# Patient Record
Sex: FEMALE | Race: BLACK OR AFRICAN AMERICAN | NOT HISPANIC OR LATINO | Employment: FULL TIME | URBAN - METROPOLITAN AREA
[De-identification: names, ages, dates, MRNs, and addresses within clinical notes are randomized per-mention and may not be internally consistent; named-entity substitution may affect disease eponyms.]

---

## 2017-03-20 DIAGNOSIS — E66.9 NON MORBID OBESITY, UNSPECIFIED OBESITY TYPE: ICD-10-CM

## 2017-03-20 NOTE — TELEPHONE ENCOUNTER
[facility] phone call message- patient requesting a refill:    Full Medication Name:naltrexone-bupropion (CONTRAVE) 8-90 MG extended release tablet    Dose:     Pharmacy confirmed as     [place], [place] - [facility] [address] [address]  Phone: [phone] Fax: [phone]  : Yes    Additional Comments: patient is requesting refill  Of above medication     OK to leave a message on voice mail? Yes    Primary language: English      needed? No    Call taken on March 20, 2017 at 3:58 PM by Maria Dolores Solomon

## 2017-03-24 ENCOUNTER — OFFICE VISIT (OUTPATIENT)
Dept: FAMILY MEDICINE | Facility: CLINIC | Age: 57
End: 2017-03-24

## 2017-03-24 VITALS
OXYGEN SATURATION: 99 % | BODY MASS INDEX: 33.79 KG/M2 | HEIGHT: 70 IN | SYSTOLIC BLOOD PRESSURE: 131 MMHG | WEIGHT: 236 LBS | HEART RATE: 65 BPM | DIASTOLIC BLOOD PRESSURE: 89 MMHG

## 2017-03-24 DIAGNOSIS — M25.511 BILATERAL SHOULDER PAIN, UNSPECIFIED CHRONICITY: ICD-10-CM

## 2017-03-24 DIAGNOSIS — M25.512 BILATERAL SHOULDER PAIN, UNSPECIFIED CHRONICITY: ICD-10-CM

## 2017-03-24 DIAGNOSIS — R10.31 RLQ ABDOMINAL PAIN: Primary | ICD-10-CM

## 2017-03-24 DIAGNOSIS — F32.A DEPRESSION, UNSPECIFIED DEPRESSION TYPE: ICD-10-CM

## 2017-03-24 RX ORDER — BUSPIRONE HYDROCHLORIDE 7.5 MG/1
7.5 TABLET ORAL 2 TIMES DAILY
Qty: 60 TABLET | Refills: 1 | Status: SHIPPED | OUTPATIENT
Start: 2017-03-24 | End: 2017-05-02

## 2017-03-24 ASSESSMENT — ENCOUNTER SYMPTOMS
NERVOUS/ANXIOUS: 1
MYALGIAS: 1
CHILLS: 0
DYSPHORIC MOOD: 1
SHORTNESS OF BREATH: 0
ABDOMINAL PAIN: 0
COUGH: 0
NECK PAIN: 1
FEVER: 0

## 2017-03-24 NOTE — PATIENT INSTRUCTIONS
Here is the plan from today's visit    1. RLQ abdominal pain  They should call you to set up an appointment  - GASTROENTEROLOGY ADULT REF PROCEDURE ONLY    2. Depression, unspecified depression type  Please follow up with me in 1 month   - busPIRone (BUSPAR) 7.5 MG tablet; Take 1 tablet (7.5 mg) by mouth 2 times daily  Dispense: 60 tablet; Refill: 1    3. Bilateral shoulder pain, unspecified chronicity  Continue taking aleve for pain.  Physical therapy should call you for an appointment  - PHYSICAL THERAPY REFERRAL    Please call or return to clinic if your symptoms don't go away.    Follow up plan  Please make a clinic appointment for follow up with me (BENJIE BONNER) in 1  month for follow up.    Thank you for coming to [facility] today.  Lab Testing:  **If you had lab testing today and your results are reassuring or normal they will be mailed to you or sent through [employer] within 7 days.   **If the lab tests need quick action we will call you with the results.  The phone number we will call with results is # [phone] (home) [phone] (work). If this is not the best number please call our clinic and change the number.  Medication Refills:  If you need any refills please call your pharmacy and they will contact us.   If you need to  your refill at a new pharmacy, please contact the new pharmacy directly. The new pharmacy will help you get your medications transferred faster.   Scheduling:  If you have any concerns about today's visit or wish to schedule another appointment please call our office during normal business hours [phone] (8-5:00 M-F)  If a referral was made to a [facility] and you don't get a call from central scheduling please call [phone].  If a Mammogram was ordered for you at [facility] call [phone] to schedule or change your appointment.  If you had an XRay/CT/Ultrasound/MRI ordered the number is [phone] to schedule or  change your radiology appointment.   Medical Concerns:  If you have urgent medical concerns please call [phone] at any time of the day.    Colonoscopy referral    Order sent to Endoscopy scheduling pool.           ThanksAgusto

## 2017-03-24 NOTE — PROGRESS NOTES
HPI:       Chiquita Butler is a 57 year old who presents for the following  Patient presents with:  Physical      Joint Pain- Right and Left Shoulder pain and weakness     Onset: 3-4 months    Injury  no    Description:   Location(s): Posterior shoulders bilaterally, across trapezius.  Occasional neck pain  Character: Feels tight.  No focal weakness or pain or loss in function or mobility.    Intensity: moderate    Progression of Symptoms: same    Accompanying Signs & Symptoms:  Other symptoms: radiation of pain to arms   History:   Previous similar pain: no      Worsened by    Overuse?: Yes Details: worse with raising arms, lifting child  Morning Stiffness?:no    Alleviating factors:  Improved by: NSAID - Aleve       Therapies Tried and outcome: Aleve, Details: uses it every day, helps.  Patient has also had massages, did not help.          Mood Symptoms     Concerns: History of depression, currently working with a grief counselor, which is bringing up more depression    How long have you been feeling this way? 6 months    Description:   Depressed Mood?: YES   Anxious Mood?:  YES     Accompanying Signs & Symptoms:  Still participating in activities that you used to enjoy?: Yes  Fatigue: No   Irritability:  YES   Difficulty concentrating:  YES   Changes in appetite: No   Problems with sleep:  YES uses ambien every other night, helps  ++++++++++++++++++++++++++++++++      Substance Use: No        Alcohol Use:occasional                Other drug use:  Never Used    Social Support           Who do you live with? Partner, 4 kids, partner's brother and his partner          Who do you turn to for support? Partner         Resources         What makes you feel better?: spending time with family         What do you do to manage stress? exercise             History          Has there been a time in your life when you needed much          less sleep than usual? No   Heart racing/beating fast : No   Thoughts of hurting  "yourself or others: No   Previous treatment Antidepressants - Currently being treated with Celexa (maximum dose)                                 Therapy: Yes: Currently going to a grief counselor    Today's PHQ-9 Score:   3    KEN Score:  6           Patient also requests a referral for a colonoscopy.  She was seen in ED 11/2016 for right lower quadrant abdominal pain.  An abdominal CT was performed which showed thickening and edema of transverse colon.  GI was consulted and patient was advised to have an \"urgent\" colonoscopy.  Patient also notes a family history of colon cancer.  She states abdominal pain has improved but she is still concerned and would like to have a colonoscopy.      Problem, Medication and Allergy Lists were reviewed and are current.  Patient is an established patient of this clinic.         Review of Systems:   Review of Systems   Constitutional: Negative for chills and fever.   HENT: Negative for congestion.    Respiratory: Negative for cough and shortness of breath.    Cardiovascular: Negative for chest pain.   Gastrointestinal: Negative for abdominal pain.   Musculoskeletal: Positive for myalgias and neck pain.   Psychiatric/Behavioral: Positive for dysphoric mood (mild depression). The patient is nervous/anxious (mild anxiety).              Physical Exam:   Patient Vitals for the past 24 hrs:   BP Pulse SpO2 Height Weight   03/24/17 1115 131/89 - - - -   03/24/17 1113 (!) 151/94 65 99 % 5' 10\" (177.8 cm) 236 lb (107 kg)     Body mass index is 33.86 kg/(m^2).  Vital signs normal except elevated blood pressure     Physical Exam   Constitutional: She is oriented to person, place, and time. She appears well-developed and well-nourished.   HENT:   Head: Normocephalic.   Neck: Normal range of motion. Neck supple.   Cardiovascular: Normal rate.    Pulmonary/Chest: Effort normal.   Musculoskeletal: Normal range of motion.   Mild forward posture.  Mild tenderness with palpation of lower " cervical/upper thoracic spine.  Trapezius muscles tight/firm to palpation bilaterally.  Patient has no limits in ROM of arms or shoulders.  MS 5/5 shoulders and upper extremities.  Negative Simental, Neers, and empty can tests bilaterally.   Neurological: She is alert and oriented to person, place, and time.   Skin: No rash noted. No erythema. No pallor.   Psychiatric: She has a normal mood and affect. Her behavior is normal. Judgment and thought content normal.   Vitals reviewed.      Results:     No labs ordered today  Assessment and Plan     Chiquita was seen today for physical.  Chiquita was planning on receiving a CPE, but due to shoulder pain, need for referral and discussion about depression, we decided to postpone CPE and address current issues.      First, patient's abdominal pain has actually improved, but this is the reason she was advised to have the colonoscopy.  Colonoscopy referral placed in Epic.      Second, we discussed patient's depression.  She is currently seeing a grief counselor and issues from the past are coming up and patient does not feel that her Celexa (currently on maximum dose) is helping as much as she would like it to.  She is open to augmentation with Buspar, so we will try this for a month and patient will return for follow up to discuss it's effect.    Third, patient has bilateral upper back/shoulder pain.  There was no inciting event and the pain is localized to the trapezius area bilaterally, so I do not think this is an actual shoulder injury such as a rotator cuff tear or impingement syndrome.  Her exam was normal (no focal weakness or pain noted).   She has a toddler and it is possible that her toddler is getting heavier, which is straining her muscles.  Patient would most likely benefit from PT and exercises to help strengthen her upper back.  PT referral given to patient.  Ok to continue Aleve, but do not recommend it's use for long periods of time (risk of kidney  injury).    Diagnoses and all orders for this visit:    RLQ abdominal pain  -     GASTROENTEROLOGY ADULT REF PROCEDURE ONLY    Depression, unspecified depression type  -     busPIRone (BUSPAR) 7.5 MG tablet; Take 1 tablet (7.5 mg) by mouth 2 times daily    Bilateral shoulder pain, unspecified chronicity  -     PHYSICAL THERAPY REFERRAL      There are no discontinued medications.  Options for treatment and follow-up care were reviewed with the patient. Chiuqita Butler  engaged in the decision making process and verbalized understanding of the options discussed and agreed with the final plan.    Kasey Daniel M.D.  PGY-2, Family Medicine

## 2017-03-24 NOTE — MR AVS SNAPSHOT
After Visit Summary   3/24/2017    Chiquita Butler    MRN: [id number]           Patient Information     Date Of Birth          1960        Visit Information        Provider Department      3/24/2017 11:00 AM Kasey Daniel MD [facility]        Today's Diagnoses     RLQ abdominal pain    -  1    Depression, unspecified depression type        Bilateral shoulder pain, unspecified chronicity          Care Instructions    Here is the plan from today's visit    1. RLQ abdominal pain  They should call you to set up an appointment  - GASTROENTEROLOGY ADULT REF PROCEDURE ONLY    2. Depression, unspecified depression type  Please follow up with me in 1 month   - busPIRone (BUSPAR) 7.5 MG tablet; Take 1 tablet (7.5 mg) by mouth 2 times daily  Dispense: 60 tablet; Refill: 1    3. Bilateral shoulder pain, unspecified chronicity  Continue taking aleve for pain.  Physical therapy should call you for an appointment  - PHYSICAL THERAPY REFERRAL    Please call or return to clinic if your symptoms don't go away.    Follow up plan  Please make a clinic appointment for follow up with me (KASEY DANIEL) in 1  month for follow up.    Thank you for coming to [facility] today.  Lab Testing:  **If you had lab testing today and your results are reassuring or normal they will be mailed to you or sent through [employer] within 7 days.   **If the lab tests need quick action we will call you with the results.  The phone number we will call with results is # [phone] (home) [phone] (work). If this is not the best number please call our clinic and change the number.  Medication Refills:  If you need any refills please call your pharmacy and they will contact us.   If you need to  your refill at a new pharmacy, please contact the new pharmacy directly. The new pharmacy will help you get your medications transferred faster.   Scheduling:  If you have any concerns about today's visit  or wish to schedule another appointment please call our office during normal business hours [phone] (8-5:00 M-F)  If a referral was made to a [facility] and you don't get a call from central scheduling please call [phone].  If a Mammogram was ordered for you at [facility] call [phone] to schedule or change your appointment.  If you had an XRay/CT/Ultrasound/MRI ordered the number is [phone] to schedule or change your radiology appointment.   Medical Concerns:  If you have urgent medical concerns please call [phone] at any time of the day.          Follow-ups after your visit        Additional Services     GASTROENTEROLOGY ADULT REF PROCEDURE ONLY       Last Lab Result: Creatinine (mg/dL)       Date                     Value                 11/13/2016               0.75             ----------  Body mass index is 33.86 kg/(m^2).     Needed:  No  Language:  English    Patient will be contacted to schedule procedure.     Please be aware that coverage of these services is subject to the terms and limitations of your health insurance plan.  Call member services at your health plan with any benefit or coverage questions.  Any procedures must be performed at a [place] facility OR coordinated by your clinic's referral office.    Please bring the following with you to your appointment:    (1) Any X-Rays, CTs or MRIs which have been performed.  Contact the facility where they were done to arrange for  prior to your scheduled appointment.    (2) List of current medications   (3) This referral request   (4) Any documents/labs given to you for this referral            PHYSICAL THERAPY REFERRAL       *This therapy referral will be filtered to a centralized scheduling office at [facility] and the patient will receive a call to schedule an appointment at a [place] location most convenient for them. *     [facility]  provides Physical Therapy evaluation and treatment and many specialty services across the [facility].  If requesting a specialty program, please choose from the list below.    If you have not heard from the scheduling office within 2 business days, please call [phone] for all locations, with the exception of [place], please call [phone].  Treatment: Evaluation & Treatment  Special Instructions/Modalities: Bilateral shoulder pain and mild neck pain  Special Programs: None    Please be aware that coverage of these services is subject to the terms and limitations of your health insurance plan.  Call member services at your health plan with any benefit or coverage questions.      **Note to Provider:  If you are referring outside of [place] for the therapy appointment, please list the name of the location in the  special instructions  above, print the referral and give to the patient to schedule the appointment.                  Who to contact     Please call your clinic at [phone] to:    Ask questions about your health    Make or cancel appointments    Discuss your medicines    Learn about your test results    Speak to your doctor   If you have compliments or concerns about an experience at your clinic, or if you wish to file a complaint, please contact [facility] Patient Relations at [phone] or email us at [email or web address]         Additional Information About Your Visit        [employer] Information     [employer] gives you secure access to your electronic health record. If you see a primary care provider, you can also send messages to your care team and make appointments. If you have questions, please call your primary care clinic.  If you do not have a primary care provider, please call [phone] and they will assist you.      [employer] is an electronic gateway that provides easy, online access to your medical records. With [employer], you can request a clinic  "appointment, read your test results, renew a prescription or communicate with your care team.     To access your existing account, please contact your [facility] or call [phone] for assistance.        Care EveryWhere ID     This is your Care EveryWhere ID. This could be used by other organizations to access your [facility] medical records  [id number]        Your Vitals Were     Pulse Height Pulse Oximetry Breastfeeding? BMI (Body Mass Index)       65 5' 10\" (177.8 cm) 99% No 33.86 kg/m2        Blood Pressure from Last 3 Encounters:   03/24/17 131/89   11/13/16 133/82   06/29/16 121/82    Weight from Last 3 Encounters:   03/24/17 236 lb (107 kg)   06/29/16 244 lb (110.7 kg)   05/27/16 238 lb 12.8 oz (108.3 kg)              We Performed the Following     GASTROENTEROLOGY ADULT REF PROCEDURE ONLY     PHYSICAL THERAPY REFERRAL          Today's Medication Changes          These changes are accurate as of: 3/24/17 11:55 AM.  If you have any questions, ask your nurse or doctor.               Start taking these medicines.        Dose/Directions    busPIRone 7.5 MG tablet   Commonly known as:  BUSPAR   Used for:  Depression, unspecified depression type   Started by:  Kasey Daniel MD        Dose:  7.5 mg   Take 1 tablet (7.5 mg) by mouth 2 times daily   Quantity:  60 tablet   Refills:  1            Where to get your medicines      These medications were sent to [address] [address]     Phone:  [phone]     busPIRone 7.5 MG tablet                Primary Care Provider Office Phone # Fax #    Amarilis Agosto [phone]       [facility] [address] [facility] [address]        Thank you!     Thank you for choosing [facility]  for your care. Our goal is always to provide you with excellent care. Hearing back from our patients is one way we can " continue to improve our services. Please take a few minutes to complete the written survey that you may receive in the mail after your visit with us. Thank you!             Your Updated Medication List - Protect others around you: Learn how to safely use, store and throw away your medicines at www.disposemymeds.org.          This list is accurate as of: 3/24/17 11:55 AM.  Always use your most recent med list.                   Brand Name Dispense Instructions for use    amLODIPine 10 MG tablet    NORVASC    90 tablet    Take 1 tablet (10 mg) by mouth daily       artificial tears Oint ophthalmic ointment     5 g    Apply a thin film to both eyes nightly for dryness       ASPIRIN PO          busPIRone 7.5 MG tablet    BUSPAR    60 tablet    Take 1 tablet (7.5 mg) by mouth 2 times daily       citalopram 40 MG tablet    celeXA    90 tablet    Take 1 tablet (40 mg) by mouth daily       losartan 100 MG tablet    COZAAR    90 tablet    Take 1 tablet (100 mg) by mouth daily       metoprolol 100 MG 24 hr tablet    TOPROL XL    90 tablet    Take 1 tablet (100 mg) by mouth daily       Multi-vitamin Tabs tablet      Take 1 tablet by mouth daily       naltrexone 50 MG tablet    DEPADE;REVIA    30 tablet    Take 1/2 tablet.  Time it one to two hours prior to worst cravings.  Then increase to one full tablet as instructed.       naltrexone-bupropion 8-90 MG per 12 hr tablet    CONTRAVE    120 tablet    Take 2 tablets by mouth 2 times daily Patient has titrated up to final dose 2tabs 2x daily.       vitamin D 2000 UNITS tablet     100 tablet    Take 2,000 Units by mouth daily       zolpidem 5 MG tablet    AMBIEN    90 tablet    Take 1 tablet (5 mg) by mouth nightly as needed

## 2017-03-24 NOTE — PROGRESS NOTES
Preceptor Attestation:   Patient seen and discussed with the resident. Assessment and plan reviewed with resident and agreed upon.   Supervising Physician:  Nick Aburto MD  [facility]

## 2017-04-05 ASSESSMENT — PATIENT HEALTH QUESTIONNAIRE - PHQ9: 5. POOR APPETITE OR OVEREATING: SEVERAL DAYS

## 2017-04-05 ASSESSMENT — ANXIETY QUESTIONNAIRES
3. WORRYING TOO MUCH ABOUT DIFFERENT THINGS: NOT AT ALL
GAD7 TOTAL SCORE: 3
2. NOT BEING ABLE TO STOP OR CONTROL WORRYING: NOT AT ALL
1. FEELING NERVOUS, ANXIOUS, OR ON EDGE: SEVERAL DAYS
5. BEING SO RESTLESS THAT IT IS HARD TO SIT STILL: NOT AT ALL
IF YOU CHECKED OFF ANY PROBLEMS ON THIS QUESTIONNAIRE, HOW DIFFICULT HAVE THESE PROBLEMS MADE IT FOR YOU TO DO YOUR WORK, TAKE CARE OF THINGS AT HOME, OR GET ALONG WITH OTHER PEOPLE: NOT DIFFICULT AT ALL
7. FEELING AFRAID AS IF SOMETHING AWFUL MIGHT HAPPEN: NOT AT ALL
6. BECOMING EASILY ANNOYED OR IRRITABLE: SEVERAL DAYS

## 2017-04-06 ASSESSMENT — ANXIETY QUESTIONNAIRES: GAD7 TOTAL SCORE: 3

## 2017-04-06 ASSESSMENT — PATIENT HEALTH QUESTIONNAIRE - PHQ9: SUM OF ALL RESPONSES TO PHQ QUESTIONS 1-9: 3

## 2017-04-17 ENCOUNTER — TELEPHONE (OUTPATIENT)
Dept: GASTROENTEROLOGY | Facility: CLINIC | Age: 57
End: 2017-04-17

## 2017-04-17 DIAGNOSIS — Z12.11 ENCOUNTER FOR SCREENING COLONOSCOPY: Primary | ICD-10-CM

## 2017-04-17 NOTE — TELEPHONE ENCOUNTER
Patient scheduled for Colonoscopy    Indication for procedure. RLQ abdominal pain    Referring Provider. Kasey Daniel MD    ? Not Needed    Arrival time verified? Yes, 0840    Facility location verified? Yes, [address]    Instructions given regarding prep and procedure    Prep Type Golytely    Are you taking any anticoagulants or blood thinners? Aspirin    Instructions given? Stopped    Electronic implanted devices? No    Pre procedure teaching completed? Yes    Transportation from procedure? Significant Other, Significant Other will stay with patient after procedure    H&P / Pre op physical completed? N/A

## 2017-04-20 ENCOUNTER — HOSPITAL ENCOUNTER (OUTPATIENT)
Facility: CLINIC | Age: 57
Discharge: HOME | End: 2017-04-20
Attending: INTERNAL MEDICINE | Admitting: INTERNAL MEDICINE
Payer: COMMERCIAL

## 2017-04-20 VITALS
DIASTOLIC BLOOD PRESSURE: 85 MMHG | OXYGEN SATURATION: 96 % | SYSTOLIC BLOOD PRESSURE: 145 MMHG | RESPIRATION RATE: 12 BRPM

## 2017-04-20 DIAGNOSIS — E66.9 NON MORBID OBESITY, UNSPECIFIED OBESITY TYPE: ICD-10-CM

## 2017-04-20 DIAGNOSIS — Z98.84 H/O GASTRIC BYPASS: Primary | ICD-10-CM

## 2017-04-20 LAB — COLONOSCOPY: NORMAL

## 2017-04-20 PROCEDURE — 25000125 ZZHC RX 250: Performed by: INTERNAL MEDICINE

## 2017-04-20 PROCEDURE — 25000128 H RX IP 250 OP 636: Performed by: INTERNAL MEDICINE

## 2017-04-20 PROCEDURE — G0104 CA SCREEN;FLEXI SIGMOIDSCOPE: HCPCS | Performed by: INTERNAL MEDICINE

## 2017-04-20 PROCEDURE — 40000104 ZZH STATISTIC MODERATE SEDATION < 10 MIN: Performed by: INTERNAL MEDICINE

## 2017-04-20 PROCEDURE — 45378 DIAGNOSTIC COLONOSCOPY: CPT | Performed by: INTERNAL MEDICINE

## 2017-04-20 RX ORDER — LIDOCAINE 40 MG/G
CREAM TOPICAL
Status: DISCONTINUED | OUTPATIENT
Start: 2017-04-20 | End: 2017-04-26 | Stop reason: HOSPADM

## 2017-04-20 RX ORDER — ONDANSETRON 2 MG/ML
4 INJECTION INTRAMUSCULAR; INTRAVENOUS
Status: DISCONTINUED | OUTPATIENT
Start: 2017-04-20 | End: 2017-04-26 | Stop reason: HOSPADM

## 2017-04-20 RX ORDER — FENTANYL CITRATE 50 UG/ML
INJECTION, SOLUTION INTRAMUSCULAR; INTRAVENOUS PRN
Status: DISCONTINUED | OUTPATIENT
Start: 2017-04-20 | End: 2017-04-26 | Stop reason: HOSPADM

## 2017-04-20 NOTE — IP AVS SNAPSHOT
MRN:[id number]                      After Visit Summary   4/20/2017    Chiquita Butler    MRN: [id number]           Thank you!     Thank you for choosing [facility] for your care. Our goal is always to provide you with excellent care. Hearing back from our patients is one way we can continue to improve our services. Please take a few minutes to complete the written survey that you may receive in the mail after you visit with us. Thank you!        Patient Information     Date Of Birth          1960        About your hospital stay     You were admitted on:  April 20, 2017 You last received care in the:  [facility], Endoscopy    You were discharged on:  April 20, 2017       Who to Call     For medical emergencies, please call 911.  For non-urgent questions about your medical care, please call your primary care provider or clinic, [phone]  For questions related to your surgery, please call your surgery clinic        Attending Provider     Provider Specialty    August Perez MD Gastroenterology       Primary Care Provider Office Phone # Fax #    Amarilis Agosto [phone]       [facility] [address] [address]        Your next 10 appointments already scheduled     May 02, 2017  8:00 AM CDT   Return Visit with Kasey Daniel MD   [facility]    [address],  [address] [address] [phone]              Further instructions from your care team       Discharge Instructions after Colonoscopy  or Sigmoidoscopy    Today you had a Colonoscopy    Activity and Diet  You were given medicine for pain. You may be dizzy or sleepy.  For 24 hours:    Do not drive or use heavy equipment.    Do not make important decisions.    Do not drink any alcohol.  You may return to your normal diet and medicines.    Discomfort    Air was placed in your colon during the exam in order to see it.  Walking helps to pass the air.  .    Follow-up    We were unable to complete your Colonoscopy today due to poor bowel prep.  You will need to reschedule your Colonoscopy and complete a 2-day prep for the next procedure.        We have sent a message to the [facility] Endoscopy Unit scheduling group regarding the above.  You should expect a call from them in the next couple of days.     When to call:    Call right away if you have:    Unusual pain in belly or chest pain not relieved with passing air.    More than 1 to 2 Tablespoons of bleeding from your rectum.    Fever above 100.6  F (37.5  C).    If you have severe pain, bleeding, or shortness of breath, go to an emergency room.    If you have questions, call:  Monday to Friday, 7 a.m. to 4:30 p.m.  Endoscopy: [phone] (We may have to call you back)    After hours  Hospital: [phone] (Ask for the GI fellow on call)    Pending Results     No orders found from 4/18/2017 to 4/21/2017.            Admission Information     Date & Time Provider Department Dept. Phone    4/20/2017 August Perez MD [facility], [place], Endoscopy [phone]      Your Vitals Were     Blood Pressure Respirations Pulse Oximetry             153/99 43 98%         MyChart Information     [employer] gives you secure access to your electronic health record. If you see a primary care provider, you can also send messages to your care team and make appointments. If you have questions, please call your primary care clinic.  If you do not have a primary care provider, please call [phone] and they will assist you.        Care EveryWhere ID     This is your Care EveryWhere ID. This could be used by other organizations to access your [place] medical records  [id number]           Review of your medicines      UNREVIEWED medicines. Ask your doctor about these medicines        Dose / Directions    amLODIPine 10 MG tablet   Commonly known as:  NORVASC   Used for:  Benign essential  hypertension        Dose:  10 mg   Take 1 tablet (10 mg) by mouth daily   Quantity:  90 tablet   Refills:  3       artificial tears Oint ophthalmic ointment   Used for:  Dry eyes, bilateral        Apply a thin film to both eyes nightly for dryness   Quantity:  5 g   Refills:  3       ASPIRIN PO   Used for:  Myopia of both eyes        Dose:  81 mg   Take 81 mg by mouth   Refills:  0       busPIRone 7.5 MG tablet   Commonly known as:  BUSPAR   Used for:  Depression, unspecified depression type        Dose:  7.5 mg   Take 1 tablet (7.5 mg) by mouth 2 times daily   Quantity:  60 tablet   Refills:  1       citalopram 40 MG tablet   Commonly known as:  celeXA   Used for:  Recurrent major depressive disorder, in partial remission (H)        Dose:  40 mg   Take 1 tablet (40 mg) by mouth daily   Quantity:  90 tablet   Refills:  3       losartan 100 MG tablet   Commonly known as:  COZAAR   Used for:  Benign essential hypertension        Dose:  100 mg   Take 1 tablet (100 mg) by mouth daily   Quantity:  90 tablet   Refills:  3       metoprolol 100 MG 24 hr tablet   Commonly known as:  TOPROL XL   Used for:  Benign essential hypertension        Dose:  100 mg   Take 1 tablet (100 mg) by mouth daily   Quantity:  90 tablet   Refills:  3       Multi-vitamin Tabs tablet   Used for:  Myopia of both eyes        Dose:  1 tablet   Take 1 tablet by mouth daily   Refills:  0       naltrexone 50 MG tablet   Commonly known as:  DEPADE;REVIA   Used for:  Morbid obesity (H)        Take 1/2 tablet.  Time it one to two hours prior to worst cravings.  Then increase to one full tablet as instructed.   Quantity:  30 tablet   Refills:  6       naltrexone-bupropion 8-90 MG per 12 hr tablet   Commonly known as:  CONTRAVE   Used for:  Non morbid obesity, unspecified obesity type        Dose:  2 tablet   Take 2 tablets by mouth 2 times daily Patient has titrated up to final dose 2tabs 2x daily.   Quantity:  120 tablet   Refills:  6       vitamin D 2000  UNITS tablet   Used for:  Health care maintenance        Dose:  2000 Units   Take 2,000 Units by mouth daily   Quantity:  100 tablet   Refills:  3       zolpidem 5 MG tablet   Commonly known as:  AMBIEN   Used for:  Persistent disorder of initiating or maintaining sleep        Dose:  5 mg   Take 1 tablet (5 mg) by mouth nightly as needed   Quantity:  90 tablet   Refills:  1                Protect others around you: Learn how to safely use, store and throw away your medicines at www.disposemymeds.org.             Medication List: This is a list of all your medications and when to take them. Check marks below indicate your daily home schedule. Keep this list as a reference.      Medications           Morning Afternoon Evening Bedtime As Needed    amLODIPine 10 MG tablet   Commonly known as:  NORVASC   Take 1 tablet (10 mg) by mouth daily                                artificial tears Oint ophthalmic ointment   Apply a thin film to both eyes nightly for dryness                                ASPIRIN PO   Take 81 mg by mouth                                busPIRone 7.5 MG tablet   Commonly known as:  BUSPAR   Take 1 tablet (7.5 mg) by mouth 2 times daily                                citalopram 40 MG tablet   Commonly known as:  celeXA   Take 1 tablet (40 mg) by mouth daily                                losartan 100 MG tablet   Commonly known as:  COZAAR   Take 1 tablet (100 mg) by mouth daily                                metoprolol 100 MG 24 hr tablet   Commonly known as:  TOPROL XL   Take 1 tablet (100 mg) by mouth daily                                Multi-vitamin Tabs tablet   Take 1 tablet by mouth daily                                naltrexone 50 MG tablet   Commonly known as:  DEPADE;REVIA   Take 1/2 tablet.  Time it one to two hours prior to worst cravings.  Then increase to one full tablet as instructed.                                naltrexone-bupropion 8-90 MG per 12 hr tablet   Commonly known as:   CONTRAVE   Take 2 tablets by mouth 2 times daily Patient has titrated up to final dose 2tabs 2x daily.                                vitamin D 2000 UNITS tablet   Take 2,000 Units by mouth daily                                zolpidem 5 MG tablet   Commonly known as:  AMBIEN   Take 1 tablet (5 mg) by mouth nightly as needed

## 2017-04-20 NOTE — OR NURSING
Pt tolerated colonoscopy well on 2L NC. Weaned to rm air after. Stool present. Procedure aborted. MD informed pt to need more prep.

## 2017-04-20 NOTE — DISCHARGE INSTRUCTIONS
Discharge Instructions after Colonoscopy  or Sigmoidoscopy    Today you had a Colonoscopy    Activity and Diet  You were given medicine for pain. You may be dizzy or sleepy.  For 24 hours:    Do not drive or use heavy equipment.    Do not make important decisions.    Do not drink any alcohol.  You may return to your normal diet and medicines.    Discomfort    Air was placed in your colon during the exam in order to see it. Walking helps to pass the air.  .    Follow-up    We were unable to complete your Colonoscopy today due to poor bowel prep.  You will need to reschedule your Colonoscopy and complete a 2-day prep for the next procedure.        We have sent a message to the [facility] Endoscopy Unit scheduling group regarding the above.  You should expect a call from them in the next couple of days.     When to call:    Call right away if you have:    Unusual pain in belly or chest pain not relieved with passing air.    More than 1 to 2 Tablespoons of bleeding from your rectum.    Fever above 100.6  F (37.5  C).    If you have severe pain, bleeding, or shortness of breath, go to an emergency room.    If you have questions, call:  Monday to Friday, 7 a.m. to 4:30 p.m.  Endoscopy: [phone] (We may have to call you back)    After hours  Hospital: [phone] (Ask for the GI fellow on call)

## 2017-04-20 NOTE — IP AVS SNAPSHOT
[facility], [place], Endoscopy    [address] [address]    Phone:  [phone]                                       After Visit Summary   4/20/2017    Chiquita Butler    MRN: [id number]           After Visit Summary Signature Page     I have received my discharge instructions, and my questions have been answered. I have discussed any challenges I see with this plan with the nurse or doctor.    ..........................................................................................................................................  Patient/Patient Representative Signature      ..........................................................................................................................................  Patient Representative Print Name and Relationship to Patient    ..................................................               ................................................  Date                                            Time    ..........................................................................................................................................  Reviewed by Signature/Title    ...................................................              ..............................................  Date                                                            Time

## 2017-04-25 ENCOUNTER — TELEPHONE (OUTPATIENT)
Dept: GASTROENTEROLOGY | Facility: CLINIC | Age: 57
End: 2017-04-25

## 2017-04-25 NOTE — TELEPHONE ENCOUNTER
Message left for patient to call back to schedule follow up colonoscopy with 2 day prep.     Salena Dhaliwal RN

## 2017-05-02 ENCOUNTER — OFFICE VISIT (OUTPATIENT)
Dept: FAMILY MEDICINE | Facility: CLINIC | Age: 57
End: 2017-05-02

## 2017-05-02 VITALS
RESPIRATION RATE: 20 BRPM | WEIGHT: 236.6 LBS | BODY MASS INDEX: 33.95 KG/M2 | TEMPERATURE: 97.5 F | SYSTOLIC BLOOD PRESSURE: 136 MMHG | HEART RATE: 67 BPM | OXYGEN SATURATION: 97 % | DIASTOLIC BLOOD PRESSURE: 84 MMHG

## 2017-05-02 DIAGNOSIS — Z01.419 ENCOUNTER FOR CERVICAL PAP SMEAR WITH PELVIC EXAM: Primary | ICD-10-CM

## 2017-05-02 DIAGNOSIS — Z11.59 NEED FOR HEPATITIS C SCREENING TEST: ICD-10-CM

## 2017-05-02 DIAGNOSIS — F32.A DEPRESSION, UNSPECIFIED DEPRESSION TYPE: ICD-10-CM

## 2017-05-02 LAB — HCV AB SERPL QL IA: NORMAL

## 2017-05-02 RX ORDER — BUSPIRONE HYDROCHLORIDE 7.5 MG/1
7.5 TABLET ORAL 2 TIMES DAILY
Qty: 60 TABLET | Refills: 1 | Status: SHIPPED | OUTPATIENT
Start: 2017-05-02 | End: 2017-08-03

## 2017-05-02 ASSESSMENT — ENCOUNTER SYMPTOMS
FEVER: 0
PALPITATIONS: 0
FATIGUE: 0
COUGH: 0
SHORTNESS OF BREATH: 0

## 2017-05-02 NOTE — PATIENT INSTRUCTIONS
Here is the plan from today's visit    1. Encounter for cervical Pap smear with pelvic exam  - Pap imaged thin layer screen with HPV - recommended age 30 - 65 years (select HPV order below)  - HPV High Risk Types DNA Cervical  -The results will take at least one week.     2. Need for hepatitis C screening test  - Hepatitis C antibody    3. Depression, unspecified depression type  - busPIRone (BUSPAR) 7.5 MG tablet; Take 1 tablet (7.5 mg) by mouth 2 times daily  Dispense: 60 tablet; Refill: 1      Please call or return to clinic if your symptoms don't go away.    Follow up plan  Follow up as needed    Thank you for coming to [facility] today.  Lab Testing:  **If you had lab testing today and your results are reassuring or normal they will be mailed to you or sent through [employer] within 7 days.   **If the lab tests need quick action we will call you with the results.  The phone number we will call with results is # [phone] (home) [phone] (work). If this is not the best number please call our clinic and change the number.  Medication Refills:  If you need any refills please call your pharmacy and they will contact us.   If you need to  your refill at a new pharmacy, please contact the new pharmacy directly. The new pharmacy will help you get your medications transferred faster.   Scheduling:  If you have any concerns about today's visit or wish to schedule another appointment please call our office during normal business hours [phone] (8-5:00 M-F)  If a referral was made to a [facility] and you don't get a call from central scheduling please call [phone].  If a Mammogram was ordered for you at [facility] call [phone] to schedule or change your appointment.  If you had an XRay/CT/Ultrasound/MRI ordered the number is [phone] to schedule or change your radiology appointment.   Medical Concerns:  If you have urgent medical concerns please call  [phone] at any time of the day.  If you have a medical emergency please call 911.

## 2017-05-02 NOTE — PROGRESS NOTES
HPI:       Chiquita Butler is a 57 year old who presents for the following  Patient presents with:  Repeat Pap Smear: annual pap      Pap smear: Patient is here to get pap smear done. Report she moved from [place] three years ago and last pap smear was five years ago. Denies any abnormality with pap smear.  She has been menopause for years       Problem, Medication and Allergy Lists were reviewed and are current.  Patient is an established patient of this clinic.         Review of Systems:   Review of Systems   Constitutional: Negative for fatigue and fever.   Respiratory: Negative for cough and shortness of breath.    Cardiovascular: Negative for chest pain, palpitations and leg swelling.             Physical Exam:   Patient Vitals for the past 24 hrs:   BP Temp Temp src Pulse Resp SpO2 Weight   05/02/17 0826 136/84 97.5  F (36.4  C) Oral 67 20 97 % 236 lb 9.6 oz (107.3 kg)     Body mass index is 33.95 kg/(m^2).  Vitals were reviewed and were normal     Physical Exam   Constitutional: She is oriented to person, place, and time. She appears well-developed and well-nourished.   HENT:   Head: Normocephalic and atraumatic.   Cardiovascular: Normal rate, regular rhythm and normal heart sounds.    No murmur heard.  Pulmonary/Chest: Effort normal and breath sounds normal. No respiratory distress. She has no wheezes. She has no rales.   Genitourinary: No tenderness in the vagina. No signs of injury around the vagina. No vaginal discharge found.   Neurological: She is alert and oriented to person, place, and time.         Results:      Results from the last 24 hoursNo results found for this or any previous visit (from the past 24 hour(s)).  Assessment and Plan     Chiquita was seen today for repeat pap smear.    Diagnoses and all orders for this visit:    Encounter for cervical Pap smear with pelvic exam  -     Pap imaged thin layer screen with HPV - recommended age 30 - 65 years (select HPV order below)  -     HPV High Risk  Types DNA Cervical    Need for hepatitis C screening test  -     Hepatitis C antibody    Depression, unspecified depression type  -     busPIRone (BUSPAR) 7.5 MG tablet; Take 1 tablet (7.5 mg) by mouth 2 times daily      Medications Discontinued During This Encounter   Medication Reason     busPIRone (BUSPAR) 7.5 MG tablet Reorder     Options for treatment and follow-up care were reviewed with the patient. Chiquita Butler  engaged in the decision making process and verbalized understanding of the options discussed and agreed with the final plan.    Symone Wright MD  PGY 3 [facility]  [phone](pg)

## 2017-05-02 NOTE — PROGRESS NOTES
Preceptor Attestation:   Patient seen and discussed with the resident.   Assessment and plan reviewed with resident and agreed upon.   Supervising Physician:  Uvaldo Bear MD  [facility]

## 2017-05-02 NOTE — MR AVS SNAPSHOT
After Visit Summary   5/2/2017    Chiquita Butler    MRN: [id number]           Patient Information     Date Of Birth          1960        Visit Information        Provider Department      5/2/2017 8:20 AM Symone Wright MD [facility]        Today's Diagnoses     Encounter for cervical Pap smear with pelvic exam    -  1    Need for hepatitis C screening test        Depression, unspecified depression type          Care Instructions    Here is the plan from today's visit    1. Encounter for cervical Pap smear with pelvic exam  - Pap imaged thin layer screen with HPV - recommended age 30 - 65 years (select HPV order below)  - HPV High Risk Types DNA Cervical  -The results will take at least one week.     2. Need for hepatitis C screening test  - Hepatitis C antibody    3. Depression, unspecified depression type  - busPIRone (BUSPAR) 7.5 MG tablet; Take 1 tablet (7.5 mg) by mouth 2 times daily  Dispense: 60 tablet; Refill: 1      Please call or return to clinic if your symptoms don't go away.    Follow up plan  Follow up as needed    Thank you for coming to [facility] today.  Lab Testing:  **If you had lab testing today and your results are reassuring or normal they will be mailed to you or sent through [employer]bi within 7 days.   **If the lab tests need quick action we will call you with the results.  The phone number we will call with results is # [phone] (home) [phone] (work). If this is not the best number please call our clinic and change the number.  Medication Refills:  If you need any refills please call your pharmacy and they will contact us.   If you need to  your refill at a new pharmacy, please contact the new pharmacy directly. The new pharmacy will help you get your medications transferred faster.   Scheduling:  If you have any concerns about today's visit or wish to schedule another appointment please call our office during normal business hours  [phone] (8-5:00 M-F)  If a referral was made to a [facility] and you don't get a call from central scheduling please call [phone].  If a Mammogram was ordered for you at [facility] call [phone] to schedule or change your appointment.  If you had an XRay/CT/Ultrasound/MRI ordered the number is [phone] to schedule or change your radiology appointment.   Medical Concerns:  If you have urgent medical concerns please call [phone] at any time of the day.  If you have a medical emergency please call [phone].        Follow-ups after your visit        Your next 10 appointments already scheduled     May 15, 2017   Procedure with Gold Tamayo MD   [facility], [place], Endoscopy ([facility], [facility])    [address] [address] [phone]           The [facility] is located on the corner of [address] and [address] on the [address]. It is easily accessible from virtually any point in the [place] [place] area, via [employer] and [employer].              Who to contact     Please call your clinic at [phone] to:    Ask questions about your health    Make or cancel appointments    Discuss your medicines    Learn about your test results    Speak to your doctor   If you have compliments or concerns about an experience at your clinic, or if you wish to file a complaint, please contact [facility] Patient Relations at [phone] or email us at [email or web address]         Additional Information About Your Visit        [employer] Information     [employer] gives you secure access to your electronic health record. If you see a primary care provider, you can also send messages to your care team and make appointments. If you have questions, please call your primary care clinic.  If you do not have a primary care provider, please call [phone] and they will assist  you.      [employer] is an electronic gateway that provides easy, online access to your medical records. With [employer], you can request a clinic appointment, read your test results, renew a prescription or communicate with your care team.     To access your existing account, please contact your [facility] or call [phone] for assistance.        Care EveryWhere ID     This is your Care EveryWhere ID. This could be used by other organizations to access your [place] medical records  [id number]        Your Vitals Were     Pulse Temperature Respirations Pulse Oximetry BMI (Body Mass Index)       67 97.5  F (36.4  C) (Oral) 20 97% 33.95 kg/m2        Blood Pressure from Last 3 Encounters:   05/02/17 136/84   04/20/17 145/85   03/24/17 131/89    Weight from Last 3 Encounters:   05/02/17 236 lb 9.6 oz (107.3 kg)   03/24/17 236 lb (107 kg)   06/29/16 244 lb (110.7 kg)              We Performed the Following     Hepatitis C antibody     HPV High Risk Types DNA Cervical     Pap imaged thin layer screen with HPV - recommended age 30 - 65 years (select HPV order below)          Where to get your medicines      These medications were sent to [address] [address]     Phone:  [phone]     busPIRone 7.5 MG tablet          Primary Care Provider Office Phone # Fax #    Amarilis Agosto [phone]       [facility] [address] [facility] [address]        Thank you!     Thank you for choosing [facility]  for your care. Our goal is always to provide you with excellent care. Hearing back from our patients is one way we can continue to improve our services. Please take a few minutes to complete the written survey that you may receive in the mail after your visit with us. Thank you!             Your Updated Medication List - Protect others around you: Learn how to safely  use, store and throw away your medicines at www.disposemymeds.org.          This list is accurate as of: 5/2/17  9:10 AM.  Always use your most recent med list.                   Brand Name Dispense Instructions for use    amLODIPine 10 MG tablet    NORVASC    90 tablet    Take 1 tablet (10 mg) by mouth daily       artificial tears Oint ophthalmic ointment     5 g    Apply a thin film to both eyes nightly for dryness       ASPIRIN PO      Take 81 mg by mouth       busPIRone 7.5 MG tablet    BUSPAR    60 tablet    Take 1 tablet (7.5 mg) by mouth 2 times daily       citalopram 40 MG tablet    celeXA    90 tablet    Take 1 tablet (40 mg) by mouth daily       losartan 100 MG tablet    COZAAR    90 tablet    Take 1 tablet (100 mg) by mouth daily       metoprolol 100 MG 24 hr tablet    TOPROL XL    90 tablet    Take 1 tablet (100 mg) by mouth daily       Multi-vitamin Tabs tablet      Take 1 tablet by mouth daily       naltrexone 50 MG tablet    DEPADE;REVIA    30 tablet    Take 1/2 tablet.  Time it one to two hours prior to worst cravings.  Then increase to one full tablet as instructed.       naltrexone-bupropion 8-90 MG per 12 hr tablet    CONTRAVE    120 tablet    Take 2 tablets by mouth 2 times daily Patient has titrated up to final dose 2tabs 2x daily.       vitamin D 2000 UNITS tablet     100 tablet    Take 2,000 Units by mouth daily       zolpidem 5 MG tablet    AMBIEN    90 tablet    Take 1 tablet (5 mg) by mouth nightly as needed

## 2017-05-04 LAB
COPATH REPORT: NORMAL
PAP: NORMAL

## 2017-05-05 LAB
FINAL DIAGNOSIS: NORMAL
HPV HR 12 DNA CVX QL NAA+PROBE: NEGATIVE
HPV16 DNA SPEC QL NAA+PROBE: NEGATIVE
HPV18 DNA SPEC QL NAA+PROBE: NEGATIVE
SPECIMEN DESCRIPTION: NORMAL

## 2017-05-08 ENCOUNTER — TELEPHONE (OUTPATIENT)
Dept: GASTROENTEROLOGY | Facility: CLINIC | Age: 57
End: 2017-05-08

## 2017-05-08 DIAGNOSIS — Z12.11 ENCOUNTER FOR SCREENING COLONOSCOPY: Primary | ICD-10-CM

## 2017-05-08 RX ORDER — BISACODYL 5 MG/1
6 TABLET, DELAYED RELEASE ORAL ONCE
Qty: 6 TABLET | Refills: 0 | Status: SHIPPED | OUTPATIENT
Start: 2017-05-08 | End: 2017-05-08

## 2017-05-08 NOTE — TELEPHONE ENCOUNTER
Patient scheduled for Colonoscopy    Indication for procedure. RLQ abdominal pain    Referring Provider. Kasey Daniel MD    ? Not Needed    Arrival time verified? Yes, 1400    Facility location verified? Yes, [address]    Instructions given regarding prep and procedure    Prep Type 2 day Golytely prep (Past poor prep)    Are you taking any anticoagulants or blood thinners? Aspirin    Instructions given? Stopped    Electronic implanted devices? No    Pre procedure teaching completed? Yes    Transportation from procedure? Significant Other, Significant Other will stay with patient after procedure    H&P / Pre op physical completed? N/A

## 2017-05-09 DIAGNOSIS — G47.00 PERSISTENT DISORDER OF INITIATING OR MAINTAINING SLEEP: ICD-10-CM

## 2017-05-09 NOTE — TELEPHONE ENCOUNTER
Date of last visit at clinic: 5/2/17    Please complete refill and CLOSE ENCOUNTER.  Closing the encounter signifies the refill is complete.

## 2017-05-10 RX ORDER — ZOLPIDEM TARTRATE 5 MG/1
5 TABLET ORAL
Qty: 30 TABLET | Refills: 1 | Status: SHIPPED | OUTPATIENT
Start: 2017-05-10 | End: 2017-12-15

## 2017-05-10 NOTE — TELEPHONE ENCOUNTER
Please process per controlled substance protocol. I have reviewed pt's chart and this refill is appropriate. Please provide rx to preceptor to sign, then provide to patient's pharmacy and notify patient.   Amarilis Agosto MD

## 2017-05-15 ENCOUNTER — HOSPITAL ENCOUNTER (OUTPATIENT)
Facility: CLINIC | Age: 57
Discharge: HOME | End: 2017-05-15
Attending: INTERNAL MEDICINE | Admitting: INTERNAL MEDICINE
Payer: COMMERCIAL

## 2017-05-15 VITALS
RESPIRATION RATE: 20 BRPM | OXYGEN SATURATION: 98 % | DIASTOLIC BLOOD PRESSURE: 84 MMHG | WEIGHT: 225 LBS | SYSTOLIC BLOOD PRESSURE: 140 MMHG | HEIGHT: 70 IN | BODY MASS INDEX: 32.21 KG/M2

## 2017-05-15 LAB — COLONOSCOPY: NORMAL

## 2017-05-15 PROCEDURE — 45378 DIAGNOSTIC COLONOSCOPY: CPT | Performed by: INTERNAL MEDICINE

## 2017-05-15 PROCEDURE — 99153 MOD SED SAME PHYS/QHP EA: CPT | Performed by: INTERNAL MEDICINE

## 2017-05-15 PROCEDURE — 25000128 H RX IP 250 OP 636: Performed by: INTERNAL MEDICINE

## 2017-05-15 PROCEDURE — G0500 MOD SEDAT ENDO SERVICE >5YRS: HCPCS | Performed by: INTERNAL MEDICINE

## 2017-05-15 PROCEDURE — 25000132 ZZH RX MED GY IP 250 OP 250 PS 637: Performed by: INTERNAL MEDICINE

## 2017-05-15 PROCEDURE — 25000125 ZZHC RX 250: Performed by: INTERNAL MEDICINE

## 2017-05-15 RX ORDER — SIMETHICONE
LIQUID (ML) MISCELLANEOUS PRN
Status: DISCONTINUED | OUTPATIENT
Start: 2017-05-15 | End: 2017-05-15 | Stop reason: HOSPADM

## 2017-05-15 RX ORDER — FENTANYL CITRATE 50 UG/ML
INJECTION, SOLUTION INTRAMUSCULAR; INTRAVENOUS PRN
Status: DISCONTINUED | OUTPATIENT
Start: 2017-05-15 | End: 2017-05-15 | Stop reason: HOSPADM

## 2017-05-15 NOTE — IP AVS SNAPSHOT
[facility], [place], Endoscopy    [address] [address]    Phone:  [phone]                                       After Visit Summary   5/15/2017    Chiquita Butler    MRN: [id number]           After Visit Summary Signature Page     I have received my discharge instructions, and my questions have been answered. I have discussed any challenges I see with this plan with the nurse or doctor.    ..........................................................................................................................................  Patient/Patient Representative Signature      ..........................................................................................................................................  Patient Representative Print Name and Relationship to Patient    ..................................................               ................................................  Date                                            Time    ..........................................................................................................................................  Reviewed by Signature/Title    ...................................................              ..............................................  Date                                                            Time

## 2017-05-15 NOTE — IP AVS SNAPSHOT
MRN:[id number]                      After Visit Summary   5/15/2017    Chiquita Butler    MRN: [id number]           Thank you!     Thank you for choosing [facility] for your care. Our goal is always to provide you with excellent care. Hearing back from our patients is one way we can continue to improve our services. Please take a few minutes to complete the written survey that you may receive in the mail after you visit with us. Thank you!        Patient Information     Date Of Birth          1960        About your hospital stay     You were admitted on:  May 15, 2017 You last received care in the:  [facility], Endoscopy    You were discharged on:  May 15, 2017       Who to Call     For medical emergencies, please call 911.  For non-urgent questions about your medical care, please call your primary care provider or clinic, [phone]  For questions related to your surgery, please call your surgery clinic        Attending Provider     Provider Specialty    Gold Tamayo MD Gastroenterology       Primary Care Provider Office Phone # Fax #    Amarilis MD Surendra [phone]       [place] [id number]        Further instructions from your care team       Discharge Instructions after Colonoscopy  or Sigmoidoscopy    Today you had a ____ Colonoscopy ____ Sigmoidoscopy    Activity and Diet  You were given medicine for pain. You may be dizzy or sleepy.  For 24 hours:    Do not drive or use heavy equipment.    Do not make important decisions.    Do not drink any alcohol.  You may return to your normal diet and medicines.    Discomfort    Air was placed in your colon during the exam in order to see it. Walking helps to pass the air.    You may take Tylenol (acetaminophen) for pain unless your doctor has told you not to.  Do not take aspirin or ibuprofen (Advil, Motrin, or other anti-inflammatory  drugs) for _____ days.    Follow-up  ____ We took small  "tissue samples or polyps to study. Your doctor will call you with the results  within two weeks.    When to call:    Call right away if you have:    Unusual pain in belly or chest pain not relieved with passing air.    More than 1 to 2 Tablespoons of bleeding from your rectum.    Fever above 100.6  F (37.5  C).    If you have severe pain, bleeding, or shortness of breath, go to an emergency room.    If you have questions, call:  Monday to Friday, 7 a.m. to 4:30 p.m.  Endoscopy: [phone] (We may have to call you back)    After hours  Hospital: [phone] (Ask for the GI fellow on call)    Pending Results     No orders found from 5/13/2017 to 5/16/2017.            Admission Information     Date & Time Provider Department Dept. Phone    5/15/2017 Gold Tamayo MD [facility], [place], Endoscopy [phone]      Your Vitals Were     Blood Pressure Respirations Height Weight Pulse Oximetry BMI (Body Mass Index)    129/86 17 1.778 m (5' 10\") 102.1 kg (225 lb) 97% 32.28 kg/m2      [email or web address] Information     [employer] gives you secure access to your electronic health record. If you see a primary care provider, you can also send messages to your care team and make appointments. If you have questions, please call your primary care clinic.  If you do not have a primary care provider, please call [phone] and they will assist you.        Care EveryWhere ID     This is your Care EveryWhere ID. This could be used by other organizations to access your [place] medical records  [id number]           Review of your medicines      UNREVIEWED medicines. Ask your doctor about these medicines        Dose / Directions    amLODIPine 10 MG tablet   Commonly known as:  NORVASC   Used for:  Benign essential hypertension        Dose:  10 mg   Take 1 tablet (10 mg) by mouth daily   Quantity:  90 tablet   Refills:  3       artificial tears Oint ophthalmic ointment   Used for:  Dry eyes, bilateral        Apply a thin film to both eyes " nightly for dryness   Quantity:  5 g   Refills:  3       ASPIRIN PO   Used for:  Myopia of both eyes        Dose:  81 mg   Take 81 mg by mouth   Refills:  0       busPIRone 7.5 MG tablet   Commonly known as:  BUSPAR   Used for:  Depression, unspecified depression type        Dose:  7.5 mg   Take 1 tablet (7.5 mg) by mouth 2 times daily   Quantity:  60 tablet   Refills:  1       citalopram 40 MG tablet   Commonly known as:  celeXA   Used for:  Recurrent major depressive disorder, in partial remission (H)        Dose:  40 mg   Take 1 tablet (40 mg) by mouth daily   Quantity:  90 tablet   Refills:  3       losartan 100 MG tablet   Commonly known as:  COZAAR   Used for:  Benign essential hypertension        Dose:  100 mg   Take 1 tablet (100 mg) by mouth daily   Quantity:  90 tablet   Refills:  3       metoprolol 100 MG 24 hr tablet   Commonly known as:  TOPROL XL   Used for:  Benign essential hypertension        Dose:  100 mg   Take 1 tablet (100 mg) by mouth daily   Quantity:  90 tablet   Refills:  3       Multi-vitamin Tabs tablet   Used for:  Myopia of both eyes        Dose:  1 tablet   Take 1 tablet by mouth daily   Refills:  0       naltrexone 50 MG tablet   Commonly known as:  DEPADE;REVIA   Used for:  Morbid obesity (H)        Take 1/2 tablet.  Time it one to two hours prior to worst cravings.  Then increase to one full tablet as instructed.   Quantity:  30 tablet   Refills:  6       naltrexone-bupropion 8-90 MG per 12 hr tablet   Commonly known as:  CONTRAVE   Used for:  Non morbid obesity, unspecified obesity type        Dose:  2 tablet   Take 2 tablets by mouth 2 times daily Patient has titrated up to final dose 2tabs 2x daily.   Quantity:  120 tablet   Refills:  6       vitamin D 2000 UNITS tablet   Used for:  Health care maintenance        Dose:  2000 Units   Take 2,000 Units by mouth daily   Quantity:  100 tablet   Refills:  3       zolpidem 5 MG tablet   Commonly known as:  AMBIEN   Used for:  Persistent  disorder of initiating or maintaining sleep        Dose:  5 mg   Take 1 tablet (5 mg) by mouth nightly as needed   Quantity:  30 tablet   Refills:  1                Protect others around you: Learn how to safely use, store and throw away your medicines at www.disposemymeds.org.             Medication List: This is a list of all your medications and when to take them. Check marks below indicate your daily home schedule. Keep this list as a reference.      Medications           Morning Afternoon Evening Bedtime As Needed    amLODIPine 10 MG tablet   Commonly known as:  NORVASC   Take 1 tablet (10 mg) by mouth daily                                artificial tears Oint ophthalmic ointment   Apply a thin film to both eyes nightly for dryness                                ASPIRIN PO   Take 81 mg by mouth                                busPIRone 7.5 MG tablet   Commonly known as:  BUSPAR   Take 1 tablet (7.5 mg) by mouth 2 times daily                                citalopram 40 MG tablet   Commonly known as:  celeXA   Take 1 tablet (40 mg) by mouth daily                                losartan 100 MG tablet   Commonly known as:  COZAAR   Take 1 tablet (100 mg) by mouth daily                                metoprolol 100 MG 24 hr tablet   Commonly known as:  TOPROL XL   Take 1 tablet (100 mg) by mouth daily                                Multi-vitamin Tabs tablet   Take 1 tablet by mouth daily                                naltrexone 50 MG tablet   Commonly known as:  DEPADE;REVIA   Take 1/2 tablet.  Time it one to two hours prior to worst cravings.  Then increase to one full tablet as instructed.                                naltrexone-bupropion 8-90 MG per 12 hr tablet   Commonly known as:  CONTRAVE   Take 2 tablets by mouth 2 times daily Patient has titrated up to final dose 2tabs 2x daily.                                vitamin D 2000 UNITS tablet   Take 2,000 Units by mouth daily                                 zolpidem 5 MG tablet   Commonly known as:  AMBIEN   Take 1 tablet (5 mg) by mouth nightly as needed

## 2017-05-15 NOTE — DISCHARGE INSTRUCTIONS
Discharge Instructions after Colonoscopy  or Sigmoidoscopy    Today you had a ____ Colonoscopy ____ Sigmoidoscopy    Activity and Diet  You were given medicine for pain. You may be dizzy or sleepy.  For 24 hours:    Do not drive or use heavy equipment.    Do not make important decisions.    Do not drink any alcohol.  You may return to your normal diet and medicines.    Discomfort    Air was placed in your colon during the exam in order to see it. Walking helps to pass the air.    You may take Tylenol (acetaminophen) for pain unless your doctor has told you not to.  Do not take aspirin or ibuprofen (Advil, Motrin, or other anti-inflammatory  drugs) for _____ days.    Follow-up  ____ We took small tissue samples or polyps to study. Your doctor will call you with the results  within two weeks.    When to call:    Call right away if you have:    Unusual pain in belly or chest pain not relieved with passing air.    More than 1 to 2 Tablespoons of bleeding from your rectum.    Fever above 100.6  F (37.5  C).    If you have severe pain, bleeding, or shortness of breath, go to an emergency room.    If you have questions, call:  Monday to Friday, 7 a.m. to 4:30 p.m.  Endoscopy: [phone] (We may have to call you back)    After hours  Hospital: [phone] (Ask for the GI fellow on call)

## 2017-05-15 NOTE — OR NURSING
Colonoscopy started but aborted d/t poor prep.  Pt tolerated procedure well.  VSS on 2L NC.  Sedation was given per MD instruction.  Pt instructed to reschedule procedure with additional prep.

## 2017-07-06 DIAGNOSIS — I10 BENIGN ESSENTIAL HYPERTENSION: ICD-10-CM

## 2017-07-06 NOTE — TELEPHONE ENCOUNTER
Request for medication refill:    Date of last visit at clinic: 5-2-17    Please complete refill if appropriate and CLOSE ENCOUNTER.    Closing the encounter signifies the refill is complete.    If refill has been denied, please complete the smart phrase .smirefuse and route it to the [facility] RN TRIAGE pool to inform the patient and the pharmacy.    Aissatou Archibald

## 2017-07-07 DIAGNOSIS — I10 BENIGN ESSENTIAL HYPERTENSION: ICD-10-CM

## 2017-07-07 NOTE — TELEPHONE ENCOUNTER
Request for medication refill:    Date of last visit at clinic: 5/2/17    Please complete refill if appropriate and CLOSE ENCOUNTER.    Closing the encounter signifies the refill is complete.    If refill has been denied, please complete the smart phrase .smirefuse and route it to the [facility] RN TRIAGE pool to inform the patient and the pharmacy.    KARSON Bautista

## 2017-07-10 RX ORDER — LOSARTAN POTASSIUM 100 MG/1
100 TABLET ORAL DAILY
Qty: 90 TABLET | Refills: 3 | Status: SHIPPED | OUTPATIENT
Start: 2017-07-10 | End: 2018-07-27

## 2017-07-10 RX ORDER — AMLODIPINE BESYLATE 10 MG/1
10 TABLET ORAL DAILY
Qty: 90 TABLET | Refills: 3 | Status: SHIPPED | OUTPATIENT
Start: 2017-07-10 | End: 2017-12-22

## 2017-07-11 DIAGNOSIS — I10 BENIGN ESSENTIAL HYPERTENSION: ICD-10-CM

## 2017-07-11 DIAGNOSIS — F33.41 RECURRENT MAJOR DEPRESSIVE DISORDER, IN PARTIAL REMISSION (H): ICD-10-CM

## 2017-07-11 NOTE — TELEPHONE ENCOUNTER
Request for medication refill:    Date of last visit at clinic: 5-2-17    Please complete refill if appropriate and CLOSE ENCOUNTER.    Closing the encounter signifies the refill is complete.    If refill has been denied, please complete the smart phrase .smirefuse and route it to the [facility] RN TRIAGE pool to inform the patient and the pharmacy.    Vannesa Gonzalez MA

## 2017-07-12 RX ORDER — CITALOPRAM HYDROBROMIDE 40 MG/1
40 TABLET ORAL DAILY
Qty: 90 TABLET | Refills: 3 | Status: SHIPPED | OUTPATIENT
Start: 2017-07-12 | End: 2018-07-30

## 2017-07-12 RX ORDER — METOPROLOL SUCCINATE 100 MG/1
100 TABLET, EXTENDED RELEASE ORAL DAILY
Qty: 90 TABLET | Refills: 3 | Status: SHIPPED | OUTPATIENT
Start: 2017-07-12 | End: 2018-07-30

## 2017-08-03 DIAGNOSIS — F32.A DEPRESSION, UNSPECIFIED DEPRESSION TYPE: ICD-10-CM

## 2017-08-04 RX ORDER — BUSPIRONE HYDROCHLORIDE 7.5 MG/1
7.5 TABLET ORAL 2 TIMES DAILY
Qty: 180 TABLET | Refills: 1 | Status: SHIPPED | OUTPATIENT
Start: 2017-08-04 | End: 2017-12-22

## 2017-10-10 ENCOUNTER — ALLIED HEALTH/NURSE VISIT (OUTPATIENT)
Dept: NURSING | Facility: CLINIC | Age: 57
End: 2017-10-10
Payer: COMMERCIAL

## 2017-10-10 DIAGNOSIS — Z23 NEED FOR PROPHYLACTIC VACCINATION AND INOCULATION AGAINST INFLUENZA: Primary | ICD-10-CM

## 2017-10-10 PROCEDURE — 90686 IIV4 VACC NO PRSV 0.5 ML IM: CPT

## 2017-10-10 PROCEDURE — 99207 ZZC NO CHARGE NURSE ONLY: CPT

## 2017-10-10 PROCEDURE — 90471 IMMUNIZATION ADMIN: CPT

## 2017-10-10 NOTE — PROGRESS NOTES
Injectable Influenza Immunization Documentation    1.  Is the person to be vaccinated sick today?   No    2. Does the person to be vaccinated have an allergy to a component   of the vaccine?   No    3. Has the person to be vaccinated ever had a serious reaction   to influenza vaccine in the past?   No    4. Has the person to be vaccinated ever had Guillain-Barré syndrome?   No    Form completed by Sugey Nielsen

## 2017-10-10 NOTE — MR AVS SNAPSHOT
After Visit Summary   10/10/2017    Chiquita Butler    MRN: [id number]           Patient Information     Date Of Birth          1960        Visit Information        Provider Department      10/10/2017 11:50 AM CARE COORDINATOR [facility]        Today's Diagnoses     Need for prophylactic vaccination and inoculation against influenza    -  1       Follow-ups after your visit        Who to contact     If you have questions or need follow up information about today's clinic visit or your schedule please contact [facility] directly at [phone].  Normal or non-critical lab and imaging results will be communicated to you by [employer], letter or phone within 4 business days after the clinic has received the results. If you do not hear from us within 7 days, please contact the clinic through [employer] or phone. If you have a critical or abnormal lab result, we will notify you by phone as soon as possible.  Submit refill requests through [employer]uli or call your pharmacy and they will forward the refill request to us. Please allow 3 business days for your refill to be completed.          Additional Information About Your Visit        MyChart Information     [employer] gives you secure access to your electronic health record. If you see a primary care provider, you can also send messages to your care team and make appointments. If you have questions, please call your primary care clinic.  If you do not have a primary care provider, please call [phone] and they will assist you.        Care EveryWhere ID     This is your Care EveryWhere ID. This could be used by other organizations to access your [place] medical records  [id number]         Blood Pressure from Last 3 Encounters:   05/15/17 140/84   05/02/17 136/84   04/20/17 145/85    Weight from Last 3 Encounters:   05/15/17 225 lb (102.1 kg)   05/02/17 236 lb 9.6 oz (107.3 kg)   03/24/17 236 lb (107 kg)               We Performed the Following     FLU VAC, SPLIT VIRUS IM > 3 YO (QUADRIVALENT) [09615]     Vaccine Administration, Initial [38570]        Primary Care Provider Office Phone # Fax #    Amarilis Agosto [phone]       [address] [address]        Equal Access to Services     RONASIRENA ELLI : Hadii aad ku hadasho Soomaali, waaxda luqadaha, qaybta kaalmada adeegyada, waxay emiliain hayshantelln sukhwinder jimsanna herrera. So [facility] [phone].    ATENCIÓN: Si habla español, tiene a peng disposición servicios gratuitos de asistencia lingüística. Lawsonamarjit al [phone].    We comply with applicable federal civil rights laws and [place] laws. We do not discriminate on the basis of race, color, national origin, age, disability, sex, sexual orientation, or gender identity.            Thank you!     Thank you for choosing [facility]  for your care. Our goal is always to provide you with excellent care. Hearing back from our patients is one way we can continue to improve our services. Please take a few minutes to complete the written survey that you may receive in the mail after your visit with us. Thank you!             Your Updated Medication List - Protect others around you: Learn how to safely use, store and throw away your medicines at www.disposemymeds.org.          This list is accurate as of: 10/10/17 12:19 PM.  Always use your most recent med list.                   Brand Name Dispense Instructions for use Diagnosis    amLODIPine 10 MG tablet    NORVASC    90 tablet    Take 1 tablet (10 mg) by mouth daily    Benign essential hypertension       artificial tears Oint ophthalmic ointment     5 g    Apply a thin film to both eyes nightly for dryness    Dry eyes, bilateral       ASPIRIN PO      Take 81 mg by mouth    Myopia of both eyes       busPIRone 7.5 MG tablet    BUSPAR    180 tablet    Take 1 tablet (7.5 mg) by mouth 2 times daily    Depression, unspecified  depression type       citalopram 40 MG tablet    celeXA    90 tablet    Take 1 tablet (40 mg) by mouth daily    Recurrent major depressive disorder, in partial remission (H)       losartan 100 MG tablet    COZAAR    90 tablet    Take 1 tablet (100 mg) by mouth daily    Benign essential hypertension       metoprolol 100 MG 24 hr tablet    TOPROL XL    90 tablet    Take 1 tablet (100 mg) by mouth daily    Benign essential hypertension       Multi-vitamin Tabs tablet      Take 1 tablet by mouth daily    Myopia of both eyes       naltrexone 50 MG tablet    DEPADE;REVIA    30 tablet    Take 1/2 tablet.  Time it one to two hours prior to worst cravings.  Then increase to one full tablet as instructed.    Morbid obesity (H)       naltrexone-bupropion 8-90 MG per 12 hr tablet    CONTRAVE    120 tablet    Take 2 tablets by mouth 2 times daily Patient has titrated up to final dose 2tabs 2x daily.    Non morbid obesity, unspecified obesity type       vitamin D 2000 UNITS tablet     100 tablet    Take 2,000 Units by mouth daily    Health care maintenance       zolpidem 5 MG tablet    AMBIEN    30 tablet    Take 1 tablet (5 mg) by mouth nightly as needed    Persistent disorder of initiating or maintaining sleep

## 2017-12-15 ENCOUNTER — DOCUMENTATION ONLY (OUTPATIENT)
Dept: FAMILY MEDICINE | Facility: CLINIC | Age: 57
End: 2017-12-15

## 2017-12-15 DIAGNOSIS — G47.00 PERSISTENT DISORDER OF INITIATING OR MAINTAINING SLEEP: ICD-10-CM

## 2017-12-15 RX ORDER — ZOLPIDEM TARTRATE 5 MG/1
5 TABLET ORAL
Qty: 30 TABLET | Refills: 1 | Status: SHIPPED | OUTPATIENT
Start: 2017-12-15 | End: 2018-04-02

## 2017-12-22 ENCOUNTER — OFFICE VISIT (OUTPATIENT)
Dept: FAMILY MEDICINE | Facility: CLINIC | Age: 57
End: 2017-12-22
Payer: COMMERCIAL

## 2017-12-22 VITALS
SYSTOLIC BLOOD PRESSURE: 135 MMHG | WEIGHT: 225.2 LBS | DIASTOLIC BLOOD PRESSURE: 78 MMHG | HEIGHT: 68 IN | HEART RATE: 79 BPM | OXYGEN SATURATION: 95 % | RESPIRATION RATE: 18 BRPM | TEMPERATURE: 97.4 F | BODY MASS INDEX: 34.13 KG/M2

## 2017-12-22 DIAGNOSIS — Z13.220 LIPID SCREENING: ICD-10-CM

## 2017-12-22 DIAGNOSIS — I10 BENIGN ESSENTIAL HYPERTENSION: ICD-10-CM

## 2017-12-22 DIAGNOSIS — F32.A DEPRESSION, UNSPECIFIED DEPRESSION TYPE: ICD-10-CM

## 2017-12-22 DIAGNOSIS — Z12.11 SPECIAL SCREENING FOR MALIGNANT NEOPLASMS, COLON: Primary | ICD-10-CM

## 2017-12-22 RX ORDER — AMLODIPINE BESYLATE 10 MG/1
10 TABLET ORAL DAILY
Qty: 90 TABLET | Refills: 3 | Status: SHIPPED | OUTPATIENT
Start: 2017-12-22 | End: 2019-01-14

## 2017-12-22 RX ORDER — BUSPIRONE HYDROCHLORIDE 7.5 MG/1
7.5 TABLET ORAL 2 TIMES DAILY
Qty: 180 TABLET | Refills: 1 | Status: SHIPPED | OUTPATIENT
Start: 2017-12-22 | End: 2018-07-27

## 2017-12-22 ASSESSMENT — ENCOUNTER SYMPTOMS
FEVER: 0
PALPITATIONS: 0
SHORTNESS OF BREATH: 0
ABDOMINAL PAIN: 0
COUGH: 1
FATIGUE: 0
HEADACHES: 0
NERVOUS/ANXIOUS: 0
CHILLS: 0

## 2017-12-22 NOTE — MR AVS SNAPSHOT
After Visit Summary   12/22/2017    Chiquita Butler    MRN: [id number]           Patient Information     Date Of Birth          1960        Visit Information        Provider Department      12/22/2017 2:00 PM Kasey Daniel MD [facility]        Today's Diagnoses     Special screening for malignant neoplasms, colon    -  1    Depression, unspecified depression type        Benign essential hypertension          Care Instructions    Here is the plan from today's visit    1. Depression, unspecified depression type  - busPIRone (BUSPAR) 7.5 MG tablet; Take 1 tablet (7.5 mg) by mouth 2 times daily  Dispense: 180 tablet; Refill: 1    2. Benign essential hypertension  - amLODIPine (NORVASC) 10 MG tablet; Take 1 tablet (10 mg) by mouth daily  Dispense: 90 tablet; Refill: 3    3. Special screening for malignant neoplasms, colon  - Fecal colorectal cancer screen FIT; Future    Please call or return to clinic if your symptoms don't go away.    Follow up plan  Please make a clinic appointment for follow up with me (KASEY DANIEL) or your primary physician Link, Amarilis in 6 months or as needed.    Thank you for coming to [facility] today.  Lab Testing:  **If you had lab testing today and your results are reassuring or normal they will be mailed to you or sent through 115 network disks within 7 days.   **If the lab tests need quick action we will call you with the results.  The phone number we will call with results is # [phone] (home) [phone] (work). If this is not the best number please call our clinic and change the number.  Medication Refills:  If you need any refills please call your pharmacy and they will contact us.   If you need to  your refill at a new pharmacy, please contact the new pharmacy directly. The new pharmacy will help you get your medications transferred faster.   Scheduling:  If you have any concerns about today's visit or wish to schedule another  appointment please call our office during normal business hours [phone] (8-5:00 M-F)  If a referral was made to a [facility] and you don't get a call from central scheduling please call [phone].  If a Mammogram was ordered for you at [facility] call [phone] to schedule or change your appointment.  If you had an XRay/CT/Ultrasound/MRI ordered the number is [phone] to schedule or change your radiology appointment.   Medical Concerns:  If you have urgent medical concerns please call [phone] at any time of the day.            Follow-ups after your visit        Future tests that were ordered for you today     Open Future Orders        Priority Expected Expires Ordered    Fecal colorectal cancer screen FIT Routine 1/12/2018 3/16/2018 12/22/2017            Who to contact     Please call your clinic at [phone] to:    Ask questions about your health    Make or cancel appointments    Discuss your medicines    Learn about your test results    Speak to your doctor   If you have compliments or concerns about an experience at your clinic, or if you wish to file a complaint, please contact [facility] Patient Relations at [phone] or email us at [email or web address]         Additional Information About Your Visit        [facility] Information     [employer] gives you secure access to your electronic health record. If you see a primary care provider, you can also send messages to your care team and make appointments. If you have questions, please call your primary care clinic.  If you do not have a primary care provider, please call [phone] and they will assist you.      [employer] is an electronic gateway that provides easy, online access to your medical records. With [employer], you can request a clinic appointment, read your test results, renew a prescription or communicate with your care team.     To access your existing account,  "please contact your [facility] or call [phone] for assistance.        Care EveryWhere ID     This is your Care EveryWhere ID. This could be used by other organizations to access your [facility] medical records  [id number]        Your Vitals Were     Pulse Temperature Respirations Height Pulse Oximetry BMI (Body Mass Index)    79 97.4  F (36.3  C) (Oral) 18 5' 7.72\" (172 cm) 95% 34.53 kg/m2       Blood Pressure from Last 3 Encounters:   12/22/17 135/78   05/15/17 140/84   05/02/17 136/84    Weight from Last 3 Encounters:   12/22/17 225 lb 3.2 oz (102.2 kg)   05/15/17 225 lb (102.1 kg)   05/02/17 236 lb 9.6 oz (107.3 kg)                 Where to get your medicines      These medications were sent to [address] [address]     Phone:  [phone]     amLODIPine 10 MG tablet    busPIRone 7.5 MG tablet          Primary Care Provider Office Phone # Fax #    Amarilis Agosto [phone]       [address] [address]        Equal Access to Services     LUIGI CALLOWAY AH: Hadii darrel ku hadasho Soomaali, waaxda luqadaha, qaybta kaalmada adeegyada, waxay emiliain hayshantelln sukhwinder herrera. So [facility] [phone].    ATENCIÓN: Si habla español, tiene a peng disposición servicios gratuitos de asistencia lingüística. amarjit al [phone].    We comply with applicable federal civil rights laws and [place] laws. We do not discriminate on the basis of race, color, national origin, age, disability, sex, sexual orientation, or gender identity.            Thank you!     Thank you for choosing [facility]  for your care. Our goal is always to provide you with excellent care. Hearing back from our patients is one way we can continue to improve our services. Please take a few minutes to complete the written survey that you may receive in the mail after your visit with us. " Thank you!             Your Updated Medication List - Protect others around you: Learn how to safely use, store and throw away your medicines at www.disposemymeds.org.          This list is accurate as of: 12/22/17  2:46 PM.  Always use your most recent med list.                   Brand Name Dispense Instructions for use Diagnosis    amLODIPine 10 MG tablet    NORVASC    90 tablet    Take 1 tablet (10 mg) by mouth daily    Benign essential hypertension       artificial tears Oint ophthalmic ointment     5 g    Apply a thin film to both eyes nightly for dryness    Dry eyes, bilateral       ASPIRIN PO      Take 81 mg by mouth    Myopia of both eyes       busPIRone 7.5 MG tablet    BUSPAR    180 tablet    Take 1 tablet (7.5 mg) by mouth 2 times daily    Depression, unspecified depression type       citalopram 40 MG tablet    celeXA    90 tablet    Take 1 tablet (40 mg) by mouth daily    Recurrent major depressive disorder, in partial remission (H)       losartan 100 MG tablet    COZAAR    90 tablet    Take 1 tablet (100 mg) by mouth daily    Benign essential hypertension       metoprolol 100 MG 24 hr tablet    TOPROL XL    90 tablet    Take 1 tablet (100 mg) by mouth daily    Benign essential hypertension       Multi-vitamin Tabs tablet      Take 1 tablet by mouth daily    Myopia of both eyes       naltrexone 50 MG tablet    DEPADE;REVIA    30 tablet    Take 1/2 tablet.  Time it one to two hours prior to worst cravings.  Then increase to one full tablet as instructed.    Morbid obesity (H)       naltrexone-bupropion 8-90 MG per 12 hr tablet    CONTRAVE    120 tablet    Take 2 tablets by mouth 2 times daily Patient has titrated up to final dose 2tabs 2x daily.    Non morbid obesity, unspecified obesity type       vitamin D 2000 UNITS tablet     100 tablet    Take 2,000 Units by mouth daily    Health care maintenance       zolpidem 5 MG tablet    AMBIEN    30 tablet    Take 1 tablet (5 mg) by mouth nightly as needed     Persistent disorder of initiating or maintaining sleep

## 2017-12-22 NOTE — PATIENT INSTRUCTIONS
Here is the plan from today's visit    1. Depression, unspecified depression type  - busPIRone (BUSPAR) 7.5 MG tablet; Take 1 tablet (7.5 mg) by mouth 2 times daily  Dispense: 180 tablet; Refill: 1    2. Benign essential hypertension  - amLODIPine (NORVASC) 10 MG tablet; Take 1 tablet (10 mg) by mouth daily  Dispense: 90 tablet; Refill: 3    3. Special screening for malignant neoplasms, colon  - Fecal colorectal cancer screen FIT; Future    Please call or return to clinic if your symptoms don't go away.    Follow up plan  Please make a clinic appointment for follow up with me (BENJIE BONNER) or your primary physician Link, Amarilis in 6 months or as needed.    Thank you for coming to [facility] today.  Lab Testing:  **If you had lab testing today and your results are reassuring or normal they will be mailed to you or sent through [employer] within 7 days.   **If the lab tests need quick action we will call you with the results.  The phone number we will call with results is # [phone] (home) [phone] (work). If this is not the best number please call our clinic and change the number.  Medication Refills:  If you need any refills please call your pharmacy and they will contact us.   If you need to  your refill at a new pharmacy, please contact the new pharmacy directly. The new pharmacy will help you get your medications transferred faster.   Scheduling:  If you have any concerns about today's visit or wish to schedule another appointment please call our office during normal business hours [phone] (8-5:00 M-F)  If a referral was made to a [facility] and you don't get a call from central scheduling please call [phone].  If a Mammogram was ordered for you at [facility] call [phone] to schedule or change your appointment.  If you had an XRay/CT/Ultrasound/MRI ordered the number is [phone] to schedule or change your radiology appointment.   Medical  Concerns:  If you have urgent medical concerns please call [phone] at any time of the day.

## 2017-12-22 NOTE — PROGRESS NOTES
Preceptor Attestation:   Patient seen and discussed with the resident. Assessment and plan reviewed with resident and agreed upon.   Supervising Physician:  Markel Valencia MD  [facility]

## 2018-03-29 DIAGNOSIS — G47.00 PERSISTENT DISORDER OF INITIATING OR MAINTAINING SLEEP: ICD-10-CM

## 2018-03-29 NOTE — TELEPHONE ENCOUNTER
[facility] phone call message- patient requesting a refill:    Full Medication Name: zolpidem (AMBIEN) 5 MG tablet    Dose:     Pharmacy confirmed as   [id number] - [place], [place] - [address] [place] [address] AT CORNER OF [address] [place] [id number]  Phone: [phone] Fax: [phone]    [place], [place] - [facility] [address] [address]  Phone: [phone] Fax: [phone]  : Yes    Additional Comments: pt requesting rx refill    OK to leave a message on voice mail? Yes    Primary language: English      needed? No    Call taken on March 29, 2018 at 2:43 PM by Lam Koch

## 2018-04-02 RX ORDER — ZOLPIDEM TARTRATE 5 MG/1
5 TABLET ORAL
Qty: 30 TABLET | Refills: 1 | Status: SHIPPED | OUTPATIENT
Start: 2018-04-02 | End: 2018-06-28

## 2018-06-28 DIAGNOSIS — G47.00 PERSISTENT DISORDER OF INITIATING OR MAINTAINING SLEEP: ICD-10-CM

## 2018-06-28 RX ORDER — ZOLPIDEM TARTRATE 5 MG/1
5 TABLET ORAL
Qty: 30 TABLET | Refills: 1 | Status: SHIPPED | OUTPATIENT
Start: 2018-06-28 | End: 2018-09-13

## 2018-06-28 NOTE — TELEPHONE ENCOUNTER

## 2018-07-27 DIAGNOSIS — F32.A DEPRESSION, UNSPECIFIED DEPRESSION TYPE: ICD-10-CM

## 2018-07-27 DIAGNOSIS — I10 BENIGN ESSENTIAL HYPERTENSION: ICD-10-CM

## 2018-07-27 RX ORDER — BUSPIRONE HYDROCHLORIDE 7.5 MG/1
TABLET ORAL
Qty: 180 TABLET | Refills: 1 | Status: CANCELLED | OUTPATIENT
Start: 2018-07-27

## 2018-07-27 RX ORDER — BUSPIRONE HYDROCHLORIDE 7.5 MG/1
7.5 TABLET ORAL 2 TIMES DAILY
Qty: 180 TABLET | Refills: 1 | Status: SHIPPED | OUTPATIENT
Start: 2018-07-27 | End: 2019-03-22

## 2018-07-27 RX ORDER — LOSARTAN POTASSIUM 100 MG/1
100 TABLET ORAL DAILY
Qty: 90 TABLET | Refills: 3 | Status: SHIPPED | OUTPATIENT
Start: 2018-07-27 | End: 2019-02-28

## 2018-07-27 NOTE — TELEPHONE ENCOUNTER

## 2018-07-27 NOTE — TELEPHONE ENCOUNTER

## 2018-07-30 DIAGNOSIS — F33.41 RECURRENT MAJOR DEPRESSIVE DISORDER, IN PARTIAL REMISSION (H): ICD-10-CM

## 2018-07-30 DIAGNOSIS — I10 BENIGN ESSENTIAL HYPERTENSION: ICD-10-CM

## 2018-07-30 RX ORDER — CITALOPRAM HYDROBROMIDE 40 MG/1
40 TABLET ORAL DAILY
Qty: 90 TABLET | Refills: 3 | Status: SHIPPED | OUTPATIENT
Start: 2018-07-30 | End: 2019-08-21

## 2018-07-30 RX ORDER — METOPROLOL SUCCINATE 100 MG/1
100 TABLET, EXTENDED RELEASE ORAL DAILY
Qty: 90 TABLET | Refills: 3 | Status: SHIPPED | OUTPATIENT
Start: 2018-07-30 | End: 2019-02-28

## 2018-07-30 NOTE — TELEPHONE ENCOUNTER

## 2018-09-13 DIAGNOSIS — G47.00 PERSISTENT DISORDER OF INITIATING OR MAINTAINING SLEEP: ICD-10-CM

## 2018-09-13 NOTE — TELEPHONE ENCOUNTER

## 2018-09-17 RX ORDER — ZOLPIDEM TARTRATE 5 MG/1
5 TABLET ORAL
Qty: 30 TABLET | Refills: 1 | Status: SHIPPED | OUTPATIENT
Start: 2018-09-17 | End: 2018-09-19

## 2018-09-19 DIAGNOSIS — G47.00 PERSISTENT DISORDER OF INITIATING OR MAINTAINING SLEEP: ICD-10-CM

## 2018-09-19 NOTE — TELEPHONE ENCOUNTER

## 2018-09-21 RX ORDER — ZOLPIDEM TARTRATE 5 MG/1
5 TABLET ORAL
Qty: 30 TABLET | Refills: 1 | Status: SHIPPED | OUTPATIENT
Start: 2018-09-21 | End: 2018-12-17

## 2018-12-13 DIAGNOSIS — G47.00 PERSISTENT DISORDER OF INITIATING OR MAINTAINING SLEEP: ICD-10-CM

## 2018-12-13 NOTE — TELEPHONE ENCOUNTER

## 2018-12-17 RX ORDER — ZOLPIDEM TARTRATE 5 MG/1
5 TABLET ORAL
Qty: 30 TABLET | Refills: 0 | Status: SHIPPED | OUTPATIENT
Start: 2018-12-17 | End: 2019-01-10

## 2018-12-17 NOTE — TELEPHONE ENCOUNTER
Successfully faxed prescription to [facility] pharmacy.  Nory Valle RN

## 2019-01-01 ENCOUNTER — DOCUMENTATION ONLY (OUTPATIENT)
Dept: FAMILY MEDICINE | Facility: CLINIC | Age: 59
End: 2019-01-01

## 2019-01-01 NOTE — PROGRESS NOTES
[employer] message sent for limited refill. Due for recheck and one month supply sent. [employer] message has not been opened. Please call.

## 2019-01-10 DIAGNOSIS — G47.00 PERSISTENT DISORDER OF INITIATING OR MAINTAINING SLEEP: ICD-10-CM

## 2019-01-10 RX ORDER — ZOLPIDEM TARTRATE 5 MG/1
5 TABLET ORAL
Qty: 30 TABLET | Refills: 0 | Status: SHIPPED | OUTPATIENT
Start: 2019-01-10 | End: 2019-02-28

## 2019-01-10 NOTE — TELEPHONE ENCOUNTER

## 2019-01-14 DIAGNOSIS — I10 BENIGN ESSENTIAL HYPERTENSION: ICD-10-CM

## 2019-01-15 RX ORDER — AMLODIPINE BESYLATE 10 MG/1
TABLET ORAL
Qty: 90 TABLET | Refills: 3 | Status: SHIPPED | OUTPATIENT
Start: 2019-01-15 | End: 2019-11-08

## 2019-02-06 DIAGNOSIS — G47.00 PERSISTENT DISORDER OF INITIATING OR MAINTAINING SLEEP: ICD-10-CM

## 2019-02-06 RX ORDER — ZOLPIDEM TARTRATE 5 MG/1
5 TABLET ORAL
Qty: 30 TABLET | Refills: 0 | OUTPATIENT
Start: 2019-02-06

## 2019-02-06 NOTE — TELEPHONE ENCOUNTER

## 2019-02-06 NOTE — TELEPHONE ENCOUNTER
Medication Refill Denied  Reason: Patient needs: provider visit  Provider: I have not called the patient about the Rx denial, please call. I have messaged patient previously and done a one month supply previously.   PCS: Please notify the pharmacy, Please contact the patient to explain reasoning provided above and to schedule the patient for a provider visit with me .      Amarilis Agosto MD

## 2019-02-25 ENCOUNTER — TELEPHONE (OUTPATIENT)
Dept: FAMILY MEDICINE | Facility: CLINIC | Age: 59
End: 2019-02-25

## 2019-02-28 ENCOUNTER — OFFICE VISIT (OUTPATIENT)
Dept: FAMILY MEDICINE | Facility: CLINIC | Age: 59
End: 2019-02-28
Payer: COMMERCIAL

## 2019-02-28 VITALS
OXYGEN SATURATION: 100 % | DIASTOLIC BLOOD PRESSURE: 85 MMHG | HEART RATE: 69 BPM | WEIGHT: 250 LBS | BODY MASS INDEX: 38.33 KG/M2 | TEMPERATURE: 97.3 F | SYSTOLIC BLOOD PRESSURE: 133 MMHG

## 2019-02-28 DIAGNOSIS — Z98.84 H/O GASTRIC BYPASS: ICD-10-CM

## 2019-02-28 DIAGNOSIS — I10 BENIGN ESSENTIAL HYPERTENSION: ICD-10-CM

## 2019-02-28 DIAGNOSIS — Z13.820 SCREENING FOR OSTEOPOROSIS: Primary | ICD-10-CM

## 2019-02-28 DIAGNOSIS — F64.0 GENDER DYSPHORIA IN ADULT: ICD-10-CM

## 2019-02-28 DIAGNOSIS — Z00.00 HEALTHCARE MAINTENANCE: ICD-10-CM

## 2019-02-28 DIAGNOSIS — G47.00 PERSISTENT DISORDER OF INITIATING OR MAINTAINING SLEEP: ICD-10-CM

## 2019-02-28 RX ORDER — METOPROLOL SUCCINATE 100 MG/1
100 TABLET, EXTENDED RELEASE ORAL DAILY
Qty: 90 TABLET | Refills: 3 | Status: SHIPPED | OUTPATIENT
Start: 2019-02-28 | End: 2019-11-08

## 2019-02-28 RX ORDER — LOSARTAN POTASSIUM 100 MG/1
100 TABLET ORAL DAILY
Qty: 90 TABLET | Refills: 3 | Status: SHIPPED | OUTPATIENT
Start: 2019-02-28 | End: 2019-11-08

## 2019-02-28 RX ORDER — ZOLPIDEM TARTRATE 5 MG/1
5 TABLET ORAL
Qty: 30 TABLET | Refills: 5 | Status: SHIPPED | OUTPATIENT
Start: 2019-02-28 | End: 2019-03-06

## 2019-02-28 ASSESSMENT — ANXIETY QUESTIONNAIRES
1. FEELING NERVOUS, ANXIOUS, OR ON EDGE: NOT AT ALL
5. BEING SO RESTLESS THAT IT IS HARD TO SIT STILL: NOT AT ALL
3. WORRYING TOO MUCH ABOUT DIFFERENT THINGS: NOT AT ALL
2. NOT BEING ABLE TO STOP OR CONTROL WORRYING: NOT AT ALL
IF YOU CHECKED OFF ANY PROBLEMS ON THIS QUESTIONNAIRE, HOW DIFFICULT HAVE THESE PROBLEMS MADE IT FOR YOU TO DO YOUR WORK, TAKE CARE OF THINGS AT HOME, OR GET ALONG WITH OTHER PEOPLE: NOT DIFFICULT AT ALL
7. FEELING AFRAID AS IF SOMETHING AWFUL MIGHT HAPPEN: NOT AT ALL
6. BECOMING EASILY ANNOYED OR IRRITABLE: NOT AT ALL
GAD7 TOTAL SCORE: 0

## 2019-02-28 ASSESSMENT — PATIENT HEALTH QUESTIONNAIRE - PHQ9
5. POOR APPETITE OR OVEREATING: NOT AT ALL
SUM OF ALL RESPONSES TO PHQ QUESTIONS 1-9: 1

## 2019-02-28 NOTE — PROGRESS NOTES
"       HPI   Chiquita Butler is a 59 year old  who presents for   Chief Complaint   Patient presents with     Recheck Medication     ambien     Preventative  Is scheduled for a mammogram. Has gained 8-9 lbs since winter. Would like to get flu shot today. Has questions about shingles vaccine. Has questions about Alzheimer's testing, read there were proteins that can be tested, and wonders if it makes sense to do testing.     Sleep  Has been taking 5mg Ambien every other night to help with sleep, with improvement. Ambien helps with initial insomnia and staying asleep, was unable to fall asleep and stay asleep before Ambien. Has not experienced any side effects on Ambien. Drug use in the 70's, still smokes some marijuana. Hopes to eventually stop Ambien when she gets the time to exercise at night because it helps with sleep.     Mood  Mood is good, describes her mood as \"mellow jazz.\" Mood has been stable.     Gender Dysphoria  Still has similar feelings around their gender, but has anxiety about going through it- particularly about prioritizing her health over her families, financially speaking. Hates breasts, bothered her whole life, wants gone. FEels affirming to dress and act masculine. Would feel better if insurance covered top surgery. Is unhappy with body, would like to have the body they've always wanted. Has talked to a couple of friends about top surgery and her partner. Started thinking about top surgery after Aissatou's brother had top surgery. Partner is supportive.     Social Hx   at [employer] [place]. 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 Partnered. Daughter, Dorina. Exercises by walking at work, gets in 15,000 steps daily. Marijuana use. No alcohol use.    FHx  - Early onset Alzheimer's (Mother, late 60's).    +++++++    Problem, Medication and Allergy Lists were reviewed and updated if needed.    Patient is an established patient of this clinic.         Review of " Systems:   Review of Systems     Positive for: stable mood, weight gain (8-9 lbs), and improved sleep.    Negative for: cognitive changes.    See HPI for additional sx.    This document serves as a record of the services and decisions personally performed and made by Amarilis Agosto MD. It was created on his/her behalf by Kavitha oKch, a trained medical scribe. The creation of this document is based the provider's statements to the medical scribe.  Scribe Kavitha Koch 11:28 AM, February 28, 2019       Physical Exam:     Vitals:    02/28/19 1122   BP: 133/85   Pulse: 69   Temp: 97.3  F (36.3  C)   TempSrc: Oral   SpO2: 100%   Weight: 113.4 kg (250 lb)     Body mass index is 38.33 kg/m .  Vitals were reviewed and were normal     Physical Exam    BMI= Body mass index is 38.33 kg/m .   GENERAL: healthy, alert and no distress  RESP: lungs clear to auscultation - no rales, no rhonchi, no wheezes  CV: regular rates and rhythm, normal S1 S2, no S3 or S4 and no murmur, no click or rub -  MS: extremities- no gross deformities noted, no edema  PSYCH: Alert and oriented times 3; speech- coherent , normal rate and volume; able to articulate logical thoughts, able to abstract reason, affect- generally calm, mildly anxious when discussing gender affirmation procedures.    Results:   No testing ordered today    Assessment and Plan   Chiquita was seen today for recheck medication.    Diagnoses and all orders for this visit:    Screening for osteoporosis  H/O gastric bypass  -     DX Hip/Pelvis/Spine; Future    Healthcare maintenance  -     ADMIN VACCINE, INITIAL  - Reports she's had labs at another clinic and will send results via [employer]. Reportedly had lipid, sugar, and CMP. Will send records  - Provided education on Dexa Scans, shingles vaccine, and on potential side effects of Ambien.  - Educated on top surgery.  - Would like me to see her 17 y/o daughter.     Benign essential hypertension  -     losartan (COZAAR) 100 MG tablet;  Take 1 tablet (100 mg) by mouth daily  -     metoprolol succinate ER (TOPROL XL) 100 MG 24 hr tablet; Take 1 tablet (100 mg) by mouth daily  Cont current therapy (also on amlodipine) all effective.   Await records to see if due for BMP, annual    Persistent disorder of initiating or maintaining sleep  -     zolpidem (AMBIEN) 5 MG tablet; Take 1 tablet (5 mg) by mouth nightly as needed for sleep  Discussed de prescribing eventually - she wants to be able to exercise at night, curently work and parenting duties coincide, then she sleeps better. Not drinking, no drug use since the 70's and is using appropriately w/o side effects.   Ok to cont for now  Follow up 6 months     Other orders  -     FLU VAC PRESRV FREE QUAD SPLIT VIR IM, 0.5 mL dosage    Gender dysphoria in adult   Pt meets DSM 5 criteria persistently identifying as another gender and desiring to be rid of sec sex characteristics and causes dysfunction in life   Goal to be able to wear a suit.   Discussed this dx in chart and implications   Refer to Dr. Rapp or Margaret for top surgery evaluation     Medications Discontinued During This Encounter   Medication Reason     naltrexone (DEPADE;REVIA) 50 MG tablet      naltrexone-bupropion (CONTRAVE) 8-90 MG per 12 hr tablet      artificial tears OINT ophthalmic ointment      losartan (COZAAR) 100 MG tablet Reorder     metoprolol succinate (TOPROL XL) 100 MG 24 hr tablet Reorder     zolpidem (AMBIEN) 5 MG tablet Reorder     Patient Instructions   Preventative  1. Flu shot today at [facility]. Results via [employer].  2. I recommend getting your shingles vaccine at the [facility]. The second shot in the series will be 2-4 months from the initial shot.  3. Mammogram as scheduled.  4. Referral for top surgery.    Brain Health  1. Engaging in exercise and cognitive exercises are good preventative measures for Alzheimer's, so continue doing these things.    Options for treatment and follow-up care were reviewed with  the patient. Chiquita Butler  engaged in the decision making process and verbalized understanding of the options discussed and agreed with the final plan.    The information in this document, created by the medical scribe for me, accurately reflects the services I personally performed and the decisions made by me. I have reviewed and approved this document for accuracy prior to leaving the patient care area.    Amarilis Agosto MD  11:28 AM, 02/28/19  I spent 45 min face to face with the patient and >50% was spent counselling the patient about the above medical conditions, educating patient on their medical conditions, behavioral interventions and supports.

## 2019-03-01 ENCOUNTER — TELEPHONE (OUTPATIENT)
Dept: FAMILY MEDICINE | Facility: CLINIC | Age: 59
End: 2019-03-01

## 2019-03-01 PROBLEM — F64.0 GENDER DYSPHORIA IN ADULT: Status: ACTIVE | Noted: 2019-03-01

## 2019-03-01 ASSESSMENT — ANXIETY QUESTIONNAIRES: GAD7 TOTAL SCORE: 0

## 2019-03-01 NOTE — TELEPHONE ENCOUNTER
Internal referral placed by MIGEL ZARATE for Gender dysphoria in adult his patient will be new to the Plastic Surgery clinic. No answer or voice mail.

## 2019-03-05 DIAGNOSIS — G47.00 PERSISTENT DISORDER OF INITIATING OR MAINTAINING SLEEP: ICD-10-CM

## 2019-03-05 NOTE — TELEPHONE ENCOUNTER
I have reviewed pt's chart and this refill is appropriate.   Please provide printed rx to preceptor to sign, then provide to patient's pharmacy   and notify patient.     Amarilis Agosto MD

## 2019-03-05 NOTE — TELEPHONE ENCOUNTER

## 2019-03-06 RX ORDER — ZOLPIDEM TARTRATE 5 MG/1
5 TABLET ORAL
Qty: 30 TABLET | Refills: 5 | Status: SHIPPED | OUTPATIENT
Start: 2019-03-06 | End: 2019-09-19

## 2019-03-06 NOTE — TELEPHONE ENCOUNTER
RN printed rx and had preceptor Dr tsai sign. Faxed to pharmacy. Fax successful    Iva Fermin RN

## 2019-03-08 ENCOUNTER — PATIENT OUTREACH (OUTPATIENT)
Dept: PLASTIC SURGERY | Facility: CLINIC | Age: 59
End: 2019-03-08

## 2019-03-22 DIAGNOSIS — F32.A DEPRESSION, UNSPECIFIED DEPRESSION TYPE: ICD-10-CM

## 2019-03-22 RX ORDER — BUSPIRONE HYDROCHLORIDE 7.5 MG/1
7.5 TABLET ORAL 2 TIMES DAILY
Qty: 180 TABLET | Refills: 1 | Status: SHIPPED | OUTPATIENT
Start: 2019-03-22 | End: 2019-11-08

## 2019-03-28 ENCOUNTER — ANCILLARY PROCEDURE (OUTPATIENT)
Dept: MAMMOGRAPHY | Facility: CLINIC | Age: 59
End: 2019-03-28
Attending: FAMILY MEDICINE

## 2019-03-28 PROCEDURE — 77067 SCR MAMMO BI INCL CAD: CPT | Mod: TC

## 2019-05-03 PROBLEM — Z00.00 ENCOUNTER FOR PREVENTIVE CARE: Status: ACTIVE | Noted: 2019-05-03

## 2019-05-15 ENCOUNTER — PATIENT OUTREACH (OUTPATIENT)
Dept: PLASTIC SURGERY | Facility: CLINIC | Age: 59
End: 2019-05-15

## 2019-05-15 NOTE — PROGRESS NOTES
[facility]:  Care Coordination Note     SITUATION   Patient (Chiquita, They/Them) is a 59 year old who is receiving support for:  Clinic Care Coordination - Initial (Scheduling top consultation)  .    BACKGROUND     New patient seeking top surgery, referred by Dr. Amarilis Agsoto at [facility].     Pt is older and exploring gender as genderqueer person. Interested in connecting to other older folks in similar situation, requested resources.     ASSESSMENT     Surgery              CGC Assessment  Comprehensive Gender Care (CGC) Enrollment: Enrolled(Not on hormones, not planning on starting)  Patient has a therapist: [place] in [place]  Letter of support #1: Requested  Surgery being considered: Yes  Mastectomy: Yes          PLAN          Nursing Interventions:   CGC program and services discussed with patient. CGC referral placed. CGC assessment completed. Process for accessing surgery discussed including: WPATH standards of care, Letters of support, [place] insurance process, surgery scheduling, and approximate timeline. Pt questions answered. Registration completed & reviewed; scheduling process discussed & completed, as necessary. Referrals provided: Mental health referrals for letters of support. Pts former therapist is in [place], pt has spiritual support, friends and family.     More than 50% of the time was used to educate patient on medical & surgical process.     Follow-up plan:  Pt to attend consultation with DR. Rapp on 11/29/19. Pt to reach out to referrals for letter of support, sent via [employer].        Giacomo Troy

## 2019-08-21 DIAGNOSIS — F33.41 RECURRENT MAJOR DEPRESSIVE DISORDER, IN PARTIAL REMISSION (H): ICD-10-CM

## 2019-08-21 RX ORDER — CITALOPRAM HYDROBROMIDE 40 MG/1
40 TABLET ORAL DAILY
Qty: 30 TABLET | Refills: 0 | Status: SHIPPED | OUTPATIENT
Start: 2019-08-21 | End: 2019-08-21

## 2019-08-21 RX ORDER — CITALOPRAM HYDROBROMIDE 40 MG/1
40 TABLET ORAL DAILY
Qty: 90 TABLET | Refills: 0 | Status: SHIPPED | OUTPATIENT
Start: 2019-08-21 | End: 2019-11-08

## 2019-08-21 NOTE — TELEPHONE ENCOUNTER
Verify that the refill encounter hasn't been started Yes    [facility] phone call message- patient requesting a refill: Jenn calling in to say, they have sent a couple of faxes to refill the below medication however it has not been worked on. Conveyed the message that the front fax has not been working over for the past 10 days and can refax it again, now that its working at the same time, I will relay the message of refilling it.     Full Medication Name: citalopram (CELEXA) 40 MG tablet    Dose: - Route: Take 1 tablet (40 mg) by mouth daily - Oral     Pharmacy confirmed as     [facility] [address] [address]  Phone: [phone] Fax: [phone]      : Yes    Medication tab checked to see if medication has been sent  Yes    Additional Comments: Patient is completely out of medication is requesting a refill as early as possible.     OK to leave a message on voice mail? Yes    Advised patient refill may take up to 2 business days? Yes    Primary language: English      needed? No    Call taken on August 21, 2019 at 11:14 AM by Ashleigh Sterling    Route to P [facility] MED REFILL

## 2019-08-21 NOTE — TELEPHONE ENCOUNTER
RN sent 30 days worth of medication per protocol. Routing to PCP to add additional refills if appropriate, patient usually gets 90 day supply.  Nory Valle RN

## 2019-09-16 DIAGNOSIS — G47.00 PERSISTENT DISORDER OF INITIATING OR MAINTAINING SLEEP: ICD-10-CM

## 2019-09-19 RX ORDER — ZOLPIDEM TARTRATE 5 MG/1
5 TABLET ORAL
Qty: 30 TABLET | Refills: 5 | Status: SHIPPED | OUTPATIENT
Start: 2019-09-19 | End: 2019-11-08

## 2019-11-06 ENCOUNTER — PRE VISIT (OUTPATIENT)
Dept: SURGERY | Facility: CLINIC | Age: 59
End: 2019-11-06

## 2019-11-06 NOTE — TELEPHONE ENCOUNTER
FUTURE VISIT INFORMATION      FUTURE VISIT INFORMATION:    Date: 11/26/19    Time: 9:00am    Location: [facility]  REFERRAL INFORMATION:    Referring provider:  Amarilis sadler    Referring providers clinic:  [facility]    Reason for visit/diagnosis  Top consult    RECORDS REQUESTED FROM:       No recs to collect

## 2019-11-08 ENCOUNTER — OFFICE VISIT (OUTPATIENT)
Dept: FAMILY MEDICINE | Facility: CLINIC | Age: 59
End: 2019-11-08
Payer: COMMERCIAL

## 2019-11-08 VITALS
HEIGHT: 70 IN | DIASTOLIC BLOOD PRESSURE: 101 MMHG | SYSTOLIC BLOOD PRESSURE: 148 MMHG | WEIGHT: 243 LBS | OXYGEN SATURATION: 98 % | BODY MASS INDEX: 34.79 KG/M2 | TEMPERATURE: 97.4 F | RESPIRATION RATE: 18 BRPM | HEART RATE: 54 BPM

## 2019-11-08 DIAGNOSIS — B07.0 VERRUCA PLANTARIS: Primary | ICD-10-CM

## 2019-11-08 DIAGNOSIS — F32.A DEPRESSION, UNSPECIFIED DEPRESSION TYPE: ICD-10-CM

## 2019-11-08 DIAGNOSIS — Z23 NEED FOR PROPHYLACTIC VACCINATION AND INOCULATION AGAINST INFLUENZA: ICD-10-CM

## 2019-11-08 DIAGNOSIS — I10 BENIGN ESSENTIAL HYPERTENSION: ICD-10-CM

## 2019-11-08 DIAGNOSIS — F33.41 RECURRENT MAJOR DEPRESSIVE DISORDER, IN PARTIAL REMISSION (H): ICD-10-CM

## 2019-11-08 DIAGNOSIS — G47.00 PERSISTENT DISORDER OF INITIATING OR MAINTAINING SLEEP: ICD-10-CM

## 2019-11-08 LAB
BUN SERPL-MCNC: 18.7 MG/DL (ref 7–19)
CALCIUM SERPL-MCNC: 8.8 MG/DL (ref 8.5–10.1)
CHLORIDE SERPLBLD-SCNC: 100.4 MMOL/L (ref 98–110)
CHOLEST SERPL-MCNC: 157.4 MG/DL (ref 0–200)
CHOLEST/HDLC SERPL: 2.8 {RATIO} (ref 0–5)
CO2 SERPL-SCNC: 29.2 MMOL/L (ref 20–32)
CREAT SERPL-MCNC: 0.6 MG/DL (ref 0.5–1)
GFR SERPL CREATININE-BSD FRML MDRD: >90 ML/MIN/1.7 M2
GLUCOSE SERPL-MCNC: 99.6 MG'DL (ref 70–99)
HDLC SERPL-MCNC: 57 MG/DL
LDLC SERPL CALC-MCNC: 76 MG/DL (ref 0–129)
POTASSIUM SERPL-SCNC: 3.6 MMOL/DL (ref 3.3–4.5)
SODIUM SERPL-SCNC: 137.8 MMOL/L (ref 132.6–141.4)
TRIGL SERPL-MCNC: 123 MG/DL (ref 0–150)
VLDL CHOLESTEROL: 24.6 MG/DL (ref 7–32)

## 2019-11-08 RX ORDER — ZOLPIDEM TARTRATE 5 MG/1
5 TABLET ORAL
Qty: 30 TABLET | Refills: 2 | Status: SHIPPED | OUTPATIENT
Start: 2019-11-08 | End: 2020-03-15

## 2019-11-08 RX ORDER — METOPROLOL SUCCINATE 100 MG/1
100 TABLET, EXTENDED RELEASE ORAL DAILY
Qty: 90 TABLET | Refills: 0 | Status: SHIPPED | OUTPATIENT
Start: 2019-11-08 | End: 2020-03-02

## 2019-11-08 RX ORDER — LOSARTAN POTASSIUM 100 MG/1
100 TABLET ORAL DAILY
Qty: 90 TABLET | Refills: 0 | Status: SHIPPED | OUTPATIENT
Start: 2019-11-08 | End: 2020-03-12

## 2019-11-08 RX ORDER — AMLODIPINE BESYLATE 10 MG/1
10 TABLET ORAL DAILY
Qty: 90 TABLET | Refills: 0 | Status: SHIPPED | OUTPATIENT
Start: 2019-11-08 | End: 2020-03-02

## 2019-11-08 RX ORDER — BUSPIRONE HYDROCHLORIDE 7.5 MG/1
7.5 TABLET ORAL 2 TIMES DAILY
Qty: 180 TABLET | Refills: 0 | Status: SHIPPED | OUTPATIENT
Start: 2019-11-08 | End: 2020-03-12

## 2019-11-08 RX ORDER — CITALOPRAM HYDROBROMIDE 40 MG/1
40 TABLET ORAL DAILY
Qty: 90 TABLET | Refills: 0 | Status: SHIPPED | OUTPATIENT
Start: 2019-11-08 | End: 2020-02-13

## 2019-11-08 ASSESSMENT — ANXIETY QUESTIONNAIRES
GAD7 TOTAL SCORE: 0
7. FEELING AFRAID AS IF SOMETHING AWFUL MIGHT HAPPEN: NOT AT ALL
2. NOT BEING ABLE TO STOP OR CONTROL WORRYING: NOT AT ALL
6. BECOMING EASILY ANNOYED OR IRRITABLE: NOT AT ALL
7. FEELING AFRAID AS IF SOMETHING AWFUL MIGHT HAPPEN: NOT AT ALL
6. BECOMING EASILY ANNOYED OR IRRITABLE: NOT AT ALL
1. FEELING NERVOUS, ANXIOUS, OR ON EDGE: NOT AT ALL
1. FEELING NERVOUS, ANXIOUS, OR ON EDGE: NOT AT ALL
5. BEING SO RESTLESS THAT IT IS HARD TO SIT STILL: NOT AT ALL
3. WORRYING TOO MUCH ABOUT DIFFERENT THINGS: NOT AT ALL
IF YOU CHECKED OFF ANY PROBLEMS ON THIS QUESTIONNAIRE, HOW DIFFICULT HAVE THESE PROBLEMS MADE IT FOR YOU TO DO YOUR WORK, TAKE CARE OF THINGS AT HOME, OR GET ALONG WITH OTHER PEOPLE: NOT DIFFICULT AT ALL
5. BEING SO RESTLESS THAT IT IS HARD TO SIT STILL: NOT AT ALL
2. NOT BEING ABLE TO STOP OR CONTROL WORRYING: NOT AT ALL
3. WORRYING TOO MUCH ABOUT DIFFERENT THINGS: NOT AT ALL
IF YOU CHECKED OFF ANY PROBLEMS ON THIS QUESTIONNAIRE, HOW DIFFICULT HAVE THESE PROBLEMS MADE IT FOR YOU TO DO YOUR WORK, TAKE CARE OF THINGS AT HOME, OR GET ALONG WITH OTHER PEOPLE: NOT DIFFICULT AT ALL
GAD7 TOTAL SCORE: 0

## 2019-11-08 ASSESSMENT — MIFFLIN-ST. JEOR: SCORE: 1742.49

## 2019-11-08 ASSESSMENT — PATIENT HEALTH QUESTIONNAIRE - PHQ9
5. POOR APPETITE OR OVEREATING: NOT AT ALL
5. POOR APPETITE OR OVEREATING: NOT AT ALL

## 2019-11-08 ASSESSMENT — ENCOUNTER SYMPTOMS
NERVOUS/ANXIOUS: 0
ARTHRALGIAS: 0
DYSPHORIC MOOD: 0

## 2019-11-08 ASSESSMENT — PAIN SCALES - GENERAL: PAINLEVEL: MODERATE PAIN (5)

## 2019-11-08 NOTE — PROGRESS NOTES
Preceptor Attestation:   Patient seen, evaluated and discussed with the resident. I was present for and supervised the entire procedure. I have verified the content of the note, which accurately reflects my assessment of the patient and the plan of care.   Supervising Physician:  Uvaldo Bear MD.

## 2019-11-08 NOTE — PATIENT INSTRUCTIONS
Here is the plan from today's visit      Wart removal:  First round of freezing done! You may need a second round, let Dr. Agosto know if your wort is still giving you trouble.     1. Benign essential hypertension  - amLODIPine (NORVASC) 10 MG tablet; Take 1 tablet (10 mg) by mouth daily  Dispense: 90 tablet; Refill: 0  - metoprolol succinate ER (TOPROL XL) 100 MG 24 hr tablet; Take 1 tablet (100 mg) by mouth daily  Dispense: 90 tablet; Refill: 0  - losartan (COZAAR) 100 MG tablet; Take 1 tablet (100 mg) by mouth daily  Dispense: 90 tablet; Refill: 0  - Basic Metabolic Panel ([facility])  - Lipid Cascade ([facility])    2. Persistent disorder of initiating or maintaining sleep  - zolpidem (AMBIEN) 5 MG tablet; Take 1 tablet (5 mg) by mouth nightly as needed for sleep  Dispense: 30 tablet; Refill: 2    3. Recurrent major depressive disorder, in partial remission (H)  - citalopram (CELEXA) 40 MG tablet; Take 1 tablet (40 mg) by mouth daily  Dispense: 90 tablet; Refill: 0    4. Depression, unspecified depression type  - busPIRone (BUSPAR) 7.5 MG tablet; Take 1 tablet (7.5 mg) by mouth 2 times daily  Dispense: 180 tablet; Refill: 0    5. Need for prophylactic vaccination and inoculation against influenza  - Fluzone quad, preserve-free/prefilled  0.5ml, 6+ months [33920]      Please call or return to clinic if your symptoms don't go away.    Follow up plan  Keep your appointment with Dr. Agosto coming up in a few weeks!    Thank you for coming to [facility] today.  Lab Testing:  **If you had lab testing today and your results are reassuring or normal they will be mailed to you or sent through [employer] within 7 days.   **If the lab tests need quick action we will call you with the results.  The phone number we will call with results is # [phone] (home) [phone] (work). If this is not the best number please call our clinic and change the number.  Medication Refills:  If you need any refills please call your pharmacy  and they will contact us.   If you need to  your refill at a new pharmacy, please contact the new pharmacy directly. The new pharmacy will help you get your medications transferred faster.   Scheduling:  If you have any concerns about today's visit or wish to schedule another appointment please call our office during normal business hours [phone] (8-5:00 M-F)  If a referral was made to a [facility] and you don't get a call from central scheduling please call [phone].  If a Mammogram was ordered for you at [facility] call [phone] to schedule or change your appointment.  If you had an XRay/CT/Ultrasound/MRI ordered the number is [phone] to schedule or change your radiology appointment.   Medical Concerns:  If you have urgent medical concerns please call [phone] at any time of the day.    Nicole Church MD

## 2019-11-08 NOTE — PROGRESS NOTES
HPI       Chiquita Butler is a 59 year old  who presents for   Chief Complaint   Patient presents with     Derm Problem     Patient is complaining warts on the left foot and it's painful when she walk      Imm/Inj     Flu Shot     Pronouns: she/her/hers for now, but is exploring and may choose to use they/them/theirs in the future.       L foot Wart: Patient has had a wart on the sole of the L foot near the heel for at least one month. It's become painful to walk on. She has attempted OTC freezing treatments which have not been helpful.    Refills:  Amlodipine  buspar  Citalopram  Losartan  Metoprolol  zolpidem          Problem, Medication and Allergy Lists were      Patient Active Problem List    Diagnosis Date Noted     Encounter for preventive care 05/03/2019     Priority: Medium     Had [facility] Screening tests on Dec 27, 2018 at [facility]. Results:  Carotid blood flow measurements: normal   EKG: HR 60 NSR  AAA screen normal   [place]: normal (<1.3)  BMI 36  CRP 1.77  Glu 104  Lipids: HDL 70, , LDL 98,        Gender dysphoria in adult 03/01/2019     Priority: Medium     Non morbid obesity, unspecified obesity type 06/29/2016     Priority: Medium     MDD (major depressive disorder) 09/07/2014     Priority: Medium     Vitamin D deficiency 09/07/2014     Priority: Medium     Problem list name updated by automated process. Provider to review       H/O gastric bypass 09/07/2014     Priority: Medium     RNY 2004       Congenital heart valve 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     Priority: Medium     Hypertension      Priority: Medium     Keratoconus of both eyes 01/02/2012     Priority: Medium     Followed by ophtho q 6mos. Dx in 2002. Wears hyprid soft/hard lenses; anticipates cornea transplants in future.      .    Patient is an established patient of this clinic..         Review of Systems:   Review of Systems   Musculoskeletal: Negative for arthralgias and gait problem.   Skin:        Wart,  "L foot     Psychiatric/Behavioral: Negative for dysphoric mood. The patient is not nervous/anxious.             Physical Exam:     Vitals:    11/08/19 1112 11/08/19 1116   BP: (!) 146/88 (!) 148/101   Pulse: 54 54   Resp: 18    Temp: 97.4  F (36.3  C)    TempSrc: Oral    SpO2: 98%    Weight: 110.2 kg (243 lb)    Height: 1.77 m (5' 9.69\")      Body mass index is 35.18 kg/m .  Vital signs normal except elevated BP     Physical Exam  Constitutional:       General: Chiquita Butler is not in acute distress.     Appearance: Normal appearance.   HENT:      Head: Normocephalic and atraumatic.      Nose: Nose normal.      Mouth/Throat:      Mouth: Mucous membranes are moist.   Eyes:      General: No scleral icterus.        Right eye: No discharge.         Left eye: No discharge.      Extraocular Movements: Extraocular movements intact.      Conjunctiva/sclera: Conjunctivae normal.   Neck:      Musculoskeletal: Normal range of motion.   Cardiovascular:      Rate and Rhythm: Normal rate.   Pulmonary:      Effort: Pulmonary effort is normal. No respiratory distress.   Abdominal:      General: Abdomen is flat. There is no distension.   Musculoskeletal: Normal range of motion.   Skin:     Comments: L foot plantar surface: 1cm wart with surrounding pallor c/w prior freezing.   Neurological:      Mental Status: Chiquita Butler is alert and oriented to person, place, and time.   Psychiatric:         Mood and Affect: Mood normal.         Behavior: Behavior normal.           Results:      Results from this visit  Results for orders placed or performed in visit on 11/08/19   Basic Metabolic Panel ([facility])     Status: Abnormal   Result Value Ref Range    Calcium 8.8 8.5 - 10.1 mg/dL    Chloride 100.4 98.0 - 110.0 mmol/L    Carbon Dioxide 29.2 20.0 - 32.0 mmol/L    Creatinine 0.6 0.5 - 1.0 mg/dL    Glucose 99.6 (H) 70.0 - 99.0 mg'dL    Potassium 3.6 3.3 - 4.5 mmol/dL    Sodium 137.8 132.6 - 141.4 mmol/L    GFR Estimate >90 >60.0 mL/min/1.7 m2    " GFR Estimate If Black >90 >60.0 mL/min/1.7 m2    Urea Nitrogen 18.7 7.0 - 19.0 mg/dL   Lipid Cascade ([facility])     Status: None   Result Value Ref Range    Cholesterol 157.4 0.0 - 200.0 mg/dL    Cholesterol/HDL Ratio 2.8 0.0 - 5.0    HDL Cholesterol 57.0 >40.0 mg/dL    Triglycerides 123.0 0.0 - 150.0 mg/dL    VLDL Cholesterol 24.6 7.0 - 32.0 mg/dL    LDL Cholesterol Calculated 76 0 - 129 mg/dL       Assessment and Plan        Chiquita was seen today for derm problem and imm/inj.    Diagnoses and all orders for this visit:      Verruca plantaris  Wart removal: 1 wart on the heel of the plantar surface of the L foot was cleaned with antiseptic wipe, pared down with a sterile blade and frozen with 3 pulsed bursts of liquid nitrogen. Patient tolerated the procedure well with no complications. RTC in 2-3 for re-freeze if not resolved.    Benign essential hypertension  Blood pressures elevated at todays visit, have been WNL in past visits. Pt asymptomatic. Checked BMP and Lipids, both WNL. Refilling BP medications and recommend keeping visit with Dr. Agosto on 12/2/19 for follow-up.  -     amLODIPine (NORVASC) 10 MG tablet; Take 1 tablet (10 mg) by mouth daily  -     metoprolol succinate ER (TOPROL XL) 100 MG 24 hr tablet; Take 1 tablet (100 mg) by mouth daily  -     losartan (COZAAR) 100 MG tablet; Take 1 tablet (100 mg) by mouth daily  -     Basic Metabolic Panel ([facility])  -     Lipid Cascade ([facility])    Recurrent major depressive disorder, in partial remission (H)  -     citalopram (CELEXA) 40 MG tablet; Take 1 tablet (40 mg) by mouth daily    Persistent disorder of initiating or maintaining sleep  -     zolpidem (AMBIEN) 5 MG tablet; Take 1 tablet (5 mg) by mouth nightly as needed for sleep    Depression, unspecified depression type  -     busPIRone (BUSPAR) 7.5 MG tablet; Take 1 tablet (7.5 mg) by mouth 2 times daily    Need for prophylactic vaccination and inoculation against influenza  -     Fluzone quad,  preserve-free/prefilled  0.5ml, 6+ months [66934]           Medications Discontinued During This Encounter   Medication Reason     amLODIPine (NORVASC) 10 MG tablet Reorder     metoprolol succinate ER (TOPROL XL) 100 MG 24 hr tablet Reorder     zolpidem (AMBIEN) 5 MG tablet Reorder     losartan (COZAAR) 100 MG tablet Reorder     citalopram (CELEXA) 40 MG tablet Reorder     busPIRone (BUSPAR) 7.5 MG tablet Reorder       Options for treatment and follow-up care were reviewed with the patient. Chiquita Butler  engaged in the decision making process and verbalized understanding of the options discussed and agreed with the final plan.    Nicole Church MD

## 2019-11-09 ASSESSMENT — ANXIETY QUESTIONNAIRES: GAD7 TOTAL SCORE: 0

## 2019-11-14 ASSESSMENT — PATIENT HEALTH QUESTIONNAIRE - PHQ9: SUM OF ALL RESPONSES TO PHQ QUESTIONS 1-9: 0

## 2019-11-26 ENCOUNTER — PATIENT OUTREACH (OUTPATIENT)
Dept: PLASTIC SURGERY | Facility: CLINIC | Age: 59
End: 2019-11-26

## 2019-11-26 ENCOUNTER — OFFICE VISIT (OUTPATIENT)
Dept: PLASTIC SURGERY | Facility: CLINIC | Age: 59
End: 2019-11-26
Payer: COMMERCIAL

## 2019-11-26 VITALS — WEIGHT: 245.4 LBS | HEIGHT: 70 IN | BODY MASS INDEX: 35.13 KG/M2

## 2019-11-26 DIAGNOSIS — F64.0 GENDER DYSPHORIA IN ADOLESCENT AND ADULT: Primary | ICD-10-CM

## 2019-11-26 ASSESSMENT — MIFFLIN-ST. JEOR: SCORE: 1758.38

## 2019-11-26 ASSESSMENT — PAIN SCALES - GENERAL: PAINLEVEL: NO PAIN (0)

## 2019-11-26 NOTE — LETTER
"2019       RE: Chiquita Butler  [address]  [facility] [address]     Dear Colleague,    Thank you for referring your patient, Chiquita Butler, to the [facility] PLASTIC AND RECONSTRUCTIVE SURGERY at [facility]. Please see a copy of my visit note below.    PLASTICS NEW    This is a 59 year old biological female here today with their wife to discuss possible top surgery. Chiquita is comfortable with any pronouns and identifies as non-binary or gender queer. They originally \"came out\" in 1972, and historically were referred to as marian or \"stud\".  They are not on hormonal therapy and they do not bind.   They were referred by Amarilis Agosto, and are well-connected with the LGBTQ community over the years.   They deny having any overt breast problems. They have had yearly mammograms per recommended screening, and their last was 2019 without signs of abnormality.     PMH: gender dysphoria. Obesity.  HTN controlled on meds. Anxiety and depression on meds. Keratoconus - uses contacts. Denies DM, asthma, GERD. Denies bleeding/clotting, healing or scarring problems.     PSH: Amrand-en-Y 15 years ago (lost 85 lbs) in [place]. Partial hysterectomy (cervix remains) for fibroids. Open heart surgery at age 1 years for valve ablation.     FHX: non known breast or ovarian cancers. Paternal side - CAD. MGM  from stomach CA. Mom  with Alzheimers. Dad  of MI at age 47. Sister has DM.     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 Wife is pregnant and due mid February. They also have  4 yo and 19 year old daughters.  Non-smoker, non-drinker. Diet - omnivore, caffeine 2-3 cups/day. No pop. Exercise - walks, strengthening at gym. Goes to [facility].  Sleep - uses Ambien PRN, but usually gets 7-8 hours per night.     PE: 5'10\", 235 lbs.   Good contour upper chest. Grade 3 ptosis of breasts with striae. Moderate lateral thoracic rolls. " Right breast 200-300 gms larger. Right breast 1-2 cms lower than left. Both IMF's at least 3-4 cms too low related to pec origin. Descended but fairly dense breast tissue on palpation. No abnormal masses or nodes. Well - healed midline abdominal incision. Well-healed left posterolateral thoracic incision.  Photos taken with written consent.     A/P: 59 year old non-binary individual  - good candidate for gender affirming top surgery. Given breast volume and ptosis, will require double incision approach to bilateral simple mastectomy. Patient interested in nipple graft reconstruction.     Will need letter of support from a therapist, most likely from [facility]. Does not need to repeat mammogram. Hoping for surgery in January so that they can be recovered before the birth of their baby in mid-February.     Met with our Transgender Care Coordinator to discuss preop requirements and general timeline of process.     Total time= 30 minutes, greater than half spent on discussing various surgical options.    Again, thank you for allowing me to participate in the care of your patient.      Sincerely,    Gracie Rpap MD

## 2019-11-26 NOTE — PROGRESS NOTES
[facility]:  Care Coordination Note     SITUATION   Patient (Chiquita, They/Them) is a 59 year old who is receiving support for:  Clinic Care Coordination - Face To Face and Consult For ([facility] [place] [facility] - [facility])  .    BACKGROUND     Pt attended [facility] consult with Dr. Rapp; pt was accompanied by spouse. Pts Spouse is pregnant and due February 2020. Pt will need to schedule around this date.     Pt does not have therapist, requested a person of color, unfortunately  referral list does not include an adult POC therapist.  Pt was willing to see Sonia Pritchett at [facility].    ASSESSMENT     Surgery              [facility] [place] [place] Assessment  [facility] Care ([facility] [place] [place]) Enrollment: Enrolled  Patient has a therapist: No  Letter of support #1: Requested  Surgery being considered: Yes  Mastectomy: Yes          PLAN          Nursing Interventions:   [facility] [place] [place] program and services discussed with patient. Educational surgical packet provided and reviewed with patient. Process for accessing surgery discussed, including: WPATH standards of care, letters of support, treatment plan action steps, PA insurance process, surgery scheduling, and approximate timeline.  Pt questions answered within scope of practice.     Follow-up plan:  Pt needs to obtain one letter of support. See Dr. Rapp's note for additional information.     Writer sending message to Sonia Pritchett message to see if pt can get in for a letter of support.        Giacomo Troy

## 2019-11-26 NOTE — NURSING NOTE
"Chief Complaint   Patient presents with     Consult     Pt here for top surgery consult       Vitals:    11/26/19 0854   Weight: 111.3 kg (245 lb 6.4 oz)   Height: 1.778 m (5' 10\")       Body mass index is 35.21 kg/m .      STANLEY Mcdonough NREMT                    No vitals taken per provider  "

## 2019-12-02 ENCOUNTER — OFFICE VISIT (OUTPATIENT)
Dept: FAMILY MEDICINE | Facility: CLINIC | Age: 59
End: 2019-12-02
Payer: COMMERCIAL

## 2019-12-02 VITALS
HEART RATE: 57 BPM | HEIGHT: 70 IN | TEMPERATURE: 97.7 F | BODY MASS INDEX: 34.36 KG/M2 | WEIGHT: 240 LBS | DIASTOLIC BLOOD PRESSURE: 85 MMHG | SYSTOLIC BLOOD PRESSURE: 138 MMHG

## 2019-12-02 DIAGNOSIS — G44.209 TENSION HEADACHE: ICD-10-CM

## 2019-12-02 DIAGNOSIS — H61.21 IMPACTED CERUMEN OF RIGHT EAR: ICD-10-CM

## 2019-12-02 DIAGNOSIS — Z12.11 SPECIAL SCREENING FOR MALIGNANT NEOPLASMS, COLON: ICD-10-CM

## 2019-12-02 DIAGNOSIS — M25.562 ACUTE PAIN OF LEFT KNEE: ICD-10-CM

## 2019-12-02 DIAGNOSIS — Z00.00 ROUTINE GENERAL MEDICAL EXAMINATION AT A HEALTH CARE FACILITY: Primary | ICD-10-CM

## 2019-12-02 DIAGNOSIS — E66.811 CLASS 1 OBESITY DUE TO EXCESS CALORIES WITH SERIOUS COMORBIDITY AND BODY MASS INDEX (BMI) OF 34.0 TO 34.9 IN ADULT: ICD-10-CM

## 2019-12-02 DIAGNOSIS — E66.09 CLASS 1 OBESITY DUE TO EXCESS CALORIES WITH SERIOUS COMORBIDITY AND BODY MASS INDEX (BMI) OF 34.0 TO 34.9 IN ADULT: ICD-10-CM

## 2019-12-02 SDOH — HEALTH STABILITY: PHYSICAL HEALTH: ON AVERAGE, HOW MANY DAYS PER WEEK DO YOU ENGAGE IN MODERATE TO STRENUOUS EXERCISE (LIKE A BRISK WALK)?: 4 DAYS

## 2019-12-02 SDOH — HEALTH STABILITY: PHYSICAL HEALTH: ON AVERAGE, HOW MANY MINUTES DO YOU ENGAGE IN EXERCISE AT THIS LEVEL?: 30 MIN

## 2019-12-02 ASSESSMENT — MIFFLIN-ST. JEOR: SCORE: 1729.91

## 2019-12-02 NOTE — PROGRESS NOTES
"PLASTICS NEW    This is a 59 year old biological female here today with their wife to discuss possible top surgery. Chiquita is comfortable with any pronouns and identifies as non-binary or gender queer. They originally \"came out\" in , and historically were referred to as marian or \"stud\".  They are not on hormonal therapy and they do not bind.   They were referred by Amarilis Aogsto, and are well-connected with the LGBTQ community over the years.   They deny having any overt breast problems. They have had yearly mammograms per recommended screening, and their last was 2019 without signs of abnormality.     PMH: gender dysphoria. Obesity.  HTN controlled on meds. Anxiety and depression on meds. Keratoconus - uses contacts. Denies DM, asthma, GERD. Denies bleeding/clotting, healing or scarring problems.     PSH: Armand-en-Y 15 years ago (lost 85 lbs) in [place]. Partial hysterectomy (cervix remains) for fibroids. Open heart surgery at age 1 years for valve ablation.     FHX: non known breast or ovarian cancers. Paternal side - CAD. MGM  from stomach CA. Mom  with Alzheimers. Dad  of MI at age 47. Sister has DM.     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 Wife is pregnant and due mid February. They also have  6 yo and 19 year old daughters.  Non-smoker, non-drinker. Diet - omnivore, caffeine 2-3 cups/day. No pop. Exercise - walks, strengthening at gym. Goes to [facility].  Sleep - uses Ambien PRN, but usually gets 7-8 hours per night.     PE: 5'10\", 235 lbs.   Good contour upper chest. Grade 3 ptosis of breasts with striae. Moderate lateral thoracic rolls. Right breast 200-300 gms larger. Right breast 1-2 cms lower than left. Both IMF's at least 3-4 cms too low related to pec origin. Descended but fairly dense breast tissue on palpation. No abnormal masses or nodes. Well - healed midline abdominal incision. Well-healed left posterolateral " thoracic incision.  Photos taken with written consent.     A/P: 59 year old non-binary individual  - good candidate for gender affirming top surgery. Given breast volume and ptosis, will require double incision approach to bilateral simple mastectomy. Patient interested in nipple graft reconstruction.     Will need letter of support from a therapist, [facility] likely from [facility]. Does not need to repeat mammogram. Hoping for surgery in January so that they can be recovered before the birth of their baby in mid-February.     Met with our Transgender Care Coordinator to discuss preop requirements and general timeline of process.     Total time= 30 minutes, greater than half spent on discussing various surgical options.

## 2019-12-02 NOTE — PATIENT INSTRUCTIONS
Preventative  1. I recommend getting a shingles vaccination at the [facility].  - The pharmacy can give you an estimated out of pocket cost after insurance.  - This is a two series shot, the second series will be 4 months from the initial injection.  2. Due for pap in 2022.  3. Ordered FIT test today.  4. Continue using Selsun Blue for dandruff.   5. Continue walking at the [facility].  6. Follow up with me in one year for routine wellness visit, or otherwise as needed.  - Follow up with me for pre-op.    Left Knee  1. Continue going to [facility].  2. Provided Sports Medicine referral.    Headaches  1. Do occipital exercises as demonstrated during visit.  - Start by doing exercises ten times a day and work up to 30 a day.      * You can e-mail Makr Ribeiro at: [email or web address].    Preventive Health Recommendations  Female Ages 50 - 64    Yearly exam: See your health care provider every year in order to  o Review health changes.   o Discuss preventive care.    o Review your medicines if your doctor has prescribed any.      Get a Pap test every three years (unless you have an abnormal result and your provider advises testing more often).    If you get Pap tests with HPV test, you only need to test every 5 years, unless you have an abnormal result.     You do not need a Pap test if your uterus was removed (hysterectomy) and you have not had cancer.    You should be tested each year for STDs (sexually transmitted diseases) if you're at risk.     Have a mammogram every 1 to 2 years.    Have a colonoscopy at age 50, or have a yearly FIT test (stool test). These exams screen for colon cancer.      Have a cholesterol test every 5 years, or more often if advised.    Have a diabetes test (fasting glucose) every three years. If you are at risk for diabetes, you should have this test more often.     If you are at risk for osteoporosis (brittle bone disease), think about having a bone density scan (DEXA).    Shots: Get a flu  shot each year. Get a tetanus shot every 10 years.    Nutrition:     Eat at least 5 servings of fruits and vegetables each day.    Eat whole-grain bread, whole-wheat pasta and brown rice instead of white grains and rice.    Get adequate Calcium and Vitamin D.     Lifestyle    Exercise at least 150 minutes a week (30 minutes a day, 5 days a week). This will help you control your weight and prevent disease.    Limit alcohol to one drink per day.    No smoking.     Wear sunscreen to prevent skin cancer.     See your dentist every six months for an exam and cleaning.    See your eye doctor every 1 to 2 years.

## 2019-12-02 NOTE — PROGRESS NOTES
"Physical Note          HPI   60 y/o individual presents today for a wellness visit.    Concerns today: heart health, knee swelling, headache, having a baby in feb (partner delivering/carrying)    Preventative  Does not have periods because they had a total hysterectomy, but still has a cervix.last screening 2017 NIL and neg HPV. Is drinking more water, aims to drink 100 oz a day. Is receiving routine dental care. Has been using Ambien three night a week max during a harder week. Is taking Buspar scheduled, with improvement. Received flu shot this year. Would like to create a bill for chaplains in the [place], explains that [place] has a administer of loneliness.   Had flu shot    Heart Health  Wonders about heart health since she had a heart valve at age 1y/o ( I suspect PDA ligation) and ablation at age 33 y/o. Denies CP, chest pressure, chest tightness, racing heart.     HAs  Reports she's been having HA's, which is new. HAs are around the occipital region, also has some tenderness in the area. Has some dandruff but not more than usual. Uses Head and Shoulders shampoo or Selsun Blue, and coconut oil for dryness. Denies flaking of the skin or bumps.     MSK Issue  Reports falling a few months ago in [place] and she twisted the right leg, and recently fell again a few weeks ago and now has some swelling in the left knee, has been icing the area. Notices knees creak more with sit to stand. Knee pain is worse with stairs. [facility] once a week, with improvement in left knee pain. Wonders about knee injections. Had a foot wart that was completely removed with cryotherapy.    Mood  [facility], needs a letter of support from Sonia Pritchett. States gender journey has been \"invigorating\" and \"spritual.\" Building a community at [facility], which provides support.Making the mental transition from a lifetime as a marian lesbian to  More nonbinary identity. Same pronouns " for now, considering use of he in future. Very excited about upcoming surgery.     Social Hx  Is having a baby in 2020. Walks 10,000 steps 4 days a week at the [facility].  Eating healthy, fresh.     FHx  - Heart attack (Father  age 46 y/o)  - Colorectal CA (Materal Grandmother)  - Cellulitis (Brother)    Patient Active Problem List   Diagnosis     Keratoconus of both eyes     Hypertension     MDD (major depressive disorder)     Vitamin D deficiency     H/O gastric bypass     Congenital heart valve abnormality     Non morbid obesity, unspecified obesity type     Gender dysphoria in adult     Encounter for preventive care       Past Medical History:   Diagnosis Date     Congenital heart valve abnormality 2014     Diverticulosis      Hypertension      Keratoconus of both eyes 2012     Panic attack 11    ER Visit       Previous Medical Care   [facility]     Family History   Problem Relation Age of Onset     Alzheimer Disease Mother 60         78     Myocardial Infarction Father 49             C.A.D. Father      Glaucoma Maternal Grandmother      Colorectal Cancer Maternal Grandmother         95, pancreatic     Diabetes Sister      Eye Disorder Sister      Depression Other         multiple siblings     Cerebrovascular Disease Paternal Grandmother         80's     Breast Cancer No family hx of             Review of Systems:     Review of Systems:  CONSTITUTIONAL: NEGATIVE for fever, chills, change in weight  INTEGUMENTARY/SKIN: NEGATIVE for worrisome rashes, moles or lesions  EYES: NEGATIVE for vision changes or irritation  ENT/MOUTH: NEGATIVE for ear, mouth and throat problems  RESP: NEGATIVE for significant cough or SOB  BREAST: NEGATIVE for masses, tenderness or discharge  CV: NEGATIVE for chest pain, palpitations or peripheral edema  GI: NEGATIVE for nausea, abdominal pain, heartburn, or change in bowel habits  : NEGATIVE for frequency, dysuria, or hematuria  MUSCULOSKELETAL: NEGATIVE for  significant arthralgias or myalgia  NEURO: NEGATIVE for weakness, dizziness or paresthesias  ENDOCRINE: NEGATIVE for temperature intolerance, skin/hair changes  HEME/ALLERGY: NEGATIVE for bleeding problems  PSYCHIATRIC: NEGATIVE for changes in mood or affect  Sleep:   Do you snore most or the night (as reported by a family member)? No  Do you feel sleepy or extremely tired during most of the day? No    See HPI for additional sxs.    This document serves as a record of the services and decisions personally performed and made by Amarilis Agosto MD. It was created on his/her behalf by Kavitha Koch, a trained medical scribe. The creation of this document is based the provider's statements to the medical scribe.  Scribdesi Koch 10:25 AM, December 2, 2019       Social History     Social History     Socioeconomic History     Marital status:      Spouse name: Aissatou     Number of children: Not on file     Years of education: Not on file     Highest education level: Not on file   Occupational History     Not on file   Social Needs     Financial resource strain: Not on file     Food insecurity:     Worry: Not on file     Inability: Not on file     Transportation needs:     Medical: Not on file     Non-medical: Not on file   Tobacco Use     Smoking status: Never Smoker     Smokeless tobacco: Never Used   Substance and Sexual Activity     Alcohol use: Not Currently     Alcohol/week: 2.5 standard drinks     Types: 3 Standard drinks or equivalent per week     Comment: 1-2 drinks/week     Drug use: No     Sexual activity: Yes     Partners: Female     Birth control/protection: None   Lifestyle     Physical activity:     Days per week: 4 days     Minutes per session: 30 min     Stress: Not on file   Relationships     Social connections:     Talks on phone: Not on file     Gets together: Not on file     Attends Mosque service: Not on file     Active member of club or organization: Not on file     Attends meetings of  "clubs or organizations: Not on file     Relationship status: Not on file     Intimate partner violence:     Fear of current or ex partner: Not on file     Emotionally abused: Not on file     Physically abused: Not on file     Forced sexual activity: Not on file   Other Topics Concern      Service Not Asked     Blood Transfusions Not Asked     Caffeine Concern Not Asked     Occupational Exposure Not Asked     Hobby Hazards Not Asked     Sleep Concern Yes     Comment: 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 Not Asked   Social History Narrative    Lives in [facility] with partner Aissatou, adopted daughter Marely. [place] born . Baby due 2020.      at [employer] [place]. Plays trumpet.     Gets exercise! 10,000 steps..       Marital Status:Partnered  Who lives in your household? Partner, daughter    Has anyone hurt you physically, for example by pushing, hitting, slapping or kicking you or forcing you to have sex? Denies  Do you feel threatened or controlled by a partner, ex-partner or anyone in your life? Denies    Sexual Health     Sexual concerns: No   STI History: Neg  Pregnancy History:   LMP No LMP recorded (lmp unknown). Patient is postmenopausal.   Last Pap Smear Date:   Lab Results   Component Value Date    PAP NIL 2017     Abnormal Pap History: None    Recommended Screening     Pap/HPV cotest every 5 years for women 30-65  Pap discussion today, pt is due in .  Colon CA Screening (>50-75 ):  Recommended and patient accepted testing, will do FIT test.         Physical Exam:   Vitals: Blood Pressure 138/85   Pulse 57   Temperature 97.7  F (36.5  C) (Oral)   Height 1.772 m (5' 9.75\")   Weight 108.9 " kg (240 lb)   Last Menstrual Period  (LMP Unknown)   Peak Flow 98 L/min   Body Mass Index 34.68 kg/m    BMI= Body mass index is 34.68 kg/m .   Vitals were reviewed and were normal.     Physical Exam:  GENERAL: healthy, alert and no distress  EYES: Eyes grossly normal to inspection, extraocular movements - intact, and PERRL  HENT: Head: Symmetric, no lymphadenopathy, no skin changes or swelling, there is diffuse scaling of the scalp over the left parietal and occipital regions without redness.   ear canals- normal, cerumen in the right canal, curetted successfully by me to remove the occlusion ; TMs- normal; Nose- normal; Mouth- no ulcers, no lesions, Mallampati III.  NECK: no tenderness, no adenopathy, no asymmetry, no masses, no stiffness; thyroid- normal to palpation  RESP: lungs clear to auscultation - no rales, no rhonchi, no wheezes  CV: regular rates and rhythm, normal S1 S2, no S3 or S4 and no murmur, no click or rub -  ABDOMEN: soft, no tenderness, no  hepatosplenomegaly, no masses, normal bowel sounds  MS: extremities-  Dermatofibroma on the right leg.     No effusion on the right knee. No pedal edema bilaterally.  Left Knee: The left knee is swollen compared to the right.  Tender medial joint, small effusion, small bruise inferior to the knee cap. Flexion is near full, slightly limited by pants. Extension lacks approximately 5 degrees. Soft tissue swelling on the lateral aspect above the knee. Negative [facility]. Negative [facility].   SKIN: no suspicious lesions, no rashes   NEURO: strength and tone- normal, sensory exam- grossly normal, mentation- intact, speech- normal, reflexes- symmetric  BACK: no CVA tenderness, no paralumbar tenderness  PSYCH: Alert and oriented times 3; speech- coherent , normal rate and volume; able to articulate logical thoughts, able to abstract reason, affect- bright.  LYMPHATICS: ant. cervical- normal, post. cervical- normal,occipital normal supraclavicular- normal,  Assessment  and Plan   Chiquita was seen today for physical and referral.    Diagnoses and all orders for this visit:    Routine general medical examination at a health care facility  - Immunizations were reviewed, recommended shingles vaccination. Discussed risks and benefits.    Special screening for malignant neoplasms, colon  -     Fecal colorectal cancer screen FIT; Future    Tension headache  1. Do occipital exercises as demonstrated during visit.  2. Upper back strengthening - Start by doing exercises ten times a day and work up to 30 a day to offload neck area    Acute pain of left knee  -     [facility] INTERNAL REFERRAL    Obesity stage 1 Body mass index is 34.68 kg/m . s/p gastric bypass surgery and 80# wt loss.   Maintain exercise regimen!    Patient Instructions   Preventative  1. I recommend getting a shingles vaccination at the [facility] pharmacy.  - The pharmacy can give you an estimated out of pocket cost after insurance.  - This is a two series shot, the second series will be 4 months from the initial injection.  2. Due for pap in 2022.  3. Ordered FIT test today.  4. Continue using Selsun Blue for dandruff.   5. Continue walking at the [facility].  6. Follow up with me in one year for routine wellness visit, or otherwise as needed.  - Follow up with me for pre-op.    Left Knee  1. Continue going [facility].  2. Provided Sports Medicine referral.    Headaches  1. Do occipital exercises as demonstrated during visit.  - Start by doing exercises ten times a day and work up to 30 a day.      Options for treatment and follow-up care were reviewed with the patient . Chiquita Butler and/or guardian engaged in the decision making process and verbalized understanding of the options discussed and agreed with the final plan.    The information in this document, created by the medical scribe for me, accurately reflects the services I personally performed and the decisions made by me. I have reviewed and approved  this document for accuracy prior to leaving the patient care area.    Amarilis Agosto MD  10:25 AM, 12/02/19  End Time: 11:07 AM

## 2019-12-13 ENCOUNTER — OFFICE VISIT (OUTPATIENT)
Dept: OTHER | Facility: OUTPATIENT CENTER | Age: 59
End: 2019-12-13
Payer: COMMERCIAL

## 2019-12-13 DIAGNOSIS — F64.0 GENDER DYSPHORIA IN ADULT: Primary | ICD-10-CM

## 2019-12-13 NOTE — LETTER
2019      Re:  Chiquita Butler   :  1960  Referral letter for gender affirmation surgery (top surgery)    To whom it may concern:    I am writing this letter in support of Chiquita s intention to pursue gender affirmation surgery (top surgery). I am employed at the [employer] ([employer]) and work in the Transgender Health Program. Further, I am a gender specialist and licensed psychologist with a Ph.D. in Counseling Psychology.     Chiquita identifies as genderqueer and transmasculine and reported being comfortable with any pronouns. For the purposes of this letter, I will be using he/him/his pronouns to which Chiquita agreed and reported made him feel affirmed at that the time of discussion. His appointment for an individual session occurred on 2019. Chiquita met with me to complete an initial diagnostic evaluation and to gather information to assist with writing this letter of support. During this session, Chiquita was engaged and attentive, asked appropriate questions, was open to feedback, and was an active participant in discussion.    Chiquita appears to meet diagnostic criteria for Gender Dysphoria. He reported a history of gender and anatomic dysphoria related to their birth-assigned sex dating back to adolescence. Chiquita reported that he initially  came out  in , when he was 12 years old, and has historically been referred to as a stud or marian. He described that coming to a better understanding of his gender (separate from his sexual orientation) on the masculine spectrum and as nonbinary has been a  spiritual  and  invigorating  experience. He noted that he completed a partial hysterectomy for fibroids in the past, but reported that he had some relief in terms of gender dysphoria following this procedure. Chiquita described a strong desire for gender affirmation surgery (in the form of a top surgery) to decrease his gender and anatomical dysphoria. Chiquita reported that he had a top  surgery consultation on November 25, 2019 with Dr. Rapp at [facility]. Gender affirmation surgeries are needed as effective and medically necessary procedures to treat gender dysphoria.    Chiquita appears to meet the World Professional Association for Transgender Health (WPATH) Standards of Care for gender affirmation surgery. Surgery is considered a medically necessary treatment by the WPATH Standards of Care for transgender persons. According to the WPATH Standards of Care, an important guideline states that an individual should reasonably address medical and mental health concerns prior to undergoing surgery. Chiquita reported a history of experiencing anxiety and depressive symptoms. Chiquita shared that he takes psychotropic medications as prescribed that he finds helpful. He also reported having assistance from a support network and that he engages in adaptive coping skills to manage symptoms. He appears to be psychologically well adjusted, and there appears to be no concerns that would present need for caution in moving forward with gender affirming top surgery at this time.     Chiquita appears to have adequate expectations about surgery.  Chiquita also reported having support to assist with post-operation needs. Given the information in this letter, I support his decision to pursue medically necessary surgery at this time. This support is based on the psychological indication for surgery and did not include a physical examination to assess medical contraindications for the surgical procedure.     This letter of support is valid for one year from the date of approval. If you have any questions, or are in need of additional information, please do not hesitate to contact me at [phone].     Sincerely,      IVELISSE Carson, PhD LP    [facility], [facility]

## 2019-12-13 NOTE — PROGRESS NOTES
[facility] -  Case Progress Note    Date of Service: 12/13/19   Client Name: Chiquita Butler (any pronouns)  YOB: 1960  MRN: [id number]  Treating Provider: IVELISSE Carson, PhD,   Type of Session: Individual  Present in Session: Client Only  Number of Minutes:  57     Current Symptoms/Status:  Gender and anatomical dysphoria that has been presenting for more than six months. Seeking a letter of support for gender affirming chest surgery. Reported being in communication with Giacomo Troy (care coordinator) regarding this procedure. Reported completing top surgery consultation with Dr. Rapp on 11/23/2019.       Progress Toward Treatment Goals:   Client is making progress by taking steps towards gender affirming chest surgery, including getting letter for support.      Intervention: Modality and Description:  Utilized interpersonal, solution-focused, and supportive psychotherapy approaches to process about gender affirmation surgery plans. Discussed client's history of gender and anatomical dysphoria. Discussed [facility] Standards of Care criteria for surgery, and it appears that client meets criteria to move forward with surgery at this time. Discussed client's understanding of the surgery process, including risks, benefits, and alternatives to surgery. Discussed client's aftercare plans/support. Discussed plan to complete and submit letter of support, which will be electronically stored in client's file. Client thanked provider for having this session and expressed appreciation for the letter of support. Toward session end, client stated that he is not currently interested in therapy but may want to start individual therapy after completing chest surgery. Discussed procedures for schedule appts and noted that client is welcome to schedule with this provider in the future if desired.     Of note: client reported no preference in terms of pronouns. Initially discussed using gender neutral pronouns  in documentation. When discussing transmasculine as a word that resonates with client, client stated that he would like to be referenced with more masculine coded language/terminology. Agreed to complete documentations using he/him/his pronouns at this point.      Response to Intervention:  Client reported validation and support.     Assignment:  None     Interactive Complexity:  None     Diagnosis:  Encounter Diagnosis   Name Primary?     Gender dysphoria in adult Yes       Plan / Need for Future Services:  Client is not seeking therapy services at this time. He stated he may be interested in services after completing chest surgery. Discussed that he is welcome to return for therapy in the future if he would like.

## 2019-12-17 ENCOUNTER — ANCILLARY PROCEDURE (OUTPATIENT)
Dept: GENERAL RADIOLOGY | Facility: CLINIC | Age: 59
End: 2019-12-17
Attending: FAMILY MEDICINE
Payer: COMMERCIAL

## 2019-12-17 ENCOUNTER — OFFICE VISIT (OUTPATIENT)
Dept: FAMILY MEDICINE | Facility: CLINIC | Age: 59
End: 2019-12-17
Payer: COMMERCIAL

## 2019-12-17 VITALS
RESPIRATION RATE: 16 BRPM | DIASTOLIC BLOOD PRESSURE: 86 MMHG | HEART RATE: 60 BPM | HEIGHT: 70 IN | TEMPERATURE: 97.7 F | OXYGEN SATURATION: 98 % | WEIGHT: 245 LBS | SYSTOLIC BLOOD PRESSURE: 146 MMHG | BODY MASS INDEX: 35.07 KG/M2

## 2019-12-17 DIAGNOSIS — M17.10 PATELLOFEMORAL ARTHRITIS: ICD-10-CM

## 2019-12-17 DIAGNOSIS — M25.562 ACUTE PAIN OF LEFT KNEE: Primary | ICD-10-CM

## 2019-12-17 DIAGNOSIS — M25.562 ACUTE PAIN OF LEFT KNEE: ICD-10-CM

## 2019-12-17 ASSESSMENT — MIFFLIN-ST. JEOR: SCORE: 1756.56

## 2019-12-17 NOTE — PROGRESS NOTES
SUBJECTIVE: Chiquita Butler is a 59 year old adult who presents with left knee pain and swelling (worse for the last 2 weeks)  3 months ago was out dancing fell on both knees. Since then she has had pain in the left knee. 2 weeks ago has had increasing pain and swelling. Painful when going from sitting to standing. Has been taking advil to help with the pain. Expecting a baby in the family in February, and would like to be in the best shape. Points to supero-lateral pain of left knee. Carrying up stairs is bad. Once she is level, does not have significant pain     PAST MEDICAL, SOCIAL, SURGICAL AND FAMILY HISTORY: Chiquita Butler  has a past medical history of Congenital heart valve abnormality (9/7/2014), Diverticulosis, Hypertension, Keratoconus of both eyes (01-), and Panic attack (12/17/13).  Chiquita Butler  has a past surgical history that includes Hysterectomy (1993); Bypass gastric romaine-en-y, liver biopsy, combined (2004); Repair valve pulmonary (1960); catheter, ablation (1962); Colonoscopy (N/A, 4/20/2017); and Colonoscopy (N/A, 5/15/2017).  Chiquita Butler's family history includes Alzheimer Disease (age of onset: 60) in Chiquita Butler's mother; C.A.D. in Chiquita Butler's father; Cerebrovascular Disease in Chiquita Butler's paternal grandmother; Colorectal Cancer in Chiquita Butler's maternal grandmother; Depression in an other family member; Diabetes in Chiquita Butler's sister; Eye Disorder in Chiquita Butler's sister; Glaucoma in Chiquita Butler's maternal grandmother; Myocardial Infarction (age of onset: 49) in Chiquita Butler's father.  Chiquita Butler reports that Chiquita Butler has never smoked. Chiquita Butler has never used smokeless tobacco. Chiquita Butler reports previous alcohol use of about 2.5 standard drinks of alcohol per week. Chiquita Butler reports that Chiquita Butler does not use drugs.      ALLERGIES: Chiquita Butler has No Known Allergies.    CURRENT MEDICATIONS: Chiquita Butler has a current medication list which includes the following prescription(s): amlodipine,  "buspirone, vitamin d3, citalopram, losartan, metoprolol succinate er, multivitamin w/minerals, and zolpidem.     REVIEW OF SYSTEMS: 10 point review of systems is negative except as noted above.    EXAM:  BP (!) 146/86   Pulse 60   Temp 97.7  F (36.5  C) (Oral)   Resp 16   Ht 1.778 m (5' 10\")   Wt 111.1 kg (245 lb)   LMP  (LMP Unknown)   SpO2 98%   BMI 35.15 kg/m    CONSTITUTIONIAL: healthy, alert and no distress  SKIN: no suspicious lesions or rashes  GAIT: normal  Stance: normal  NEUROLOGIC: Normal muscle tone and strength, reflexes normal, sensation grossly normal.  PSYCHIATRIC: affect normal/bright and mentation appears normal.    MUSCULOSKELETAL: left knee pain  Inspection: swelling of lateral knee  Tender: Pain lateral joint line, little tender of medial joint line, pain with lateral patellar grind  Non-tender: patella tendon  Active Range of Motion: all normal  Strength: hip flexion, knee extension, knee flexion normal  Special tests: positive lateral stress    Also examined: Normal right knee exam     ASSESSMENT/PLAN:  Chiquita was seen today for knee pain.    Diagnoses and all orders for this visit:    Acute pain of left knee  Patellofemoral arthritis  Patient's xray consistent with patellofemoral arthritis, consistent also with exam. We discussed treatment options - emphasis on PT and continuing PT. We discussed injections and knee replacements as treatment for knee compartment arthritis - but patient's compartments look normal, and only patella involved. Patient to go to PT weekly, and follow-up in 8 weeks. Ice packs as needed. If swelling at the end of the day - working with ROM. May continue with stretching exercises. Continue with ibuprofen, consider topical preparations for pain relief.   -     [facility]; Future  -     XR Knee Left 3 Views; Future    X-RAY INTERPRETATION:   X-Ray of the Left Knee: 3-view, Christian, lateral, sunrise   ordered and interpreted in " the office today was positive for patellofemoral arthritis, preserved medial and lateral compartments without significant signs of arthritis.    Shireen Wheeler DO  PGY3 Family Medicine Resident  Pager: [phone]     Patient seen, evaluated and discussed with the resident. I have verified the content of the note, which accurately reflects my assessment of the patient and the plan of care.   Supervising Physician:  Kasey Doss MD

## 2019-12-17 NOTE — PATIENT INSTRUCTIONS
Here is the plan from today's visit    1. Acute pain of left knee  - XR Knee Left 3 Views; Future    2. Patellofemoral arthritis  - [facility], PT, HAND AND CHIROPRACTIC REFERRAL - [facility]; Future    Please call or return to clinic if your symptoms don't go away.    Follow up plan  Please make a clinic appointment for follow up with me (CAYLA, KASEY GAVIRIA) in 8  weeks for recheck.    Thank you for coming to [facility] today.  Lab Testing:  **If you had lab testing today and your results are reassuring or normal they will be mailed to you or sent through [employer] within 7 days.   **If the lab tests need quick action we will call you with the results.  The phone number we will call with results is # [phone] (home) [phone] (work). If this is not the best number please call our clinic and change the number.  Medication Refills:  If you need any refills please call your pharmacy and they will contact us.   If you need to  your refill at a new pharmacy, please contact the new pharmacy directly. The new pharmacy will help you get your medications transferred faster.   Scheduling:  If you have any concerns about today's visit or wish to schedule another appointment please call our office during normal business hours [phone] (8-5:00 M-F)  If a referral was made to a [facility] and you don't get a call from central scheduling please call [phone].  If a Mammogram was ordered for you at [facility] call [phone] to schedule or change your appointment.  If you had an XRay/CT/Ultrasound/MRI ordered the number is [phone] to schedule or change your radiology appointment.   Medical Concerns:  If you have urgent medical concerns please call [phone] at any time of the day.    Kasey Doss MD

## 2020-01-06 ENCOUNTER — TELEPHONE (OUTPATIENT)
Dept: FAMILY MEDICINE | Facility: CLINIC | Age: 60
End: 2020-01-06

## 2020-01-06 NOTE — TELEPHONE ENCOUNTER
[facility] phone call message-patient reporting a symptom:     Symptom: Sweaty, extreme tired, nose running, Temperature of 101     When did symptoms begin? Few days back. Temperature zonia to 102 this morning.    Characteristics: (location on body, intensity, what makes it better or worse, associated symptoms):  Headache, vomiting,     Additional Details: Tried making an appointment but no appointments for today.      Same Day Visit Offered: No open spot    Additional comments: Stephanie is diagnosed with influenza A+ so is assuming whether they got it from her. Spoke with RN.    OK to leave message on voice mail? Yes    Advised patient that RN would call back within 3 hours, unless emergent   Primary language: English      needed? No    Call taken on January 6, 2020 at 8:18 AM by Ashleigh Sterling

## 2020-01-06 NOTE — TELEPHONE ENCOUNTER
RN attempted reaching her and could not reach, left name and callback number. When she calls back, please transfer to RN.  Nory Valle RN

## 2020-01-07 NOTE — TELEPHONE ENCOUNTER
RN spoke with Aissatou yesterday 1/6/2020 (mistakenly did not document) and she started getting sick Sunday and has gotten worse, has been exposed to influenza A. She is not vomiting, this was an error with the message that was taken. She has a cough, body aches and fever. Due to the timing for receiving Tamiflu and the lack of appointments at clinic, RN recommended urgent care. Aissatou verbalized understanding.  Nory Valle RN

## 2020-01-09 ENCOUNTER — OFFICE VISIT (OUTPATIENT)
Dept: FAMILY MEDICINE | Facility: CLINIC | Age: 60
End: 2020-01-09
Payer: COMMERCIAL

## 2020-01-09 ENCOUNTER — ANCILLARY PROCEDURE (OUTPATIENT)
Dept: GENERAL RADIOLOGY | Facility: CLINIC | Age: 60
End: 2020-01-09
Attending: FAMILY MEDICINE
Payer: COMMERCIAL

## 2020-01-09 VITALS
RESPIRATION RATE: 18 BRPM | HEART RATE: 74 BPM | DIASTOLIC BLOOD PRESSURE: 87 MMHG | SYSTOLIC BLOOD PRESSURE: 128 MMHG | TEMPERATURE: 98.1 F | OXYGEN SATURATION: 94 %

## 2020-01-09 DIAGNOSIS — R06.89 DECREASED BREATH SOUNDS: Primary | ICD-10-CM

## 2020-01-09 DIAGNOSIS — J15.7 PNEUMONIA OF BOTH LOWER LOBES DUE TO MYCOPLASMA PNEUMONIAE: ICD-10-CM

## 2020-01-09 DIAGNOSIS — R06.89 DECREASED BREATH SOUNDS: ICD-10-CM

## 2020-01-09 RX ORDER — AZITHROMYCIN 250 MG/1
TABLET, FILM COATED ORAL
Qty: 6 TABLET | Refills: 0 | Status: SHIPPED | OUTPATIENT
Start: 2020-01-09 | End: 2020-12-10

## 2020-01-09 RX ORDER — INHALER, ASSIST DEVICES
SPACER (EA) MISCELLANEOUS
Qty: 1 EACH | Refills: 0 | Status: SHIPPED | OUTPATIENT
Start: 2020-01-09 | End: 2020-12-10

## 2020-01-09 ASSESSMENT — PAIN SCALES - GENERAL: PAINLEVEL: MODERATE PAIN (4)

## 2020-01-09 NOTE — PATIENT INSTRUCTIONS
Chest Pain  1. Chest x-ray today at [facility]. Results discussed in clinic.  2. Continue tamiflu as prescribed.  3. If you experience increased difficulty breathing, or increased coughing follow up with me or the ED.  4. I recommend using your inhaler while standing so it gets to the bases of your lungs.  - Continue waiting a minute between puffs.  - Ordered spacer today for inhaler.  5. I recommend waiting on the shingles vaccine until you're feeling better.

## 2020-01-09 NOTE — PROGRESS NOTES
HPI   Chiquita Butler is a 59 year old  who presents for   Chief Complaint   Patient presents with     Chest Pain     Patient is here for  chest x-ray      Chest Pain  Was dx with influenza. Everyone in the house has it including pregnant wife and 4 y/o daughter. Sxs worsened three days ago so they went to urgent care for chest pressure. Had a fever of 102.6 at urgent care. Oxygen saturation of 93%, was prescribed an inhaler and pt has been using inhaler regularly with minimal improvement. Presents today with daughter for WCC and asks me to listen to her lungs. Based on this exam, asked her to be registered as a patient so we can get vitals.     Has 2-3 days left remaining of tamiflu, dose was for 5 days. Last dose of Tylenol/ibuprofen was yesterday. Last albuterol was this morning before visit, and is underwhelmed with the results. Uses inhaler while sitting and does not have a spacer. Has had similar sxs in the past and was put on O2 for three months. Wonders if she should wait on her shingles shot today until she's feeling better because her friend told her it wiped them out.    +++++++    Problem, Medication and Allergy Lists were reviewed and updated if needed.    Patient is an established patient of this clinic.         Review of Systems:   Review of Systems    Positive for: chest tightness, productive cough, poor appetite, dizziness,     Negative for: vomiting, poor sleep, racing heart, edema in the bilateral lower extremities.    See HPI for additional sxs.    This document serves as a record of the services and decisions personally performed and made by Amarilis Agosto MD. It was created on his/her behalf by Kavitha Koch, a trained medical scribe. The creation of this document is based the provider's statements to the medical scribe.  Anali Koch 8:42 AM, January 9, 2020       Physical Exam:     Vitals:    01/09/20 0933   BP: 128/87   Pulse: 74   Resp: 18   Temp: 98.1  F (36.7  C)   TempSrc: Oral    SpO2: 94%     There is no height or weight on file to calculate BMI.  Vitals were reviewed and were normal     Physical Exam    BMI= There is no height or weight on file to calculate BMI.   GENERAL: healthy, alert and no distress  EYES: Eyes grossly normal to inspection, extraocular movements - intact,  HENT: ear canals- normal; TMs- normal; Nose- normal; Mouth- White plaque, moist mucus membranes.  RESP: Diminished sounds in the left base and taking a deep breath incites a paroxysm of cough.  CV: regular rates and rhythm, normal S1 S2, no S3 or S4 and no murmur, no click or rub -  PSYCH: Alert and oriented times 3; speech- coherent , normal rate and volume; able to articulate logical thoughts, able to abstract reason, affect- normal  LYMPHATICS: ant. cervical- normal, post. cervical- normal, axillary- normal, supraclavicular- normal    Results:    Results from this visitNo results found for any visits on 01/09/20.     01/09/20 Chestxr reviewed by me shows: streaky linear opacities in the right lower and left lower lobes, minimal. Does have retro cardiac clearing on the lateral.     Assessment and Plan   Chiquita was seen today for chest pain.    Diagnoses and all orders for this visit:    Decreased breath sounds  -     XR CHEST 2 VW; Future    Pneumonia of both lower lobes due to Mycoplasma pneumoniae  -     azithromycin (ZITHROMAX) 250 MG tablet; Two tablets first day, then one tablet daily for four days.  -     spacer (OPTICHAMBER TRACY) holding chamber; Any brand spacer for use with inhaler      There are no discontinued medications.    Patient Instructions   Chest Pain  1. Chest x-ray today at [facility]. Results discussed in clinic.  2. Continue tamiflu as prescribed.  3. If you experience increased difficulty breathing, or increased coughing follow up with me or the ED.  4. I recommend using your inhaler while standing so it gets to the bases of your lungs.  - Continue waiting a minute between puffs.  -  Ordered spacer today for inhaler.  5. I recommend waiting on the shingles vaccine until you're feeling better.    Options for treatment and follow-up care were reviewed with the patient. Chiquita Butler  engaged in the decision making process and verbalized understanding of the options discussed and agreed with the final plan.    The information in this document, created by the medical scribe for me, accurately reflects the services I personally performed and the decisions made by me. I have reviewed and approved this document for accuracy prior to leaving the patient care area.    Amarilis Agosto MD

## 2020-02-13 DIAGNOSIS — F33.41 RECURRENT MAJOR DEPRESSIVE DISORDER, IN PARTIAL REMISSION (H): ICD-10-CM

## 2020-02-13 RX ORDER — CITALOPRAM HYDROBROMIDE 40 MG/1
40 TABLET ORAL DAILY
Qty: 30 TABLET | Refills: 0 | Status: SHIPPED | OUTPATIENT
Start: 2020-02-13 | End: 2020-02-13

## 2020-02-13 NOTE — TELEPHONE ENCOUNTER
Verify that the refill encounter hasn't been started Yes    [facility] phone call message- patient requesting a refill:    Full Medication Name: citalopram (CELEXA) 40 MG tablet    Dose: Take 1 tablet (40 mg) by mouth daily - Oral     Pharmacy confirmed as   [place], [place] - [address]  [address] [address]  Phone: [phone] Fax: [phone]  : Yes    Medication tab checked to see if medication has been sent  Yes    Additional Comments: Patient is out of med.     OK to leave a message on voice mail? Yes    Advised patient refill may take up to 2 business days? Yes    Primary language: English      needed? No    Call taken on February 13, 2020 at 3:24 PM by Cecille Rust to [facility] MED REFILL

## 2020-02-13 NOTE — TELEPHONE ENCOUNTER
RN sent 30 days per protocol. Routing to PCP to add additional refills if appropriate, or advise if a visit is needed. If visit needed, please route to  to contact patient to schedule.  Nory Valle RN

## 2020-02-14 RX ORDER — CITALOPRAM HYDROBROMIDE 40 MG/1
40 TABLET ORAL DAILY
Qty: 90 TABLET | Refills: 0 | Status: SHIPPED | OUTPATIENT
Start: 2020-02-14 | End: 2020-03-15

## 2020-02-18 DIAGNOSIS — Z12.11 SPECIAL SCREENING FOR MALIGNANT NEOPLASMS, COLON: ICD-10-CM

## 2020-02-18 PROCEDURE — 82274 ASSAY TEST FOR BLOOD FECAL: CPT | Performed by: FAMILY MEDICINE

## 2020-02-20 ENCOUNTER — PATIENT OUTREACH (OUTPATIENT)
Dept: PLASTIC SURGERY | Facility: CLINIC | Age: 60
End: 2020-02-20

## 2020-02-20 NOTE — PROGRESS NOTES
[facility]:  Care Coordination Note     SITUATION   Patient (Chiquita, They/Them)  is a 60 year old who is receiving support for:  Clinic Care Coordination - Follow-up (Letter of support obtained)  .    BACKGROUND     Ready to PA.     Pt had top consult on 11/26/2019 with Dr. Rapp. Pt called to let us know they received their letter of support from Randall Chapman.       ASSESSMENT     Surgery              CGC Assessment  [facility] (CGC) Enrollment: Enrolled  Patient has a therapist: Yes  Name of therapist: Randall Chapman @ [facility]  Letter of support #1: Received  Letter #1 Date: 12/19/19  Surgery being considered: Yes  Mastectomy: Yes  Mammogram completed: Yes(03/29/19 - negative located in media tab)          PLAN          Nursing Interventions:   Reviewed letter of support for WPATH standards of care which is adequate.     Follow-up plan:  Ready to PA.        Giacomo Troy

## 2020-02-21 LAB — HEMOCCULT STL QL IA: NEGATIVE

## 2020-03-02 DIAGNOSIS — I10 BENIGN ESSENTIAL HYPERTENSION: ICD-10-CM

## 2020-03-02 RX ORDER — METOPROLOL SUCCINATE 100 MG/1
100 TABLET, EXTENDED RELEASE ORAL DAILY
Qty: 90 TABLET | Refills: 0 | Status: SHIPPED | OUTPATIENT
Start: 2020-03-02 | End: 2020-03-15

## 2020-03-02 RX ORDER — AMLODIPINE BESYLATE 10 MG/1
10 TABLET ORAL DAILY
Qty: 90 TABLET | Refills: 3 | Status: SHIPPED | OUTPATIENT
Start: 2020-03-02 | End: 2020-03-15

## 2020-03-02 NOTE — TELEPHONE ENCOUNTER
"Request for medication refill: amLODIPine (NORVASC) 10 MG tablet AND busPIRone (BUSPAR) 7.5 MG tablet AND losartan (COZAAR) 100 MG tablet    Providers if patient needs an appointment and you are willing to give a one month supply please refill for one month and  send a letter/MyChart using \".SMILLIMITEDREFILL\" .smillimited and route chart to \"P [facility] \" (Giving one month refill in non controlled medications is strongly recommended before denial)    If refill has been denied, meaning absolutely no refills without visit, please complete the smart phrase \".smirxrefuse\" and route it to the \"P SMI MED REFILLS\"  pool to inform the patient and the pharmacy.    Christine Conrad CMA        "

## 2020-03-02 NOTE — TELEPHONE ENCOUNTER
"Request for medication refill: metoprolol succinate ER (TOPROL XL) 100 MG 24 hr tablet    Providers if patient needs an appointment and you are willing to give a one month supply please refill for one month and  send a letter/MyChart using \".SMILLIMITEDREFILL\" .smillimited and route chart to \"P [facility] \" (Giving one month refill in non controlled medications is strongly recommended before denial)    If refill has been denied, meaning absolutely no refills without visit, please complete the smart phrase \".smirxrefuse\" and route it to the \"P SMI MED REFILLS\"  pool to inform the patient and the pharmacy.    Barbara Membreno, CMA        "

## 2020-03-03 ENCOUNTER — TELEPHONE (OUTPATIENT)
Dept: SURGERY | Facility: CLINIC | Age: 60
End: 2020-03-03

## 2020-03-03 DIAGNOSIS — F64.0 GENDER DYSPHORIA IN ADOLESCENT AND ADULT: Primary | ICD-10-CM

## 2020-03-03 RX ORDER — CEFAZOLIN SODIUM 2 G/50ML
2 SOLUTION INTRAVENOUS
Status: CANCELLED | OUTPATIENT
Start: 2020-03-03

## 2020-03-03 RX ORDER — CEFAZOLIN SODIUM 1 G/50ML
1 INJECTION, SOLUTION INTRAVENOUS SEE ADMIN INSTRUCTIONS
Status: CANCELLED | OUTPATIENT
Start: 2020-03-03

## 2020-03-06 ENCOUNTER — TELEPHONE (OUTPATIENT)
Dept: PLASTIC SURGERY | Facility: CLINIC | Age: 60
End: 2020-03-06

## 2020-03-06 ENCOUNTER — TELEPHONE (OUTPATIENT)
Dept: SURGERY | Facility: CLINIC | Age: 60
End: 2020-03-06

## 2020-03-06 ENCOUNTER — HOSPITAL ENCOUNTER (OUTPATIENT)
Facility: CLINIC | Age: 60
End: 2020-03-06
Attending: SURGERY | Admitting: SURGERY
Payer: COMMERCIAL

## 2020-03-06 DIAGNOSIS — F64.0 GENDER DYSPHORIA IN ADOLESCENT AND ADULT: ICD-10-CM

## 2020-03-06 NOTE — TELEPHONE ENCOUNTER
Patient returned call and would like surgery the beginning of May. This writer called the patient and left a message offering 05/06/2020. Patient was asked to call [phone].

## 2020-03-06 NOTE — TELEPHONE ENCOUNTER
This writer called the patient to schedule procedure with Dr. Deyanira Rapp, there was no answer. Left message with this writer's direct line [phone] and let the patient know she would need an H&P within 30 days of her surgery date, a  to take her home, and someone to stay with her for 24 hours after her surgery.

## 2020-03-12 ENCOUNTER — TELEPHONE (OUTPATIENT)
Dept: FAMILY MEDICINE | Facility: CLINIC | Age: 60
End: 2020-03-12

## 2020-03-12 DIAGNOSIS — F32.A DEPRESSION, UNSPECIFIED DEPRESSION TYPE: ICD-10-CM

## 2020-03-12 DIAGNOSIS — I10 BENIGN ESSENTIAL HYPERTENSION: ICD-10-CM

## 2020-03-12 RX ORDER — LOSARTAN POTASSIUM 100 MG/1
100 TABLET ORAL DAILY
Qty: 90 TABLET | Refills: 0 | Status: SHIPPED | OUTPATIENT
Start: 2020-03-12 | End: 2020-03-15

## 2020-03-12 RX ORDER — BUSPIRONE HYDROCHLORIDE 7.5 MG/1
7.5 TABLET ORAL 2 TIMES DAILY
Qty: 180 TABLET | Refills: 0 | Status: SHIPPED | OUTPATIENT
Start: 2020-03-12 | End: 2020-06-16

## 2020-03-15 ENCOUNTER — MYC REFILL (OUTPATIENT)
Dept: FAMILY MEDICINE | Facility: CLINIC | Age: 60
End: 2020-03-15

## 2020-03-15 DIAGNOSIS — I10 BENIGN ESSENTIAL HYPERTENSION: ICD-10-CM

## 2020-03-15 DIAGNOSIS — F33.41 RECURRENT MAJOR DEPRESSIVE DISORDER, IN PARTIAL REMISSION (H): ICD-10-CM

## 2020-03-15 DIAGNOSIS — G47.00 PERSISTENT DISORDER OF INITIATING OR MAINTAINING SLEEP: ICD-10-CM

## 2020-03-16 RX ORDER — LOSARTAN POTASSIUM 100 MG/1
100 TABLET ORAL DAILY
Qty: 90 TABLET | Refills: 0 | Status: SHIPPED | OUTPATIENT
Start: 2020-03-16 | End: 2020-06-15

## 2020-03-16 RX ORDER — ZOLPIDEM TARTRATE 5 MG/1
5 TABLET ORAL
Qty: 30 TABLET | Refills: 2 | Status: SHIPPED | OUTPATIENT
Start: 2020-03-16 | End: 2020-07-09

## 2020-03-16 RX ORDER — CITALOPRAM HYDROBROMIDE 40 MG/1
40 TABLET ORAL DAILY
Qty: 90 TABLET | Refills: 0 | Status: SHIPPED | OUTPATIENT
Start: 2020-03-16 | End: 2020-06-15

## 2020-03-16 RX ORDER — METOPROLOL SUCCINATE 100 MG/1
100 TABLET, EXTENDED RELEASE ORAL DAILY
Qty: 90 TABLET | Refills: 0 | Status: SHIPPED | OUTPATIENT
Start: 2020-03-16 | End: 2020-10-12

## 2020-03-16 RX ORDER — AMLODIPINE BESYLATE 10 MG/1
10 TABLET ORAL DAILY
Qty: 90 TABLET | Refills: 3 | Status: SHIPPED | OUTPATIENT
Start: 2020-03-16 | End: 2020-10-12

## 2020-04-10 ENCOUNTER — TELEPHONE (OUTPATIENT)
Dept: FAMILY MEDICINE | Facility: CLINIC | Age: 60
End: 2020-04-10

## 2020-04-10 DIAGNOSIS — I10 BENIGN ESSENTIAL HYPERTENSION: ICD-10-CM

## 2020-04-10 NOTE — TELEPHONE ENCOUNTER
Verify that the refill encounter hasn't been started Yes    [facility] phone call message- patient requesting a refill:    Full Medication Name: amLODIPine (NORVASC) 10 MG tablet     Dose: Take 1 tablet (10 mg) by mouth daily - Oral     Pharmacy confirmed as   [id number] - [place], [place] - [address] & [address] [address]  Phone: [phone] Fax: [phone]  : Yes    Medication tab checked to see if medication has been sent  Yes    Additional Comments: Pt is out of medication, requesting 90day supply. Pt requested a request via [employer] on 3/16/20 but still has not received medication      OK to leave a message on voice mail? Yes    Advised patient refill may take up to 2 business days? No: Need ASAP     Primary language: English      needed? No    Call taken on April 10, 2020 at 9:33 AM by Paty Valverde    Route to P [facility] MED REFILL

## 2020-04-10 NOTE — TELEPHONE ENCOUNTER
RN contacted the pharmacy because we sent 90 days with refills on 3/16/20. They state that she picked it up on 3/20/20 for 90 days. RN contacted patient to relay and she states that she doesn't have anything. RN instructed her to contact pharmacy and see if they can do an override to fill early due to loss of medication.  Nory Valle RN

## 2020-04-13 ENCOUNTER — TELEPHONE (OUTPATIENT)
Dept: SURGERY | Facility: CLINIC | Age: 60
End: 2020-04-13

## 2020-04-13 NOTE — TELEPHONE ENCOUNTER
LVM that we are cancelling surgery with Dr. Rapp on 05/06 due to the COVID-19 concerns. We are also cancelling any post-op appointment scheduled.     I stated that we will call when we are able to reschedule as we are not able to do so at this time.    Noted that I am not a nurse and cannot answer any medical questions regarding this and to contact the RN with medical questions.    Provided my direct number.

## 2020-05-18 ENCOUNTER — TELEPHONE (OUTPATIENT)
Dept: SURGERY | Facility: CLINIC | Age: 60
End: 2020-05-18

## 2020-05-18 DIAGNOSIS — Z11.59 ENCOUNTER FOR SCREENING FOR OTHER VIRAL DISEASES: Primary | ICD-10-CM

## 2020-05-18 NOTE — TELEPHONE ENCOUNTER
Surgery is scheduled with Dr. Rapp on 7/22 at [place].  Scheduled per MD.    H&P: to be completed by PCP.  POST-OP: 7/28    The RN completed the education regarding the surgery.     The surgery packet was provided that contains surgical instructions and a map.     They are aware that they will receive a call  ~2 days prior to the scheduled procedure and will be given an exact arrival/start time.    I contacted the patient and left a message to confirm the scheduled dates.

## 2020-05-25 ENCOUNTER — TELEPHONE (OUTPATIENT)
Dept: FAMILY MEDICINE | Facility: CLINIC | Age: 60
End: 2020-05-25

## 2020-05-25 NOTE — TELEPHONE ENCOUNTER
Message Return    5/25/2020  12:25 PM    Message returned by Parish Roblero DO    Patient: Chiquita Butler   Phone number-  [phone] (home) [phone] (work)      Return their call  Phone conversation with: Patient    Situation: Chiquita Butler  Is a 60 year old  adult who is calling because of  medical advice.  Background :   [facility] [place]  -removed 2 deer ticks off side of abdomen & her neck  -now having neck pain; no rash noted   -denies fevers, SOB, CP, otherwise asymptomatic    Assessment: Rule out lyme disease    Recommendation/Plan  Advised caller to go to urgent care for rule out lyme disease & to assess neck pain      Parish Roblero DO   PGY-1  [facility]  Pager [phone]

## 2020-06-15 ENCOUNTER — TELEPHONE (OUTPATIENT)
Dept: FAMILY MEDICINE | Facility: CLINIC | Age: 60
End: 2020-06-15

## 2020-06-15 DIAGNOSIS — F33.41 RECURRENT MAJOR DEPRESSIVE DISORDER, IN PARTIAL REMISSION (H): ICD-10-CM

## 2020-06-15 DIAGNOSIS — F32.A DEPRESSION, UNSPECIFIED DEPRESSION TYPE: ICD-10-CM

## 2020-06-15 DIAGNOSIS — I10 BENIGN ESSENTIAL HYPERTENSION: ICD-10-CM

## 2020-06-15 RX ORDER — CITALOPRAM HYDROBROMIDE 40 MG/1
40 TABLET ORAL DAILY
Qty: 90 TABLET | Refills: 0 | Status: SHIPPED | OUTPATIENT
Start: 2020-06-15 | End: 2020-08-24

## 2020-06-15 RX ORDER — CITALOPRAM HYDROBROMIDE 40 MG/1
40 TABLET ORAL DAILY
Qty: 90 TABLET | Refills: 0 | Status: CANCELLED | OUTPATIENT
Start: 2020-06-15

## 2020-06-15 RX ORDER — LOSARTAN POTASSIUM 100 MG/1
100 TABLET ORAL DAILY
Qty: 90 TABLET | Refills: 0 | Status: SHIPPED | OUTPATIENT
Start: 2020-06-15 | End: 2020-10-12

## 2020-06-15 NOTE — TELEPHONE ENCOUNTER
Verify that the refill encounter hasn't been started Yes    [facility] phone call message- patient requesting a refill:    Full Medication Name: citalopram (CELEXA) 40 MG tablet   losartan (COZAAR) 100 MG tablet     Dose: See chart      Pharmacy confirmed as   [id number] - [place], [place] - [address] [address]  Phone: [phone] Fax: [phone]  : Yes    Medication tab checked to see if medication has been sent  Yes    Additional Comments: Patient is out of medication.     OK to leave a message on voice mail? Yes    Advised patient refill may take up to 2 business days? Yes    Primary language: English      needed? No    Call taken on Melina 15, 2020 at 3:26 PM by Riana Self    Route to [facility] MED REFILL

## 2020-06-15 NOTE — TELEPHONE ENCOUNTER
"Request for medication refill: Buspirone, Citalopram      Providers if patient needs an appointment and you are willing to give a one month supply please refill for one month and  send a letter/MyChart using \".SMILLIMITEDREFILL\" .smillimited and route chart to \"P [facility] [place] [facility] \" (Giving one month refill in non controlled medications is strongly recommended before denial)    If refill has been denied, meaning absolutely no refills without visit, please complete the smart phrase \".smirxrefuse\" and route it to the \"P SMI MED REFILLS\"  pool to inform the patient and the pharmacy.    Maria Guadalupe Klein, [facility]        "

## 2020-06-16 RX ORDER — BUSPIRONE HYDROCHLORIDE 7.5 MG/1
7.5 TABLET ORAL 2 TIMES DAILY
Qty: 180 TABLET | Refills: 0 | Status: SHIPPED | OUTPATIENT
Start: 2020-06-16 | End: 2020-10-12

## 2020-06-17 ENCOUNTER — MYC REFILL (OUTPATIENT)
Dept: FAMILY MEDICINE | Facility: CLINIC | Age: 60
End: 2020-06-17

## 2020-06-17 DIAGNOSIS — F32.A DEPRESSION, UNSPECIFIED DEPRESSION TYPE: ICD-10-CM

## 2020-06-17 RX ORDER — BUSPIRONE HYDROCHLORIDE 7.5 MG/1
7.5 TABLET ORAL 2 TIMES DAILY
Qty: 180 TABLET | Refills: 0 | Status: CANCELLED | OUTPATIENT
Start: 2020-06-17

## 2020-06-22 ENCOUNTER — ANCILLARY PROCEDURE (OUTPATIENT)
Dept: MAMMOGRAPHY | Facility: CLINIC | Age: 60
End: 2020-06-22
Attending: FAMILY MEDICINE
Payer: COMMERCIAL

## 2020-06-22 DIAGNOSIS — Z12.31 VISIT FOR SCREENING MAMMOGRAM: ICD-10-CM

## 2020-06-23 NOTE — RESULT ENCOUNTER NOTE
Normal mammogram. If you have any further questions feel free to contact me via [employer] message.     Sincerely,   Chiquita Green MD (for Dr. Agosto)

## 2020-07-09 DIAGNOSIS — G47.00 PERSISTENT DISORDER OF INITIATING OR MAINTAINING SLEEP: ICD-10-CM

## 2020-07-09 RX ORDER — ZOLPIDEM TARTRATE 5 MG/1
5 TABLET ORAL
Qty: 30 TABLET | Refills: 1 | Status: SHIPPED | OUTPATIENT
Start: 2020-07-09 | End: 2020-10-12

## 2020-07-09 NOTE — TELEPHONE ENCOUNTER

## 2020-08-18 DIAGNOSIS — F33.41 RECURRENT MAJOR DEPRESSIVE DISORDER, IN PARTIAL REMISSION (H): ICD-10-CM

## 2020-08-18 NOTE — TELEPHONE ENCOUNTER
Verify that the refill encounter hasn't been started Yes    [facility] phone call message- patient requesting a refill:    Full Medication Name: citalopram (CELEXA) 40 MG tablet    Dose:     Pharmacy confirmed as   [id number] - [place], [place] - [address] AT [address]NER OF [address] [place] [address]  [address] [address]  Phone: [phone] Fax: [phone]    [place], [place] - [address] [place] [address]  Phone: [phone] Fax: [phone]    [id number] - [place] - [address] AT [address] [facility] [address]  [address] [place] [address]  Phone: [phone] Fax: [phone]  : No:     Medication tab checked to see if medication has been sent  No:    Additional Comments: pt request rx refill please send [address]     OK to leave a message on voice mail? Yes    Advised patient refill may take up to 2 business days? Yes    Primary language: English      needed? No    Call taken on August 18, 2020 at 4:14 PM by Lam Rust to [facility] MED REFILL

## 2020-08-18 NOTE — TELEPHONE ENCOUNTER

## 2020-08-24 RX ORDER — CITALOPRAM HYDROBROMIDE 40 MG/1
40 TABLET ORAL DAILY
Qty: 90 TABLET | Refills: 0 | Status: SHIPPED | OUTPATIENT
Start: 2020-08-24 | End: 2020-10-12

## 2020-10-12 ENCOUNTER — MYC REFILL (OUTPATIENT)
Dept: FAMILY MEDICINE | Facility: CLINIC | Age: 60
End: 2020-10-12

## 2020-10-12 DIAGNOSIS — G47.00 PERSISTENT DISORDER OF INITIATING OR MAINTAINING SLEEP: ICD-10-CM

## 2020-10-12 DIAGNOSIS — I10 BENIGN ESSENTIAL HYPERTENSION: ICD-10-CM

## 2020-10-12 DIAGNOSIS — F33.41 RECURRENT MAJOR DEPRESSIVE DISORDER, IN PARTIAL REMISSION (H): ICD-10-CM

## 2020-10-12 DIAGNOSIS — F32.A DEPRESSION, UNSPECIFIED DEPRESSION TYPE: ICD-10-CM

## 2020-10-13 RX ORDER — CEFAZOLIN SODIUM 2 G/50ML
2 SOLUTION INTRAVENOUS
Status: CANCELLED | OUTPATIENT
Start: 2020-10-13

## 2020-10-13 RX ORDER — ZOLPIDEM TARTRATE 5 MG/1
5 TABLET ORAL
Qty: 30 TABLET | Refills: 1 | Status: SHIPPED | OUTPATIENT
Start: 2020-10-13 | End: 2020-12-10

## 2020-10-13 RX ORDER — METOPROLOL SUCCINATE 100 MG/1
100 TABLET, EXTENDED RELEASE ORAL DAILY
Qty: 90 TABLET | Refills: 0 | Status: SHIPPED | OUTPATIENT
Start: 2020-10-13 | End: 2020-12-10

## 2020-10-13 RX ORDER — LOSARTAN POTASSIUM 100 MG/1
100 TABLET ORAL DAILY
Qty: 90 TABLET | Refills: 0 | Status: SHIPPED | OUTPATIENT
Start: 2020-10-13 | End: 2020-12-10

## 2020-10-13 RX ORDER — CITALOPRAM HYDROBROMIDE 40 MG/1
40 TABLET ORAL DAILY
Qty: 90 TABLET | Refills: 0 | Status: SHIPPED | OUTPATIENT
Start: 2020-10-13 | End: 2021-02-02

## 2020-10-13 RX ORDER — CEFAZOLIN SODIUM 1 G/50ML
1 INJECTION, SOLUTION INTRAVENOUS SEE ADMIN INSTRUCTIONS
Status: CANCELLED | OUTPATIENT
Start: 2020-10-13

## 2020-10-13 RX ORDER — BUSPIRONE HYDROCHLORIDE 7.5 MG/1
7.5 TABLET ORAL 2 TIMES DAILY
Qty: 180 TABLET | Refills: 0 | Status: SHIPPED | OUTPATIENT
Start: 2020-10-13 | End: 2020-12-10

## 2020-10-13 RX ORDER — AMLODIPINE BESYLATE 10 MG/1
10 TABLET ORAL DAILY
Qty: 90 TABLET | Refills: 3 | Status: SHIPPED | OUTPATIENT
Start: 2020-10-13 | End: 2020-12-10

## 2020-10-22 DIAGNOSIS — Z11.59 ENCOUNTER FOR SCREENING FOR OTHER VIRAL DISEASES: Primary | ICD-10-CM

## 2020-12-08 ENCOUNTER — VIRTUAL VISIT (OUTPATIENT)
Dept: PLASTIC SURGERY | Facility: CLINIC | Age: 60
End: 2020-12-08
Payer: COMMERCIAL

## 2020-12-08 VITALS — HEIGHT: 70 IN | WEIGHT: 245 LBS | BODY MASS INDEX: 35.07 KG/M2

## 2020-12-08 DIAGNOSIS — F64.0 GENDER DYSPHORIA IN ADOLESCENT AND ADULT: Primary | ICD-10-CM

## 2020-12-08 DIAGNOSIS — E66.01 MORBID OBESITY (H): ICD-10-CM

## 2020-12-08 PROCEDURE — 99213 OFFICE O/P EST LOW 20 MIN: CPT | Mod: 95 | Performed by: SURGERY

## 2020-12-08 ASSESSMENT — PAIN SCALES - GENERAL: PAINLEVEL: NO PAIN (0)

## 2020-12-08 ASSESSMENT — MIFFLIN-ST. JEOR: SCORE: 1751.56

## 2020-12-08 NOTE — PROGRESS NOTES
"Chiquita is being evaluated via a billable video visit.      The patient has been notified of following:     \"This video visit will be conducted via a call between you and your physician/provider. We have found that certain health care needs can be provided without the need for an in-person physical exam.  This service lets us provide the care you need with a video conversation.  If a prescription is necessary we can send it directly to your pharmacy.  If lab work is needed we can place an order for that and you can then stop by our lab to have the test done at a later time.    If during the course of the call the physician/provider feels a video visit is not appropriate, you will not be charged for this service.\"     Patient has given verbal consent for Video visit? Yes    Start time: 1225    Stop time: 1246    PLASTICS PRE-OP  This is a 60 year old year old biological female who identifies as non-binary who presents for their pre-op visit prior to bilateral simple mastectomy without nipple graft reconstruction scheduled for 12/14. They are here today for a virtual visit by themselves. The patient has had a negative mammogram (6/22/20), and their letter of support from Lucy Carson has been received. History and physical are scheduled for Thursday.     Has 9 month old baby. I advised them not to  the child for at least 3 weeks. Baby can be placed in their arms.     My LPN, Gillian, discussed periop instructions with the patient including: not eating anything 8 hours prior to surgery, drinking clear liquids up to 2 hours before surgery except for morning medications with a sip of water, the preop shower with surgical soap which was given, and wearing a button- or zip-up shirt on the day of surgery. She also instructed the patient to avoid NSAIDs x 1 week both before AND after surgery, but they may take Tylenol post-op for pain as needed. She also gave the patient a folder with information on leander-op topics, " including where the surgery will be.     I discussed the following with the patient; preop, intraop and postop phases of care on the day of surgery, the placement of a bladder catheter during surgery that will likely be removed in recovery, postop cares and limitations with relation to home and work settings, 5-lb weight restriction for the first 3 weeks postop, and how long to maintain limited activities. We also discussed Zofran, oxycodone, Z-michael, and antipruritics which will be prescribed. We discussed that preventing constipation will be their responsibility, and we discussed methods such as aloe, prune juice or Miralax.     In addition, we went over the possible risks and complications involved with this elective procedure. These include but are not limited to: infection, bleeding, hematoma/seroma formation, and poor healing (including dehiscence, nipple graft loss, or hypertrophic scarring). We also discussed the possibility of altered chest sensation (either hypo or hypersensitive), residual deformities and asymmetries, possible further surgical revisions, and possible injury to surrounding neurovascular and musculoskeletal structures, including intra-axillary or intra-thoracic. We lastly discussed anesthetic risks including DVT/PE or cardiopulmonary events.      All questions were answered. They will bring any other questions that arise to the day of surgery.    Total time = 20 minutes, all spent educating about upcoming procedure.    This note was prepared on behalf of Gracie Rapp MD by Magda Lilly, a trained medical scribe, based on my observations and the provider's statements to me.

## 2020-12-08 NOTE — NURSING NOTE
"Chief Complaint   Patient presents with     Pre-Op Exam     Virtual pre op       Vitals:    12/08/20 1149   Weight: 111.1 kg (245 lb)   Height: 1.778 m (5' 10\")       Body mass index is 35.15 kg/m .      STANLEY McdonoughT                      "

## 2020-12-08 NOTE — LETTER
"12/8/2020       RE: Chiquita Butler  [address]  [facility] [address]     Dear Colleague,    Thank you for referring your patient, Chiquita Butler, to the [facility] [place] at [facility]. Please see a copy of my visit note below.    Chiquita is being evaluated via a billable video visit.      The patient has been notified of following:     \"This video visit will be conducted via a call between you and your physician/provider. We have found that certain health care needs can be provided without the need for an in-person physical exam.  This service lets us provide the care you need with a video conversation.  If a prescription is necessary we can send it directly to your pharmacy.  If lab work is needed we can place an order for that and you can then stop by our lab to have the test done at a later time.    If during the course of the call the physician/provider feels a video visit is not appropriate, you will not be charged for this service.\"     Patient has given verbal consent for Video visit? Yes    Start time: 1225    Stop time: 1246    PLASTICS PRE-OP  This is a 60 year old year old biological female who identifies as non-binary who presents for their pre-op visit prior to bilateral simple mastectomy without nipple graft reconstruction scheduled for 12/14. They are here today for a virtual visit by themselves. The patient has had a negative mammogram (6/22/20), and their letter of support from [facility] has been received. History and physical are scheduled for Thursday.     Has 9 month old baby. I advised them not to  the child for at least 3 weeks. Baby can be placed in their arms.     My LPN, Gillian, discussed periop instructions with the patient including: not eating anything 8 hours prior to surgery, drinking clear liquids up to 2 hours before surgery except for morning medications with a sip of water, the " preop shower with surgical soap which was given, and wearing a button- or zip-up shirt on the day of surgery. She also instructed the patient to avoid NSAIDs x 1 week both before AND after surgery, but they may take Tylenol post-op for pain as needed. She also gave the patient a folder with information on leander-op topics, including where the surgery will be.     I discussed the following with the patient; preop, intraop and postop phases of care on the day of surgery, the placement of a bladder catheter during surgery that will likely be removed in recovery, postop cares and limitations with relation to home and work settings, 5-lb weight restriction for the first 3 weeks postop, and how long to maintain limited activities. We also discussed Zofran, oxycodone, Z-michael, and antipruritics which will be prescribed. We discussed that preventing constipation will be their responsibility, and we discussed methods such as aloe, prune juice or Miralax.     In addition, we went over the possible risks and complications involved with this elective procedure. These include but are not limited to: infection, bleeding, hematoma/seroma formation, and poor healing (including dehiscence, nipple graft loss, or hypertrophic scarring). We also discussed the possibility of altered chest sensation (either hypo or hypersensitive), residual deformities and asymmetries, possible further surgical revisions, and possible injury to surrounding neurovascular and musculoskeletal structures, including intra-axillary or intra-thoracic. We lastly discussed anesthetic risks including DVT/PE or cardiopulmonary events.      All questions were answered. They will bring any other questions that arise to the day of surgery.    Total time = 20 minutes, all spent educating about upcoming procedure.    This note was prepared on behalf of Gracie Rapp MD by Magda Lilly, a trained medical scribe, based on my observations and the provider's statements  to me.     Again, thank you for allowing me to participate in the care of your patient.      Sincerely,    Gracie Rapp MD

## 2020-12-08 NOTE — PATIENT INSTRUCTIONS
Bilateral Mastectomy   Pre and Post op Surgery Instructions    You are scheduled for Bilateral Mastectomy with nipple grafts on 12/14/2020 at 9:00 AM.    You will need a Covid test 3-4 days prior to your surgery. Please make sure you get this done or your surgery will get cancel.     You will need to arrive at 7:00 [place] [id number]. You can park in the Green Lot and check in on 3rd floor. (See attached map).     - Please make sure you have someone to drive you home after your surgery and you will need someone to stay with you at home 24 hours after your surgery.    The surgery will be about 3-4 hours long. You will be in recovery afterwards for about 1-2 hours.     Before Surgery:  - 8 hours prior to surgery stop eating solid food. You can continue to drink clear liquids (water, apple juice, Gatorade) up to 2 hours before surgery. If you have any medications that need to be taken in this timeframe, you can take them with a small sip of water    - No Ibuprofen, Advil, Aleve, Naproxen, Motrin, Aspirin for 7 days prior to surgery. If you are having pain in the week before surgery Tylenol is okay to take.    - Wear or bring a button up or zip up shirt with you to the hospital.    - Wash with surgical soap:      Showering with an antiseptic soap prior to an invasive procedure will decrease the  bacteria that is normally found on the skin.     Using 1/2 of the bottle for each application.  Be sure to use the whole bottle before coming to surgery.    The evening before surgery, shower or bathe as you normally would, using your preferred soap.  Give special attention to areas where the incisions will be made. Rinse thoroughly.  You may wash your hair with your regular shampoo.     Next wash your entire body, from the chin down, with the special antiseptic soap (full strength) using a freshly laundered clean washcloth, again giving special  attention to areas where incisions will be made.    Rinse thoroughly and dry off using a freshly laundered clean towel.     Wear clean clothes/pajamas and sleep on clean sheets    Repeat steps 2 and 3 in the morning before coming to surgery (using the rest of the bottle of soap).     **If you have a reaction to the surgical soap, let the nurse know.     Events of day:     -Check in on the 3rd floor of the hospital for your surgery.    Pre-op     - After you check in, you will meet with a pre-op nurse. They will go over your medications, review your H&P (pre-op physical), have you change into a paper gown, and your chest will be wiped again with soap.    - They will place compression boots on both legs to help decrease risk of blood clots.     - You may be asked to complete a pregnancy test. If you have had no exposure to sperm, you can decline.    - You will meet with the anesthesiologists and nurse anesthetist who will be keeping you asleep during surgery, they will be placing an IV, and will be monitoring you throughout the surgery.    - You will meet with the RN as you are being moved into operating room, they will be helping to position you and keep you comfortable during the surgery.     - Dr. Rapp will meet you in the pre-op area to discuss any questions about surgery, make markings on your chest for planning, and to sign your consent.     Intra-op (OR)    - A catheter will be placed in your bladder after you are sleeping and is typically removed before you wake up. The longest this would typically be in is in the post-op recovery room if needed.     - There will be straps and pillows to help position you and keep you in place during the surgery.    - The tissue that is removed will be sent to pathology to be analyzed and then discarded.     - MORGAN drains will be placed (one in each armpit) and a pain catheter will be placed in the center of your chest.     - Closure is done with dissolvable sutures under the  "skin and is then sealed with glue, and tape.    Post-op/Recovery    - You can usually go home the same day so long as you are eating, drinking, voiding, and pain is well controlled.  You will be sent home with all needed supplies.     - Post-Op medications you will be given in addition to the anesthesia include: Zofran (nausea), Oxycodone (pain), Z-michael (infection), and antipruritic (itching).     - You will leave the hospitals with an ace bandage wrapped around your chest for compression. You may keep the ace bandage on until you come back for your one week post op visit or you may adjust the wrap as needed if it is either too tight or too loose.     Post-Op Cares:     - No lifting over 5 lbs for 3 weeks after surgery    - No stretching arms out or above the head (\"modified T-batsheva\")    - Walk around frequently to help prevent blood clots    - Do deep breathing exercises to prevent pneumonia    - No sleeping on stomach x 3 weeks after surgery, if it is difficult not to roll over onto your stomach you can sleep in a recliner or use additional pillows    - Do not use any ice packs or heat packs    - Preventing constipation will be your responsibility. You can use whatever method works best for you such as; aloe, prune juice, Sennokot or Miralax.    - No showering in the first week after your surgery.     What to expect at your 1st post op visit:    When you come in for your 1st post op visit, we will assess the output of your drains and remove the pain catheter (On-Q) that was placed on your chest.     -Please remember to bring the documentations of your output with you to your appointment so we can review how much your drains have been putting out since your surgery.     -We will remove the bolsters that was placed on your nipples and teach you how to perform nipple graft dressings.     -You will be given supplies to take home and will need to continue wearing the ace wrap compression for another week after the drains " are removed.     Nipple Care:   Change nipple dressings daily.  Take dressings off prior to showering and reapply after showering.  No direct shower spray on nipple grafts for 4 weeks.     Cut vaseline gauze to nipple size and place over nipple.  Place folded white gauze over vaseline gauze and cover with plastic dressing.  Do dressing changes for 1 more week.  If you continue to have drainage, continue the dressings until drainage stops.       Drain Care:  You will be discharged with Bronson-Valencia (MORGAN) drainage tubes.  These tubes drain fluid from your incision, helping to prevent swelling and reducing the risk for infection.  The tube is held in place by a few stitches. Pin your MORGAN drain to your clothing by using a safety pin through the plastic loop on the top of the bulb. If the drain is not attached to your clothing, it may pull out from under your skin. Drains are uncomfortable!    Emptying the drain bulb: The measuring cup provided by the hospital or a measuring cup that is used only for the MORGAN drain.  1. Wash your hands with soap and water.  2. Hold the bulb securely.  3. Remove the drainage plug from the emptying port.  4. Carefully turn the bulb upside down over the measuring cup, and gently squeeze all of the drainage into the measuring cup.  5. Squeeze the middle of the bulb firmly so that the bulb is as flat as possible.                                                                                                                                                    6. While still squeezing the bulb, replace the drainage plug. This step is important to keep the drain suction  7. Measure how much fluid you removed from the bulb.  8. Write down the amount and color of the fluid you removed from the bulb. If  you have more than one drain, keep a separate record for each one.  9. Empty the fluid into the toilet and flush.  10. Rinse the measuring cup, and wash your hands with soap and water.  11. You should  empty the drain at least two times each day, in the morning and at bedtime.  12. Drainage tubes are removed when the output is less than 20ml in a 24 hour period for 2 consecutive days.  13. Output will be somewhere between 30 to 50 mLs per day, but don't be alarmed if it is more or less. Output may not be equal between sides. Amounts will probably decrease over time.  14. The color coming from the drains may vary from deep red, light pink, or clear yellow.    Stripping the tube:  To prevent clots from blocking the drain, you will need to  strip  it. Stripping means that you use your fingers to squeeze along the length of the drain to help maintain the flow of drainage.    Wash your hands.    Using one hand, firmly hold the tubing near the insertion site (as close to your skin as the ace wrap and gauze dressing will allow). This will prevent the drain from being pulled out while you are stripping it and from pulling on skin and sutures.    [place] upper/top fingers and milk with the bottom or lower fingers.    Using your index finger and thumb of the other hand, squeeze the tubing below the  first hand. You should squeeze it firmly enough so the tubing becomes flat.     Maintain pressure on the tube, as you are squeezing, slide your index finger and thumb down the tube about 4-6 inches toward the bulb. (use alcohol wipes or lotion to help).    Then, release the hand closest to where the tube is attached to the body (making sure to keep pressure with the other hand), and move upper/anchor hand down. Squeeze, Repeat in segments.    Do not release the pressure you are creating in the tubing until you reach the bulb.  The whole tube will be flat.     If at any time it feels like the tube is pulling away from the skin or starts to hurt STOP! Then start over again more gently.     Strip the drain each time you empty it.

## 2020-12-10 ENCOUNTER — OFFICE VISIT (OUTPATIENT)
Dept: FAMILY MEDICINE | Facility: CLINIC | Age: 60
End: 2020-12-10
Payer: COMMERCIAL

## 2020-12-10 VITALS
RESPIRATION RATE: 16 BRPM | DIASTOLIC BLOOD PRESSURE: 81 MMHG | WEIGHT: 261.4 LBS | BODY MASS INDEX: 37.51 KG/M2 | OXYGEN SATURATION: 98 % | SYSTOLIC BLOOD PRESSURE: 129 MMHG | HEART RATE: 65 BPM | TEMPERATURE: 97.8 F

## 2020-12-10 DIAGNOSIS — Z01.818 PREOP GENERAL PHYSICAL EXAM: Primary | ICD-10-CM

## 2020-12-10 DIAGNOSIS — F33.41 RECURRENT MAJOR DEPRESSIVE DISORDER, IN PARTIAL REMISSION (H): ICD-10-CM

## 2020-12-10 DIAGNOSIS — G47.00 PERSISTENT DISORDER OF INITIATING OR MAINTAINING SLEEP: ICD-10-CM

## 2020-12-10 DIAGNOSIS — Q24.8 CONGENITAL HEART VALVE ABNORMALITY: ICD-10-CM

## 2020-12-10 DIAGNOSIS — I10 BENIGN ESSENTIAL HYPERTENSION: ICD-10-CM

## 2020-12-10 PROBLEM — Z00.00 ENCOUNTER FOR PREVENTIVE CARE: Status: RESOLVED | Noted: 2019-05-03 | Resolved: 2020-12-10

## 2020-12-10 PROCEDURE — 99214 OFFICE O/P EST MOD 30 MIN: CPT | Mod: GC | Performed by: STUDENT IN AN ORGANIZED HEALTH CARE EDUCATION/TRAINING PROGRAM

## 2020-12-10 PROCEDURE — 93000 ELECTROCARDIOGRAM COMPLETE: CPT | Mod: GC | Performed by: STUDENT IN AN ORGANIZED HEALTH CARE EDUCATION/TRAINING PROGRAM

## 2020-12-10 NOTE — PATIENT INSTRUCTIONS
"  Preparing for Your Surgery  Getting started  A surgery nurse will call you to review your health history and instructions. They will give you an arrival time based on your scheduled surgery time.  Please be ready to share the following:    Your doctor's clinic name and phone number    Your medical, surgical and anesthesia history    A list of allergies and sensitivities    A list of medicines, including herbal treatments and over-the-counter drugs    Whether the patient has a legal guardian (ask how to send us the papers in advance)  If your child is having surgery, please ask for a copy of Preparing for Your Child's Surgery.    Preparing for surgery    Within 30 days of surgery: Have an exam at your family clinic (primary care clinic), or go to a pre-operative clinic. This exam is called a \"History and Physical,\" or H&P.    At your H&P exam, talk to your care team about all medicines you take. If you need to stop any medicines before surgery, ask when to start taking them again.  ? We do this for your safety. Many medicines can make you bleed too much during surgery. Some change how well surgery (anesthesia) drugs work.    Call your insurance company to see what it will and won't pay for. Ask if they need to pre-approve the surgery. (If no insurance, call [phone].)    Call your surgeon's clinic if there's any change in your health. This includes signs of a cold or flu (sore throat, runny nose, cough, rash, fever). It also includes a scrape or scratch near the surgery site.    If you have questions on the day of surgery, call your surgery center.  Eating and drinking guidelines  For your safety: Unless your surgeon tells you otherwise, follow the guidelines below.    Eat and drink as usual until 8 hours before surgery. After that, no food or milk.    Drink clear liquids until 2 hours before surgery. These are liquids you can see through, like water, Gatorade and Propel Water. You may also have black coffee " and tea (no cream or milk).    Nothing by mouth within 2 hours of surgery. This includes gum, candy and breath mints.    Stop alcohol the midnight before surgery.    If your family clinic tells you to take medicine on the morning of surgery, it's okay to take it with a sip of water.  Preventing infection    Shower or bathe the night before and morning of your surgery. Follow the instructions your clinic gave you. (If no instructions, use regular soap.)    Don't shave or clip hair near your surgery site. This can lead to skin infection.    Don't smoke the morning of surgery. Smoking increases the risk of infection. You may chew nicotine gum up to 2 hours before surgery. A nicotine patch is okay.  ? Note: Some surgeries require you to completely quit smoking and nicotine. Check with your surgeon.    Your care team will make every effort to keep you safe from infection. We will:  ? Clean our hands often with soap and water (or an alcohol-based hand rub).  ? Clean the skin at your surgery site with a special soap that kills germs. We'll also remove hair from the site as needed.  ? Wear special hair covers, masks, gowns and gloves during surgery.  ? Give antibiotic medicine, if prescribed. Not all surgeries need antibiotics.  What to bring on the day of surgery    Photo ID and insurance card    Copy of your health care directive, if you have one    Glasses and hearing aides (bring cases)  ? You can't wear contacts during surgery    Inhaler and eye drops, if you use them (tell us about these when you arrive)    CPAP machine or breathing device, if you use them    A few personal items, if spending the night    If you have . . .  ? A pacemaker or ICD (cardiac defibrillator): Bring the ID card.  ? An implanted stimulator: Bring the remote control.  ? A legal guardian: Bring a copy of the certified (court-stamped) guardianship papers.  Please remove any jewelry, including body piercings. Leave jewelry and other valuables at  home.  If you're going home the day of surgery  Important: If you don't follow the rules below, we must cancel your surgery.     Arrange for someone to drive you home after surgery. You may not drive, take a taxi or take public transportation by yourself (unless you'll have local anesthesia only).    Arrange for a responsible adult to stay with you overnight. If you don't, we may keep you in the hospital overnight, and you may need to pay the costs yourself.  Questions?   If you have any questions for your care team, list them here: _________________________________________________________________________________________________________________________________________________________________________________________________________________________________________________________________________________________________________________________  For informational purposes only. Not to replace the advice of your health care provider. Copyright   6195-7354 [employer]. All rights reserved. Clinically reviewed by Destini Dang MD. SMARTworks 018892 - REV 07/19.    Before Your Procedure or Hospital Admission  Testing for COVID-19 (Coronavirus)  Thank you for choosing [facility] for your health care needs. This is a very challenging time for everyone. The World Health Organization and the [place] of [place] have declared the COVID-19 virus a pandemic.   Our goal is to keep you and our team here at [facility] safe and healthy. We've taken several steps to make this happen. For example:    We screen our staff, care teams and patients for COVID-19.    Everyone at [facility] must wear a mask and stay 6 feet apart.    We are limiting hospital and clinic visitors.  Before you come in  All patients must get tested for COVID-19. Your test needs to happen 2 to 4 days before you check in to the hospital or surgery site.   A clinic scheduler will call about a week in advance to set up a testing time at  "one of our labs where we'll take a swab of your nose or throat.  Note: If you go to a clinic or pharmacy like [facility] or [facility] for your test, make sure it's a \"RT-PCR\" test, not a \"rapid\" COVID-19 test. (See Questions and Answers below.)  After the test, please stay at home and stay out of contact with other people. It will be harder for you to recover if you get COVID-19 before your treatment.  Please follow all current safety guidelines, including:    Limit trips outside your home.    Limit the number of people you see.    Always wear a mask outside your home.    Use social distancing. (Stay 6 feet away from others whenever you can.)    Wash your hands often.  If your test shows you have COVID-19  If your test is positive, we'll let you know. A positive test means that you have the virus.   We'll probably have to postpone your admission, surgery or procedure. Your doctor will discuss this with you. After that, we'll let you know what to do and when you can reschedule.   We may need to cancel your treatment on short notice for other reasons, too.  If your test shows you DON'T have COVID-19  Even if your test is negative, you may still get COVID-19. It's rare but, sometimes, the test result is wrong. You could also catch the virus after taking the test.   There's a very small chance that you could catch COVID-19 in the hospital or surgery center. [facility] has taken many steps to prevent this from happening.   Day of your surgery or procedure    Please come wearing a mask or something else that covers both your nose and mouth.    When you arrive, we'll ask you some questions to find out if you have any signs or symptoms of COVID-19.    Ask your care team if you can have visitors. All visitors must wear masks and will be screened for signs of COVID-19.  ? Even if no visitors are allowed, you can still have with you:    Your legal guardian or legal decision maker    A parent and one other visitor, if you are " "younger than 18 years old    A partner and a , if you are in labor  ? We might need to teach you about taking care of yourself after surgery. If so, a visitor can come into the hospital to learn about it, too.  ? The rules for visitors change often, depending on how much the virus is spreading. To learn more, see Visiting a Loved One in the Hospital during the COVID-19 Outbreak.  Please call your care team, hospital or surgery center if you have any questions. We thank you for your understanding and for choosing [facility] for your care.   Questions and Answers  Does it matter where I get tested for COVID-19?  Yes. We urge you to get tested at one of our [facility] COVID-19 testing sites. We process these tests in our lab and can get the results quickly. Your [facility] care team needs to get your results before you check in.  What should I do if I can't get tested at [facility]?  You can get tested somewhere else, but you'll need to take these extra steps:  1. Contact your family doctor or clinic to arrange your test.  2. Take the test within 4 days of your surgery or procedure. We can't accept tests older than 4 days.  3. Make sure your doctor or clinic faxes your results to [facility] at [phone].  If we don't get your results in time, we may have to postpone or cancel your treatment.  Ask if you're getting a \"RT-PCR\" COVID-19 test. It should NOT be a \"rapid\" COVID-19 test. Many drug stores use \"rapid\" tests, but they may not be as accurate. We don't accept the results of \"rapid\" tests.  For informational purposes only. Not to replace the advice of your health care provider. Copyright   2020 [facility] [employer]. All rights reserved. Clinically reviewed by [facility] and the [facility] COVID-19 Clinical Team. SMARTworks 900592 - Rev 10/07/20.    How to Take Your Medication Before Surgery  - STOP taking all vitamins and herbal supplements 14 days " "before surgery.     Ok to take buspar, celexa, metoprolol, amlodipine, and losartan the day of your surgery.    Preparing for Your Surgery  Getting started  A surgery nurse will call you to review your health history and instructions. They will give you an arrival time based on your scheduled surgery time.  Please be ready to share the following:    Your doctor's clinic name and phone number    Your medical, surgical and anesthesia history    A list of allergies and sensitivities    A list of medicines, including herbal treatments and over-the-counter drugs    Whether the patient has a legal guardian (ask how to send us the papers in advance)  If your child is having surgery, please ask for a copy of Preparing for Your Child's Surgery.    Preparing for surgery    Within 30 days of surgery: Have an exam at your family clinic (primary care clinic), or go to a pre-operative clinic. This exam is called a \"History and Physical,\" or H&P.    At your H&P exam, talk to your care team about all medicines you take. If you need to stop any medicines before surgery, ask when to start taking them again.  ? We do this for your safety. Many medicines can make you bleed too much during surgery. Some change how well surgery (anesthesia) drugs work.    Call your insurance company to see what it will and won't pay for. Ask if they need to pre-approve the surgery. (If no insurance, call [phone].)    Call your surgeon's clinic if there's any change in your health. This includes signs of a cold or flu (sore throat, runny nose, cough, rash, fever). It also includes a scrape or scratch near the surgery site.    If you have questions on the day of surgery, call your surgery center.  Eating and drinking guidelines  For your safety: Unless your surgeon tells you otherwise, follow the guidelines below.    Eat and drink as usual until 8 hours before surgery. After that, no food or milk.    Drink clear liquids until 2 hours before surgery. " These are liquids you can see through, like water, Gatorade and Propel Water. You may also have black coffee and tea (no cream or milk).    Nothing by mouth within 2 hours of surgery. This includes gum, candy and breath mints.    Stop alcohol the midnight before surgery.    If your family clinic tells you to take medicine on the morning of surgery, it's okay to take it with a sip of water.  Preventing infection    Shower or bathe the night before and morning of your surgery. Follow the instructions your clinic gave you. (If no instructions, use regular soap.)    Don't shave or clip hair near your surgery site. This can lead to skin infection.    Don't smoke the morning of surgery. Smoking increases the risk of infection. You may chew nicotine gum up to 2 hours before surgery. A nicotine patch is okay.  ? Note: Some surgeries require you to completely quit smoking and nicotine. Check with your surgeon.    Your care team will make every effort to keep you safe from infection. We will:  ? Clean our hands often with soap and water (or an alcohol-based hand rub).  ? Clean the skin at your surgery site with a special soap that kills germs. We'll also remove hair from the site as needed.  ? Wear special hair covers, masks, gowns and gloves during surgery.  ? Give antibiotic medicine, if prescribed. Not all surgeries need antibiotics.  What to bring on the day of surgery    Photo ID and insurance card    Copy of your health care directive, if you have one    Glasses and hearing aides (bring cases)  ? You can't wear contacts during surgery    Inhaler and eye drops, if you use them (tell us about these when you arrive)    CPAP machine or breathing device, if you use them    A few personal items, if spending the night    If you have . . .  ? A pacemaker or ICD (cardiac defibrillator): Bring the ID card.  ? An implanted stimulator: Bring the remote control.  ? A legal guardian: Bring a copy of the certified (court-stamped)  guardianship papers.  Please remove any jewelry, including body piercings. Leave jewelry and other valuables at home.  If you're going home the day of surgery  Important: If you don't follow the rules below, we must cancel your surgery.     Arrange for someone to drive you home after surgery. You may not drive, take a taxi or take public transportation by yourself (unless you'll have local anesthesia only).    Arrange for a responsible adult to stay with you overnight. If you don't, we may keep you in the hospital overnight, and you may need to pay the costs yourself.  Questions?   If you have any questions for your care team, list them here: _________________________________________________________________________________________________________________________________________________________________________________________________________________________________________________________________________________________________________________________  For informational purposes only. Not to replace the advice of your health care provider. Copyright   1532-1257 [employer]. All rights reserved. Clinically reviewed by Destini Dang MD. SMARTworks 925287 - REV 07/19.      Here is the plan from today's visit    Thank you for coming to [facility] today.  Lab Testing:  **If you had lab testing today and your results are reassuring or normal they will be mailed to you or sent through [facility] within 7 days.   **If the lab tests need quick action we will call you with the results.  The phone number we will call with results is # [phone] (home) [phone] (work). If this is not the best number please call our clinic and change the number.  Medication Refills:  If you need any refills please call your pharmacy and they will contact us.   If you need to  your refill at a new pharmacy, please contact the new pharmacy directly. The new pharmacy will help you get your medications transferred faster.    Scheduling:  If you have any concerns about today's visit or wish to schedule another appointment please call our office during normal business hours [phone] (8-5:00 M-F)  If a referral was made to a [facility] and you don't get a call from central scheduling please call [phone].  If a Mammogram was ordered for you at [facility] call [phone] to schedule or change your appointment.  If you had an XRay/CT/Ultrasound/MRI ordered the number is [phone] to schedule or change your radiology appointment.   Medical Concerns:  If you have urgent medical concerns please call [phone] at any time of the day.    Parish Roblero, DO

## 2020-12-10 NOTE — PROGRESS NOTES
FYI:  60 year old year old biological female who identifies as non-binary here for pre-op visit for bilateral simple mastectomy without nipple graft reconstruction scheduled for 12/14.     Meds to hold:  -BP meds?-continue  -ambien?  -buspar/celexa?-continue

## 2020-12-10 NOTE — PROGRESS NOTES
[address]  [address] [place] [id number]  Phone: [phone]  Fax: [phone]  Primary Provider: Amarilis Agosto  Pre-op Performing Provider: ARIADNA LANGFORD    PREOPERATIVE EVALUATION:  Today's date: 12/10/2020    Chiquita Butler is a 60 year old adult who presents for a preoperative evaluation.    Surgical Information:  Surgery/Procedure: bilateral simple mastectomy, no nipple grafts. OnQ  Surgery Location:   Surgeon: Dr. Rapp  Surgery Date: 12/14/20  Time of Surgery: 7am  Where patient plans to recover: At home with family  Fax number for surgical facility: Note does not need to be faxed, will be available electronically in Epic.    Type of Anesthesia Anticipated: General    Subjective     HPI related to upcoming procedure:      Preop Questions 12/10/2020   1. Have you ever had a heart attack or stroke? No   2. Have you ever had surgery on your heart or blood vessels, such as a stent placement, a coronary artery bypass, or surgery on an artery in your head, neck, heart, or legs? No   3. Do you have chest pain with activity? No   4. Do you have a history of  heart failure? No   5. Do you currently have a cold, bronchitis or symptoms of other infection? No   6. Do you have a cough, shortness of breath, or wheezing? No   7. Do you or anyone in your family have previous history of blood clots? No   8. Do you or does anyone in your family have a serious bleeding problem such as prolonged bleeding following surgeries or cuts? No   9. Have you ever had problems with anemia or been told to take iron pills? YES - many years ago, not currently anemic   10. Have you had any abnormal blood loss such as black, tarry or bloody stools, or abnormal vaginal bleeding? No   11. Have you ever had a blood transfusion? No   12. Are you willing to have a blood transfusion if it is medically needed before, during, or after your surgery? Yes   13. Have you or any of your relatives  ever had problems with anesthesia? No   14. Do you have sleep apnea, excessive snoring or daytime drowsiness? No   15. Do you have any artifical heart valves or other implanted medical devices like a pacemaker, defibrillator, or continuous glucose monitor? No   16. Do you have artificial joints? No   17. Are you allergic to latex? No       Health Care Directive:  Patient does have a Health Care Directive or Living Will (not on epic): will bring advanced directive to hospital on day of surgery    Preoperative Review of :   reviewed - controlled substances reflected in medication list.  -ambien last prescribed 10/19, 5mg tablets, 30 tablets      Status of Chronic Conditions:  DEPRESSION - Patient has a history of depression of moderate severity requiring medication for control with recent symptoms being stable. Current symptoms of depression include none.     HYPERTENSION - Patient has longstanding history of HTN , currently denies any symptoms referable to elevated blood pressure. Specifically denies chest pain, palpitations, dyspnea, orthopnea, PND or peripheral edema. Blood pressure readings have been in normal range. Current medication regimen is as listed below. Patient denies any side effects of medication.     SLEEP PROBLEM - Patient has a longstanding history of insomnia.. Patient has tried OTC medications with limited success. Currently on Ambien as needed, for which she has not taken in over 3wks.      Review of Systems  CONSTITUTIONAL: NEGATIVE for fever, chills, weight loss  INTEGUMENTARY/SKIN: NEGATIVE for worrisome rashes, moles or lesions  EYES: NEGATIVE for vision changes or irritation  ENT/MOUTH: NEGATIVE for ear, mouth and throat problems  RESP: NEGATIVE for significant cough or SOB  BREAST: NEGATIVE for masses, tenderness or discharge  CV: NEGATIVE for chest pain, palpitations or peripheral edema  GI: NEGATIVE for nausea, abdominal pain, heartburn, or change in bowel habits  : NEGATIVE for  frequency, dysuria, or hematuria  MUSCULOSKELETAL: NEGATIVE for significant arthralgias or myalgia  NEURO: NEGATIVE for weakness, dizziness or paresthesias  ENDOCRINE: NEGATIVE for temperature intolerance, skin/hair changes  HEME: NEGATIVE for bleeding problems  PSYCHIATRIC: NEGATIVE for changes in mood or affect    Patient Active Problem List    Diagnosis Date Noted     Morbid obesity (H) 12/08/2020     Priority: Medium     Gender dysphoria in adolescent and adult 03/06/2020     Priority: Medium     Added automatically from request for surgery [id number]       Encounter for preventive care 05/03/2019     Priority: Medium     Had [facility] Screening tests on Dec 27, 2018 at [facility]. Results:  Carotid blood flow measurements: normal   EKG: HR 60 NSR  AAA screen normal   [place]: normal (<1.3)  BMI 36  CRP 1.77  Glu 104  Lipids: HDL 70, , LDL 98,        Gender dysphoria in adult 03/01/2019     Priority: Medium     Non morbid obesity, unspecified obesity type 06/29/2016     Priority: Medium     MDD (major depressive disorder) 09/07/2014     Priority: Medium     Vitamin D deficiency 09/07/2014     Priority: Medium     Problem list name updated by automated process. Provider to review       H/O gastric bypass 09/07/2014     Priority: Medium     RNY 2004       Congenital heart valve 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     Priority: Medium     Hypertension      Priority: Medium     Keratoconus of both eyes 01/02/2012     Priority: Medium     Followed by ophtho q 6mos. Dx in 2002. Wears hyprid soft/hard lenses; anticipates cornea transplants in future.         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-     Panic attack 12/17/13    ER Visit     Past Surgical History:   Procedure Laterality Date     BYPASS GASTRIC MASON-EN-Y, LIVER BIOPSY, COMBINED  2004     CATHETER, ABLATION  1962     cardiac, related to scarring around PDA ligation site      COLONOSCOPY N/A 2017    Procedure: COLONOSCOPY;;  Surgeon: August Perez MD;  Location: [facility]     COLONOSCOPY N/A 5/15/2017    Procedure: COLONOSCOPY;  Colonoscopy/DX:RLQ abdominal pain/2 day prep emailed;  Surgeon: Gold Tamayo MD;  Location: [facility]     HYSTERECTOMY      Partial, fibroids     REPAIR VALVE PULMONARY  1960     Current Outpatient Medications   Medication Sig Dispense Refill     amLODIPine (NORVASC) 10 MG tablet Take 1 tablet (10 mg) by mouth daily 90 tablet 3     busPIRone (BUSPAR) 7.5 MG tablet Take 1 tablet (7.5 mg) by mouth 2 times daily 180 tablet 3     Cholecalciferol (VITAMIN D) 2000 UNITS tablet Take 2,000 Units by mouth daily 100 tablet 3     citalopram (CELEXA) 40 MG tablet Take 1 tablet (40 mg) by mouth daily 90 tablet 0     losartan (COZAAR) 100 MG tablet Take 1 tablet (100 mg) by mouth daily 90 tablet 3     metoprolol succinate ER (TOPROL XL) 100 MG 24 hr tablet Take 1 tablet (100 mg) by mouth daily 90 tablet 3     multivitamin, therapeutic with minerals (MULTI-VITAMIN) TABS Take 1 tablet by mouth daily       zolpidem (AMBIEN) 5 MG tablet Take 1 tablet (5 mg) by mouth nightly as needed for sleep 30 tablet 2       No Known Allergies     Social History     Tobacco Use     Smoking status: Never Smoker     Smokeless tobacco: Never Used   Substance Use Topics     Alcohol use: Not Currently     Alcohol/week: 2.5 standard drinks     Types: 3 Standard drinks or equivalent per week     Comment: 1-2 drinks/week     Family History   Problem Relation Age of Onset     Alzheimer Disease Mother 60         78     Myocardial Infarction Father 49             C.A.D. Father      Glaucoma Maternal Grandmother      Colorectal Cancer Maternal Grandmother         95, pancreatic     Diabetes Sister      Eye Disorder Sister      Depression Other         multiple siblings     Cerebrovascular Disease Paternal Grandmother          80's     Breast Cancer No family hx of      History   Drug Use No         Objective     /81   Pulse 65   Temp 97.8  F (36.6  C) (Oral)   Resp 16   Wt 118.6 kg (261 lb 6.4 oz)   LMP  (LMP Unknown)   SpO2 98%   BMI 37.51 kg/m      Physical Exam    GENERAL APPEARANCE: healthy, alert and no distress     EYES: EOMI, PERRLA     HENT: ear canals and TM's normal, L TM blocked by cerumen, and nose and mouth without ulcers or lesions     NECK: no adenopathy, no asymmetry, masses, or scars and thyroid normal to palpation     RESP: lungs clear to auscultation - no rales, rhonchi or wheezes     CV: regular rates and rhythm, normal S1 S2, no S3 or S4 and no murmur, click or rub     ABDOMEN:  soft, nontender, no HSM or masses and bowel sounds normal     MS: extremities normal- no gross deformities noted, no evidence of inflammation in joints, FROM in all extremities.     SKIN: no suspicious lesions or rashes     NEURO: Normal strength and tone, sensory exam grossly normal, mentation intact and speech normal     PSYCH: mentation appears normal. and affect normal/bright     LYMPHATICS: No cervical adenopathy    Recent Labs   Lab Test 11/08/19  1155   .8   POTASSIUM 3.6   CR 0.6        Diagnostics:  No labs were ordered during this visit.   EKG required for structural heart disease and not completed in the last 90 days.    EKG 12/10/20: sinus rhythm, HR 60, normal R wave progression, p and t waves normal size, no ST depression/elevation, pronounced LVH    Revised Cardiac Risk Index (RCRI):  The patient has the following serious cardiovascular risks for perioperative complications:   - No serious cardiac risks = 0 points     RCRI Interpretation: 0 points: Class I (very low risk - 0.4% complication rate)       Assessment & Plan     Preop general physical exam  Congenital heart valve abnormality   The proposed surgical procedure, bilateral mastectomy, is considered LOW risk. Revised cardiac risk index score is  0. Baseline EKG ordered due to history of congenital heart valve abnormality s/p pulmonic valve replacement in 1960. EKG today was unremarkable-sinus rhythm with LVH. Pt getting COVID test 12/11. Ok to proceed with surgery.     Benign essential hypertension  Stable. BP at goal. No signs or symptoms of HTN. Ok to take medications (amlodipine, losartan, metoprolol) the day of surgery.    Recurrent major depressive disorder, in partial remission (H)  Stable. Continue buspar and celexa.    Persistent disorder of initiating or maintaining sleep  Takes ambien twice a week, however has not taken in over 3 weeks. Takes infrequently-low risk for withdrawal. Ok to hold medication prior to surgery.       Risks and Recommendations:  The patient has the following additional risks and recommendations for perioperative complications:   - No identified additional risk factors other than previously addressed    Medication Instructions:  Patient is to take all scheduled medications on the day of surgery EXCEPT for modifications listed below:  ambien (take infrequently)   NO NSAID's the week before surgery.    RECOMMENDATION:  APPROVAL GIVEN to proceed with proposed procedure, without further diagnostic evaluation.    Signed Electronically by: Parish Roblero DO    Copy of this evaluation report is provided to requesting physician.

## 2020-12-11 DIAGNOSIS — Z11.59 ENCOUNTER FOR SCREENING FOR OTHER VIRAL DISEASES: ICD-10-CM

## 2020-12-11 LAB
SARS-COV-2 RNA SPEC QL NAA+PROBE: NOT DETECTED
SPECIMEN SOURCE: NORMAL

## 2020-12-11 PROCEDURE — U0003 INFECTIOUS AGENT DETECTION BY NUCLEIC ACID (DNA OR RNA); SEVERE ACUTE RESPIRATORY SYNDROME CORONAVIRUS 2 (SARS-COV-2) (CORONAVIRUS DISEASE [COVID-19]), AMPLIFIED PROBE TECHNIQUE, MAKING USE OF HIGH THROUGHPUT TECHNOLOGIES AS DESCRIBED BY CMS-2020-01-R: HCPCS | Mod: 90 | Performed by: PATHOLOGY

## 2020-12-11 PROCEDURE — 99000 SPECIMEN HANDLING OFFICE-LAB: CPT | Performed by: PATHOLOGY

## 2020-12-11 NOTE — PROGRESS NOTES
Preceptor Attestation:    Patient seen and evaluated in person. I discussed the patient with the resident. I personally viewed the EKG and agree with the interpretation documented by the resident. I have verified the content of the note, which accurately reflects my assessment of the patient and the plan of care.   Supervising Physician:  Latanya Ley DO.

## 2020-12-14 ENCOUNTER — ANESTHESIA EVENT (OUTPATIENT)
Dept: SURGERY | Facility: CLINIC | Age: 60
End: 2020-12-14
Payer: COMMERCIAL

## 2020-12-14 ENCOUNTER — HOSPITAL ENCOUNTER (OUTPATIENT)
Facility: CLINIC | Age: 60
Discharge: HOME | End: 2020-12-14
Attending: SURGERY | Admitting: SURGERY
Payer: COMMERCIAL

## 2020-12-14 ENCOUNTER — ANESTHESIA (OUTPATIENT)
Dept: SURGERY | Facility: CLINIC | Age: 60
End: 2020-12-14
Payer: COMMERCIAL

## 2020-12-14 VITALS
OXYGEN SATURATION: 96 % | RESPIRATION RATE: 13 BRPM | SYSTOLIC BLOOD PRESSURE: 131 MMHG | TEMPERATURE: 98.7 F | BODY MASS INDEX: 37.18 KG/M2 | DIASTOLIC BLOOD PRESSURE: 74 MMHG | HEART RATE: 69 BPM | WEIGHT: 259.7 LBS | HEIGHT: 70 IN

## 2020-12-14 DIAGNOSIS — F64.0 GENDER DYSPHORIA IN ADULT: Primary | ICD-10-CM

## 2020-12-14 LAB
CREAT SERPL-MCNC: 0.66 MG/DL (ref 0.52–1.04)
GFR SERPL CREATININE-BSD FRML MDRD: >90 ML/MIN/{1.73_M2}
GLUCOSE SERPL-MCNC: 94 MG/DL (ref 70–99)
HGB BLD-MCNC: 12.7 G/DL (ref 11.7–15.7)
POTASSIUM SERPL-SCNC: 3.1 MMOL/L (ref 3.4–5.3)

## 2020-12-14 PROCEDURE — 258N000003 HC RX IP 258 OP 636

## 2020-12-14 PROCEDURE — 250N000011 HC RX IP 250 OP 636: Performed by: SURGERY

## 2020-12-14 PROCEDURE — 88305 TISSUE EXAM BY PATHOLOGIST: CPT | Mod: 26 | Performed by: PATHOLOGY

## 2020-12-14 PROCEDURE — 761N000002 HC RECOVERY PHASE 1 LEVEL 1 EA ADDTL HR: Performed by: SURGERY

## 2020-12-14 PROCEDURE — 271N000002 HC RX 271: Performed by: SURGERY

## 2020-12-14 PROCEDURE — 250N000011 HC RX IP 250 OP 636

## 2020-12-14 PROCEDURE — 88305 TISSUE EXAM BY PATHOLOGIST: CPT | Mod: TC | Performed by: SURGERY

## 2020-12-14 PROCEDURE — 360N000023 HC SURGERY LEVEL 3 EA 15 ADDTL MIN UMMC: Performed by: SURGERY

## 2020-12-14 PROCEDURE — 84132 ASSAY OF SERUM POTASSIUM: CPT | Performed by: ANESTHESIOLOGY

## 2020-12-14 PROCEDURE — 250N000009 HC RX 250

## 2020-12-14 PROCEDURE — 36415 COLL VENOUS BLD VENIPUNCTURE: CPT | Performed by: ANESTHESIOLOGY

## 2020-12-14 PROCEDURE — 272N000001 HC OR GENERAL SUPPLY STERILE: Performed by: SURGERY

## 2020-12-14 PROCEDURE — 761N000007 HC RECOVERY PHASE 2 EACH 15 MINS: Performed by: SURGERY

## 2020-12-14 PROCEDURE — 82565 ASSAY OF CREATININE: CPT | Performed by: ANESTHESIOLOGY

## 2020-12-14 PROCEDURE — 250N000009 HC RX 250: Performed by: SURGERY

## 2020-12-14 PROCEDURE — 82947 ASSAY GLUCOSE BLOOD QUANT: CPT | Performed by: ANESTHESIOLOGY

## 2020-12-14 PROCEDURE — 85018 HEMOGLOBIN: CPT | Performed by: ANESTHESIOLOGY

## 2020-12-14 PROCEDURE — 19303 MAST SIMPLE COMPLETE: CPT | Mod: 50 | Performed by: PHYSICIAN ASSISTANT

## 2020-12-14 PROCEDURE — 370N000001 HC ANESTHESIA TECHNICAL FEE, 1ST 30 MIN: Performed by: SURGERY

## 2020-12-14 PROCEDURE — 999N000139 HC STATISTIC PRE-PROCEDURE ASSESSMENT II: Performed by: SURGERY

## 2020-12-14 PROCEDURE — 370N000002 HC ANESTHESIA TECHNICAL FEE, EACH ADDTL 15 MIN: Performed by: SURGERY

## 2020-12-14 PROCEDURE — 258N000003 HC RX IP 258 OP 636: Performed by: ANESTHESIOLOGY

## 2020-12-14 PROCEDURE — 761N000001 HC RECOVERY PHASE 1 LEVEL 1 FIRST HR: Performed by: SURGERY

## 2020-12-14 PROCEDURE — 250N000003 HC SEVOFLURANE, EA 15 MIN: Performed by: SURGERY

## 2020-12-14 PROCEDURE — 250N000011 HC RX IP 250 OP 636: Performed by: ANESTHESIOLOGY

## 2020-12-14 PROCEDURE — 360N000022 HC SURGERY LEVEL 3 1ST 30 MIN - UMMC: Performed by: SURGERY

## 2020-12-14 PROCEDURE — 19303 MAST SIMPLE COMPLETE: CPT | Mod: 50 | Performed by: SURGERY

## 2020-12-14 RX ORDER — ONDANSETRON 4 MG/1
4 TABLET, ORALLY DISINTEGRATING ORAL EVERY 8 HOURS PRN
Qty: 15 TABLET | Refills: 0 | Status: SHIPPED | OUTPATIENT
Start: 2020-12-14 | End: 2021-02-24

## 2020-12-14 RX ORDER — DEXAMETHASONE SODIUM PHOSPHATE 4 MG/ML
INJECTION, SOLUTION INTRA-ARTICULAR; INTRALESIONAL; INTRAMUSCULAR; INTRAVENOUS; SOFT TISSUE PRN
Status: DISCONTINUED | OUTPATIENT
Start: 2020-12-14 | End: 2020-12-14

## 2020-12-14 RX ORDER — CEFAZOLIN SODIUM 1 G/3ML
1 INJECTION, POWDER, FOR SOLUTION INTRAMUSCULAR; INTRAVENOUS SEE ADMIN INSTRUCTIONS
Status: DISCONTINUED | OUTPATIENT
Start: 2020-12-14 | End: 2020-12-14 | Stop reason: HOSPADM

## 2020-12-14 RX ORDER — FENTANYL CITRATE 50 UG/ML
25-50 INJECTION, SOLUTION INTRAMUSCULAR; INTRAVENOUS
Status: DISCONTINUED | OUTPATIENT
Start: 2020-12-14 | End: 2020-12-14 | Stop reason: HOSPADM

## 2020-12-14 RX ORDER — HYDROXYZINE HYDROCHLORIDE 25 MG/1
25 TABLET, FILM COATED ORAL 3 TIMES DAILY PRN
Qty: 12 TABLET | Refills: 0 | Status: SHIPPED | OUTPATIENT
Start: 2020-12-14 | End: 2021-02-24

## 2020-12-14 RX ORDER — NALOXONE HYDROCHLORIDE 0.4 MG/ML
0.4 INJECTION, SOLUTION INTRAMUSCULAR; INTRAVENOUS; SUBCUTANEOUS
Status: DISCONTINUED | OUTPATIENT
Start: 2020-12-14 | End: 2020-12-14 | Stop reason: HOSPADM

## 2020-12-14 RX ORDER — SODIUM CHLORIDE, SODIUM LACTATE, POTASSIUM CHLORIDE, CALCIUM CHLORIDE 600; 310; 30; 20 MG/100ML; MG/100ML; MG/100ML; MG/100ML
INJECTION, SOLUTION INTRAVENOUS CONTINUOUS
Status: DISCONTINUED | OUTPATIENT
Start: 2020-12-14 | End: 2020-12-14 | Stop reason: HOSPADM

## 2020-12-14 RX ORDER — ONDANSETRON 4 MG/1
4 TABLET, ORALLY DISINTEGRATING ORAL EVERY 30 MIN PRN
Status: DISCONTINUED | OUTPATIENT
Start: 2020-12-14 | End: 2020-12-14 | Stop reason: HOSPADM

## 2020-12-14 RX ORDER — MEPERIDINE HYDROCHLORIDE 25 MG/ML
12.5 INJECTION INTRAMUSCULAR; INTRAVENOUS; SUBCUTANEOUS
Status: DISCONTINUED | OUTPATIENT
Start: 2020-12-14 | End: 2020-12-14 | Stop reason: HOSPADM

## 2020-12-14 RX ORDER — FENTANYL CITRATE 50 UG/ML
INJECTION, SOLUTION INTRAMUSCULAR; INTRAVENOUS PRN
Status: DISCONTINUED | OUTPATIENT
Start: 2020-12-14 | End: 2020-12-14

## 2020-12-14 RX ORDER — OXYCODONE HYDROCHLORIDE 10 MG/1
5-10 TABLET ORAL EVERY 6 HOURS PRN
Qty: 20 TABLET | Refills: 0 | Status: SHIPPED | OUTPATIENT
Start: 2020-12-14 | End: 2021-01-05

## 2020-12-14 RX ORDER — LIDOCAINE HYDROCHLORIDE 20 MG/ML
INJECTION, SOLUTION INFILTRATION; PERINEURAL PRN
Status: DISCONTINUED | OUTPATIENT
Start: 2020-12-14 | End: 2020-12-14

## 2020-12-14 RX ORDER — LIDOCAINE 40 MG/G
CREAM TOPICAL
Status: DISCONTINUED | OUTPATIENT
Start: 2020-12-14 | End: 2020-12-14 | Stop reason: HOSPADM

## 2020-12-14 RX ORDER — PROPOFOL 10 MG/ML
INJECTION, EMULSION INTRAVENOUS PRN
Status: DISCONTINUED | OUTPATIENT
Start: 2020-12-14 | End: 2020-12-14

## 2020-12-14 RX ORDER — NALOXONE HYDROCHLORIDE 0.4 MG/ML
0.2 INJECTION, SOLUTION INTRAMUSCULAR; INTRAVENOUS; SUBCUTANEOUS
Status: DISCONTINUED | OUTPATIENT
Start: 2020-12-14 | End: 2020-12-14 | Stop reason: HOSPADM

## 2020-12-14 RX ORDER — ONDANSETRON 2 MG/ML
4 INJECTION INTRAMUSCULAR; INTRAVENOUS EVERY 30 MIN PRN
Status: DISCONTINUED | OUTPATIENT
Start: 2020-12-14 | End: 2020-12-14 | Stop reason: HOSPADM

## 2020-12-14 RX ORDER — MAGNESIUM HYDROXIDE 1200 MG/15ML
LIQUID ORAL PRN
Status: DISCONTINUED | OUTPATIENT
Start: 2020-12-14 | End: 2020-12-14 | Stop reason: HOSPADM

## 2020-12-14 RX ORDER — ONDANSETRON 2 MG/ML
INJECTION INTRAMUSCULAR; INTRAVENOUS PRN
Status: DISCONTINUED | OUTPATIENT
Start: 2020-12-14 | End: 2020-12-14

## 2020-12-14 RX ORDER — BUPIVACAINE HYDROCHLORIDE 2.5 MG/ML
INJECTION, SOLUTION EPIDURAL; INFILTRATION; INTRACAUDAL PRN
Status: DISCONTINUED | OUTPATIENT
Start: 2020-12-14 | End: 2020-12-14 | Stop reason: HOSPADM

## 2020-12-14 RX ORDER — AZITHROMYCIN 250 MG/1
TABLET, FILM COATED ORAL
Qty: 6 TABLET | Refills: 0 | Status: SHIPPED | OUTPATIENT
Start: 2020-12-14 | End: 2020-12-19

## 2020-12-14 RX ORDER — CEFAZOLIN SODIUM 2 G/100ML
2 INJECTION, SOLUTION INTRAVENOUS
Status: COMPLETED | OUTPATIENT
Start: 2020-12-14 | End: 2020-12-14

## 2020-12-14 RX ADMIN — FENTANYL CITRATE 25 MCG: 50 INJECTION INTRAMUSCULAR; INTRAVENOUS at 14:12

## 2020-12-14 RX ADMIN — FENTANYL CITRATE 50 MCG: 50 INJECTION, SOLUTION INTRAMUSCULAR; INTRAVENOUS at 10:18

## 2020-12-14 RX ADMIN — SUGAMMADEX 300 MG: 100 INJECTION, SOLUTION INTRAVENOUS at 12:32

## 2020-12-14 RX ADMIN — ROCURONIUM BROMIDE 40 MG: 10 INJECTION INTRAVENOUS at 10:15

## 2020-12-14 RX ADMIN — HYDROMORPHONE HYDROCHLORIDE 0.5 MG: 1 INJECTION, SOLUTION INTRAMUSCULAR; INTRAVENOUS; SUBCUTANEOUS at 11:34

## 2020-12-14 RX ADMIN — DEXAMETHASONE SODIUM PHOSPHATE 8 MG: 4 INJECTION, SOLUTION INTRAMUSCULAR; INTRAVENOUS at 10:08

## 2020-12-14 RX ADMIN — CEFAZOLIN 1 G: 10 INJECTION, POWDER, FOR SOLUTION INTRAVENOUS at 11:45

## 2020-12-14 RX ADMIN — ROCURONIUM BROMIDE 60 MG: 10 INJECTION INTRAVENOUS at 09:42

## 2020-12-14 RX ADMIN — PROPOFOL 50 MG: 10 INJECTION, EMULSION INTRAVENOUS at 09:31

## 2020-12-14 RX ADMIN — CEFAZOLIN 2 G: 10 INJECTION, POWDER, FOR SOLUTION INTRAVENOUS at 09:53

## 2020-12-14 RX ADMIN — ONDANSETRON 4 MG: 2 INJECTION INTRAMUSCULAR; INTRAVENOUS at 11:36

## 2020-12-14 RX ADMIN — PROPOFOL 30 MG: 10 INJECTION, EMULSION INTRAVENOUS at 12:09

## 2020-12-14 RX ADMIN — PROPOFOL 30 MG: 10 INJECTION, EMULSION INTRAVENOUS at 09:39

## 2020-12-14 RX ADMIN — FENTANYL CITRATE 50 MCG: 50 INJECTION, SOLUTION INTRAMUSCULAR; INTRAVENOUS at 09:29

## 2020-12-14 RX ADMIN — MIDAZOLAM 2 MG: 1 INJECTION INTRAMUSCULAR; INTRAVENOUS at 09:22

## 2020-12-14 RX ADMIN — Medication: at 14:01

## 2020-12-14 RX ADMIN — DEXMEDETOMIDINE HYDROCHLORIDE 12 MCG: 100 INJECTION, SOLUTION INTRAVENOUS at 09:29

## 2020-12-14 RX ADMIN — SODIUM CHLORIDE, POTASSIUM CHLORIDE, SODIUM LACTATE AND CALCIUM CHLORIDE: 600; 310; 30; 20 INJECTION, SOLUTION INTRAVENOUS at 09:22

## 2020-12-14 RX ADMIN — Medication: at 12:35

## 2020-12-14 RX ADMIN — LIDOCAINE HYDROCHLORIDE 50 MG: 20 INJECTION, SOLUTION INFILTRATION; PERINEURAL at 09:29

## 2020-12-14 ASSESSMENT — MIFFLIN-ST. JEOR: SCORE: 1818.25

## 2020-12-14 NOTE — ANESTHESIA PREPROCEDURE EVALUATION
"Anesthesia Pre-Procedure Evaluation    Patient: Chiquita Butler   MRN:     [id number] Gender:   adult   Age:    60 year old :      1960        Preoperative Diagnosis: Gender dysphoria in adolescent and adult [F64.0]   Procedure(s):  bilateral simple mastectomy, nipple grafts. OnQ     LABS:  CBC:   Lab Results   Component Value Date    WBC 6.3 2016    HGB 12.7 2020    HGB 12.0 2016    HCT 38.4 2016     2016     BMP:   Lab Results   Component Value Date    .8 2019     2016    POTASSIUM 3.1 (L) 2020    POTASSIUM 3.6 2019    CHLORIDE 100.4 2019    CHLORIDE 104 2016    CO2 29.2 2019    CO2 29 2016    BUN 18.7 2019    BUN 9 2016    CR 0.66 2020    CR 0.6 2019    GLC 94 2020    GLC 99.6 (H) 2019     COAGS: No results found for: PTT, INR, FIBR  POC: No results found for: BGM, HCG, HCGS  OTHER:   Lab Results   Component Value Date    ROJAS 8.8 2019    ALBUMIN 2.8 (L) 2016    PROTTOTAL 7.5 2016    ALT 11 2016    AST 11 2016    ALKPHOS 90 2016    BILITOTAL 0.4 2016    LIPASE 89 2016        Preop Vitals    BP Readings from Last 3 Encounters:   20 (!) 155/98   12/10/20 129/81   20 128/87    Pulse Readings from Last 3 Encounters:   20 67   12/10/20 65   20 74      Resp Readings from Last 3 Encounters:   20 16   12/10/20 16   20 18    SpO2 Readings from Last 3 Encounters:   20 99%   12/10/20 98%   20 94%      Temp Readings from Last 1 Encounters:   20 36.5  C (97.7  F) (Oral)    Ht Readings from Last 1 Encounters:   20 1.778 m (5' 10\")      Wt Readings from Last 1 Encounters:   20 117.8 kg (259 lb 11.2 oz)    Estimated body mass index is 37.26 kg/m  as calculated from the following:    Height as of this encounter: 1.778 m (5' 10\").    Weight as of this encounter: 117.8 kg (259 lb 11.2 " oz).     LDA:        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-     Panic attack 12/17/13    ER Visit      Past Surgical History:   Procedure Laterality Date     BYPASS GASTRIC MASON-EN-Y, LIVER BIOPSY, COMBINED  2004     CATHETER, ABLATION  1962    cardiac, related to scarring around PDA ligation site      COLONOSCOPY N/A 4/20/2017    Procedure: COLONOSCOPY;;  Surgeon: August Perez MD;  Location: [facility]     COLONOSCOPY N/A 5/15/2017    Procedure: COLONOSCOPY;  Colonoscopy/DX:RLQ abdominal pain/2 day prep emailed;  Surgeon: Gold Tamayo MD;  Location: [facility]     HYSTERECTOMY  1993    Partial, fibroids     REPAIR VALVE PULMONARY  1960      No Known Allergies     Anesthesia Evaluation     . Pt has had prior anesthetic.            ROS/MED HX    ENT/Pulmonary:       Neurologic:       Cardiovascular:     (+) hypertension (currently on amlodipine, losartan, metoprolol)-range: BP this /98, ---. : . . . :. valvular problems/murmurs congenital heart valve abnormality (pulmonary valve, repaired 1960):. Previous cardiac testing date:results:date: results:ECG reviewed date:12/10/2020 results:NSR, LVH, HR 61 bpm date: results:          METS/Exercise Tolerance:     Hematologic:         Musculoskeletal:         GI/Hepatic: Comment: Hx of Gastric Bypass        Renal/Genitourinary:         Endo:     (+) Obesity, .      Psychiatric:     (+) psychiatric history depression      Infectious Disease:         Malignancy:         Other:                     JZG FV AN PHYSICAL EXAM    Assessment:   ASA SCORE: 3            Plan:   Anes. Type:  General   Pre-Medication: None   Induction:  IV (Standard)   Airway: ETT; Oral   Access/Monitoring: PIV   Maintenance: Balanced     Postop Plan:   Postop Pain: Opioids  Postop Sedation/Airway: Not planned     PONV Management:   Adult Risk Factors: Female,  Postop Opioids   Prevention: Ondansetron, Dexamethasone, Propofol     CONSENT: Direct conversation   Plan and risks discussed with: Patient                      Tasia Brumfield MD

## 2020-12-14 NOTE — RESULT ENCOUNTER NOTE
Results discussed directly with patient while patient was present. Any further details documented in the note.   Parish Roblero, DO

## 2020-12-14 NOTE — OR NURSING
Discharge instructions reviewed with patient and patient's partner. Drain teaching completed- verbalized and demonstrated understanding.

## 2020-12-14 NOTE — BRIEF OP NOTE
[facility]     Brief Operative Note    Pre-operative diagnosis: Gender dysphoria in adolescent and adult [F64.0]  Post-operative diagnosis Same as pre-operative diagnosis    Procedure: Procedure(s):  Bilateral Simple Mastectomy, On-Q  Surgeon: Surgeon(s) and Role:     * Gracie Rapp MD - Primary     * Jazmin Koroma PA-C - Assisting  Anesthesia: General   Estimated blood loss: Minimal  Drains: Bronson-Valencia and On-Q pump  Specimens:   ID Type Source Tests Collected by Time Destination   A : Right breast Tissue Breast, Right SURGICAL PATHOLOGY EXAM Gracie Rapp MD 12/14/2020 10:56 AM    B : Left breast Tissue Breast, Left SURGICAL PATHOLOGY EXAM Gracie Rapp MD 12/14/2020 10:56 AM      Findings:   None.  Complications: None.  Implants: * No implants in log *    Bilateral simple mastectomies. Jpx2, on Q pump. Wrapped with kerlix and ace wrap.

## 2020-12-14 NOTE — ANESTHESIA PROCEDURE NOTES
Airway   Date/Time: 12/14/2020 9:47 AM        Staff -   CRNA: Clare Lunsford APRN CRNA  Other Anesthesia Staff: Madalyn Shay  Performed By: CRNA    Consent for Airway   Urgency: elective    Indications and Patient Condition  Indications for airway management: leander-procedural  Induction type:intravenousMask difficulty assessment: 1 - vent by mask    Final Airway Details  Final airway type: endotracheal airway  Successful airway:ETT - single  Endotracheal Airway Details   ETT size (mm): 6.5  Cuffed: yes  Successful intubation technique: video laryngoscopy  Grade View of Cords: 2  Adjucts: stylet  Measured from: gums/teeth  Secured at (cm): 23  Secured with: silk tape  Bite block used: None    Post intubation assessment   Placement verified by: capnometry   Number of attempts at approach: 3 (SRNA x2)  Secured with:silk tape  Dentition: Intact

## 2020-12-14 NOTE — ANESTHESIA CARE TRANSFER NOTE
Patient: Chiquita Butler    Procedure(s):  Bilateral Simple Mastectomy, On-Q    Diagnosis: Gender dysphoria in adolescent and adult [F64.0]  Diagnosis Additional Information: No value filed.    Anesthesia Type:   General     Note:  Airway :Face Mask  Patient transferred to:PACU  Handoff Report: Identifed the Patient, Identified the Reponsible Provider, Reviewed the pertinent medical history, Discussed the surgical course, Reviewed Intra-OP anesthesia mangement and issues during anesthesia, Set expectations for post-procedure period and Allowed opportunity for questions and acknowledgement of understanding      Vitals: (Last set prior to Anesthesia Care Transfer)    CRNA VITALS  12/14/2020 1214 - 12/14/2020 1248      12/14/2020             Pulse:  79    SpO2:  99 %    Resp Rate (observed):  (!) 1                Electronically Signed By: LEONEL Munoz CRNA  December 14, 2020  12:48 PM

## 2020-12-14 NOTE — ANESTHESIA POSTPROCEDURE EVALUATION
Anesthesia POST Procedure Evaluation    Patient: Chiquita Butler   MRN:     [id number] Gender:   adult   Age:    60 year old :      1960        Preoperative Diagnosis: Gender dysphoria in adolescent and adult [F64.0]   Procedure(s):  Bilateral Simple Mastectomy, On-Q   Postop Comments: No value filed.     Anesthesia Type: General       Disposition: Outpatient   Postop Pain Control: Uneventful            Sign Out: Well controlled pain   PONV: No   Neuro/Psych: Uneventful            Sign Out: Acceptable/Baseline neuro status   Airway/Respiratory: Uneventful            Sign Out: Acceptable/Baseline resp. status   CV/Hemodynamics: Uneventful            Sign Out: Acceptable CV status   Other NRE: NONE   DID A NON-ROUTINE EVENT OCCUR? No         Last Anesthesia Record Vitals:  CRNA VITALS  2020 1214 - 2020 1314      2020             Pulse:  79    SpO2:  99 %    Resp Rate (observed):  (!) 1          Last PACU Vitals:  Vitals Value Taken Time   /79 20 1430   Temp 36.5  C (97.7  F) 20 1430   Pulse 70 20 1442   Resp 12 20 1442   SpO2 95 % 20 1444   Temp src     NIBP     Pulse     SpO2     Resp     Temp     Ht Rate     Temp 2     Vitals shown include unvalidated device data.      Electronically Signed By: Tasia Brumfield MD, 2020, 3:05 PM

## 2020-12-14 NOTE — DISCHARGE INSTRUCTIONS
Same-Day Surgery   Adult Discharge Orders & Instructions     For 24 hours after surgery:  1. Get plenty of rest.  A responsible adult must stay with you for at least 24 hours after you leave the hospital.   2. Pain medication can slow your reflexes. Do not drive or use heavy equipment.  If you have weakness or tingling, don't drive or use heavy equipment until this feeling goes away.  3. Mixing alcohol and pain medication can cause dizziness and slow your breathing. It can even be fatal. Do not drink alcohol while taking pain medication.  4. Avoid strenuous or risky activities.  Ask for help when climbing stairs.   5. You may feel lightheaded.  If so, sit for a few minutes before standing.  Have someone help you get up.   6. If you have nausea (feel sick to your stomach), drink only clear liquids such as apple juice, ginger ale, broth or 7-Up.  Rest may also help.  Be sure to drink enough fluids.  Move to a regular diet as you feel able. Take pain medications with a small amount of solid food, such as toast or crackers, to avoid nausea.   7. A slight fever is normal. Call the doctor if your fever is over 100 F (37.7 C) (taken under the tongue) or lasts longer than 24 hours.  8. You may have a dry mouth, muscle aches, trouble sleeping or a sore throat.  These symptoms should go away after 24 hours.  9. Do not make important or legal decisions.   Pain Management:      1. Take pain medication (if prescribed) for pain as directed by your physician.        2. WARNING: If the pain medication you have been prescribed contains Tylenol  (acetaminophen), DO NOT take additional doses of Tylenol (acetaminophen).     Call your doctor for any of the followin.  Signs of infection (fever, growing tenderness at the surgery site, severe pain, a large amount of drainage or bleeding, foul-smelling drainage, redness, swelling).    2.  It has been over 8 to 10 hours since surgery and you are still not able to urinate (pee).    3.   Headache for over 24 hours.    4.  Numbness, tingling or weakness the day after surgery (if you had spinal anesthesia).  To contact a doctor, call _____________________________________ or:      [phone] and ask for the Resident On Call for:          __________________________________________ (answered 24 hours a day)      Emergency Department:  [facility]: [phone]  [facility]: [phone]               Rev. 10/2014       Caring for Your Bronson-Valencia Drain    You have been discharged with a Bronson-Valencia drainage tube. This tube drains fluid from your incision, helping prevent swelling and reducing the risk for infection. The tube is held in place by a few stitches. The drain will be removed when your doctor determines you no longer need it and when the amount of drainage decreases. The color and amount of fluid varies. Right after surgery, the fluid may be bright red and may become clearer over time.   Dressing:    Keep the skin around the tube dry.    If you have a dressing, change it every day.   o Wash your hands.  o Remove the old bandage. Do not use scissors-you may accidentally cut the tube.  o Check for any redness, swelling, drainage, or broken stitches. (Call your doctor with any of these findings).  o Wash your hands again.  o Wet a cotton swab (Q-tip) and clean around the incision and the tube site. Use normal saline solution (salt and water) or soap and water. Start at the tube site and move outward in a circular motion.   o Pat dry.  o Put a new bandage on the incision and tube site. Make the bandage large enough to cover the whole incision area.   o Tape the bandage in place.  o Throw out old materials and wash your hands.   Home Care:    Tape the tube to the skin below the bandage. Make sure to keep some slack in the tube to keep it from pulling out.     DO NOT sleep on the same side as the tube.    Secure the tube and bulb inside your clothing with  a safety pin. This helps keep the tube from being pulled out.     Keep the bulb compressed at all times, except when you empty it.    Empty your drain at least twice a day. Empty it more often if the drain is full.   o Lift the opening of the drain.  o Drain the fluid into a measuring cup.  o Record the amount of fluid each time you empty. Share the information with your doctor at your follow-up visit.   o Squeeze the bulb with your hands until you hear air coming out of the bulb.  o Close the opening.     Tape plastic wrap over the bandage and tube site when you shower.      Stripping  the tube helps keep blood clots from blocking the tube.                         ONLY DO THIS IF YOUR MD INSTRUCTED YOU TO DO SO!  o Hold the tubing where it leaves the skin with one hand. This keeps it from pulling on the skin.  o Pinch the tubing with the thumb and first finger of your other hand.   o Slowly and firmly pull your thumb and first finger down the tube (squeezing the tube between your fingers). Keep squeezing the tube as you run your fingers towards the bulb. If the pulling hurts or feels like it is coming out of the skin, STOP. Begin again more gently.  o Let go of the tubing with both hands. If the tube is still blocked, repeat these steps three or four times. Make sure that the bulb is compressed so it creates suction.  When to call your doctor:    New or increased pain around the tube    Redness, warmth, or swelling around the incision or tube    Drainage that is foul smelling    Vomiting    Fever over 101 F degrees    Fluid leaking around the tube    Incision seems not to be healing    Stitches become loose    The tube falls out    Drainage that changes from light pick to dark red    A sudden increase or decrease in the amount of drainage (over 30 ml).  Your drainage record:  Date Time Bulb 1: Amount of drainage (ml or cc) Bulb 2: Amount of drainage (ml or cc) Notes                                                                                             Rev. 4/2014

## 2020-12-16 ENCOUNTER — PATIENT OUTREACH (OUTPATIENT)
Dept: PLASTIC SURGERY | Facility: CLINIC | Age: 60
End: 2020-12-16

## 2020-12-16 NOTE — PATIENT INSTRUCTIONS
Spoke with pt and caregiver regarding post op recovery. They state the left drain has been producing 50ml daily serosanguineous drainage. The right side has been producing 15ml daily red, thicker more viscous drainage. Pt states she does have more pain and a feeling of tightness on the right side. Recommended pt remove wrap to evaluate the chest for additional swelling or bruising on the right side. Pt to send photos for review via [employer]. Salena CHEN RN, BSN

## 2020-12-18 NOTE — OP NOTE
Procedure Date: 12/14/2020      ATTENDING:  Gracie Rapp MD      ASSISTANT:  Jazmin Koroma PA-C (no resident available, AISHA did 50% of case).      PREOPERATIVE DIAGNOSIS:  Gender dysphoria.      POSTOPERATIVE DIAGNOSIS:  Gender dysphoria.      PROCEDURE:  Bilateral simple mastectomies without nipple graft reconstruction.  On-Q catheter placement.      ANESTHESIA:  GET.      ESTIMATED BLOOD LOSS:  50 mL      IV FLUIDS:  800 mL      URINE OUTPUT:  150 mL      COUNTS:  Correct.      COMPLICATIONS:  None.      DRAINS:  MORGAN x 2.      SPECIMENS:  Right breast 948 grams, left breast 936 grams.      INDICATIONS:  This is a 60-year-old -American biological female who has a diagnosis of gender dysphoria and met WPATH and insurance criteria for gender affirming top surgery.  Due to the patient's obesity, breast hypertrophy and ptosis, they would require a double-incision approach to their simple mastectomies.  They did not desire nipple grafts.      DESCRIPTION OF PROCEDURE:  The patient was seen in preoperative waiting area.  The operative sites were marked.  This included the sternal notch, sternal midline, inframammary folds with extensions laterally for possible dog ears, vertical line through the nipple-areolar complex, and the transposed line from the mid humerus.  Informed consent was obtained after reviewing the possible risks and complications including but not limited to the following:  Infection, bleeding, hematoma, seroma formation, poor healing, spitting sutures, hypertrophic scarring, dehiscence, residual deformities and asymmetries, possible need for further surgery, altered sensation of the chest wall, either hypo or hypersensitivity, and anesthetic risks such as DVT, PE and cardiopulmonary arrest.        The patient was then brought to the operating room and placed supine on the OR table.  General anesthesia was administered and the patient was oral endotracheally intubated.  A Melo was  placed.  The patient already had sequential compression devices on the lower extremities prior to induction.  Arms were secured to arm boards with Ace wraps.  Pillows were used for the thighs and forelegs and padded safety straps were used to secure the patient.  A lower Dominique Hugger was in place.  The patient was put into a sitting position and adjustments were made to get symmetry.  The chest breast area was then prepped and draped in the usual sterile fashion using ChloraPrep.      After a timeout was taken and the proper patient and procedure were identified, we made our inframammary fold incisions using PEAK PlasmaBlade on the left side and traditional Valleylab cautery with scalpel on the right.  Approximately 3 cm of subcutaneous fat was left along the IMF incision before beveling down to the pectoralis fascia.  The breast mound was elevated off the pectoral fascia up towards the clavicle, medially towards the parasternal border and laterally towards the lateral border of the pectoralis major muscle.  This allowed us to bring the breast mound inferiorly and cora where it overlapped with the IMF incision.  Since the nipple grafts were not desired, the breast mound with the nipples were excised sharply.  All breast tissue was weighed off the backtable in preparation for sending to pathology.  Additional thinning of the superior skin flap was done.  Our incisions were temporarily skin stapled, the patient was put into a sitting position and additional markings were made to gain symmetry as well as eliminate lateral dog ears.  We did not have to join our incision in the midline.  All additional tissues that were resected were added to the path specimen.  When we were happy with our contour and our symmetry, our dissection pockets were irrigated with Ancef solution.  Hemostasis was achieved with cautery.  Then #15 round MORGAN drains were introduced through separate stab wound incisions laterally and secured with 3-0  nylon suture.  These were draped along the inferior pocket.  Dual 10-inch On-Q catheters were percutaneously introduced from the epigastric region and draped along the superior aspect of the dissection pocket.  These were secured with benzoin and Tegaderm.  Definitive closure was then achieved with 3-0 Vicryl deep dermal buried sutures followed by 4-0 Vicryl running subcuticular suture.  Of note, we did extend our dog ear incisions very far back to the upper posterior axillary line to minimize the lateral thoracic roll.  JPs were trimmed and put to bulb suction.  Exofin Dermabond was applied to our incisions.  ABD pads for drain sponges and a couple Kerlix rolls unfurled across the anterior chest were used to pad before wrapping the thorax with a double long 6-inch Ace wrap.  The Melo was removed.  The patient was extubated and transferred to the stretcher.  The On-Q catheters were attached to the reservoir containing 550 mL of 0.2% ropivacaine to be delivered at 2 mL per hour per catheter for the next 5 days.  The patient was taken to the recovery room in satisfactory condition, having tolerated the procedure without difficulty or complication.  Total weights of the tissue removed that were sent to pathology were 948 g from the right and 936 g from the left.         CHARLIE ANDERSON MD             D: 2020   T: 2020   MT: TRACE      Name:     KENRICK HILL   MRN:      [id number]        Account:        [id number]   :      1960           Procedure Date: 2020      Document: [id number]

## 2020-12-22 ENCOUNTER — OFFICE VISIT (OUTPATIENT)
Dept: PLASTIC SURGERY | Facility: CLINIC | Age: 60
End: 2020-12-22
Payer: COMMERCIAL

## 2020-12-22 VITALS — BODY MASS INDEX: 37.08 KG/M2 | WEIGHT: 259 LBS | HEIGHT: 70 IN

## 2020-12-22 DIAGNOSIS — E66.01 MORBID OBESITY (H): ICD-10-CM

## 2020-12-22 DIAGNOSIS — Z90.13 S/P BILATERAL MASTECTOMY: Primary | ICD-10-CM

## 2020-12-22 PROCEDURE — 99024 POSTOP FOLLOW-UP VISIT: CPT | Performed by: SURGERY

## 2020-12-22 ASSESSMENT — MIFFLIN-ST. JEOR: SCORE: 1815.07

## 2020-12-22 ASSESSMENT — PAIN SCALES - GENERAL: PAINLEVEL: MILD PAIN (2)

## 2020-12-22 NOTE — NURSING NOTE
"Chief Complaint   Patient presents with     Surgical Followup     Pt here for 1 week post op       Vitals:    12/22/20 1206   Weight: 117.5 kg (259 lb)   Height: 1.778 m (5' 10\")       Body mass index is 37.16 kg/m .      STANLEY McdonoughT                      "

## 2020-12-22 NOTE — PROGRESS NOTES
"PLASTICS POST-OP   This is a 60 year old trans male with a history of gender dysphoria who presents 8 days post-op after a bilateral simple mastectomy with NO nipple graft reconstruction on 12/14/2020. Chiquita is here today by herself.    Experienced mild pruritis which resolved with Hydroxyzine. Experienced some nausea. Denies constipation. States OnQ catheter was helpful for pain management.  Patient did not need a refill of medication.    PE: Chest: Good contour. Nice symmetry. Some swelling and resolving ecchymosis on the right side.  Dermabond intact. Minimal \"dog-ears\". No nipple grafts.  Overall nice results. Photo taken with verbal consent.   Right MORGAN drain has old bloody output.     A&P: 60 year old nonbinary person who presents 8 days post-op after a bilateral simple mastectomy with NO nipple grafts on 12/14/20.   As left MORGAN drain output was within normal limits, left MORGAN drain was removed without difficulty. Right MORGAN drain was left because of higher output and to see if hematoma will liquify in the next week or so. Also removed OnQ catheters. We discussed when to remove the Dermabond on the incision.     I reviewed with the patient how long to maintain limited activities. We discussed scar reducing techniques, such as Mederma, silicone strips, vitamin E oil, emu oil, massage and when he may begin using these. Problems to look out for during the recovery period were discussed, including: spitting sutures, incision dehiscence, hematoma/seroma, thick scarring, fluid accumulation under skin flap.     Patient will continue wrapping with Ace bandage and physical limitations. The patient can resume showering.   She will follow up next week to hopefully remove right MORGAN drain. Causes for returning sooner were discussed.     This note was prepared on behalf of Gracie Rapp MD by Magda Lilly, a trained medical scribe, based on my observations and the provider's statements to me.   "

## 2020-12-22 NOTE — LETTER
"12/22/2020       RE: Chiquita Butler  [address]  [facility] [address]     Dear Colleague,    Thank you for referring your patient, Chiquita Butler, to the [facility] [place] at [facility]. Please see a copy of my visit note below.    PLASTICS POST-OP   This is a 60 year old trans male with a history of gender dysphoria who presents 8 days post-op after a bilateral simple mastectomy with NO nipple graft reconstruction on 12/14/2020. Chiquita is here today by herself.    Experienced mild pruritis which resolved with Hydroxyzine. Experienced some nausea. Denies constipation. States OnQ catheter was helpful for pain management.  Patient did not need a refill of medication.    PE: Chest: Good contour. Nice symmetry. Some swelling and resolving ecchymosis on the right side.  Dermabond intact. Minimal \"dog-ears\". No nipple grafts.  Overall nice results. Photo taken with verbal consent.   Right MORGAN drain has old bloody output.     A&P: 60 year old nonbinary person who presents 8 days post-op after a bilateral simple mastectomy with NO nipple grafts on 12/14/20.   As left MORGAN drain output was within normal limits, left MORGAN drain was removed without difficulty. Right MORGAN drain was left because of higher output and to see if hematoma will liquify in the next week or so. Also removed OnQ catheters. We discussed when to remove the Dermabond on the incision.     I reviewed with the patient how long to maintain limited activities. We discussed scar reducing techniques, such as Mederma, silicone strips, vitamin E oil, emu oil, massage and when he may begin using these. Problems to look out for during the recovery period were discussed, including: spitting sutures, incision dehiscence, hematoma/seroma, thick scarring, fluid accumulation under skin flap.     Patient will continue wrapping with Ace bandage and physical limitations. The patient can " resume showering.   She will follow up next week to hopefully remove right MORGAN drain. Causes for returning sooner were discussed.     This note was prepared on behalf of Gracie Rapp MD by Magda Lilly, a trained medical scribe, based on my observations and the provider's statements to me.     Again, thank you for allowing me to participate in the care of your patient.      Sincerely,    Gracie Rapp MD

## 2020-12-28 LAB — COPATH REPORT: NORMAL

## 2020-12-29 ENCOUNTER — OFFICE VISIT (OUTPATIENT)
Dept: PLASTIC SURGERY | Facility: CLINIC | Age: 60
End: 2020-12-29
Payer: COMMERCIAL

## 2020-12-29 VITALS — HEIGHT: 70 IN | WEIGHT: 259 LBS | BODY MASS INDEX: 37.08 KG/M2

## 2020-12-29 DIAGNOSIS — T14.8XXA HEMATOMA OF SKIN: ICD-10-CM

## 2020-12-29 DIAGNOSIS — Z11.59 ENCOUNTER FOR SCREENING FOR OTHER VIRAL DISEASES: Primary | ICD-10-CM

## 2020-12-29 DIAGNOSIS — Z90.13 S/P BILATERAL MASTECTOMY: Primary | ICD-10-CM

## 2020-12-29 DIAGNOSIS — T14.8XXA HEMATOMA OF SKIN: Primary | ICD-10-CM

## 2020-12-29 PROCEDURE — 99024 POSTOP FOLLOW-UP VISIT: CPT | Performed by: SURGERY

## 2020-12-29 RX ORDER — CEFAZOLIN SODIUM 2 G/50ML
2 SOLUTION INTRAVENOUS
Status: CANCELLED | OUTPATIENT
Start: 2020-12-29

## 2020-12-29 RX ORDER — CEFAZOLIN SODIUM 1 G/50ML
1 INJECTION, SOLUTION INTRAVENOUS SEE ADMIN INSTRUCTIONS
Status: CANCELLED | OUTPATIENT
Start: 2020-12-29

## 2020-12-29 ASSESSMENT — PAIN SCALES - GENERAL: PAINLEVEL: MILD PAIN (2)

## 2020-12-29 ASSESSMENT — MIFFLIN-ST. JEOR: SCORE: 1815.07

## 2020-12-29 NOTE — PROGRESS NOTES
PLASTICS POST-OP   This is a 60 year old genderqueer person with a history of gender dysphoria who presents 2 weeks post-op after a bilateral simple mastectomy with nipple graft reconstruction on 12/14.  Patient has hematoma on the right side. States output is fairly liquid. No clotting.     Patient's right MORGAN drain output was slowly decreasing, then started to increase, now pretty constant at 30mL Qday. Still dark colored, but more watery.   Patient is experiencing tenderness around drain site. There is no sign of infection.     PE:  Still swelling along IMF incision that is greater than left side which is flat. MORGAN intact and secured.     A&P: 60 year old who presents 2 weeks post-op after a bilateral simple mastectomy with nipple grafts on 12/14/20.    We discussed the options going forward. Patient could wait to see if hematoma is absorbed, meaning she would continue to have the drain in for an indefinite amount of time. Or we could return to the OR to evacuate hematoma. Chiquita would like to go forward with surgery and we will plan to schedule surgery on January 8th. Patient agrees to this plan.    She will follow up with me on January 8th. Causes for returning sooner were discussed.     This note was prepared on behalf of Gracie Rapp MD by Magda Lilly, a trained medical scribe, based on my observations and the provider's statements to me.

## 2020-12-29 NOTE — NURSING NOTE
"Chief Complaint   Patient presents with     Surgical Followup     Pt here for post op       Vitals:    12/29/20 1251   Weight: 117.5 kg (259 lb)   Height: 1.778 m (5' 10\")       Body mass index is 37.16 kg/m .      STANLEY McdonoughT                      "

## 2020-12-29 NOTE — LETTER
12/29/2020       RE: Chiquita Butler  [address]  [facility] [address]     Dear Colleague,    Thank you for referring your patient, Chiquita Butler, to the [facility]ERY CLIN[facility] [place] at [facility]. Please see a copy of my visit note below.    PLASTICS POST-OP   This is a 60 year old genderqueer person with a history of gender dysphoria who presents 2 weeks post-op after a bilateral simple mastectomy with nipple graft reconstruction on 12/14.  Patient has hematoma on the right side. States output is fairly liquid. No clotting.     Patient's right MORGAN drain output was slowly decreasing, then started to increase, now pretty constant at 30mL Qday. Still dark colored, but more watery.   Patient is experiencing tenderness around drain site. There is no sign of infection.     PE:  Still swelling along IMF incision that is greater than left side which is flat. MORGAN intact and secured.     A&P: 60 year old who presents 2 weeks post-op after a bilateral simple mastectomy with nipple grafts on 12/14/20.    We discussed the options going forward. Patient could wait to see if hematoma is absorbed, meaning she would continue to have the drain in for an indefinite amount of time. Or we could return to the OR to evacuate hematoma. Chiquita would like to go forward with surgery and we will plan to schedule surgery on January 8th. Patient agrees to this plan.    She will follow up with me on January 8th. Causes for returning sooner were discussed.     This note was prepared on behalf of Gracie Rapp MD by Magda Lilly, a trained medical scribe, based on my observations and the provider's statements to me.     Again, thank you for allowing me to participate in the care of your patient.      Sincerely,    Gracie Rapp MD

## 2020-12-30 ENCOUNTER — DOCUMENTATION ONLY (OUTPATIENT)
Dept: SURGERY | Facility: CLINIC | Age: 60
End: 2020-12-30

## 2021-01-04 DIAGNOSIS — Z11.59 ENCOUNTER FOR SCREENING FOR OTHER VIRAL DISEASES: ICD-10-CM

## 2021-01-04 LAB
SARS-COV-2 RNA SPEC QL NAA+PROBE: NOT DETECTED
SPECIMEN SOURCE: NORMAL

## 2021-01-04 PROCEDURE — U0003 INFECTIOUS AGENT DETECTION BY NUCLEIC ACID (DNA OR RNA); SEVERE ACUTE RESPIRATORY SYNDROME CORONAVIRUS 2 (SARS-COV-2) (CORONAVIRUS DISEASE [COVID-19]), AMPLIFIED PROBE TECHNIQUE, MAKING USE OF HIGH THROUGHPUT TECHNOLOGIES AS DESCRIBED BY CMS-2020-01-R: HCPCS | Performed by: FAMILY MEDICINE

## 2021-01-04 PROCEDURE — U0005 INFEC AGEN DETEC AMPLI PROBE: HCPCS | Performed by: FAMILY MEDICINE

## 2021-01-08 ENCOUNTER — ANESTHESIA (OUTPATIENT)
Dept: SURGERY | Facility: CLINIC | Age: 61
End: 2021-01-08
Payer: COMMERCIAL

## 2021-01-08 ENCOUNTER — ANESTHESIA EVENT (OUTPATIENT)
Dept: SURGERY | Facility: CLINIC | Age: 61
End: 2021-01-08
Payer: COMMERCIAL

## 2021-01-08 ENCOUNTER — HOSPITAL ENCOUNTER (OUTPATIENT)
Facility: CLINIC | Age: 61
Discharge: HOME | End: 2021-01-08
Attending: SURGERY | Admitting: SURGERY
Payer: COMMERCIAL

## 2021-01-08 VITALS
RESPIRATION RATE: 14 BRPM | SYSTOLIC BLOOD PRESSURE: 126 MMHG | TEMPERATURE: 97.5 F | HEART RATE: 61 BPM | OXYGEN SATURATION: 99 % | HEIGHT: 70 IN | BODY MASS INDEX: 36.83 KG/M2 | WEIGHT: 257.28 LBS | DIASTOLIC BLOOD PRESSURE: 78 MMHG

## 2021-01-08 DIAGNOSIS — T14.8XXA HEMATOMA OF SKIN: ICD-10-CM

## 2021-01-08 DIAGNOSIS — F64.0 GENDER DYSPHORIA IN ADULT: Primary | ICD-10-CM

## 2021-01-08 LAB — GLUCOSE BLDC GLUCOMTR-MCNC: 87 MG/DL (ref 70–99)

## 2021-01-08 PROCEDURE — 999N001017 HC STATISTIC GLUCOSE BY METER IP

## 2021-01-08 PROCEDURE — 88305 TISSUE EXAM BY PATHOLOGIST: CPT | Mod: TC | Performed by: SURGERY

## 2021-01-08 PROCEDURE — 999N000141 HC STATISTIC PRE-PROCEDURE NURSING ASSESSMENT: Performed by: SURGERY

## 2021-01-08 PROCEDURE — 250N000009 HC RX 250: Performed by: NURSE ANESTHETIST, CERTIFIED REGISTERED

## 2021-01-08 PROCEDURE — 250N000025 HC SEVOFLURANE, PER MIN: Performed by: SURGERY

## 2021-01-08 PROCEDURE — 710N000012 HC RECOVERY PHASE 2, PER MINUTE: Performed by: SURGERY

## 2021-01-08 PROCEDURE — 250N000011 HC RX IP 250 OP 636: Performed by: NURSE ANESTHETIST, CERTIFIED REGISTERED

## 2021-01-08 PROCEDURE — 250N000009 HC RX 250: Performed by: SURGERY

## 2021-01-08 PROCEDURE — 250N000013 HC RX MED GY IP 250 OP 250 PS 637: Performed by: ANESTHESIOLOGY

## 2021-01-08 PROCEDURE — 370N000017 HC ANESTHESIA TECHNICAL FEE, PER MIN: Performed by: SURGERY

## 2021-01-08 PROCEDURE — 360N000075 HC SURGERY LEVEL 2, PER MIN: Performed by: SURGERY

## 2021-01-08 PROCEDURE — 88305 TISSUE EXAM BY PATHOLOGIST: CPT | Mod: 26 | Performed by: PATHOLOGY

## 2021-01-08 PROCEDURE — 272N000001 HC OR GENERAL SUPPLY STERILE: Performed by: SURGERY

## 2021-01-08 PROCEDURE — 710N000010 HC RECOVERY PHASE 1, LEVEL 2, PER MIN: Performed by: SURGERY

## 2021-01-08 PROCEDURE — 250N000011 HC RX IP 250 OP 636: Performed by: SURGERY

## 2021-01-08 PROCEDURE — 258N000003 HC RX IP 258 OP 636: Performed by: ANESTHESIOLOGY

## 2021-01-08 PROCEDURE — 21501 I&D DP ABSC/HMTMA SFT TS NCK: CPT | Mod: 78 | Performed by: SURGERY

## 2021-01-08 RX ORDER — AZITHROMYCIN 250 MG/1
TABLET, FILM COATED ORAL
Qty: 6 TABLET | Refills: 0 | Status: SHIPPED | OUTPATIENT
Start: 2021-01-08 | End: 2021-01-13

## 2021-01-08 RX ORDER — MEPERIDINE HYDROCHLORIDE 25 MG/ML
12.5 INJECTION INTRAMUSCULAR; INTRAVENOUS; SUBCUTANEOUS
Status: DISCONTINUED | OUTPATIENT
Start: 2021-01-08 | End: 2021-01-08 | Stop reason: HOSPADM

## 2021-01-08 RX ORDER — ONDANSETRON 2 MG/ML
INJECTION INTRAMUSCULAR; INTRAVENOUS PRN
Status: DISCONTINUED | OUTPATIENT
Start: 2021-01-08 | End: 2021-01-08

## 2021-01-08 RX ORDER — GLYCOPYRROLATE 0.2 MG/ML
INJECTION, SOLUTION INTRAMUSCULAR; INTRAVENOUS PRN
Status: DISCONTINUED | OUTPATIENT
Start: 2021-01-08 | End: 2021-01-08

## 2021-01-08 RX ORDER — LIDOCAINE 40 MG/G
CREAM TOPICAL
Status: DISCONTINUED | OUTPATIENT
Start: 2021-01-08 | End: 2021-01-08 | Stop reason: HOSPADM

## 2021-01-08 RX ORDER — DEXAMETHASONE SODIUM PHOSPHATE 4 MG/ML
INJECTION, SOLUTION INTRA-ARTICULAR; INTRALESIONAL; INTRAMUSCULAR; INTRAVENOUS; SOFT TISSUE PRN
Status: DISCONTINUED | OUTPATIENT
Start: 2021-01-08 | End: 2021-01-08

## 2021-01-08 RX ORDER — NALOXONE HYDROCHLORIDE 0.4 MG/ML
0.2 INJECTION, SOLUTION INTRAMUSCULAR; INTRAVENOUS; SUBCUTANEOUS
Status: DISCONTINUED | OUTPATIENT
Start: 2021-01-08 | End: 2021-01-08 | Stop reason: HOSPADM

## 2021-01-08 RX ORDER — ONDANSETRON 4 MG/1
4 TABLET, ORALLY DISINTEGRATING ORAL EVERY 8 HOURS PRN
Qty: 30 TABLET | Refills: 0 | Status: SHIPPED | OUTPATIENT
Start: 2021-01-08 | End: 2021-02-24

## 2021-01-08 RX ORDER — ACETAMINOPHEN 325 MG/1
975 TABLET ORAL ONCE
Status: COMPLETED | OUTPATIENT
Start: 2021-01-08 | End: 2021-01-08

## 2021-01-08 RX ORDER — FENTANYL CITRATE 50 UG/ML
25-50 INJECTION, SOLUTION INTRAMUSCULAR; INTRAVENOUS
Status: DISCONTINUED | OUTPATIENT
Start: 2021-01-08 | End: 2021-01-08 | Stop reason: HOSPADM

## 2021-01-08 RX ORDER — HYDROXYZINE HYDROCHLORIDE 10 MG/1
10 TABLET, FILM COATED ORAL EVERY 6 HOURS PRN
Qty: 30 TABLET | Refills: 0 | Status: SHIPPED | OUTPATIENT
Start: 2021-01-08 | End: 2021-02-24

## 2021-01-08 RX ORDER — OXYCODONE HYDROCHLORIDE 5 MG/1
5 TABLET ORAL ONCE
Status: COMPLETED | OUTPATIENT
Start: 2021-01-08 | End: 2021-01-08

## 2021-01-08 RX ORDER — FENTANYL CITRATE 50 UG/ML
INJECTION, SOLUTION INTRAMUSCULAR; INTRAVENOUS PRN
Status: DISCONTINUED | OUTPATIENT
Start: 2021-01-08 | End: 2021-01-08

## 2021-01-08 RX ORDER — ONDANSETRON 2 MG/ML
4 INJECTION INTRAMUSCULAR; INTRAVENOUS EVERY 30 MIN PRN
Status: DISCONTINUED | OUTPATIENT
Start: 2021-01-08 | End: 2021-01-08 | Stop reason: HOSPADM

## 2021-01-08 RX ORDER — ONDANSETRON 4 MG/1
4 TABLET, ORALLY DISINTEGRATING ORAL EVERY 30 MIN PRN
Status: DISCONTINUED | OUTPATIENT
Start: 2021-01-08 | End: 2021-01-08 | Stop reason: HOSPADM

## 2021-01-08 RX ORDER — PROPOFOL 10 MG/ML
INJECTION, EMULSION INTRAVENOUS PRN
Status: DISCONTINUED | OUTPATIENT
Start: 2021-01-08 | End: 2021-01-08

## 2021-01-08 RX ORDER — CEFAZOLIN SODIUM 1 G/3ML
1 INJECTION, POWDER, FOR SOLUTION INTRAMUSCULAR; INTRAVENOUS SEE ADMIN INSTRUCTIONS
Status: DISCONTINUED | OUTPATIENT
Start: 2021-01-08 | End: 2021-01-08 | Stop reason: HOSPADM

## 2021-01-08 RX ORDER — LIDOCAINE HYDROCHLORIDE 20 MG/ML
INJECTION, SOLUTION INFILTRATION; PERINEURAL PRN
Status: DISCONTINUED | OUTPATIENT
Start: 2021-01-08 | End: 2021-01-08

## 2021-01-08 RX ORDER — NALOXONE HYDROCHLORIDE 0.4 MG/ML
0.4 INJECTION, SOLUTION INTRAMUSCULAR; INTRAVENOUS; SUBCUTANEOUS
Status: DISCONTINUED | OUTPATIENT
Start: 2021-01-08 | End: 2021-01-08 | Stop reason: HOSPADM

## 2021-01-08 RX ORDER — SODIUM CHLORIDE, SODIUM LACTATE, POTASSIUM CHLORIDE, CALCIUM CHLORIDE 600; 310; 30; 20 MG/100ML; MG/100ML; MG/100ML; MG/100ML
INJECTION, SOLUTION INTRAVENOUS CONTINUOUS
Status: DISCONTINUED | OUTPATIENT
Start: 2021-01-08 | End: 2021-01-08 | Stop reason: HOSPADM

## 2021-01-08 RX ORDER — OXYCODONE HYDROCHLORIDE 5 MG/1
5 TABLET ORAL EVERY 6 HOURS PRN
Qty: 15 TABLET | Refills: 0 | Status: SHIPPED | OUTPATIENT
Start: 2021-01-08 | End: 2021-01-11

## 2021-01-08 RX ORDER — CEFAZOLIN SODIUM 2 G/100ML
2 INJECTION, SOLUTION INTRAVENOUS
Status: COMPLETED | OUTPATIENT
Start: 2021-01-08 | End: 2021-01-08

## 2021-01-08 RX ORDER — EPHEDRINE SULFATE 50 MG/ML
INJECTION, SOLUTION INTRAMUSCULAR; INTRAVENOUS; SUBCUTANEOUS PRN
Status: DISCONTINUED | OUTPATIENT
Start: 2021-01-08 | End: 2021-01-08

## 2021-01-08 RX ADMIN — ONDANSETRON 4 MG: 2 INJECTION INTRAMUSCULAR; INTRAVENOUS at 14:13

## 2021-01-08 RX ADMIN — ACETAMINOPHEN 975 MG: 325 TABLET, FILM COATED ORAL at 15:50

## 2021-01-08 RX ADMIN — OXYCODONE HYDROCHLORIDE 5 MG: 5 TABLET ORAL at 16:45

## 2021-01-08 RX ADMIN — LIDOCAINE HYDROCHLORIDE 100 MG: 20 INJECTION, SOLUTION INFILTRATION; PERINEURAL at 13:33

## 2021-01-08 RX ADMIN — GLYCOPYRROLATE 0.2 MG: 0.2 INJECTION, SOLUTION INTRAMUSCULAR; INTRAVENOUS at 13:56

## 2021-01-08 RX ADMIN — FENTANYL CITRATE 50 MCG: 50 INJECTION, SOLUTION INTRAMUSCULAR; INTRAVENOUS at 13:33

## 2021-01-08 RX ADMIN — PROPOFOL 40 MG: 10 INJECTION, EMULSION INTRAVENOUS at 14:06

## 2021-01-08 RX ADMIN — DEXAMETHASONE SODIUM PHOSPHATE 8 MG: 4 INJECTION, SOLUTION INTRAMUSCULAR; INTRAVENOUS at 13:33

## 2021-01-08 RX ADMIN — CEFAZOLIN 2 G: 10 INJECTION, POWDER, FOR SOLUTION INTRAVENOUS at 13:47

## 2021-01-08 RX ADMIN — SODIUM CHLORIDE, POTASSIUM CHLORIDE, SODIUM LACTATE AND CALCIUM CHLORIDE: 600; 310; 30; 20 INJECTION, SOLUTION INTRAVENOUS at 14:58

## 2021-01-08 RX ADMIN — Medication 5 MG: at 14:14

## 2021-01-08 RX ADMIN — PROPOFOL 40 MG: 10 INJECTION, EMULSION INTRAVENOUS at 14:29

## 2021-01-08 RX ADMIN — PROPOFOL 200 MG: 10 INJECTION, EMULSION INTRAVENOUS at 13:33

## 2021-01-08 RX ADMIN — FENTANYL CITRATE 50 MCG: 50 INJECTION, SOLUTION INTRAMUSCULAR; INTRAVENOUS at 14:29

## 2021-01-08 RX ADMIN — MIDAZOLAM 2 MG: 1 INJECTION INTRAMUSCULAR; INTRAVENOUS at 13:30

## 2021-01-08 RX ADMIN — SODIUM CHLORIDE, POTASSIUM CHLORIDE, SODIUM LACTATE AND CALCIUM CHLORIDE: 600; 310; 30; 20 INJECTION, SOLUTION INTRAVENOUS at 13:30

## 2021-01-08 ASSESSMENT — MIFFLIN-ST. JEOR: SCORE: 1807.25

## 2021-01-08 NOTE — ANESTHESIA PREPROCEDURE EVALUATION
"Anesthesia Pre-Procedure Evaluation    Patient: Chiquita Butler   MRN:     [id number] Gender:   adult   Age:    60 year old :      1960        Preoperative Diagnosis: Hematoma of skin [T14.8XXA]   Procedure(s):  INCISION AND DRAINAGE, BREAST. hematoma evacuation R chest s/p transgender mastectomy     LABS:  CBC:   Lab Results   Component Value Date    WBC 6.3 2016    HGB 12.7 2020    HGB 12.0 2016    HCT 38.4 2016     2016     BMP:   Lab Results   Component Value Date    .8 2019     2016    POTASSIUM 3.1 (L) 2020    POTASSIUM 3.6 2019    CHLORIDE 100.4 2019    CHLORIDE 104 2016    CO2 29.2 2019    CO2 29 2016    BUN 18.7 2019    BUN 9 2016    CR 0.66 2020    CR 0.6 2019    GLC 94 2020    GLC 99.6 (H) 2019     COAGS: No results found for: PTT, INR, FIBR  POC: No results found for: BGM, HCG, HCGS  OTHER:   Lab Results   Component Value Date    ROJAS 8.8 2019    ALBUMIN 2.8 (L) 2016    PROTTOTAL 7.5 2016    ALT 11 2016    AST 11 2016    ALKPHOS 90 2016    BILITOTAL 0.4 2016    LIPASE 89 2016        Preop Vitals    BP Readings from Last 3 Encounters:   20 131/74   12/10/20 129/81   20 128/87    Pulse Readings from Last 3 Encounters:   20 69   12/10/20 65   20 74      Resp Readings from Last 3 Encounters:   20 13   12/10/20 16   20 18    SpO2 Readings from Last 3 Encounters:   20 96%   12/10/20 98%   20 94%      Temp Readings from Last 1 Encounters:   20 37.1  C (98.7  F) (Oral)    Ht Readings from Last 1 Encounters:   20 1.778 m (5' 10\")      Wt Readings from Last 1 Encounters:   20 117.5 kg (259 lb)    Estimated body mass index is 37.16 kg/m  as calculated from the following:    Height as of 20: 1.778 m (5' 10\").    Weight as of 20: 117.5 kg (259 lb). "     LDA:  Subcutaneous Catheter (Active)   Number of days: 25       Closed/Suction Drain 1 Right;Lateral Chest Bulb 15 Pal[place] (Active)   Number of days: 25       Closed/Suction Drain 2 Left;Lateral Chest Bulb 15 Palauan (Active)   Number of days: 25        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-     Panic attack 12/17/13    ER Visit      Past Surgical History:   Procedure Laterality Date     BYPASS GASTRIC MASON-EN-Y, LIVER BIOPSY, COMBINED  2004     CATHETER, ABLATION  1962    cardiac, related to scarring around PDA ligation site      COLONOSCOPY N/A 4/20/2017    Procedure: COLONOSCOPY;;  Surgeon: August Perez MD;  Location: [facility]     COLONOSCOPY N/A 5/15/2017    Procedure: COLONOSCOPY;  Colonoscopy/DX:RLQ abdominal pain/2 day prep emailed;  Surgeon: Gold Tamayo MD;  Location: [facility]     HYSTERECTOMY  1993    Partial, fibroids     REPAIR VALVE PULMONARY  1960     TRANSGENDER MASTECTOMY Bilateral 12/14/2020    Procedure: Bilateral Simple Mastectomy, On-Q;  Surgeon: Gracie Rapp MD;  Location: [facility]      No Known Allergies     Anesthesia Evaluation     .             ROS/MED HX    ENT/Pulmonary:       Neurologic:       Cardiovascular:     (+) hypertension----. : . . . :. .       METS/Exercise Tolerance:     Hematologic:         Musculoskeletal:         GI/Hepatic:         Renal/Genitourinary:         Endo:     (+) Obesity, .      Psychiatric:         Infectious Disease:         Malignancy:         Other:                         PHYSICAL EXAM:   Mental Status/Neuro: A/A/O   Airway: Facies: Feasible  Mallampati: II  Mouth/Opening: Full  TM distance: > 6 cm  Neck ROM: Full   Respiratory: Auscultation: CTAB     Resp. Rate: Normal     Resp. Effort: Normal      CV: Rhythm: Regular  Rate: Age appropriate  Heart: Normal Sounds  Edema: None   Comments:      Dental: Normal  Dentition                Assessment:   ASA SCORE: 3      Smoking Status:  Non-Smoker/Unknown   NPO Status: NPO Appropriate     Plan:   Anes. Type:  General   Pre-Medication: Acetaminophen   Induction:  IV (Standard)   Airway: Oral   Access/Monitoring: PIV   Maintenance: Balanced     Postop Plan:   Postop Pain: Opioids  Postop Sedation/Airway: Not planned  Disposition: Outpatient     PONV Management:   Adult Risk Factors: Female, Non-Smoker, Postop Opioids   Prevention: Ondansetron                   Mona Ledbetter MD

## 2021-01-08 NOTE — DISCHARGE INSTRUCTIONS
Same-Day Surgery   Adult Discharge Orders & Instructions     For 24 hours after surgery:  1. Get plenty of rest.  A responsible adult must stay with you for at least 24 hours after you leave the hospital.   2. Pain medication can slow your reflexes. Do not drive or use heavy equipment.  If you have weakness or tingling, don't drive or use heavy equipment until this feeling goes away.  3. Mixing alcohol and pain medication can cause dizziness and slow your breathing. It can even be fatal. Do not drink alcohol while taking pain medication.  4. Avoid strenuous or risky activities.  Ask for help when climbing stairs.   5. You may feel lightheaded.  If so, sit for a few minutes before standing.  Have someone help you get up.   6. If you have nausea (feel sick to your stomach), drink only clear liquids such as apple juice, ginger ale, broth or 7-Up.  Rest may also help.  Be sure to drink enough fluids.  Move to a regular diet as you feel able. Take pain medications with a small amount of solid food, such as toast or crackers, to avoid nausea.   7. A slight fever is normal. Call the doctor if your fever is over 100 F (37.7 C) (taken under the tongue) or lasts longer than 24 hours.  8. You may have a dry mouth, muscle aches, trouble sleeping or a sore throat.  These symptoms should go away after 24 hours.  9. Do not make important or legal decisions.   Pain Management:      1. Take pain medication (if prescribed) for pain as directed by your physician.        2. WARNING: If the pain medication you have been prescribed contains Tylenol  (acetaminophen), DO NOT take additional doses of Tylenol (acetaminophen).     Call your doctor for any of the followin.  Signs of infection (fever, growing tenderness at the surgery site, severe pain, a large amount of drainage or bleeding, foul-smelling drainage, redness, swelling).    2.  It has been over 8 to 10 hours since surgery and you are still not able to urinate (pee).    3.   Headache for over 24 hours.    4.  Numbness, tingling or weakness the day after surgery (if you had spinal anesthesia).  To contact a doctor, call  Or: (New [phone], OR [place]: [phone] and ask for the Resident On Call for:          General Surgery.  (answered 24 hours a day)      Emergency Department:  [facility]: [phone]  [place]: [phone]

## 2021-01-08 NOTE — ANESTHESIA POSTPROCEDURE EVALUATION
Anesthesia POST Procedure Evaluation    Patient: Chiquita Butler   MRN:     [id number] Gender:   adult   Age:    60 year old :      1960        Preoperative Diagnosis: Hematoma of skin [T14.8XXA]   Procedure(s):  INCISION AND DRAINAGE, BREAST. hematoma evacuation R chest s/p transgender mastectomy   Postop Comments: No value filed.     Anesthesia Type: General       Disposition: Outpatient   Postop Pain Control: Uneventful            Sign Out: Well controlled pain   PONV: No   Neuro/Psych: Uneventful            Sign Out: Acceptable/Baseline neuro status   Airway/Respiratory: Uneventful            Sign Out: Acceptable/Baseline resp. status   CV/Hemodynamics: Uneventful            Sign Out: Acceptable CV status   Other NRE: NONE   DID A NON-ROUTINE EVENT OCCUR? No         Last Anesthesia Record Vitals:  CRNA VITALS  2021 1440 - 2021 1540      2021             Pulse:  66    SpO2:  100 %    Resp Rate (observed):  (!) 1          Last PACU Vitals:  Vitals Value Taken Time   /91 21 1545   Temp 36.4  C (97.6  F) 21 1512   Pulse 67 21 1559   Resp 14 21 1559   SpO2 97 % 21 1559   Temp src     NIBP     Pulse     SpO2     Resp     Temp     Ht Rate     Temp 2     Vitals shown include unvalidated device data.      Electronically Signed By: Mo Rivera MD, 2021, 4:00 PM

## 2021-01-08 NOTE — OR NURSING
Assisted patient to bathroom, patient tolerated standing well with minimal dizziness that quickly passed, patient voided successfully. Back to bay #3 for phase 2 care. Eating feliciano grahams & water currently.

## 2021-01-08 NOTE — ANESTHESIA CARE TRANSFER NOTE
Patient: Chiquita Butler    Procedure(s):  INCISION AND DRAINAGE, BREAST. hematoma evacuation R chest s/p transgender mastectomy    Diagnosis: Hematoma of skin [T14.8XXA]  Diagnosis Additional Information: No value filed.    Anesthesia Type:   General     Note:  Airway :Face Mask  Patient transferred to:PACU  Comments: Report to RN, vss, IV and airway patent, awake, exchanging well on O2, comfortableHandoff Report: Identifed the Patient, Identified the Reponsible Provider, Reviewed the pertinent medical history, Discussed the surgical course, Reviewed Intra-OP anesthesia mangement and issues during anesthesia, Set expectations for post-procedure period and Allowed opportunity for questions and acknowledgement of understanding      Vitals: (Last set prior to Anesthesia Care Transfer)    CRNA VITALS  1/8/2021 1440 - 1/8/2021 1514      1/8/2021             Pulse:  66    SpO2:  100 %    Resp Rate (observed):  (!) 1                Electronically Signed By: LEONEL Hunter CRNA  January 8, 2021  3:14 PM

## 2021-01-08 NOTE — ANESTHESIA PROCEDURE NOTES
Airway    Patient location during procedure: OR    Staff -   CRNA: Teja Cuba APRN CRNA  Performed By: CRNA    Consent for Airway   Urgency: elective    Indications and Patient Condition  Indications for airway management: leander-procedural  Induction type:intravenousMask difficulty assessment: 1 - vent by mask    Final Airway Details  Final airway type: supraglottic airway    Endotracheal Airway Details   Secured with: pink tape    Post intubation assessment   Placement verified by: capnometry, equal breath sounds and chest rise   Number of attempts at approach: 1  Number of other approaches attempted: 0  Secured with:pink tape  Ease of procedure: easy  Dentition: Intact

## 2021-01-11 NOTE — OP NOTE
Procedure Date: 01/08/2021      ATTENDING:  Gracie Rapp MD      RESIDENT SURGEON:  Erick Ash, PGY-1.      PREOPERATIVE DIAGNOSIS:  Right breast chest hematoma status post transgender mastectomy.      POSTOPERATIVE DIAGNOSIS:  Right breast chest hematoma status post transgender mastectomy.      PROCEDURE:  Right breast chest hematoma evacuation.      ANESTHESIA:  GET.      ESTIMATED BLOOD LOSS:  25 mL      INTRAVENOUS FLUIDS:  1100 mL      COUNTS:  Correct.      COMPLICATIONS:  None.      DRAINS:  MORGAN x 1.      INDICATIONS:  This is a 60-year-old biological female with a diagnosis of gender dysphoria.  She has chosen to keep her name and gender marker.  She met pass and insurance criteria for gender affirming top surgery and underwent bilateral simple mastectomies.  No nipple grafts last 3 weeks prior.  Unfortunately, postoperatively, it appeared that the right side had a hematoma.  The MORGAN drain was placed and showed old blood in the tubing.  There was a mild to moderate formation of the right anterior chest wall.  Despite conservative management observation and ongoing compression this was not resolving, and decision was made to bring her back to the OR to directly evacuate the hematoma as necessary.      DESCRIPTION OF PROCEDURE:  The patient was seen in the preoperative waiting area.  The operative site was marked.  Informed consent was obtained after reviewing the possible risks and complications including but not limited to the following:  Infection, bleeding, hematoma, seroma formation, poor healing, possible hypertrophic scarring, possible spitting sutures, possible dehiscence, possible altered sensation of chest wall, possible residual deformities and asymmetries, possible need for further surgery and possible anesthetic risks such as DVT, PE and cardiopulmonary arrest.  The patient was then brought to the operating room, placed supine on the OR table.  After general anesthesia was administered,  the arms were secured to arm boards with Ace wraps.  A Melo was not placed due to the short nature of this case.  The bilateral chest breast area was then prepped and draped in the usual sterile fashion using ChloraPrep.  This was done after removal of the old MORGAN drain from the right side.  We had also removed some residual Dermabond type.      After time-out was taken and the proper patient and procedure were identified, we incised through the healing scar on the right side.  We popped into the subcutaneous pocket and aspirated what appeared to be old serosanguineous hematoma.  The middle 2/3 of the incision was then opened completely to allow us to directly observe the pocket.  There was a lot of hemosiderin staining, some residual clot, but not much, and no obvious active bleeders.  What we did notice, however, after completely evacuating the hematoma space was thickening of the superior skin flap subcutaneous tissue.  This may have been in response to the bleed itself.  In order to get a more symmetrical contour with the contralateral left side, we did excise a portion of this fibrotic subcutaneous fat to thin the flap and achieve a flatter appearance or contour.  This has helped considerably.  On multiple occasions, the pocket was irrigated with Ancef solution.  Hemostasis was achieved very carefully.  Ultimately, a new #15 round MORGAN drain was introduced through a separate stab wound incision laterally and secured with 3-0 nylon suture.  The excess drain was trimmed.  When we were sure of adequate hemostasis and happy with our contour and symmetry, closure was achieved with 3-0 Vicryl deep dermal buried sutures followed by 4-0 Vicryl running subcuticular suture and some 5-0 fast absorbing gut for the final areas.  The MORGAN bulb was attached to trim the tubing.  Exofin Dermabond was applied to the incision.  Dressing of ABD pad and Kerlix rolls were placed and secured with a double long 6-inch Ace wrap  circumferentially around the thorax.  The patient was then extubated and transferred to a stretcher and taken to the recovery room in satisfactory condition, having tolerated the procedure without difficulty or complication.  The tissue that was resected was sent to Pathology.         CHARLIE ANDERSON MD             D: 01/10/2021   T: 01/10/2021   MT: JACQUES      Name:     KENRICK HILL   MRN:      [id number]        Account:        [id number]   :      1960           Procedure Date: 2021      Document: [id number]

## 2021-01-15 ENCOUNTER — HEALTH MAINTENANCE LETTER (OUTPATIENT)
Age: 61
End: 2021-01-15

## 2021-01-18 DIAGNOSIS — I10 BENIGN ESSENTIAL HYPERTENSION: ICD-10-CM

## 2021-01-18 NOTE — TELEPHONE ENCOUNTER

## 2021-01-19 ENCOUNTER — OFFICE VISIT (OUTPATIENT)
Dept: PLASTIC SURGERY | Facility: CLINIC | Age: 61
End: 2021-01-19
Payer: COMMERCIAL

## 2021-01-19 VITALS
WEIGHT: 260.6 LBS | TEMPERATURE: 97.8 F | SYSTOLIC BLOOD PRESSURE: 151 MMHG | DIASTOLIC BLOOD PRESSURE: 84 MMHG | HEIGHT: 70 IN | BODY MASS INDEX: 37.31 KG/M2 | HEART RATE: 55 BPM | OXYGEN SATURATION: 98 %

## 2021-01-19 DIAGNOSIS — F64.0 GENDER DYSPHORIA IN ADOLESCENT AND ADULT: Primary | ICD-10-CM

## 2021-01-19 PROCEDURE — 99024 POSTOP FOLLOW-UP VISIT: CPT | Performed by: PHYSICIAN ASSISTANT

## 2021-01-19 ASSESSMENT — MIFFLIN-ST. JEOR: SCORE: 1817.32

## 2021-01-19 ASSESSMENT — PAIN SCALES - GENERAL: PAINLEVEL: NO PAIN (0)

## 2021-01-19 NOTE — LETTER
"1/19/2021       RE: Chiquita Butler  [address]  [facility] [address]     Dear Colleague,    Thank you for referring your patient, Chiquita Butler, to the [facility] [place] at [facility]. Please see a copy of my visit note below.    Plastic Surgery Outpatient Visit    ID: Chiquita Butler is a 61 year old other with PMH bilateral mastectomy c/b small hematoma to R chest now s/p debridement 1/8/21 with Dr. Rapp.    S: feeling well. Drain output <10cc daily.     O:  BP (!) 151/84 (BP Location: Left arm, Patient Position: Sitting, Cuff Size: Adult Large)   Pulse 55   Temp 97.8  F (36.6  C) (Oral)   Ht 1.778 m (5' 10\")   Wt 118.2 kg (260 lb 9.6 oz)   LMP  (LMP Unknown)   SpO2 98%   BMI 37.39 kg/m     General: NAD  Chest: R chest incision c/d/i with tape intact. Contour appears similar to L chest. No significant swelling. Drain with minimal serosanginous output.     A/P:  -Healing well  -restrictions discussed, including no lifting for 2 more weeks (baby is 18 lbs). Exercise ok at 6 weeks post op.   -RTC as scheduled    15 minutes spent on the date of the encounter doing chart review, history and physical, drain removal, documentation and further activity as noted above.    Jazmin Koroma PA-C  Plastic and Reconstructive Surgery    "

## 2021-01-19 NOTE — NURSING NOTE
"Chief Complaint   Patient presents with     Surgical Followup     Post op visit, DOS: 1/8/21.       Vitals:    01/19/21 0925   BP: (!) 151/84   BP Location: Left arm   Patient Position: Sitting   Cuff Size: Adult Large   Pulse: 55   Temp: 97.8  F (36.6  C)   TempSrc: Oral   SpO2: 98%   Weight: 118.2 kg (260 lb 9.6 oz)   Height: 1.778 m (5' 10\")       Body mass index is 37.39 kg/m .    Gillian Vinson LPN      "

## 2021-01-20 ENCOUNTER — TELEPHONE (OUTPATIENT)
Dept: FAMILY MEDICINE | Facility: CLINIC | Age: 61
End: 2021-01-20

## 2021-01-20 DIAGNOSIS — G47.00 PERSISTENT DISORDER OF INITIATING OR MAINTAINING SLEEP: ICD-10-CM

## 2021-01-20 LAB — COPATH REPORT: NORMAL

## 2021-01-20 RX ORDER — LOSARTAN POTASSIUM 100 MG/1
100 TABLET ORAL DAILY
Qty: 90 TABLET | Refills: 3 | Status: SHIPPED | OUTPATIENT
Start: 2021-01-20 | End: 2022-02-09

## 2021-01-20 NOTE — TELEPHONE ENCOUNTER
Verify that the refill encounter hasn't been started Yes    [facility] phone call message- patient requesting a refill:    Full Medication Name: zolpidem (AMBIEN) 5 MG tablet    Dose:      Pharmacy confirmed as   [id number] - [place], [place] - [address] [facility] AT CORNER OF [address] [place] [address]  [address] [facility] [address]  Phone: [phone] Fax: [phone]    [place], [place] - [address] [place], [place] [address] [facility] [address]  Phone: [phone] Fax: [phone]    [id number] - [place] - [address] AT [facility] & [address]  [address] [place] [address]  Phone: [phone] Fax: [phone]    [id number] [place] [address] AT [address] & [address] [address]  Phone: [phone] Fax: [phone]  : Yes    Medication tab checked to see if medication has been sent  Yes    Additional Comments: patient out of med's  Using [employer]     OK to leave a message on voice mail? Yes    Advised patient refill may take up to 2 business days? Yes    Primary language: English      needed? No    Call taken on January 20, 2021 at 12:50 PM by Lam Koch    Route to [facility] MED REFILL

## 2021-01-27 DIAGNOSIS — F32.A DEPRESSION, UNSPECIFIED DEPRESSION TYPE: ICD-10-CM

## 2021-01-27 NOTE — TELEPHONE ENCOUNTER
"Request for medication refill:  busPIRone (BUSPAR) 7.5 MG tablet    Providers if patient needs an appointment and you are willing to give a one month supply please refill for one month and  send a letter/MyChart using \".SMILLIMITEDREFILL\" .smillimited and route chart to \"P SMI \" (Giving one month refill in non controlled medications is strongly recommended before denial)    If refill has been denied, meaning absolutely no refills without visit, please complete the smart phrase \".smirxrefuse\" and route it to the \"P SMI MED REFILLS\"  pool to inform the patient and the pharmacy.    Monica Buchanan MA        "

## 2021-01-28 ENCOUNTER — OFFICE VISIT (OUTPATIENT)
Dept: FAMILY MEDICINE | Facility: CLINIC | Age: 61
End: 2021-01-28
Payer: COMMERCIAL

## 2021-01-28 VITALS
DIASTOLIC BLOOD PRESSURE: 89 MMHG | SYSTOLIC BLOOD PRESSURE: 137 MMHG | TEMPERATURE: 97.7 F | OXYGEN SATURATION: 98 % | HEART RATE: 61 BPM | RESPIRATION RATE: 12 BRPM

## 2021-01-28 DIAGNOSIS — Z20.822 ENCOUNTER FOR LABORATORY TESTING FOR COVID-19 VIRUS: Primary | ICD-10-CM

## 2021-01-28 PROCEDURE — U0005 INFEC AGEN DETEC AMPLI PROBE: HCPCS | Performed by: FAMILY MEDICINE

## 2021-01-28 PROCEDURE — 99212 OFFICE O/P EST SF 10 MIN: CPT | Mod: CS | Performed by: STUDENT IN AN ORGANIZED HEALTH CARE EDUCATION/TRAINING PROGRAM

## 2021-01-28 PROCEDURE — U0003 INFECTIOUS AGENT DETECTION BY NUCLEIC ACID (DNA OR RNA); SEVERE ACUTE RESPIRATORY SYNDROME CORONAVIRUS 2 (SARS-COV-2) (CORONAVIRUS DISEASE [COVID-19]), AMPLIFIED PROBE TECHNIQUE, MAKING USE OF HIGH THROUGHPUT TECHNOLOGIES AS DESCRIBED BY CMS-2020-01-R: HCPCS | Performed by: FAMILY MEDICINE

## 2021-01-28 NOTE — PROGRESS NOTES
HPI       Chiquita Butler is a 61 year old  who presents for   Chief Complaint   Patient presents with     Covid 19 Testing     Significant exposure to someone who tested positive.  This person tested positive 2 weeks ago  No symptoms    Problem, Medication and Allergy Lists were reviewed and updated if needed..    Patient is an established patient of this clinic..         Review of Systems:   Review of Systems  7 points negative        Physical Exam:     Vitals:    01/28/21 1322   BP: 137/89   Pulse: 61   Resp: 12   Temp: 97.7  F (36.5  C)   SpO2: 98%     There is no height or weight on file to calculate BMI.  Vitals were reviewed and were normal     Physical Exam  Constitutional:       General: Chiquita Butler is not in acute distress.     Appearance: Chiquita Butler is well-developed. Chiquita Butler is not diaphoretic.   HENT:      Head: Normocephalic and atraumatic.   Eyes:      Conjunctiva/sclera: Conjunctivae normal.   Neck:      Musculoskeletal: Normal range of motion.   Cardiovascular:      Rate and Rhythm: Normal rate and regular rhythm.      Heart sounds: No murmur.   Pulmonary:      Effort: Pulmonary effort is normal. No respiratory distress.      Breath sounds: No wheezing or rales.   Musculoskeletal: Normal range of motion.   Neurological:      General: No focal deficit present.      Mental Status: Chiquita Butler is alert.           Results:   Results are ordered and pending    Assessment and Plan        Chiquita was seen today for covid 19 testing.    Diagnoses and all orders for this visit:    Encounter for laboratory testing for COVID-19 virus  Positive exposure. Asymptomatc  Precautions in AVS  -     Asymptomatic COVID-19 Virus (Coronavirus) by PCR           There are no discontinued medications.    Options for treatment and follow-up care were reviewed with the patient. Chiquita Butler  engaged in the decision making process and verbalized understanding of the options discussed and agreed with the final  plan.    Dina Navarro MD

## 2021-01-28 NOTE — PROGRESS NOTES
Preceptor Attestation:   Patient seen, evaluated and discussed with the resident. I have verified the content of the note, which accurately reflects my assessment of the patient and the plan of care.   Supervising Physician:  Lucero Ribeiro MD

## 2021-01-28 NOTE — PATIENT INSTRUCTIONS
COVID-19 Testing Follow Up Instructions  If you have symptoms or known exposure/contact to someone with positive COVID 19 results, please stay home and isolate as you await your own results.     Test results are typically delivered within 48-72 hours. You will be notified by an [facility] nurse of any positive results and a public health representative will also reach out to provide more information. Negative (no COVID)  results are available via [employer].  We will also attempt to contact you by phone with your results.  If you haven't heard from us within 5 days, please contact us at [phone].    How can I protect others?  If you have symptoms (fever, cough, body aches or trouble breathing):  ? Stay home and away from others (self-isolate) until:  ? At least 10 days have passed since your symptoms started. And   ? You've had no fever--and no medicine that reduces fever--for 1 full day (24 hours). And   ? Your other symptoms have resolved (gotten better).  If you don't show symptoms, but testing showed that you have COVID-19:  ? Stay home and away from others (self-isolate) until at least 10 days have passed since the date of your first positive COVID-19 test.  ? You don't need to be retested for COVID-19 before going back to school or work. As long as you're fever-free and feeling better, you can go back to school, work and other activities after waiting the 10 or 20 days.   If you have been exposed to COVID-19:  ? Stay home and away from others (quarantine) for 14 days even if you have tested negative for COVID.  The amount of time for COVID to make you sick can be anywhere from 2-14 days.  Remember that you can be sick without symptoms.  You can be re-tested close to day 14 to be sure you are not passing on the virus.      During this time  ? Stay in your own room, even for meals. Use your own bathroom if you can.  ? Stay away from others in your home. No hugging, kissing or shaking hands. No visitors.  ? Don't  "go to work, school or anywhere else.  ? Clean \"high touch\" surfaces often (doorknobs, counters, handles). Use household cleaning spray or wipes. You'll find a full list of  on the EPA website: www.epa.gov/pesticide-registration/list-n-disinfectants-use-against-sars-cov-2.  ? Cover your mouth and nose with a mask or other face covering to avoid spreading germs.  ? Wash your hands and face often. Use soap and water.  ? Caregivers in these groups are at risk for severe illness due to COVID-19:  ? People 65 years and older  ? People who live in a nursing home or long-term care facility  ? People with chronic disease (lung, heart, cancer, diabetes, kidney, liver, obesity or immune issues)    ? Caregivers should wear a mask as well and wear gloves while washing dishes, handling laundry and cleaning bedrooms and bathrooms.  ? Use caution when washing and drying laundry: Don't shake dirty laundry and use the warmest water setting that you can.  ? For more tips on managing your health at home, go to www.cdc.gov/coronavirus/2019-ncov/downloads/10Things.pdf.    How can I take care of myself at home?  1. Get lots of rest. Drink extra fluids (unless a doctor has told you not to).  2. Take Tylenol (acetaminophen) for fever or pain. If you have liver or kidney problems, ask your family doctor if it's okay to take Tylenol.   Adults can take either:  ? 650 mg (two 325 mg pills) every 4 to 6 hours, or   ? 1,000 mg (two 500 mg pills) every 8 hours as needed.  ? Note: Don't take more than 3,000 mg in one day. Acetaminophen is found in many medicines (both prescribed and over-the-counter medicines). Read all labels to be sure you don't take too much.   For children, check the Tylenol bottle for the right dose. The dose is based on the child's age or weight.     Ibuprofen/Advil and other 'Non-steroidal Anti Inflammatory Medicines like Aleve/Naproxen are often fine to take as well. They have not been shown to make COVID worse or " cause organ damage.  Be careful if you have kidney trouble, stomach or heart issues, and when in doubt, call your doctor.    3. If you have other health problems (like cancer, heart failure, an organ transplant or severe kidney disease): Call your specialty clinic if you don't feel better in the next 2 days.  4. Know when to call 911. Emergency warning signs include:  ? Trouble breathing or shortness of breath  ? Pain or pressure in the chest that doesn't go away  ? Feeling confused like you haven't felt before, or not being able to wake up  ? Bluish-colored lips or face    Where can I get more information?  ? [facility] - About COVID-19: [email or web address]  ? CDC - What to Do If You're Sick: www.cdc.gov/coronavirus/2019-ncov/about/steps-when-sick.html  ? CDC - Ending Home Isolation: www.cdc.gov/coronavirus/2019-ncov/hcp/disposition-in-home-patients.html  ? [facility] - Caring for Someone: www.cdc.gov/coronavirus/2019-ncov/if-you-are-sick/care-for-someone.html  ? [facility] - Interim Guidance for Hospital Discharge to Home: www.health.[facility].[place]/diseases/coronavirus/hcp/hospdischarge.pdf  ? [facility] clinical trials (COVID-19 research studies): clinicalaffairs.[facility]-clinical-trials  ? Below are the COVID-19 hotlines at the [place] Department of Health ([facility]). Interpreters are available.  ? For health questions: Call 721-182-4901 or 1-144.686.5822 (7 a.m. to 7 p.m.)  ? For questions about schools and childcare: Call 802-642-7484 or 1-303.554.8945 (7 a.m. to 7 p.m.)    For informational purposes only. Not to replace the advice of your health care provider. Clinically reviewed by the Infection Prevention Team. Copyright   2020 [employer]. All rights reserved. Adapted from [employer] Discharge Instructions [employer] [id number] - REV 10/15/20.    If you have any questions please contact [place]'s clinic 24/7 at [phone]

## 2021-01-29 LAB
LABORATORY COMMENT REPORT: NORMAL
SARS-COV-2 RNA RESP QL NAA+PROBE: NEGATIVE
SARS-COV-2 RNA RESP QL NAA+PROBE: NORMAL
SPECIMEN SOURCE: NORMAL
SPECIMEN SOURCE: NORMAL

## 2021-02-01 DIAGNOSIS — F33.41 RECURRENT MAJOR DEPRESSIVE DISORDER, IN PARTIAL REMISSION (H): ICD-10-CM

## 2021-02-01 NOTE — TELEPHONE ENCOUNTER
"Request for medication refill:CITALOPRAM    Providers if patient needs an appointment and you are willing to give a one month supply please refill for one month and  send a letter/MyChart using \".SMILLIMITEDREFILL\" .smillimited and route chart to \"P SMI \" (Giving one month refill in non controlled medications is strongly recommended before denial)    If refill has been denied, meaning absolutely no refills without visit, please complete the smart phrase \".smirxrefuse\" and route it to the \"P SMI MED REFILLS\"  pool to inform the patient and the pharmacy.    Maria Guadalupe Klein, CMA        "

## 2021-02-02 RX ORDER — CITALOPRAM HYDROBROMIDE 40 MG/1
40 TABLET ORAL DAILY
Qty: 90 TABLET | Refills: 3 | Status: SHIPPED | OUTPATIENT
Start: 2021-02-02 | End: 2022-01-31

## 2021-02-02 RX ORDER — BUSPIRONE HYDROCHLORIDE 7.5 MG/1
7.5 TABLET ORAL 2 TIMES DAILY
Qty: 180 TABLET | Refills: 3 | Status: SHIPPED | OUTPATIENT
Start: 2021-02-02 | End: 2021-11-23

## 2021-02-08 RX ORDER — ZOLPIDEM TARTRATE 5 MG/1
5 TABLET ORAL
Qty: 30 TABLET | Refills: 2 | Status: SHIPPED | OUTPATIENT
Start: 2021-02-08 | End: 2021-07-28

## 2021-02-16 ENCOUNTER — OFFICE VISIT (OUTPATIENT)
Dept: PLASTIC SURGERY | Facility: CLINIC | Age: 61
End: 2021-02-16
Payer: COMMERCIAL

## 2021-02-16 VITALS — WEIGHT: 260 LBS | HEIGHT: 70 IN | BODY MASS INDEX: 37.22 KG/M2

## 2021-02-16 DIAGNOSIS — Z90.13 S/P BILATERAL MASTECTOMY: Primary | ICD-10-CM

## 2021-02-16 DIAGNOSIS — E66.01 MORBID OBESITY (H): ICD-10-CM

## 2021-02-16 PROCEDURE — 99024 POSTOP FOLLOW-UP VISIT: CPT | Performed by: SURGERY

## 2021-02-16 ASSESSMENT — PAIN SCALES - GENERAL: PAINLEVEL: NO PAIN (0)

## 2021-02-16 ASSESSMENT — MIFFLIN-ST. JEOR: SCORE: 1814.6

## 2021-02-16 NOTE — PROGRESS NOTES
"PLASTICS POST-OP  This is a 61 year old genderqueer person with a history of gender dysphoria who presents 6 weeks post-op after a right breast chest hematoma evacuation on 1/8/21. Previously had bilateral simple mastectomy with NO nipple graft reconstruction on 12/14/20.     Right side experiencing some tightness laterally. Has been lifting 20 lbs baby with no problems. Has resumed driving.   Chiquita states she lost 11lbs since she started a new diet. Would like to start exercising.   Has been using cream to moisturize her scars.     PE: Chest: no nipple grafts present.   Nice upper chest contour. Good symmetry.   Right side is pulled slightly tighter along the pec than the left side.   Slight thickness of left incision. Some \"puckering\" of medial flaps.  No \"dog-ears\"  Photos taken with verbal consent.     A&P: 61 year old genderqueer person with a history of gender dysphoria who presents 6 weeks post-op after a right breast chest hematoma evacuation on 1/8/21. Previously had bilateral simple mastectomy with NO nipple graft reconstruction on 12/14/20.     We discussed scar treatments like vitamin E, silicone gel. silicone tape, Bio-Oil, Emu oil etc. and when she can start using these. Also had a discussion about \"ageism\" and prejudice from trans community.     Patient can increase activity as tolerated, increasing weight gradually. She plans to start using her elliptical.  She is also thinking about working on a video about older people undergoing top surgery. States she has discussed this a lot with her friends and some of them feel like they would not be able to go through with top surgery because of their age.     She will follow up 6 months post-op. Causes for returning sooner were discussed.     Total time = 20 minutes, spent on the date of encounter doing chart review, history and physical, dressing changes, documentation, patient education, and any further activity as noted above.     This note was prepared " on behalf of Gracie Rapp MD by Magda Lilly, a trained medical scribe, based on my observations and the provider's statements to me.

## 2021-02-16 NOTE — NURSING NOTE
"Chief Complaint   Patient presents with     Surgical Followup     Pt here for 6 week post op       Vitals:    02/16/21 1044   Weight: 117.9 kg (260 lb)   Height: 1.778 m (5' 10\")       Body mass index is 37.31 kg/m .      STANLEY McdonoughT                    No vitals taken per provider  "

## 2021-02-16 NOTE — LETTER
"2/16/2021       RE: Chiquita Butler  [address]  [place] [address]     Dear Colleague,    Thank you for referring your patient, Chiquita Butler, to the [facility] [place] at [facility]. Please see a copy of my visit note below.    PLASTICS POST-OP  This is a 61 year old genderqueer person with a history of gender dysphoria who presents 6 weeks post-op after a right breast chest hematoma evacuation on 1/8/21. Previously had bilateral simple mastectomy with NO nipple graft reconstruction on 12/14/20.     Right side experiencing some tightness laterally. Has been lifting 20 lbs baby with no problems. Has resumed driving.   Chiquita states she lost 11lbs since she started a new diet. Would like to start exercising.   Has been using cream to moisturize her scars.     PE: Chest: no nipple grafts present.   Nice upper chest contour. Good symmetry.   Right side is pulled slightly tighter along the pec than the left side.   Slight thickness of left incision. Some \"puckering\" of medial flaps.  No \"dog-ears\"  Photos taken with verbal consent.     A&P: 61 year old genderqueer person with a history of gender dysphoria who presents 6 weeks post-op after a right breast chest hematoma evacuation on 1/8/21. Previously had bilateral simple mastectomy with NO nipple graft reconstruction on 12/14/20.     We discussed scar treatments like vitamin E, silicone gel. silicone tape, Bio-Oil, Emu oil etc. and when she can start using these. Also had a discussion about \"ageism\" and prejudice from trans community.     Patient can increase activity as tolerated, increasing weight gradually. She plans to start using her elliptical.  She is also thinking about working on a video about older people undergoing top surgery. States she has discussed this a lot with her friends and some of them feel like they would not be able to go through with top " surgery because of their age.     She will follow up 6 months post-op. Causes for returning sooner were discussed.     Total time = 20 minutes, spent on the date of encounter doing chart review, history and physical, dressing changes, documentation, patient education, and any further activity as noted above.     This note was prepared on behalf of Gracie Rapp MD by Magda Lilly, a trained medical scribe, based on my observations and the provider's statements to me.     Again, thank you for allowing me to participate in the care of your patient.      Sincerely,    Gracie Rapp MD

## 2021-02-24 ENCOUNTER — OFFICE VISIT (OUTPATIENT)
Dept: FAMILY MEDICINE | Facility: CLINIC | Age: 61
End: 2021-02-24
Payer: COMMERCIAL

## 2021-02-24 VITALS
HEIGHT: 70 IN | HEART RATE: 73 BPM | TEMPERATURE: 97.9 F | SYSTOLIC BLOOD PRESSURE: 138 MMHG | DIASTOLIC BLOOD PRESSURE: 87 MMHG | OXYGEN SATURATION: 98 % | WEIGHT: 251.6 LBS | BODY MASS INDEX: 36.02 KG/M2

## 2021-02-24 DIAGNOSIS — Z98.84 HISTORY OF GASTRIC BYPASS: ICD-10-CM

## 2021-02-24 DIAGNOSIS — R25.2 HAND OR FOOT SPASMS: Primary | ICD-10-CM

## 2021-02-24 DIAGNOSIS — E87.6 HYPOKALEMIA: ICD-10-CM

## 2021-02-24 DIAGNOSIS — T14.8XXA HEMATOMA OF SKIN: ICD-10-CM

## 2021-02-24 DIAGNOSIS — R20.0 NUMBNESS OF RIGHT HAND: ICD-10-CM

## 2021-02-24 DIAGNOSIS — I10 BENIGN ESSENTIAL HYPERTENSION: ICD-10-CM

## 2021-02-24 LAB
BILIRUBIN UR: NEGATIVE MG/DL
BLOOD UR: ABNORMAL MG/DL
BUN SERPL-MCNC: 16.1 MG/DL (ref 7–19)
CALCIUM SERPL-MCNC: 9.4 MG/DL (ref 8.5–10.1)
CHLORIDE SERPLBLD-SCNC: 98.2 MMOL/L (ref 98–110)
CO2 SERPL-SCNC: 30.1 MMOL/L (ref 20–32)
CREAT SERPL-MCNC: 0.8 MG/DL (ref 0.5–1)
GFR SERPL CREATININE-BSD FRML MDRD: 81.1 ML/MIN/1.7 M2
GLUCOSE SERPL-MCNC: 93.7 MG'DL (ref 70–99)
GLUCOSE URINE: NEGATIVE
KETONES UR QL: NEGATIVE MG/DL
LEUKOCYTE ESTERASE UR: ABNORMAL
NITRITE UR QL STRIP: NEGATIVE MG/DL
PH UR STRIP: 7 [PH] (ref 4.5–8)
POTASSIUM SERPL-SCNC: 3 MMOL/L (ref 3.3–4.5)
PROTEIN UR: ABNORMAL MG/DL
PTH-INTACT SERPL-MCNC: 86 PG/ML (ref 18–80)
SODIUM SERPL-SCNC: 136 MMOL/L (ref 132.6–141.4)
SP GR UR STRIP: 1.02 (ref 1–1.03)
UROBILINOGEN UR STRIP-ACNC: ABNORMAL E.U./DL

## 2021-02-24 PROCEDURE — 36415 COLL VENOUS BLD VENIPUNCTURE: CPT | Performed by: FAMILY MEDICINE

## 2021-02-24 PROCEDURE — 83970 ASSAY OF PARATHORMONE: CPT | Performed by: FAMILY MEDICINE

## 2021-02-24 PROCEDURE — 99214 OFFICE O/P EST MOD 30 MIN: CPT | Performed by: FAMILY MEDICINE

## 2021-02-24 PROCEDURE — 82306 VITAMIN D 25 HYDROXY: CPT | Performed by: FAMILY MEDICINE

## 2021-02-24 PROCEDURE — 81003 URINALYSIS AUTO W/O SCOPE: CPT | Performed by: FAMILY MEDICINE

## 2021-02-24 PROCEDURE — 80048 BASIC METABOLIC PNL TOTAL CA: CPT | Performed by: FAMILY MEDICINE

## 2021-02-24 RX ORDER — METOPROLOL SUCCINATE 100 MG/1
100 TABLET, EXTENDED RELEASE ORAL DAILY
Qty: 90 TABLET | Refills: 3 | Status: SHIPPED | OUTPATIENT
Start: 2021-02-24 | End: 2022-03-24

## 2021-02-24 ASSESSMENT — MIFFLIN-ST. JEOR: SCORE: 1776.5

## 2021-02-24 ASSESSMENT — ANXIETY QUESTIONNAIRES
3. WORRYING TOO MUCH ABOUT DIFFERENT THINGS: NOT AT ALL
5. BEING SO RESTLESS THAT IT IS HARD TO SIT STILL: NOT AT ALL
1. FEELING NERVOUS, ANXIOUS, OR ON EDGE: NOT AT ALL
6. BECOMING EASILY ANNOYED OR IRRITABLE: SEVERAL DAYS
IF YOU CHECKED OFF ANY PROBLEMS ON THIS QUESTIONNAIRE, HOW DIFFICULT HAVE THESE PROBLEMS MADE IT FOR YOU TO DO YOUR WORK, TAKE CARE OF THINGS AT HOME, OR GET ALONG WITH OTHER PEOPLE: NOT DIFFICULT AT ALL
2. NOT BEING ABLE TO STOP OR CONTROL WORRYING: NOT AT ALL
GAD7 TOTAL SCORE: 2
7. FEELING AFRAID AS IF SOMETHING AWFUL MIGHT HAPPEN: NOT AT ALL

## 2021-02-24 ASSESSMENT — PATIENT HEALTH QUESTIONNAIRE - PHQ9: 5. POOR APPETITE OR OVEREATING: SEVERAL DAYS

## 2021-02-24 NOTE — PATIENT INSTRUCTIONS
Preventative  1. Ordered dexa scan. Call [phone] to schedule dexa scan.  - I will call you with results.    Muscle Spasms  1. Ordered labs today. Results via [employer].   - I will let you know if I think this is related to your nerves  2. Let me know if you're having additional episodes of muscle spasms/pain  3. Follow up is dependent on lab results.

## 2021-02-24 NOTE — PROGRESS NOTES
Assessment & Plan     Hand or foot spasms  HYPOkalemia -new  - Basic Metabolic Panel ([facility])  - Vitamin D Level  - Parathyroid Hormone Intact  - Urinalysis, Micro If (UA) ([facility])  Called pt, couldn't leave VM as box full. Instead called wife Aissatou and was able to leave a VM. Informed to take potassium starting today, eat high K foods like potatoes with skin, and blueberries. This is critical for sx mgmt but also to help prevent cardiac issues.     Hematoma of skin  Reassurance.     Numbness of right hand  Unclear etiology. Considered neck radiculopathy but no pain and exam unrevealing. Did have top surgery with likely arm positioning a strain on brachial plexus, and has C7-C8 distribution sensory alteration. Will message Dr. Rapp to let her know of this developed symptom, likely brachial plexus but does have hypokalemia also, as FYI in case other mgmt often utilized in these cases.     History of gastric bypass  - Hx of low vitamin d and Calcium both supplemented  - DX Hip/Pelvis/Spine (DEXA)    Benign essential hypertension  - Controlled  - metoprolol succinate ER (TOPROL XL) 100 MG 24 hr tablet  Dispense: 90 tablet; Refill: 3    Discussion of management or test interpretation with external physician/other qualified healthcare professional/appropriate source - Dr. Rapp, surgeon  30 minutes spent on the date of the encounter doing chart review, review of test results, interpretation of tests, patient visit, documentation and electronic communication with surgeon        Follow up dependent on lab results.    No follow-ups on file.    Amarilis Agosto MD  [facility] CARL Porras is a 61 year old who presents for the following health issues    HPI     ZARA  Experienced episode of muscle spasm in right hand- explains that hand was stuck in claw position with weakness and tingling down the arm. There was discomfort after episode in the right hand. Having trouble opening jars,  "cooking and has dropped a few items. Points to the distal aspects of the right hand second through fifth digits as the region of pain. Bought stress balls from [employer] to exercise hand muscles. Endorses dullness in the right hand, shaking, tremors, occasional HAs in the right temporal-parietal region that has been going on for awhile and coldness in the bilateral hands. Denies numbness or tingling, neck pain.    No FHx of nerve related problems.    Derm  Had to drain hematoma, took about one month to heal.    PMHx  - No kidney stones    Review of Systems     Positive for: numbness and dullness in the right hand, shaking, tremors, occasional HAs in the right temporal-parietal region that has been going on for awhile, and coldness in the bilateral hands.     Negative for: tingling in the right hand, neck pain    See HPI For additional sxs.    This document serves as a record of the services and decisions personally performed and made by Amarilis Agosto MD. It was created on his/her behalf by Kavitha Koch, a trained medical scribe. The creation of this document is based the provider's statements to the medical scribe.  Scribe Kavitha Koch 2:36 PM, February 24, 2021        Objective    Blood Pressure 138/87   Pulse 73   Temperature 97.9  F (36.6  C) (Oral)   Height 1.778 m (5' 10\")   Weight 114.1 kg (251 lb 9.6 oz)   Last Menstrual Period  (LMP Unknown)   Oxygen Saturation 98%   Body Mass Index 36.10 kg/m    Body mass index is 36.1 kg/m .   Vitals were reviewed and were normal.  Wt Readings from Last 10 Encounters:   02/24/21 114.1 kg (251 lb 9.6 oz)   02/16/21 117.9 kg (260 lb)   01/19/21 118.2 kg (260 lb 9.6 oz)   01/08/21 116.7 kg (257 lb 4.4 oz)   12/29/20 117.5 kg (259 lb)   12/22/20 117.5 kg (259 lb)   12/14/20 117.8 kg (259 lb 11.2 oz)   12/10/20 118.6 kg (261 lb 6.4 oz)   12/08/20 111.1 kg (245 lb)   12/17/19 111.1 kg (245 lb)       Physical Exam   GENERAL: healthy, alert and no distress  MSK & NEURO: No " adenopathy. Neck with FROM, no cervical midline tenderness, bilateral shoulders FROM and does not cause sxs.   Dorsum of the right hand sensation to light touch is decreased as well as in the ulnar distribution. Anterior right shoulder has decreased sensation to light touch. Sensation to light touch is normal and symmetric in the neck  No tenderness to the cubital fossa or the medial or lateral epicondyle. Sore right coracoid region but in post surgery region.   Non tender AC joint, clavical accronium, scapular and sub scapular spine. Long head of the biceps non tender.   . Bilateral brachioradialis reflexes are normal. Biceps reflex B is 2/4 & is symmetric.   Negative Rajan's sign bilaterally. Negative Spurlings' bilaterally   No bilateral tremor, no pronator drift. Rapid pincher  is symmetric on both sides, palmar grasp 5/5 bilaterally.   SKIN: Right lateral chest: well-healed bilateral incisions with drain scars evident, mild lateral redundancy on R vs swelling from resolving hematoma  PSYCH: mentation appears normal, affect bright

## 2021-02-25 LAB — DEPRECATED CALCIDIOL+CALCIFEROL SERPL-MC: 36 UG/L (ref 20–75)

## 2021-02-26 PROBLEM — E66.01 MORBID OBESITY (H): Status: ACTIVE | Noted: 2021-02-26

## 2021-02-26 RX ORDER — POTASSIUM CHLORIDE 1.5 G/1.58G
20 POWDER, FOR SOLUTION ORAL DAILY
Qty: 60 PACKET | Refills: 1 | Status: SHIPPED | OUTPATIENT
Start: 2021-02-26 | End: 2021-08-10

## 2021-03-01 ASSESSMENT — PATIENT HEALTH QUESTIONNAIRE - PHQ9: SUM OF ALL RESPONSES TO PHQ QUESTIONS 1-9: 0

## 2021-03-02 ASSESSMENT — ANXIETY QUESTIONNAIRES: GAD7 TOTAL SCORE: 2

## 2021-04-03 ENCOUNTER — TELEPHONE (OUTPATIENT)
Dept: FAMILY MEDICINE | Facility: CLINIC | Age: 61
End: 2021-04-03

## 2021-04-03 DIAGNOSIS — N30.00 ACUTE CYSTITIS WITHOUT HEMATURIA: Primary | ICD-10-CM

## 2021-04-03 RX ORDER — NITROFURANTOIN 25; 75 MG/1; MG/1
100 CAPSULE ORAL 2 TIMES DAILY
Qty: 10 CAPSULE | Refills: 0 | Status: SHIPPED | OUTPATIENT
Start: 2021-04-03 | End: 2021-04-08

## 2021-04-04 NOTE — TELEPHONE ENCOUNTER
Message Return    4/3/2021  11:10 PM    Message returned by Cecille Givens MD    Patient: Chiquita Butler   Phone number-  [phone] (home) [phone] (work)      return their call  Phone conversation with: Patient    Situation: Chiquita Butler  Is a 61 year old  adult who is calling because of  Bladder infection  Background : Drinking a lot of water, feels like UTI. Going to bathroom frequently, urgent. Some back pain, by tailbone, no fever. Uncomfortable. No blood. Clear like water. No dysuria. No abnormal bleeding. Not mid back pain. Hasn't tried anything for it. Fever.  Assessment: Most likely a UTI given symptoms. Less likely a kidney stone as not very painful or having flank pain.  Recommendation/Plan: Suggest going to urgent care tomorrow in order to have urine assessed and cultured. Patient worried with Easter so will order an antibiotic for now in case not able to be seen till Monday.  Advised caller to Patient understands the suggested plan and agrees to follow though.    Cecille Givens MD            
1. I was told the name of the doctor(s) who took care of my child while in the hospital.    2. I have been told about any important findings on my child's plan of care.    3. The doctor clearly explained my child's diagnosis and other possible diagnoses that were considered.    4. My child's doctor explained all the tests that were done and their results (if available). I understand that some of the test results may not be ready before we go home and I was told how I can get these results. I understand that a summary of my child's hospitalization and important test results will be shared with my child's outpatient doctor.    5. My child's doctor talked to me about what I need to do when we go home.    6. I understand what signs and symptoms to watch for. I understand what symptoms I would need to call my doctor for and/or return to the hospital.    7. I have the phone number to call the hospital for results and/or questions after I leave the hospital.

## 2021-04-21 NOTE — PROGRESS NOTES
Assessment & Plan     Urinary frequency  Clinical signs and symptoms consistent with UTI uncomplicated without evidence of pyelonephritis. Initially had improvement with course of Macrobid, though symptoms returned. Sexually active with cis female partner, no concerns for STDs, no vaginal symptoms. Vital signs are normal. The abdomen is soft without tenderness, no CVA tenderness. Nephrolithiasis on differential, however, would expect more discomfort and clinical picture not consistent with obstructing stone. Also considered low estrogen state as contributing factor, s/p hysterectomy. Given recurrence of symptoms following Macrobid course and overall well appearing, will await culture results before initiating antibiotic therapy. No prior culture results to guide antibiotic choice. Patient expressed understanding and agreement with this plan.   - Await UA/UC results    - push fluids, may use Pyridium OTC prn  - Consider urologic evaluation if UTI work up is negative     Chronic pain of left knee  Worsening over the past year with increased physical activity. Did have a prior XR of the left knee in 2019 which showed degenerative changes along the lateral patellofemoral joint compartment. Current symptoms consistent with knee OA- discussed natural history and management including activity reduction, NSAIDs, steroid injections. Patient would prefer to try naproxen/NSAIDs and will consider steroid injection if pain is not manageable- would recommend repeat XRs prior. No history of kidney disease, creatinine from earlier this year normal 0.8. No fevers or other systemic symptoms and duration not consistent with infectious cause. No neurologic deficits on exam. Does note subjective weakness of both hands, consider rheumatologic causes of pain if not responding to therapy as expected.   - naproxen (NAPROSYN) 375 MG tablet; Take 1 tablet (375 mg) by mouth 2 times daily (with meals)    Headaches  61 year old with no  "migraine or headache history presenting with one year of episodic left-sided headaches. No immunosuppression, fever/systemic symptoms, neurologic deficit, vision changes, does not wake from sleep. Only red flag identified is older age with onset after 50 years of age. No cancer history. Symptoms have coincided with birth of daughter 14 months ago, also with recent updated prescription from optometrist. Encouraged adequate water intake, ibuprofen or Tylenol use at onset and keeping a headache diary (provided pdf). Recommend follow up within one month and if not improving, no trigger identified or certainly if patient develops neurologic deficit, would recommend further evaluation with MRI brain with and without contrast.     Return in about 4 weeks (around 2021) for with PCP or myself, in person, to follow up on headaches. Sooner if urinary symptoms not improving..    Sara Yen MD  [facility] CARL Porras is a 61 year old  with history of gastric bypass who presents for the following health issues    HPI     Genitourinary - Female  Onset/Duration: Patient called clinic after hours line 4/3 and reported urinary frequency and urgency with fever, no dysuria or hematuria. Recommended evaluation at urgent care for UA/UC, though did send an antibiotic as it was Easter weekend- Macrobid 100 mg BID x5 days. Symptoms went away after abx, then symptoms returned 3-4 days later. For the past 10 days reports dysuria, frequency/urgency but minimal output. Lower back pain \"but not much\".     Description:   Painful urination (Dysuria): YES          Frequency: YES  Blood in urine (Hematuria): no  Delay in urine (Hesitency): YES  Intensity: moderate  Progression of Symptoms:  worsening  Accompanying Signs & Symptoms:  Fever/chills: no  Flank pain: no  Nausea and vomiting: no  Vaginal symptoms: none  Abdominal/Pelvic Pain: YES- lower abdominal when needing to use bathroom " "  History:   History of frequent UTI s: no, not since 30s. Last UA 2/26 notable for trace LE, trace protein and trace blood, no urine cultures in EMR.  History of kidney stones: no. Has not had UTI since 30s.  Sexually Active: YES - monogamous with wife, states no concern for STIs  Possibility of pregnancy: No- partial hysterectomy in late 30s   Precipitating or alleviating factors: none  Therapies tried and outcome: none      Joint problems  - 14 mo and 6 year old at home and has been more active and notices knee pain bilateral anterior/lateral aspect  - sometimes uses icy hot which helps a little, ibuprofen and Tylenol minimally helpful    - XR 12/2019 showed OA  - no prior surgeries   - has used celebrex in the past which was helpful, concerned about side effects    Headache   - x1 year has \"random\" episodes - usually chalks it up to bumping head while playing with kids though no significant head trauma  - left sided  - takes Tylenol without much relief   - never wake patient from sleep  - having more difficulty opening jars over the last few months, both hands, has been doing exercises with stress balls   - no neck pain, numbness or tingling in either arm   - no vision changes   - last saw Optometrist last week, did get a new prescription but not much change   - never had headaches before   - drinks at least 80-90 ounces of water per day      Review of Systems    ROS: 10 point ROS neg other than the symptoms noted above in the HPI.       Objective    /83   Pulse 60   Temp 98  F (36.7  C) (Oral)   Resp 16   Wt 114.2 kg (251 lb 12.8 oz)   LMP  (LMP Unknown)   SpO2 98%   BMI 36.13 kg/m    Body mass index is 36.13 kg/m .  Physical Exam  Constitutional:       General: Chiquita Butler is not in acute distress.     Appearance: Chiquita Butler is well-developed. Chiquita Butler is not diaphoretic.   Eyes:      Extraocular Movements: Extraocular movements intact.   Cardiovascular:      Rate and Rhythm: Normal rate. "   Pulmonary:      Effort: Pulmonary effort is normal. No respiratory distress.   Abdominal:      General: There is no distension.      Palpations: Abdomen is soft.      Tenderness: There is no abdominal tenderness. There is no right CVA tenderness or left CVA tenderness.   Musculoskeletal: Normal range of motion.   Skin:     General: Skin is warm.      Coloration: Skin is not jaundiced.   Neurological:      General: No focal deficit present.      Mental Status: Chiquita Butler is alert.      Cranial Nerves: No cranial nerve deficit.      Motor: No weakness.      Gait: Gait normal.   Psychiatric:         Mood and Affect: Mood normal.        Results for orders placed or performed in visit on 04/22/21 (from the past 24 hour(s))   Urinalysis, Micro If (UA) ([facility])   Result Value Ref Range    Specific Gravity Urine 1.010 1.005 - 1.030    pH Urine 7.0 4.5 - 8.0    Leukocyte Esterase UR Negative NEGATIVE    Nitrite Urine Negative NEGATIVE mg/dL    Protein UR Negative NEGATIVE mg/dL    Glucose Urine Negative NEGATIVE    Ketones Urine Negative NEGATIVE mg/dL    Urobilinogen mg/dL 0.2 E.U./dL 0.2 E.U./dL E.U./dL    Bilirubin UR Negative NEGATIVE mg/dL    Blood UR 1+ (A) NEGATIVE mg/dL

## 2021-04-22 ENCOUNTER — OFFICE VISIT (OUTPATIENT)
Dept: FAMILY MEDICINE | Facility: CLINIC | Age: 61
End: 2021-04-22
Payer: COMMERCIAL

## 2021-04-22 VITALS
SYSTOLIC BLOOD PRESSURE: 132 MMHG | RESPIRATION RATE: 16 BRPM | OXYGEN SATURATION: 98 % | BODY MASS INDEX: 36.13 KG/M2 | WEIGHT: 251.8 LBS | HEART RATE: 60 BPM | DIASTOLIC BLOOD PRESSURE: 83 MMHG | TEMPERATURE: 98 F

## 2021-04-22 DIAGNOSIS — M25.562 CHRONIC PAIN OF LEFT KNEE: ICD-10-CM

## 2021-04-22 DIAGNOSIS — G89.29 CHRONIC PAIN OF LEFT KNEE: ICD-10-CM

## 2021-04-22 DIAGNOSIS — G44.219 EPISODIC TENSION-TYPE HEADACHE, NOT INTRACTABLE: ICD-10-CM

## 2021-04-22 DIAGNOSIS — R35.0 URINARY FREQUENCY: Primary | ICD-10-CM

## 2021-04-22 LAB
BILIRUBIN UR: NEGATIVE MG/DL
BLOOD UR: ABNORMAL MG/DL
GLUCOSE URINE: NEGATIVE
KETONES UR QL: NEGATIVE MG/DL
LEUKOCYTE ESTERASE UR: NEGATIVE
NITRITE UR QL STRIP: NEGATIVE MG/DL
PH UR STRIP: 7 [PH] (ref 4.5–8)
PROTEIN UR: NEGATIVE MG/DL
SP GR UR STRIP: 1.01 (ref 1–1.03)
UROBILINOGEN UR STRIP-ACNC: ABNORMAL E.U./DL

## 2021-04-22 PROCEDURE — 81003 URINALYSIS AUTO W/O SCOPE: CPT | Performed by: STUDENT IN AN ORGANIZED HEALTH CARE EDUCATION/TRAINING PROGRAM

## 2021-04-22 PROCEDURE — 99214 OFFICE O/P EST MOD 30 MIN: CPT | Mod: GC | Performed by: STUDENT IN AN ORGANIZED HEALTH CARE EDUCATION/TRAINING PROGRAM

## 2021-04-22 NOTE — Clinical Note
"FYI - saw Chiquita yesterday for UTI sx (UA not convincing), b/l knee pain and headaches over the past year. No red flags with headaches except \"not a headache person\" and onset >50 years of age so I recommended headache diary and close follow up with you or I with low threshold for imaging. Thanks! Faith" Wound Care: [place]

## 2021-04-22 NOTE — PROGRESS NOTES
Preceptor Attestation:    I discussed the patient with the resident and evaluated the patient in person. I have verified the content of the note, which accurately reflects my assessment of the patient and the plan of care.   Supervising Physician:  Chiquita Green MD.

## 2021-04-22 NOTE — PATIENT INSTRUCTIONS
Patient Education   Here is the plan from today's visit    1. Urinary frequency  - Urinalysis, Micro If (UA) ([facility])  - Will await results before sending antibiotic   - Can try over the counter pyridium as needed    2. Chronic pain of both knees  - naproxen (NAPROSYN) 375 MG tablet; Take 1 tablet (375 mg) by mouth 2 times daily (with meals)  Dispense: 60 tablet; Refill: 0-  - Consider steroid injection     3. Headaches  - headache diary  - watch for red flags (pain waking you from sleep, neurologic changes)  - follow up within 1 month to reevaluate and consider imaging if not improving

## 2021-04-23 PROCEDURE — 87088 URINE BACTERIA CULTURE: CPT | Performed by: FAMILY MEDICINE

## 2021-04-23 PROCEDURE — 87186 SC STD MICRODIL/AGAR DIL: CPT | Performed by: FAMILY MEDICINE

## 2021-04-23 PROCEDURE — 87086 URINE CULTURE/COLONY COUNT: CPT | Performed by: FAMILY MEDICINE

## 2021-04-25 LAB
BACTERIA SPEC CULT: ABNORMAL
BACTERIA SPEC CULT: ABNORMAL
Lab: ABNORMAL
SPECIMEN SOURCE: ABNORMAL

## 2021-04-26 DIAGNOSIS — N30.00 ACUTE CYSTITIS WITHOUT HEMATURIA: Primary | ICD-10-CM

## 2021-04-26 RX ORDER — SULFAMETHOXAZOLE/TRIMETHOPRIM 800-160 MG
1 TABLET ORAL 2 TIMES DAILY
Qty: 6 TABLET | Refills: 0 | Status: SHIPPED | OUTPATIENT
Start: 2021-04-26 | End: 2021-04-29

## 2021-04-26 NOTE — RESULT ENCOUNTER NOTE
Please call patient with the following message: Your urine culture did show E.coli, so I recommend we treat for a bladder infection. The culture shows the bacteria should be covered by all antibiotics but since you tried Macrobid recently, lets try a different one. I will send Bactrim twice daily x3 days. There is a rare side effect with this medication so if you notice any rash/skin changes please stop it and call the clinic.   Thanks! Faith    - TMP-SMX /800 mg BID x3 days

## 2021-05-19 DIAGNOSIS — G89.29 CHRONIC PAIN OF LEFT KNEE: ICD-10-CM

## 2021-05-19 DIAGNOSIS — M25.562 CHRONIC PAIN OF LEFT KNEE: ICD-10-CM

## 2021-05-19 NOTE — TELEPHONE ENCOUNTER
"Request for medication refill:  naproxen (NAPROSYN) 375 MG tablet      Providers if patient needs an appointment and you are willing to give a one month supply please refill for one month and  send a letter/MyChart using \".SMILLIMITEDREFILL\" .smillimited and route chart to \"P [facility] \" (Giving one month refill in non controlled medications is strongly recommended before denial)    If refill has been denied, meaning absolutely no refills without visit, please complete the smart phrase \".smirxrefuse\" and route it to the \"P [facility] MED REFILLS\"  pool to inform the patient and the pharmacy.    Tania Lima MA        "

## 2021-07-26 DIAGNOSIS — G47.00 PERSISTENT DISORDER OF INITIATING OR MAINTAINING SLEEP: ICD-10-CM

## 2021-07-26 NOTE — TELEPHONE ENCOUNTER
"Request for medication refill:  zolpidem (AMBIEN) 5 MG tablet    Providers if patient needs an appointment and you are willing to give a one month supply please refill for one month and  send a letter/MyChart using \".SMILLIMITEDREFILL\" .smillimited and route chart to \"P [facility] \" (Giving one month refill in non controlled medications is strongly recommended before denial)    If refill has been denied, meaning absolutely no refills without visit, please complete the smart phrase \".smirxrefuse\" and route it to the \"P [facility] MED REFILLS\"  pool to inform the patient and the pharmacy.    Monica Buchanan MA        "

## 2021-07-27 ENCOUNTER — HOSPITAL ENCOUNTER (EMERGENCY)
Facility: CLINIC | Age: 61
Discharge: HOME | End: 2021-07-27
Attending: EMERGENCY MEDICINE | Admitting: EMERGENCY MEDICINE
Payer: COMMERCIAL

## 2021-07-27 ENCOUNTER — APPOINTMENT (OUTPATIENT)
Dept: GENERAL RADIOLOGY | Facility: CLINIC | Age: 61
End: 2021-07-27
Attending: EMERGENCY MEDICINE
Payer: COMMERCIAL

## 2021-07-27 VITALS
HEART RATE: 80 BPM | SYSTOLIC BLOOD PRESSURE: 118 MMHG | DIASTOLIC BLOOD PRESSURE: 90 MMHG | OXYGEN SATURATION: 92 % | RESPIRATION RATE: 16 BRPM | TEMPERATURE: 101.8 F

## 2021-07-27 DIAGNOSIS — J45.32 MILD PERSISTENT REACTIVE AIRWAY DISEASE WITH STATUS ASTHMATICUS: ICD-10-CM

## 2021-07-27 DIAGNOSIS — Z11.52 ENCOUNTER FOR SCREENING LABORATORY TESTING FOR SEVERE ACUTE RESPIRATORY SYNDROME CORONAVIRUS 2 (SARS-COV-2): ICD-10-CM

## 2021-07-27 DIAGNOSIS — J20.9 ACUTE BRONCHITIS, UNSPECIFIED ORGANISM: ICD-10-CM

## 2021-07-27 LAB
ALBUMIN SERPL-MCNC: 3.2 G/DL (ref 3.4–5)
ALP SERPL-CCNC: 86 U/L (ref 40–150)
ALT SERPL W P-5'-P-CCNC: 24 U/L (ref 0–70)
ANION GAP SERPL CALCULATED.3IONS-SCNC: 6 MMOL/L (ref 3–14)
AST SERPL W P-5'-P-CCNC: 20 U/L (ref 0–45)
BASOPHILS # BLD AUTO: 0 10E3/UL (ref 0–0.2)
BASOPHILS NFR BLD AUTO: 0 %
BILIRUB SERPL-MCNC: 0.4 MG/DL (ref 0.2–1.3)
BUN SERPL-MCNC: 18 MG/DL (ref 7–30)
CALCIUM SERPL-MCNC: 9 MG/DL (ref 8.5–10.1)
CHLORIDE BLD-SCNC: 107 MMOL/L (ref 94–109)
CO2 SERPL-SCNC: 27 MMOL/L (ref 20–32)
CREAT SERPL-MCNC: 0.8 MG/DL (ref 0.52–1.25)
EOSINOPHIL # BLD AUTO: 0 10E3/UL (ref 0–0.7)
EOSINOPHIL NFR BLD AUTO: 0 %
ERYTHROCYTE [DISTWIDTH] IN BLOOD BY AUTOMATED COUNT: 13.4 % (ref 10–15)
GFR SERPL CREATININE-BSD FRML MDRD: 79 ML/MIN/1.73M2
GLUCOSE BLD-MCNC: 111 MG/DL (ref 70–99)
HCT VFR BLD AUTO: 39.5 % (ref 35–53)
HGB BLD-MCNC: 12.5 G/DL (ref 11.7–17.7)
HOLD SPECIMEN: NORMAL
HOLD SPECIMEN: NORMAL
IMM GRANULOCYTES # BLD: 0 10E3/UL
IMM GRANULOCYTES NFR BLD: 1 %
LYMPHOCYTES # BLD AUTO: 0.4 10E3/UL (ref 0.8–5.3)
LYMPHOCYTES NFR BLD AUTO: 5 %
MCH RBC QN AUTO: 29.8 PG (ref 26.5–33)
MCHC RBC AUTO-ENTMCNC: 31.6 G/DL (ref 31.5–36.5)
MCV RBC AUTO: 94 FL (ref 78–100)
MONOCYTES # BLD AUTO: 0.7 10E3/UL (ref 0–1.3)
MONOCYTES NFR BLD AUTO: 9 %
NEUTROPHILS # BLD AUTO: 6.7 10E3/UL (ref 1.6–8.3)
NEUTROPHILS NFR BLD AUTO: 85 %
NRBC # BLD AUTO: 0 10E3/UL
NRBC BLD AUTO-RTO: 0 /100
PLATELET # BLD AUTO: 210 10E3/UL (ref 150–450)
POTASSIUM BLD-SCNC: 3.2 MMOL/L (ref 3.4–5.3)
PROT SERPL-MCNC: 7.8 G/DL (ref 6.8–8.8)
RBC # BLD AUTO: 4.19 10E6/UL (ref 3.8–5.9)
SARS-COV-2 RNA RESP QL NAA+PROBE: NEGATIVE
SODIUM SERPL-SCNC: 140 MMOL/L (ref 133–144)
WBC # BLD AUTO: 7.9 10E3/UL (ref 4–11)

## 2021-07-27 PROCEDURE — 85025 COMPLETE CBC W/AUTO DIFF WBC: CPT | Performed by: EMERGENCY MEDICINE

## 2021-07-27 PROCEDURE — 36415 COLL VENOUS BLD VENIPUNCTURE: CPT | Performed by: EMERGENCY MEDICINE

## 2021-07-27 PROCEDURE — C9803 HOPD COVID-19 SPEC COLLECT: HCPCS

## 2021-07-27 PROCEDURE — U0003 INFECTIOUS AGENT DETECTION BY NUCLEIC ACID (DNA OR RNA); SEVERE ACUTE RESPIRATORY SYNDROME CORONAVIRUS 2 (SARS-COV-2) (CORONAVIRUS DISEASE [COVID-19]), AMPLIFIED PROBE TECHNIQUE, MAKING USE OF HIGH THROUGHPUT TECHNOLOGIES AS DESCRIBED BY CMS-2020-01-R: HCPCS | Performed by: EMERGENCY MEDICINE

## 2021-07-27 PROCEDURE — 71046 X-RAY EXAM CHEST 2 VIEWS: CPT

## 2021-07-27 PROCEDURE — 71046 X-RAY EXAM CHEST 2 VIEWS: CPT | Mod: 26 | Performed by: RADIOLOGY

## 2021-07-27 PROCEDURE — 99284 EMERGENCY DEPT VISIT MOD MDM: CPT | Performed by: EMERGENCY MEDICINE

## 2021-07-27 PROCEDURE — 80053 COMPREHEN METABOLIC PANEL: CPT | Performed by: EMERGENCY MEDICINE

## 2021-07-27 PROCEDURE — 99284 EMERGENCY DEPT VISIT MOD MDM: CPT | Mod: 25

## 2021-07-27 RX ORDER — ALBUTEROL SULFATE 90 UG/1
2 AEROSOL, METERED RESPIRATORY (INHALATION) EVERY 6 HOURS PRN
Qty: 8.5 G | Refills: 0 | Status: SHIPPED | OUTPATIENT
Start: 2021-07-27 | End: 2024-08-21

## 2021-07-27 ASSESSMENT — ENCOUNTER SYMPTOMS
SHORTNESS OF BREATH: 1
COUGH: 1
BLOOD IN STOOL: 0
HEADACHES: 0
DIARRHEA: 0
NUMBNESS: 0
BACK PAIN: 0
DYSURIA: 0
AGITATION: 0
VOMITING: 0
NECK STIFFNESS: 0
LIGHT-HEADEDNESS: 0
CONFUSION: 0
PALPITATIONS: 0
SORE THROAT: 0
FATIGUE: 1
EYE DISCHARGE: 0
MYALGIAS: 0
SEIZURES: 0
NAUSEA: 0
APPETITE CHANGE: 1
NERVOUS/ANXIOUS: 0
EYE REDNESS: 0
DIZZINESS: 1
CHEST TIGHTNESS: 0
FREQUENCY: 1
FLANK PAIN: 0
WEAKNESS: 0
CHILLS: 1
ABDOMINAL PAIN: 0
FEVER: 1

## 2021-07-27 NOTE — ED TRIAGE NOTES
Pt c/o cough, labored breathing, fever for 1 week, chills, hands feel tingly, dizziness, fatigue  Tmax 100  No sense of taste  Pt did get both Covid vaccines

## 2021-07-27 NOTE — ED PROVIDER NOTES
ED Provider Note  [facility]      History     Chief Complaint   Patient presents with     Shortness of Breath     Cough     HPI  Chiquita Butler is a 61 year old adult with a medical history significant for congenital heart valve abnormality and hypertension.  Per triage note patient reports a cough, labored breathing, and fever for 1 week. Patient is experiencing chills, tingly hands, dizziness, and fatigue.  She has been exposed to a baby at home that had a cough for a few days.  The child is now doing well.  Patient has had complete vaccination.  She denies dysuria.  She does have some urinary frequency.  She has no sore throat.  It sounds as though there has been Covid exposures within the child's .    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-     Panic attack 12/17/13    ER Visit     Past Surgical History:   Procedure Laterality Date     BYPASS GASTRIC MASON-EN-Y, LIVER BIOPSY, COMBINED  2004     CATHETER, ABLATION  1962    cardiac, related to scarring around PDA ligation site      COLONOSCOPY N/A 4/20/2017    Procedure: COLONOSCOPY;;  Surgeon: August Perez MD;  Location: [facility]     COLONOSCOPY N/A 5/15/2017    Procedure: COLONOSCOPY;  Colonoscopy/DX:RLQ abdominal pain/2 day prep emailed;  Surgeon: Gold Tamayo MD;  Location: [facility]     HYSTERECTOMY  1993    Partial, fibroids     INCISION AND DRAINAGE BREAST Bilateral 1/8/2021    Procedure: INCISION AND DRAINAGE, BREAST. Right breast chest hematoma evacuation.;  Surgeon: Gracie Rapp MD;  Location: [facility] OR     REPAIR VALVE PULMONARY  1960     TRANSGENDER MASTECTOMY Bilateral 12/14/2020    Procedure: Bilateral Simple Mastectomy, On-Q;  Surgeon: Gracie Rapp MD;  Location: [facility] OR     albuterol (PROAIR HFA/PROVENTIL HFA/VENTOLIN HFA) 108 (90 Base) MCG/ACT  inhaler  amoxicillin-clavulanate (AUGMENTIN) 875-125 MG tablet  amLODIPine (NORVASC) 10 MG tablet  busPIRone (BUSPAR) 7.5 MG tablet  Cholecalciferol (VITAMIN D) 2000 UNITS tablet  citalopram (CELEXA) 40 MG tablet  losartan (COZAAR) 100 MG tablet  metoprolol succinate ER (TOPROL XL) 100 MG 24 hr tablet  multivitamin, therapeutic with minerals (MULTI-VITAMIN) TABS  naproxen (NAPROSYN) 375 MG tablet  potassium chloride (KLOR-CON) 20 MEQ packet  zolpidem (AMBIEN) 5 MG tablet      No Known Allergies  Family History  Family History   Problem Relation Age of Onset     Alzheimer Disease Mother 60         78     Myocardial Infarction Father 49             C.A.D. Father      Glaucoma Maternal Grandmother      Colorectal Cancer Maternal Grandmother         95, pancreatic     Diabetes Sister      Eye Disorder Sister      Depression Other         multiple siblings     Cerebrovascular Disease Paternal Grandmother         80's     Breast Cancer No family hx of      Social History   Social History     Tobacco Use     Smoking status: Never Smoker     Smokeless tobacco: Never Used   Substance Use Topics     Alcohol use: Not Currently     Alcohol/week: 2.5 standard drinks     Types: 3 Standard drinks or equivalent per week     Comment: 1-2 drinks/week     Drug use: No      Past medical history, past surgical history, medications, allergies, family history, and social history were reviewed with the patient. No additional pertinent items.       Review of Systems   Constitutional: Positive for appetite change, chills, fatigue and fever.   HENT: Negative for sore throat.    Eyes: Negative for discharge and redness.   Respiratory: Positive for cough and shortness of breath. Negative for chest tightness.    Cardiovascular: Negative for chest pain, palpitations and leg swelling.   Gastrointestinal: Negative for abdominal pain, blood in stool, diarrhea, nausea and vomiting.   Genitourinary: Positive for frequency. Negative for dysuria  and flank pain.   Musculoskeletal: Negative for back pain, myalgias and neck stiffness.   Skin: Negative for pallor.   Neurological: Positive for dizziness. Negative for seizures, weakness, light-headedness, numbness and headaches.   Psychiatric/Behavioral: Negative for agitation and confusion. The patient is not nervous/anxious.        Physical Exam   BP: (!) 179/94  Pulse: 81  Temp: (!) 101.8  F (38.8  C)  Resp: 19  SpO2: 95 %  Physical Exam  Vitals and nursing note reviewed.   Constitutional:       General: Chiquita Butler is not in acute distress.     Appearance: Chiquita Butler is well-developed. Chiquita Butler is not diaphoretic.   HENT:      Head: Normocephalic and atraumatic.   Eyes:      General: No scleral icterus.     Pupils: Pupils are equal, round, and reactive to light.   Cardiovascular:      Rate and Rhythm: Normal rate and regular rhythm.      Heart sounds: Normal heart sounds. No murmur heard.     Pulmonary:      Effort: No respiratory distress.      Breath sounds: No stridor. Wheezing and rhonchi present. No rales.   Abdominal:      General: Bowel sounds are normal.      Palpations: Abdomen is soft.      Tenderness: There is no abdominal tenderness.   Musculoskeletal:         General: No tenderness.      Cervical back: Normal range of motion and neck supple.   Skin:     General: Skin is warm and dry.      Coloration: Skin is not pale.      Findings: No erythema or rash.   Neurological:      Mental Status: Chiquita Butler is alert and oriented to person, place, and time.      Sensory: No sensory deficit.      Coordination: Coordination normal.         ED Course      Procedures          Results for orders placed or performed during the hospital encounter of 07/27/21   XR Chest 2 Views     Status: None (Preliminary result)    Impression    RESIDENT PRELIMINARY INTERPRETATION  IMPRESSION: No focal airspace disease.   Comprehensive metabolic panel     Status: Abnormal   Result Value Ref Range    Sodium 140 133 -  144 mmol/L    Potassium 3.2 (L) 3.4 - 5.3 mmol/L    Chloride 107 94 - 109 mmol/L    Carbon Dioxide (CO2) 27 20 - 32 mmol/L    Anion Gap 6 3 - 14 mmol/L    Urea Nitrogen 18 7 - 30 mg/dL    Creatinine 0.80 0.52 - 1.25 mg/dL    Calcium 9.0 8.5 - 10.1 mg/dL    Glucose 111 (H) 70 - 99 mg/dL    Alkaline Phosphatase 86 40 - 150 U/L    AST 20 0 - 45 U/L    ALT 24 0 - 70 U/L    Protein Total 7.8 6.8 - 8.8 g/dL    Albumin 3.2 (L) 3.4 - 5.0 g/dL    Bilirubin Total 0.4 0.2 - 1.3 mg/dL    GFR Estimate 79 >60 mL/min/1.73m2    Narrative    The sex of this patient cannot be reliably determined based on discrepancies in demographics (legal sex, sex assigned at birth, gender identity).  Both male and female reference ranges are provided where applicable.  Careful evaluation of the patient s results as compared to the gender specific reference intervals is required in this setting.    CBC with platelets and differential     Status: Abnormal   Result Value Ref Range    WBC Count 7.9 4.0 - 11.0 10e3/uL    RBC Count 4.19 3.80 - 5.90 10e6/uL    Hemoglobin 12.5 11.7 - 17.7 g/dL    Hematocrit 39.5 35.0 - 53.0 %    MCV 94 78 - 100 fL    MCH 29.8 26.5 - 33.0 pg    MCHC 31.6 31.5 - 36.5 g/dL    RDW 13.4 10.0 - 15.0 %    Platelet Count 210 150 - 450 10e3/uL    % Neutrophils 85 %    % Lymphocytes 5 %    % Monocytes 9 %    % Eosinophils 0 %    % Basophils 0 %    % Immature Granulocytes 1 %    NRBCs per 100 WBC 0 <1 /100    Absolute Neutrophils 6.7 1.6 - 8.3 10e3/uL    Absolute Lymphocytes 0.4 (L) 0.8 - 5.3 10e3/uL    Absolute Monocytes 0.7 0.0 - 1.3 10e3/uL    Absolute Eosinophils 0.0 0.0 - 0.7 10e3/uL    Absolute Basophils 0.0 0.0 - 0.2 10e3/uL    Absolute Immature Granulocytes 0.0 <=0.0 10e3/uL    Absolute NRBCs 0.0 10e3/uL    Narrative    The sex of this patient cannot be reliably determined based on discrepancies in demographics (legal sex, sex assigned at birth, gender identity).  Both male and female reference ranges are provided where  applicable.  Careful evaluation of the patient s results as compared to the gender specific reference intervals is required in this setting.      Medications - No data to display     Assessments & Plan (with Medical Decision Making)   Patient is a 61-year-old adult who presents with a cough, labored breathing, and fever for the past week.  Patient has possibly been exposed to Covid.  Patient has been vaccinated.  Patient also has had previous reactive airways.  On exam, patient has a temperature of 101.8.  Pulse is 81 with a blood pressure of 179/94.  O2 saturations are 94%.  Possibilities include bacterial pneumonia, Covid, or bronchitis.  There is no evidence of urinary tract infection or strep throat.  It seems there is some airway reactivity related to this upper respiratory infection.  Bloods were drawn, and a chest x-ray will be ordered.    Comprehensive metabolic profile is remarkable for a potassium of 3.2 and an albumin of 3.2.  Patient's white count is 7.9 with a hemoglobin of 12.5.  Chest x-ray shows no evidence of infection.  A Covid test is negative.    It seems the patient has an upper respiratory infection as a likely cause of the fever.  There is also evidence of airway reactivity.  I am still suspicious of Covid despite a negative test.  I advised the patient to self isolate to prevent spread to other people.  Patient be started on an albuterol inhaler as well as Augmentin for potential bacterial infection.    I have reviewed the nursing notes. I have reviewed the findings, diagnosis, plan and need for follow up with the patient.    Discharge Medication List as of 7/27/2021  9:15 PM      START taking these medications    Details   albuterol (PROAIR HFA/PROVENTIL HFA/VENTOLIN HFA) 108 (90 Base) MCG/ACT inhaler Inhale 2 puffs into the lungs every 6 hours as needed for shortness of breath / dyspnea or wheezing, Disp-8.5 g, R-0, Local Print      amoxicillin-clavulanate (AUGMENTIN) 875-125 MG tablet Take  1 tablet by mouth 2 times daily for 10 days, Disp-20 tablet, R-0, Local Print             Final diagnoses:   Acute bronchitis, unspecified organism   Mild persistent reactive airway disease with status asthmaticus       --  I, Joslyn Flowers, am serving as a trained medical scribe to document services personally performed by Nick Almanza MD, based on the provider's statements to me.     I, Nick Almanza MD, was physically present and have reviewed and verified the accuracy of this note documented by Joslyn Flowers.    Nick Almanza MD  [facility] EMERGENCY DEPARTMENT  7/27/2021     Nick Almanza MD  07/29/21 [id number]

## 2021-07-28 RX ORDER — ZOLPIDEM TARTRATE 5 MG/1
5 TABLET ORAL
Qty: 30 TABLET | Refills: 2 | Status: SHIPPED | OUTPATIENT
Start: 2021-07-28 | End: 2021-11-23

## 2021-07-28 NOTE — TELEPHONE ENCOUNTER
Patient inquiring about prescription. Stated she is completely out and needs it as soon as possible.

## 2021-07-28 NOTE — DISCHARGE INSTRUCTIONS
Take the antibiotic as prescribed  Use the inhaler for shortness of breath or wheezing  Follow-up with your primary care doctor in 2 weeks  Isolate for 1 week  Return if increasing shortness of breath, pain, or vomiting

## 2021-07-30 ENCOUNTER — OFFICE VISIT (OUTPATIENT)
Dept: FAMILY MEDICINE | Facility: CLINIC | Age: 61
End: 2021-07-30
Payer: COMMERCIAL

## 2021-07-30 VITALS
OXYGEN SATURATION: 97 % | HEART RATE: 72 BPM | TEMPERATURE: 97.5 F | SYSTOLIC BLOOD PRESSURE: 157 MMHG | DIASTOLIC BLOOD PRESSURE: 101 MMHG

## 2021-07-30 DIAGNOSIS — R50.9 FEVER, UNSPECIFIED FEVER CAUSE: Primary | ICD-10-CM

## 2021-07-30 DIAGNOSIS — I10 BENIGN ESSENTIAL HYPERTENSION: ICD-10-CM

## 2021-07-30 DIAGNOSIS — R05.9 COUGH: ICD-10-CM

## 2021-07-30 PROCEDURE — U0005 INFEC AGEN DETEC AMPLI PROBE: HCPCS | Performed by: STUDENT IN AN ORGANIZED HEALTH CARE EDUCATION/TRAINING PROGRAM

## 2021-07-30 PROCEDURE — 99214 OFFICE O/P EST MOD 30 MIN: CPT | Performed by: STUDENT IN AN ORGANIZED HEALTH CARE EDUCATION/TRAINING PROGRAM

## 2021-07-30 PROCEDURE — U0003 INFECTIOUS AGENT DETECTION BY NUCLEIC ACID (DNA OR RNA); SEVERE ACUTE RESPIRATORY SYNDROME CORONAVIRUS 2 (SARS-COV-2) (CORONAVIRUS DISEASE [COVID-19]), AMPLIFIED PROBE TECHNIQUE, MAKING USE OF HIGH THROUGHPUT TECHNOLOGIES AS DESCRIBED BY CMS-2020-01-R: HCPCS | Performed by: STUDENT IN AN ORGANIZED HEALTH CARE EDUCATION/TRAINING PROGRAM

## 2021-07-30 NOTE — PATIENT INSTRUCTIONS
Patient Education   Here is the plan from today's visit    Continue taking the antibiotics until you finish the prescriptoin     It's okay to use the albuterol inhaler as often as every 4 hours if you find relief. If you are needing it consistently every 4 hours or more frequently than that, call [facility] to talk to the on call RN or provider     Re: isolation:    1) COVID test will result in 1-2 days    2) If positive, there is still no recommendation that vaccinated people with a COVID exposure have to get tested if they are asymptomatic. You could still consider testing for everyone in your house for peace of mind. There are lots of free testing sites these days - [place] Dept of Health website has a list. There[place] is super easy.     3) If your COVID test is positive, then we should follow the CDC guidelines for isolation    4) If your COVID test is negative, then it is possible the test is missing it/you are no longer shedding the virus or it's possible this is a different viral infection or a bacterial pneumonia you're getting treated for. If repeat COVID test is negative then I do not think you need to isolate further.     1. Fever, unspecified fever cause  2. Cough  - Symptomatic COVID-19 Virus (Coronavirus) by PCR Nasopharyngeal      Please call or return to clinic if your symptoms don't go away.    Follow up plan  Return if symptoms worsen or fail to improve, for Follow up within 1-2 weeks if you're still having symptoms.    Thank you for coming to [facility] today.  COVID-19 Vaccine:  Starting Monday 8/2/21: [facility] will have walk-in appointments for Moderna, Pfizer and [employer] vaccines. No appointment needed! You will also have the option of receiving Moderna vaccine during your physician appointment. Please ask your care team for more information!  You may be eligible for a $100 Visa giftcard if you receive your first dose between  Friday July 30th and Sunday August 15th. Visit https://[place].gov/covid19/100/ for more information.   Lab Testing:  **If you had lab testing today and your results are reassuring or normal they will be mailed to you or sent through [employer] within 7 days.   **If the lab tests need quick action we will call you with the results.  **If you are having labs done on a different day, please call [phone] to schedule at [facility] or [phone] for other [facility] locations.   The phone number we will call with results is # [phone] (home) [phone] (work). If this is not the best number please call our clinic and change the number.  Medication Refills:  If you need any refills please call your pharmacy and they will contact us.   If you need to  your refill at a new pharmacy, please contact the new pharmacy directly. The new pharmacy will help you get your medications transferred faster.   Scheduling:  If you have any concerns about today's visit or wish to schedule another appointment please call our office during normal business hours [phone] (8-5:00 M-F)  If a referral was made to a [facility] and you don't get a call from central scheduling please call [phone].  If a Mammogram was ordered for you at [facility] call [phone] to schedule or change your appointment.  If you had an EKG/XRay/CT/Ultrasound/MRI ordered the number is [phone] to schedule or change your radiology appointment.   Medical Concerns:  If you have urgent medical concerns please call [phone] at any time of the day.    Viv Guido,

## 2021-07-30 NOTE — PROGRESS NOTES
"    Assessment & Plan     Fever, unspecified fever cause  Cough  Vaccinated with typical COVID symptoms; concern for breakthrough infection. Fever resolved and respiratory status improving; VS stable including pulse ox and clear lungs on exam so no indication for repeat CXR, steroids, or other escalating therapies at this time. Instructed to continue isolation until repeat test results return - if negative COVID-19 test x2, would consider negative and okay to stop isolation.   - Symptomatic COVID-19 Virus (Coronavirus) by PCR Nasopharyngeal  - continue prn albuterol  - finish abx course to cover for CAP   - continue Tylenol for pain/fever, rest, hydration   - reviewed return/ED precautions    HTN  Above goal, defer management changes until f/u when not acutely ill       30 minutes spent on the date of the encounter doing chart review, history and exam, documentation and further activities per the note       BMI:   Estimated body mass index is 36.13 kg/m  as calculated from the following:    Height as of 2/24/21: 1.778 m (5' 10\").    Weight as of 4/22/21: 114.2 kg (251 lb 12.8 oz).   Not discussed       Return if symptoms worsen or fail to improve, for Follow up within 1-2 weeks if you're still having symptoms.    Viv Guido DO  [facility] CARL Porras is a 61 year old who presents for the following health issues   HPI     ED follow up:    7/27 with cough, wheezing, fever. CXR clear, COVID neg. Prescribed augmentin to cover for CAP and albuterol inhaler.      Feeling somewhat better since ED. No fevers for several days. Still coughing. Tired and winded with activity. Not very short of breath at rest. Denies chest pain     Albuterol helps briefly. Was told to only use twice per day     Low appetite, tolerating fluids, some food     Recently lost sense of taste and smell     No one else symptomatic at home. Around a lot of people through her work but no definitively known " exposures       Review of Systems   Pertinent positives and negatives per HPI.        Objective    BP (!) 157/101 (BP Location: Right arm, Cuff Size: Adult Large)   Pulse 72   Temp 97.5  F (36.4  C) (Oral)   LMP  (LMP Unknown)   SpO2 97%   There is no height or weight on file to calculate BMI.  Physical Exam   GENERAL: tired appearing; alert and no distress  NECK: no adenopathy, no asymmetry, masses, or scars and thyroid normal to palpation  RESP: occasional harsh sounding cough. lungs clear to auscultation - no rales, rhonchi or wheezes  CV: regular rate and rhythm, normal S1 S2, no S3 or S4, no murmur, click or rub, no peripheral edema and peripheral pulses strong  MS: no gross musculoskeletal defects noted, no edema  NEURO: Normal strength and tone, mentation intact and speech normal

## 2021-08-01 LAB — SARS-COV-2 RNA RESP QL NAA+PROBE: POSITIVE

## 2021-08-02 ENCOUNTER — TELEPHONE (OUTPATIENT)
Dept: FAMILY MEDICINE | Facility: CLINIC | Age: 61
End: 2021-08-02

## 2021-08-02 DIAGNOSIS — U07.1 INFECTION DUE TO 2019 NOVEL CORONAVIRUS: Primary | ICD-10-CM

## 2021-08-02 NOTE — TELEPHONE ENCOUNTER
RN spoke to patient and reiterated that current guidelines do recommend that unvaccinated children quarantine for 14 days from last exposure to COVID positive patient. Again this would have been patient's last infectious day 8/1/21 so they should quarantine until 8/15/21. Also reminded them to get tested right away if anyone in the family develops symptoms. She verbalized understanding.   Maria Teresa Kapadia RN

## 2021-08-02 NOTE — TELEPHONE ENCOUNTER
Pt has tested positive for covid-19, would like advise for self and family by provider or nurse.

## 2021-08-02 NOTE — TELEPHONE ENCOUNTER
Appreciate and agree w/ RN guidance. Looked into it and MAB now being run through [place] Dept of Health. Patients are able to enter lottery themselves but clearly there is a 10 day cutoff. Sent info to patient via [employer].

## 2021-08-02 NOTE — TELEPHONE ENCOUNTER
"RN left message for patient to call back to clarify recommendations. Please transfer to Maria Teresa when she calls back.  Maria Teresa Kapadia RN      RN spoke to patient and partner. Patient states she did receive letter from central COVID team. States her symptoms have resolved- \"I'm feeling great\". Denies fever, chest pain, difficulty breathing. Does endorse \"minimal cough\". Patient's symptoms developed on 7/22/21 which would put her at 11 days and therefore she can end her quarantine/isolation per CDC guidelines. Reviewed recommendations of hydration and rest as well as when to contact clinic if symptoms return/worsen and when to seek care in ED.     Main questions round quarantine and isolation for patient's partner and children who have not been able to fully isolate from patient. RN advised that partner who is fully vaccinated should continue to quarantine but may get tested 3-5 days after \"last exposure\" which would have been yesterday 8/1/21 with patient and if test comes back negative, she can end her quarantine.     RN confirmed with Dr. Guido-current CDC guidelines do recommend that unvaccinated children do quarantine for 14 days from last exposure to COVID positive patient. Again this would have been patient's last infectious day 8/1/21 so they should quarantine until 8/15/21.     RN also reviewed monoclonal antibodies treatment with patient. Advised that patient may no longer qualify since it has been >10 day since onset of symptoms. Patient confirmed she would like Dr. Guido to place orders to see if she qualifies anyway. RN advised if she did qualify, the providers running the treatment program would reach out to her with more information.   Maria Teresa Kapadia RN            "

## 2021-08-09 DIAGNOSIS — E87.6 HYPOKALEMIA: ICD-10-CM

## 2021-08-09 NOTE — TELEPHONE ENCOUNTER
"Request for medication refill:  potassium chloride (KLOR-CON) 20 MEQ packet    Providers if patient needs an appointment and you are willing to give a one month supply please refill for one month and  send a letter/MyChart using \".SMILLIMITEDREFILL\" .smillimited and route chart to \"[facility] \" (Giving one month refill in non controlled medications is strongly recommended before denial)    If refill has been denied, meaning absolutely no refills without visit, please complete the smart phrase \".smirxrefuse\" and route it to the \"[facility] MED REFILLS\"  pool to inform the patient and the pharmacy.    Monica Buchanan MA        "

## 2021-08-10 RX ORDER — POTASSIUM CHLORIDE 1.5 G/1.58G
20 POWDER, FOR SOLUTION ORAL DAILY
Qty: 60 PACKET | Refills: 1 | Status: SHIPPED | OUTPATIENT
Start: 2021-08-10 | End: 2024-07-15

## 2021-10-03 ENCOUNTER — HEALTH MAINTENANCE LETTER (OUTPATIENT)
Age: 61
End: 2021-10-03

## 2021-11-01 ENCOUNTER — TELEPHONE (OUTPATIENT)
Dept: FAMILY MEDICINE | Facility: CLINIC | Age: 61
End: 2021-11-01

## 2021-11-01 NOTE — TELEPHONE ENCOUNTER
Reached out to patient to schedule follow up HTN, depression appointment.Patient at work and could not talk, will contact clinic later to schedule. When patient calls back, please schedule follow up appointment.    Carrie Rey  Care Coordinator

## 2021-11-02 NOTE — PATIENT INSTRUCTIONS
Preventative  1. Flu shot today at [facility]. Results via [employer].  2. I recommend getting your shingles vaccine at the [facility]. The second shot in the series will be 2-4 months from the initial shot.  3. Mammogram as scheduled.  4. Referral for top surgery.    Brain Health  1. Engaging in exercise and cognitive exercises are good preventative measures for Alzheimer's, so continue doing these things.  
N/A

## 2021-11-22 DIAGNOSIS — F32.A DEPRESSION, UNSPECIFIED DEPRESSION TYPE: ICD-10-CM

## 2021-11-22 DIAGNOSIS — I10 BENIGN ESSENTIAL HYPERTENSION: ICD-10-CM

## 2021-11-22 DIAGNOSIS — G47.00 PERSISTENT DISORDER OF INITIATING OR MAINTAINING SLEEP: ICD-10-CM

## 2021-11-22 NOTE — TELEPHONE ENCOUNTER
Patient requesting refill of buspar, zolpidem, and amlodipine. Last office visit 7/30/21. RN unable to refill controlled substance, routing to PCP to refill if appropriate.     Maryuri Webster RN

## 2021-11-22 NOTE — TELEPHONE ENCOUNTER
Verify that the refill encounter hasn't been started Yes    [facility] phone call message- patient requesting a refill:    Full Medication Name: busPIRone (BUSPAR) 7.5 MG tablet +zolpiden 5 mg =amlodipin    Dose:      Pharmacy confirmed as   [id number] - [place], [place] - [address] AT CORNER OF [address] [place] [address]  [address] [facility] [address]  Phone: [phone] Fax: [phone]    [place], [place] - [address] [facility]  [address] [facility] [address]  Phone: [phone] Fax: [phone]    [id number] - [place] - [address] AT [facility] & [address]  [facility] [place] [address]  Phone: [phone] Fax: [phone]    [id number] [place] [address] AT [address] [place] [address] & [address] [address]  Phone: [phone] Fax: [phone]    [id number] [address] [address]  Phone: [phone] Fax: [phone]  : Yes    Medication tab checked to see if medication has been sent  Yes    Additional Comments: patient out of med's [address]     OK to leave a message on voice mail? Yes    Advised patient refill may take up to 2 business days? Yes    Primary language: English      needed? No    Call taken on November 22, 2021 at 12:35 PM by BETTINA Torres    Route to [facility] MED REFILL

## 2021-11-23 RX ORDER — AMLODIPINE BESYLATE 10 MG/1
10 TABLET ORAL DAILY
Qty: 90 TABLET | Refills: 3 | Status: SHIPPED | OUTPATIENT
Start: 2021-11-23 | End: 2022-12-05

## 2021-11-23 RX ORDER — ZOLPIDEM TARTRATE 5 MG/1
5 TABLET ORAL
Qty: 30 TABLET | Refills: 2 | Status: SHIPPED | OUTPATIENT
Start: 2021-11-23 | End: 2022-04-26

## 2021-11-23 RX ORDER — BUSPIRONE HYDROCHLORIDE 7.5 MG/1
7.5 TABLET ORAL 2 TIMES DAILY
Qty: 180 TABLET | Refills: 3 | Status: SHIPPED | OUTPATIENT
Start: 2021-11-23 | End: 2023-01-17

## 2021-12-05 ENCOUNTER — APPOINTMENT (OUTPATIENT)
Dept: MRI IMAGING | Facility: CLINIC | Age: 61
End: 2021-12-05
Attending: INTERNAL MEDICINE
Payer: COMMERCIAL

## 2021-12-05 ENCOUNTER — HOSPITAL ENCOUNTER (OUTPATIENT)
Facility: CLINIC | Age: 61
Setting detail: OBSERVATION
Discharge: HOME | End: 2021-12-06
Attending: INTERNAL MEDICINE | Admitting: NURSE PRACTITIONER
Payer: COMMERCIAL

## 2021-12-05 ENCOUNTER — APPOINTMENT (OUTPATIENT)
Dept: GENERAL RADIOLOGY | Facility: CLINIC | Age: 61
End: 2021-12-05
Attending: INTERNAL MEDICINE
Payer: COMMERCIAL

## 2021-12-05 DIAGNOSIS — I25.10 CORONARY ARTERY DISEASE DUE TO LIPID RICH PLAQUE: Primary | ICD-10-CM

## 2021-12-05 DIAGNOSIS — H93.13 TINNITUS, BILATERAL: ICD-10-CM

## 2021-12-05 DIAGNOSIS — R07.89 CHEST TIGHTNESS: ICD-10-CM

## 2021-12-05 DIAGNOSIS — R00.2 PALPITATIONS: ICD-10-CM

## 2021-12-05 DIAGNOSIS — Z11.52 ENCOUNTER FOR SCREENING LABORATORY TESTING FOR SEVERE ACUTE RESPIRATORY SYNDROME CORONAVIRUS 2 (SARS-COV-2): ICD-10-CM

## 2021-12-05 DIAGNOSIS — I25.83 CORONARY ARTERY DISEASE DUE TO LIPID RICH PLAQUE: Primary | ICD-10-CM

## 2021-12-05 LAB
ALBUMIN SERPL-MCNC: 3.1 G/DL (ref 3.4–5)
ALP SERPL-CCNC: 94 U/L (ref 40–150)
ALT SERPL W P-5'-P-CCNC: 38 U/L (ref 0–70)
ANION GAP SERPL CALCULATED.3IONS-SCNC: 5 MMOL/L (ref 3–14)
AST SERPL W P-5'-P-CCNC: 24 U/L (ref 0–45)
BASOPHILS # BLD AUTO: 0 10E3/UL (ref 0–0.2)
BASOPHILS NFR BLD AUTO: 1 %
BILIRUB SERPL-MCNC: 0.3 MG/DL (ref 0.2–1.3)
BUN SERPL-MCNC: 23 MG/DL (ref 7–30)
CALCIUM SERPL-MCNC: 8.7 MG/DL (ref 8.5–10.1)
CHLORIDE BLD-SCNC: 110 MMOL/L (ref 94–109)
CO2 SERPL-SCNC: 28 MMOL/L (ref 20–32)
CREAT SERPL-MCNC: 0.65 MG/DL (ref 0.52–1.25)
CRP SERPL-MCNC: <2.9 MG/L (ref 0–8)
D DIMER PPP FEU-MCNC: <0.27 UG/ML FEU (ref 0–0.5)
EOSINOPHIL # BLD AUTO: 0 10E3/UL (ref 0–0.7)
EOSINOPHIL NFR BLD AUTO: 0 %
ERYTHROCYTE [DISTWIDTH] IN BLOOD BY AUTOMATED COUNT: 14 % (ref 10–15)
GFR SERPL CREATININE-BSD FRML MDRD: >90 ML/MIN/1.73M2
GLUCOSE BLD-MCNC: 125 MG/DL (ref 70–99)
HCT VFR BLD AUTO: 40.9 % (ref 35–53)
HGB BLD-MCNC: 12.9 G/DL (ref 11.7–17.7)
IMM GRANULOCYTES # BLD: 0.1 10E3/UL
IMM GRANULOCYTES NFR BLD: 1 %
INR PPP: 1.02 (ref 0.85–1.15)
LYMPHOCYTES # BLD AUTO: 1.3 10E3/UL (ref 0.8–5.3)
LYMPHOCYTES NFR BLD AUTO: 26 %
MAGNESIUM SERPL-MCNC: 2.6 MG/DL (ref 1.6–2.3)
MCH RBC QN AUTO: 29.5 PG (ref 26.5–33)
MCHC RBC AUTO-ENTMCNC: 31.5 G/DL (ref 31.5–36.5)
MCV RBC AUTO: 93 FL (ref 78–100)
MONOCYTES # BLD AUTO: 0.5 10E3/UL (ref 0–1.3)
MONOCYTES NFR BLD AUTO: 11 %
NEUTROPHILS # BLD AUTO: 3.1 10E3/UL (ref 1.6–8.3)
NEUTROPHILS NFR BLD AUTO: 61 %
NRBC # BLD AUTO: 0 10E3/UL
NRBC BLD AUTO-RTO: 0 /100
NT-PROBNP SERPL-MCNC: 332 PG/ML (ref 0–900)
PLATELET # BLD AUTO: 266 10E3/UL (ref 150–450)
POTASSIUM BLD-SCNC: 3.4 MMOL/L (ref 3.4–5.3)
PROT SERPL-MCNC: 7.2 G/DL (ref 6.8–8.8)
RBC # BLD AUTO: 4.38 10E6/UL (ref 3.8–5.9)
SALICYLATES SERPL-MCNC: <2 MG/DL
SARS-COV-2 RNA RESP QL NAA+PROBE: NEGATIVE
SODIUM SERPL-SCNC: 143 MMOL/L (ref 133–144)
TROPONIN I SERPL HS-MCNC: 53 NG/L
TROPONIN I SERPL HS-MCNC: 58 NG/L
TSH SERPL DL<=0.005 MIU/L-ACNC: 2.49 MU/L (ref 0.4–4)
WBC # BLD AUTO: 5 10E3/UL (ref 4–11)

## 2021-12-05 PROCEDURE — 93010 ELECTROCARDIOGRAM REPORT: CPT | Performed by: INTERNAL MEDICINE

## 2021-12-05 PROCEDURE — 85610 PROTHROMBIN TIME: CPT | Performed by: INTERNAL MEDICINE

## 2021-12-05 PROCEDURE — 70553 MRI BRAIN STEM W/O & W/DYE: CPT

## 2021-12-05 PROCEDURE — 71045 X-RAY EXAM CHEST 1 VIEW: CPT

## 2021-12-05 PROCEDURE — 70549 MR ANGIOGRAPH NECK W/O&W/DYE: CPT | Mod: 26 | Performed by: RADIOLOGY

## 2021-12-05 PROCEDURE — 255N000002 HC RX 255 OP 636: Performed by: INTERNAL MEDICINE

## 2021-12-05 PROCEDURE — 86140 C-REACTIVE PROTEIN: CPT | Performed by: INTERNAL MEDICINE

## 2021-12-05 PROCEDURE — 80179 DRUG ASSAY SALICYLATE: CPT | Performed by: INTERNAL MEDICINE

## 2021-12-05 PROCEDURE — 96375 TX/PRO/DX INJ NEW DRUG ADDON: CPT | Performed by: INTERNAL MEDICINE

## 2021-12-05 PROCEDURE — 70544 MR ANGIOGRAPHY HEAD W/O DYE: CPT | Mod: XS

## 2021-12-05 PROCEDURE — 71045 X-RAY EXAM CHEST 1 VIEW: CPT | Mod: 26 | Performed by: RADIOLOGY

## 2021-12-05 PROCEDURE — 83735 ASSAY OF MAGNESIUM: CPT | Performed by: INTERNAL MEDICINE

## 2021-12-05 PROCEDURE — 83880 ASSAY OF NATRIURETIC PEPTIDE: CPT | Performed by: INTERNAL MEDICINE

## 2021-12-05 PROCEDURE — 70544 MR ANGIOGRAPHY HEAD W/O DYE: CPT | Mod: 26 | Performed by: RADIOLOGY

## 2021-12-05 PROCEDURE — 84484 ASSAY OF TROPONIN QUANT: CPT | Performed by: INTERNAL MEDICINE

## 2021-12-05 PROCEDURE — 99285 EMERGENCY DEPT VISIT HI MDM: CPT | Mod: 25 | Performed by: INTERNAL MEDICINE

## 2021-12-05 PROCEDURE — 93005 ELECTROCARDIOGRAM TRACING: CPT | Performed by: INTERNAL MEDICINE

## 2021-12-05 PROCEDURE — 36415 COLL VENOUS BLD VENIPUNCTURE: CPT | Performed by: INTERNAL MEDICINE

## 2021-12-05 PROCEDURE — 85379 FIBRIN DEGRADATION QUANT: CPT | Performed by: INTERNAL MEDICINE

## 2021-12-05 PROCEDURE — 96374 THER/PROPH/DIAG INJ IV PUSH: CPT | Performed by: INTERNAL MEDICINE

## 2021-12-05 PROCEDURE — 85014 HEMATOCRIT: CPT | Performed by: INTERNAL MEDICINE

## 2021-12-05 PROCEDURE — 82435 ASSAY OF BLOOD CHLORIDE: CPT | Performed by: INTERNAL MEDICINE

## 2021-12-05 PROCEDURE — 84443 ASSAY THYROID STIM HORMONE: CPT | Performed by: INTERNAL MEDICINE

## 2021-12-05 PROCEDURE — C9803 HOPD COVID-19 SPEC COLLECT: HCPCS | Performed by: INTERNAL MEDICINE

## 2021-12-05 PROCEDURE — A9585 GADOBUTROL INJECTION: HCPCS | Performed by: INTERNAL MEDICINE

## 2021-12-05 PROCEDURE — G0378 HOSPITAL OBSERVATION PER HR: HCPCS

## 2021-12-05 PROCEDURE — 70553 MRI BRAIN STEM W/O & W/DYE: CPT | Mod: 26 | Performed by: RADIOLOGY

## 2021-12-05 PROCEDURE — U0003 INFECTIOUS AGENT DETECTION BY NUCLEIC ACID (DNA OR RNA); SEVERE ACUTE RESPIRATORY SYNDROME CORONAVIRUS 2 (SARS-COV-2) (CORONAVIRUS DISEASE [COVID-19]), AMPLIFIED PROBE TECHNIQUE, MAKING USE OF HIGH THROUGHPUT TECHNOLOGIES AS DESCRIBED BY CMS-2020-01-R: HCPCS | Performed by: INTERNAL MEDICINE

## 2021-12-05 RX ORDER — POTASSIUM CHLORIDE 1.5 G/1.58G
20 POWDER, FOR SOLUTION ORAL DAILY
Status: DISCONTINUED | OUTPATIENT
Start: 2021-12-06 | End: 2021-12-06 | Stop reason: HOSPADM

## 2021-12-05 RX ORDER — ASPIRIN 81 MG/1
81 TABLET ORAL DAILY
Status: DISCONTINUED | OUTPATIENT
Start: 2021-12-06 | End: 2021-12-06 | Stop reason: HOSPADM

## 2021-12-05 RX ORDER — GADOBUTROL 604.72 MG/ML
0.1 INJECTION INTRAVENOUS ONCE
Status: COMPLETED | OUTPATIENT
Start: 2021-12-05 | End: 2021-12-05

## 2021-12-05 RX ORDER — LOSARTAN POTASSIUM 100 MG/1
100 TABLET ORAL DAILY
Status: DISCONTINUED | OUTPATIENT
Start: 2021-12-06 | End: 2021-12-06 | Stop reason: HOSPADM

## 2021-12-05 RX ORDER — NITROGLYCERIN 0.4 MG/1
0.4 TABLET SUBLINGUAL EVERY 5 MIN PRN
Status: DISCONTINUED | OUTPATIENT
Start: 2021-12-05 | End: 2021-12-06 | Stop reason: HOSPADM

## 2021-12-05 RX ORDER — AMLODIPINE BESYLATE 10 MG/1
10 TABLET ORAL DAILY
Status: DISCONTINUED | OUTPATIENT
Start: 2021-12-06 | End: 2021-12-06 | Stop reason: HOSPADM

## 2021-12-05 RX ORDER — BUSPIRONE HYDROCHLORIDE 7.5 MG/1
7.5 TABLET ORAL 2 TIMES DAILY
Status: DISCONTINUED | OUTPATIENT
Start: 2021-12-05 | End: 2021-12-06 | Stop reason: HOSPADM

## 2021-12-05 RX ORDER — ASPIRIN 81 MG/1
162 TABLET, CHEWABLE ORAL ONCE
Status: COMPLETED | OUTPATIENT
Start: 2021-12-05 | End: 2021-12-06

## 2021-12-05 RX ORDER — ZOLPIDEM TARTRATE 5 MG/1
5 TABLET ORAL AT BEDTIME
Status: DISCONTINUED | OUTPATIENT
Start: 2021-12-05 | End: 2021-12-06 | Stop reason: HOSPADM

## 2021-12-05 RX ORDER — HYDRALAZINE HYDROCHLORIDE 20 MG/ML
5 INJECTION INTRAMUSCULAR; INTRAVENOUS EVERY 6 HOURS PRN
Status: DISCONTINUED | OUTPATIENT
Start: 2021-12-05 | End: 2021-12-06 | Stop reason: HOSPADM

## 2021-12-05 RX ORDER — LIDOCAINE 40 MG/G
CREAM TOPICAL
Status: CANCELLED | OUTPATIENT
Start: 2021-12-05

## 2021-12-05 RX ORDER — CITALOPRAM HYDROBROMIDE 20 MG/1
40 TABLET ORAL DAILY
Status: DISCONTINUED | OUTPATIENT
Start: 2021-12-06 | End: 2021-12-06 | Stop reason: HOSPADM

## 2021-12-05 RX ORDER — MAGNESIUM HYDROXIDE/ALUMINUM HYDROXICE/SIMETHICONE 120; 1200; 1200 MG/30ML; MG/30ML; MG/30ML
30 SUSPENSION ORAL EVERY 4 HOURS PRN
Status: DISCONTINUED | OUTPATIENT
Start: 2021-12-05 | End: 2021-12-06 | Stop reason: HOSPADM

## 2021-12-05 RX ADMIN — GADOBUTROL 10 ML: 604.72 INJECTION INTRAVENOUS at 19:03

## 2021-12-05 ASSESSMENT — ENCOUNTER SYMPTOMS
DIARRHEA: 0
ADENOPATHY: 0
CONSTIPATION: 0
BACK PAIN: 0
HEADACHES: 1
SORE THROAT: 0
WEAKNESS: 0
SHORTNESS OF BREATH: 0
COUGH: 0
TROUBLE SWALLOWING: 0
NECK PAIN: 0
WHEEZING: 0
NUMBNESS: 0
VOMITING: 1
CONFUSION: 0
FEVER: 0
CHILLS: 0
DYSURIA: 0
NAUSEA: 1
PALPITATIONS: 1

## 2021-12-05 ASSESSMENT — MIFFLIN-ST. JEOR: SCORE: 1773.78

## 2021-12-05 NOTE — ED PROVIDER NOTES
ED Provider Note  [facility]      History     Chief Complaint   Patient presents with     Tinnitus     Irregular Heart Beat     HPI  Chiquita Butler is a 61 year old adult who presents with 1 week of intermittent bilateral tinnitus and palpitations. The symptoms come and go without clear stimulus or pattern. They have had intermittent temporal headache and has one now. There is occasional nausea and one episode of vomiting shortly before arrival in the ED. The patient had an episode of tinnitus and palpitations while speaking at the pulpit earlier today. They have a tight feeling in the neck. Blood pressure has been elevated. The patient does take naproxen, but has had no change in dose or frequency and has tolerated it well in the past. The patient denies chest pain. There is mild shortness of breath. No fever, chills, URI symptoms, facial pressure, sore throat, cough or sputum, abdominal pain, diarrhea, dysuria, leg pain. Chronic bilateral ankle swelling is unchanged from baseline.    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-     Panic attack 12/17/13    ER Visit       Past Surgical History:   Procedure Laterality Date     BYPASS GASTRIC MASON-EN-Y, LIVER BIOPSY, COMBINED  2004     CATHETER, ABLATION  1962    cardiac, related to scarring around PDA ligation site      COLONOSCOPY N/A 4/20/2017    Procedure: COLONOSCOPY;;  Surgeon: August Perez MD;  Location: [facility]     COLONOSCOPY N/A 5/15/2017    Procedure: COLONOSCOPY;  Colonoscopy/DX:RLQ abdominal pain/2 day prep emailed;  Surgeon: Gold Tamayo MD;  Location: [facility]     HYSTERECTOMY  1993    Partial, fibroids     INCISION AND DRAINAGE BREAST Bilateral 1/8/2021    Procedure: INCISION AND DRAINAGE, BREAST. Right breast chest hematoma evacuation.;  Surgeon: Gracie Rapp MD;  Location: [facility] OR  "    REPAIR VALVE PULMONARY  1960     TRANSGENDER MASTECTOMY Bilateral 2020    Procedure: Bilateral Simple Mastectomy, On-Q;  Surgeon: Gracie Rapp MD;  Location: [facility] OR       Family History   Problem Relation Age of Onset     Alzheimer Disease Mother 60         78     Myocardial Infarction Father 49             C.A.D. Father      Glaucoma Maternal Grandmother      Colorectal Cancer Maternal Grandmother         95, pancreatic     Diabetes Sister      Eye Disorder Sister      Depression Other         multiple siblings     Cerebrovascular Disease Paternal Grandmother         80's     Breast Cancer No family hx of        Social History     Tobacco Use     Smoking status: Never Smoker     Smokeless tobacco: Never Used   Substance Use Topics     Alcohol use: Not Currently     Alcohol/week: 2.5 standard drinks     Types: 3 Standard drinks or equivalent per week     Comment: 1-2 drinks/week          Review of Systems   Constitutional: Negative for chills and fever.   HENT: Positive for tinnitus. Negative for congestion, sore throat and trouble swallowing.    Eyes: Negative for visual disturbance.   Respiratory: Negative for cough, shortness of breath and wheezing.    Cardiovascular: Positive for palpitations. Negative for chest pain and leg swelling.   Gastrointestinal: Positive for nausea and vomiting. Negative for constipation and diarrhea.   Genitourinary: Negative for dysuria.   Musculoskeletal: Negative for back pain and neck pain.   Skin: Negative for rash.   Neurological: Positive for headaches. Negative for weakness and numbness.   Hematological: Negative for adenopathy.   Psychiatric/Behavioral: Negative for confusion.         Physical Exam   BP: (!) 176/102  Pulse: 62  Temp: 98.6  F (37  C)  Resp: 14  Height: 177.8 cm (5' 10\")  Weight: 113.9 kg (251 lb)  SpO2: 97 %  Physical Exam  Vitals and nursing note reviewed.   Constitutional:       General: Chiquita Butler is not in acute distress.     " Appearance: Normal appearance.   HENT:      Head: Normocephalic and atraumatic.      Right Ear: External ear normal.      Left Ear: External ear normal.      Nose: Nose normal.      Mouth/Throat:      Mouth: Mucous membranes are moist.   Eyes:      General: No scleral icterus.     Extraocular Movements: Extraocular movements intact.      Pupils: Pupils are equal, round, and reactive to light.   Neck:      Vascular: No carotid bruit.   Cardiovascular:      Rate and Rhythm: Normal rate and regular rhythm.      Heart sounds: No murmur heard.      Pulmonary:      Effort: Pulmonary effort is normal.      Breath sounds: Normal breath sounds. No wheezing or rales.   Abdominal:      General: Abdomen is flat.      Palpations: Abdomen is soft.      Tenderness: There is no abdominal tenderness. There is no guarding.   Musculoskeletal:      Cervical back: Normal range of motion and neck supple.      Right lower leg: Edema (trace) present.      Left lower leg: Edema (trace) present.   Skin:     General: Skin is warm and dry.   Neurological:      General: No focal deficit present.      Mental Status: Chiquita Butler is alert and oriented to person, place, and time.      Cranial Nerves: No cranial nerve deficit.      Sensory: No sensory deficit.      Motor: No weakness.      Coordination: Coordination normal.      Deep Tendon Reflexes: Reflexes normal.   Psychiatric:         Mood and Affect: Mood normal.         Behavior: Behavior normal.         ED Course      Procedures            EKG Interpretation:      Interpreted by CECY ESCOBAR MD  Time reviewed: 1501  Symptoms at time of EKG: palpitations   Rhythm: normal sinus   Rate: Normal  Axis: Normal  Ectopy: none  Conduction: 3rd degree AV block  ST Segments/ T Waves: short AL ST depression V5 and V6  Q Waves: none  Comparison to prior: No old EKG available    Clinical Impression: non-specific EKG                Results for orders placed or performed during the hospital encounter  of 12/05/21   XR Chest Port 1 View     Status: None    Narrative    EXAM: XR CHEST PORT 1 VIEW  12/5/2021 3:28 PM     HISTORY:  chest pain       COMPARISON:  Radiograph 7/27/2021    TECHNIQUE: AP view of the chest the 90 degrees    FINDINGS:     The trachea is midline. The cardiomediastinal silhouette is within  normal limits. Atherosclerotic calcifications of the aortic knob. The  pulmonary vasculature is distinct.  No appreciable pneumothorax,  pleural effusion, or focal consolidative opacity. Irregularity of the  contour of the left fourth rib. No acute osseous abnormality.        Impression    IMPRESSION:   No acute radiographic finding.    I have personally reviewed the examination and initial interpretation  and I agree with the findings.    ONEYDA PERRY MD         SYSTEM ID:  [id number]   MR Brain for Stroke [facility] w/o & w Contras     Status: None    Narrative    MRI brain without and with contrast  MRA of the head without contrast  Neck MRA without and with contrast    Provided History:  headache, hearing changes.  ICD-10:    Comparison:  None      Technique:   Brain MRI:  Axial diffusion, FLAIR, T2-weighted, susceptibility, and  coronal T1-weighted images were obtained without intravenous contrast.  Following intravenous gadolinium-based contrast administration, axial  and coronal T1-weighted images were obtained.    Head MRA: 3D time-of-flight MRA of the [place] was performed  without intravenous contrast.  Neck MRA:  Limited non contrast 2DTOF images were obtained of the  mid-cervical region. Following intravenous gadolinium-based contrast  administration, a contrast enhanced MRA of the neck/cervical vessels  was performed.  Three-dimensional reconstructions of the neck and head MRA were  created, which were reviewed by the radiologist.    Dose: 10CC GADAVIST    Findings:   Brain MRI: Axial diffusion weighted images demonstrate no definite  acute infarct. On the FLAIR images, there is  nonspecific moderate  periventricular and subcortical T2-hyperintensity. There is no  definite intracranial hemorrhage on susceptibility images. Ventricles  are proportionate to the cerebral sulci. Contrast-enhanced images of  the brain demonstrate no abnormal intra- or extra-axial enhancement.  Flow voids within the major intracranial vessels are present.    The paranasal sinuses and mastoid air cells are relatively clear.  There is a mildly dilated presumed venous structure within the right  orbit, which measures up to 6 mm diameter may represent a orbital  varix.    Head MRA demonstrates no definite aneurysm or stenosis of the major  intracranial arteries. Anterior communicating artery is patent.  Posterior communicating arteries are not well visualized.    Neck MRA demonstrates patent major cervical arteries. The normal  distal right internal carotid artery measures 5 mm. The normal distal  left internal carotid artery measures 5 mm. Antegrade flow in the  major cervical vasculature.      Impression    Impression:  1. No evidence of acute infarction or intracranial hemorrhage.  Nonspecific findings in the periventricular white matter, for which  the differential diagnosis includes sequela of infectious or  inflammatory insults, vasculopathy or demyelination.  2. No abnormal enhancing lesions intracranially.  3. Head MRA demonstrates no definite aneurysm or stenosis of the major  intracranial arteries.  4. Neck MRA demonstrates patent major cervical arteries.  5. Mildly dilated presumed venous structure within the right orbit may  represent a orbital varix.        I have personally reviewed the examination and initial interpretation  and I agree with the findings.    CECY ONEILL MD         SYSTEM ID:  [id number]   Comprehensive metabolic panel     Status: Abnormal   Result Value Ref Range    Sodium 143 133 - 144 mmol/L    Potassium 3.4 3.4 - 5.3 mmol/L    Chloride 110 (H) 94 - 109 mmol/L    Carbon Dioxide  (CO2) 28 20 - 32 mmol/L    Anion Gap 5 3 - 14 mmol/L    Urea Nitrogen 23 7 - 30 mg/dL    Creatinine 0.65 0.52 - 1.25 mg/dL    Calcium 8.7 8.5 - 10.1 mg/dL    Glucose 125 (H) 70 - 99 mg/dL    Alkaline Phosphatase 94 40 - 150 U/L    AST 24 0 - 45 U/L    ALT 38 0 - 70 U/L    Protein Total 7.2 6.8 - 8.8 g/dL    Albumin 3.1 (L) 3.4 - 5.0 g/dL    Bilirubin Total 0.3 0.2 - 1.3 mg/dL    GFR Estimate >90 >60 mL/min/1.73m2    Narrative    The sex of this patient cannot be reliably determined based on discrepancies in demographics (legal sex, sex assigned at birth, gender identity).  Both male and female reference ranges are provided where applicable.  Careful evaluation of the patient's results as compared to the gender specific reference intervals is required in this setting.    Magnesium     Status: Abnormal   Result Value Ref Range    Magnesium 2.6 (H) 1.6 - 2.3 mg/dL   TSH with free T4 reflex     Status: Normal   Result Value Ref Range    TSH 2.49 0.40 - 4.00 mU/L   Troponin I     Status: Abnormal   Result Value Ref Range    Troponin I High Sensitivity 58 (H) <54 ng/L    Narrative    The sex of this patient cannot be reliably determined based on discrepancies in demographics (legal sex, sex assigned at birth, gender identity).  Both male and female reference ranges are provided where applicable.  Careful evaluation of the patient's results as compared to the gender specific reference intervals is required in this setting.    Nt probnp inpatient (BNP)     Status: Normal   Result Value Ref Range    N terminal Pro BNP Inpatient 332 0 - 900 pg/mL   D dimer quantitative     Status: Normal   Result Value Ref Range    D-Dimer Quantitative <0.27 0.00 - 0.50 ug/mL FEU    Narrative    This D-dimer assay is intended for use in conjunction with a clinical pretest probability assessment model to exclude pulmonary embolism (PE) and deep venous thrombosis (DVT) in outpatients suspected of PE or DVT. The cut-off value is 0.50 ug/mL FEU.    CRP inflammation     Status: Normal   Result Value Ref Range    CRP Inflammation <2.9 0.0 - 8.0 mg/L   Salicylate level     Status: Normal   Result Value Ref Range    Salicylate <2 <20 mg/dL   INR     Status: Normal   Result Value Ref Range    INR 1.02 0.85 - 1.15   CBC with platelets and differential     Status: None   Result Value Ref Range    WBC Count 5.0 4.0 - 11.0 10e3/uL    RBC Count 4.38 3.80 - 5.90 10e6/uL    Hemoglobin 12.9 11.7 - 17.7 g/dL    Hematocrit 40.9 35.0 - 53.0 %    MCV 93 78 - 100 fL    MCH 29.5 26.5 - 33.0 pg    MCHC 31.5 31.5 - 36.5 g/dL    RDW 14.0 10.0 - 15.0 %    Platelet Count 266 150 - 450 10e3/uL    % Neutrophils 61 %    % Lymphocytes 26 %    % Monocytes 11 %    % Eosinophils 0 %    % Basophils 1 %    % Immature Granulocytes 1 %    NRBCs per 100 WBC 0 <1 /100    Absolute Neutrophils 3.1 1.6 - 8.3 10e3/uL    Absolute Lymphocytes 1.3 0.8 - 5.3 10e3/uL    Absolute Monocytes 0.5 0.0 - 1.3 10e3/uL    Absolute Eosinophils 0.0 0.0 - 0.7 10e3/uL    Absolute Basophils 0.0 0.0 - 0.2 10e3/uL    Absolute Immature Granulocytes 0.1 <=0.4 10e3/uL    Absolute NRBCs 0.0 10e3/uL    Narrative    The sex of this patient cannot be reliably determined based on discrepancies in demographics (legal sex, sex assigned at birth, gender identity).  Both male and female reference ranges are provided where applicable.  Careful evaluation of the patient s results as compared to the gender specific reference intervals is required in this setting.    Troponin I (second draw)     Status: Normal   Result Value Ref Range    Troponin I High Sensitivity 53 <54 ng/L    Narrative    The sex of this patient cannot be reliably determined based on discrepancies in demographics (legal sex, sex assigned at birth, gender identity).  Both male and female reference ranges are provided where applicable.  Careful evaluation of the patient s results as compared to the gender specific reference intervals is required in this setting.     CBC with platelets differential     Status: None    Narrative    The following orders were created for panel order CBC with platelets differential.  Procedure                               Abnormality         Status                     ---------                               -----------         ------                     CBC with platelets and d...[[id number]]                      Final result                 Please view results for these tests on the individual orders.     Medications   gadobutrol (GADAVIST) injection 11.39 mL (10 mLs Intravenous Given 12/5/21 1903)        Assessments & Plan (with Medical Decision Making)   Impression:  Middle aged genderqueer person with history of congenital heart valve anomaly, hypertension, keratoconus and anxiety presents with 3 days of intermittent palpitations associated with tight sensation in the throat and with tinnitus in both ears. The most recent episode occurred earlier today when the patient stood up at the pulpit in [place] to talk.  Vital signs, EKG, troponin, d-dimer and BNP are normal (troponin upper limit of range). CXR is unremarkable. Electrolytes, BUN, creatinine, LFTs, salicylate level, TSH are normal. Head MRI has no mass lesions, vascular lesions, acute or subacute stroke. There is diffuse white matter ischemic change more prominent than typical for age on MRI. There are no vascular lesions on MRA. The patient was recently started on naproxen which could be contributing to the tinnitus. There are multiple risk factors for CAD. I would like to admit her to ED observation for serial troponin testing, and stress echo. Non emergent neurology consultation would be suggested to assess for possible demyelinating condition superimposed on age and HTN related white matter disease.    I have reviewed the nursing notes. I have reviewed the findings, diagnosis, plan and need for follow up with the patient.    New Prescriptions    No medications on file       Final  diagnoses:   Palpitations   Chest tightness   Tinnitus, bilateral       --  Danial JIMENEZ [facility] EMERGENCY DEPARTMENT  12/5/2021     Danial Nuñez MD  12/05/21 [id number]

## 2021-12-05 NOTE — ED TRIAGE NOTES
Coming in with ringing in both ears for four days. Also, chest palpitations has been going on and off last days. States feeling off.

## 2021-12-06 ENCOUNTER — APPOINTMENT (OUTPATIENT)
Dept: CT IMAGING | Facility: CLINIC | Age: 61
End: 2021-12-06
Attending: PHYSICIAN ASSISTANT
Payer: COMMERCIAL

## 2021-12-06 ENCOUNTER — APPOINTMENT (OUTPATIENT)
Dept: CARDIOLOGY | Facility: CLINIC | Age: 61
End: 2021-12-06
Attending: NURSE PRACTITIONER
Payer: COMMERCIAL

## 2021-12-06 VITALS
BODY MASS INDEX: 36.39 KG/M2 | DIASTOLIC BLOOD PRESSURE: 91 MMHG | HEIGHT: 70 IN | RESPIRATION RATE: 18 BRPM | OXYGEN SATURATION: 97 % | SYSTOLIC BLOOD PRESSURE: 140 MMHG | TEMPERATURE: 98 F | WEIGHT: 254.19 LBS | HEART RATE: 61 BPM

## 2021-12-06 LAB
ATRIAL RATE - MUSE: 66 BPM
DIASTOLIC BLOOD PRESSURE - MUSE: NORMAL MMHG
INTERPRETATION ECG - MUSE: NORMAL
P AXIS - MUSE: 58 DEGREES
PR INTERVAL - MUSE: 130 MS
QRS DURATION - MUSE: 104 MS
QT - MUSE: 434 MS
QTC - MUSE: 454 MS
R AXIS - MUSE: 46 DEGREES
SYSTOLIC BLOOD PRESSURE - MUSE: NORMAL MMHG
T AXIS - MUSE: 32 DEGREES
VENTRICULAR RATE- MUSE: 66 BPM

## 2021-12-06 PROCEDURE — 93350 STRESS TTE ONLY: CPT | Mod: 26 | Performed by: INTERNAL MEDICINE

## 2021-12-06 PROCEDURE — 250N000013 HC RX MED GY IP 250 OP 250 PS 637: Performed by: PHYSICIAN ASSISTANT

## 2021-12-06 PROCEDURE — 250N000011 HC RX IP 250 OP 636: Performed by: EMERGENCY MEDICINE

## 2021-12-06 PROCEDURE — G0378 HOSPITAL OBSERVATION PER HR: HCPCS

## 2021-12-06 PROCEDURE — 93321 DOPPLER ECHO F-UP/LMTD STD: CPT | Mod: 26 | Performed by: INTERNAL MEDICINE

## 2021-12-06 PROCEDURE — 93018 CV STRESS TEST I&R ONLY: CPT | Performed by: INTERNAL MEDICINE

## 2021-12-06 PROCEDURE — 250N000013 HC RX MED GY IP 250 OP 250 PS 637: Performed by: NURSE PRACTITIONER

## 2021-12-06 PROCEDURE — 93325 DOPPLER ECHO COLOR FLOW MAPG: CPT | Mod: 26 | Performed by: INTERNAL MEDICINE

## 2021-12-06 PROCEDURE — 250N000011 HC RX IP 250 OP 636: Performed by: INTERNAL MEDICINE

## 2021-12-06 PROCEDURE — 75574 CT ANGIO HRT W/3D IMAGE: CPT

## 2021-12-06 PROCEDURE — 250N000009 HC RX 250: Performed by: INTERNAL MEDICINE

## 2021-12-06 PROCEDURE — 255N000002 HC RX 255 OP 636: Performed by: INTERNAL MEDICINE

## 2021-12-06 PROCEDURE — 99204 OFFICE O/P NEW MOD 45 MIN: CPT | Mod: GC | Performed by: STUDENT IN AN ORGANIZED HEALTH CARE EDUCATION/TRAINING PROGRAM

## 2021-12-06 PROCEDURE — 75574 CT ANGIO HRT W/3D IMAGE: CPT | Mod: 26 | Performed by: STUDENT IN AN ORGANIZED HEALTH CARE EDUCATION/TRAINING PROGRAM

## 2021-12-06 PROCEDURE — 250N000011 HC RX IP 250 OP 636: Performed by: NURSE PRACTITIONER

## 2021-12-06 PROCEDURE — 93016 CV STRESS TEST SUPVJ ONLY: CPT | Performed by: INTERNAL MEDICINE

## 2021-12-06 PROCEDURE — C8928 TTE W OR W/O FOL W/CON,STRES: HCPCS

## 2021-12-06 PROCEDURE — 999N000208 ECHO STRESS ECHOCARDIOGRAM

## 2021-12-06 RX ORDER — METOPROLOL TARTRATE 50 MG
100 TABLET ORAL ONCE
Status: COMPLETED | OUTPATIENT
Start: 2021-12-06 | End: 2021-12-06

## 2021-12-06 RX ORDER — METOPROLOL TARTRATE 50 MG
50-100 TABLET ORAL
Status: CANCELLED | OUTPATIENT
Start: 2021-12-06

## 2021-12-06 RX ORDER — ROSUVASTATIN CALCIUM 40 MG/1
40 TABLET, COATED ORAL DAILY
Qty: 30 TABLET | Refills: 3 | Status: SHIPPED | OUTPATIENT
Start: 2021-12-06 | End: 2022-04-26

## 2021-12-06 RX ORDER — DOBUTAMINE HYDROCHLORIDE 200 MG/100ML
10-50 INJECTION INTRAVENOUS CONTINUOUS
Status: ACTIVE | OUTPATIENT
Start: 2021-12-06 | End: 2021-12-06

## 2021-12-06 RX ORDER — NITROGLYCERIN 0.4 MG/1
.4-.8 TABLET SUBLINGUAL
Status: DISCONTINUED | OUTPATIENT
Start: 2021-12-06 | End: 2021-12-06

## 2021-12-06 RX ORDER — METOPROLOL TARTRATE 1 MG/ML
5-15 INJECTION, SOLUTION INTRAVENOUS
Status: DISCONTINUED | OUTPATIENT
Start: 2021-12-06 | End: 2021-12-06

## 2021-12-06 RX ORDER — DICLOFENAC SODIUM 75 MG/1
75 TABLET, DELAYED RELEASE ORAL 2 TIMES DAILY
COMMUNITY
Start: 2021-11-28 | End: 2022-10-21

## 2021-12-06 RX ORDER — IOPAMIDOL 755 MG/ML
120 INJECTION, SOLUTION INTRAVASCULAR ONCE
Status: COMPLETED | OUTPATIENT
Start: 2021-12-06 | End: 2021-12-06

## 2021-12-06 RX ORDER — METOPROLOL TARTRATE 1 MG/ML
1-20 INJECTION, SOLUTION INTRAVENOUS
Status: ACTIVE | OUTPATIENT
Start: 2021-12-06 | End: 2021-12-06

## 2021-12-06 RX ORDER — ATROPINE SULFATE 0.4 MG/ML
.2-2 AMPUL (ML) INJECTION
Status: COMPLETED | OUTPATIENT
Start: 2021-12-06 | End: 2021-12-06

## 2021-12-06 RX ADMIN — HYDRALAZINE HYDROCHLORIDE 5 MG: 20 INJECTION INTRAMUSCULAR; INTRAVENOUS at 06:42

## 2021-12-06 RX ADMIN — ASPIRIN 81 MG: 81 TABLET, COATED ORAL at 07:53

## 2021-12-06 RX ADMIN — AMLODIPINE BESYLATE 10 MG: 5 TABLET ORAL at 07:53

## 2021-12-06 RX ADMIN — CITALOPRAM HYDROBROMIDE 40 MG: 40 TABLET ORAL at 07:54

## 2021-12-06 RX ADMIN — IOPAMIDOL 120 ML: 755 INJECTION, SOLUTION INTRAVENOUS at 15:37

## 2021-12-06 RX ADMIN — ASPIRIN 81 MG CHEWABLE TABLET 162 MG: 81 TABLET CHEWABLE at 01:01

## 2021-12-06 RX ADMIN — ATROPINE SULFATE 0.4 MG: 0.4 INJECTION, SOLUTION INTRAMUSCULAR; INTRAVENOUS; SUBCUTANEOUS at 09:35

## 2021-12-06 RX ADMIN — NITROGLYCERIN 0.4 MG: 0.4 TABLET SUBLINGUAL at 15:45

## 2021-12-06 RX ADMIN — BUSPIRONE HYDROCHLORIDE 7.5 MG: 7.5 TABLET ORAL at 07:54

## 2021-12-06 RX ADMIN — BUSPIRONE HYDROCHLORIDE 7.5 MG: 7.5 TABLET ORAL at 01:01

## 2021-12-06 RX ADMIN — DOBUTAMINE IN DEXTROSE 10 MCG/KG/MIN: 200 INJECTION, SOLUTION INTRAVENOUS at 09:29

## 2021-12-06 RX ADMIN — METOPROLOL TARTRATE 100 MG: 50 TABLET, FILM COATED ORAL at 14:40

## 2021-12-06 RX ADMIN — POTASSIUM CHLORIDE 20 MEQ: 1.5 POWDER, FOR SOLUTION ORAL at 07:55

## 2021-12-06 RX ADMIN — LOSARTAN POTASSIUM 100 MG: 100 TABLET, FILM COATED ORAL at 07:54

## 2021-12-06 RX ADMIN — METOPROLOL TARTRATE 7 MG: 1 INJECTION, SOLUTION INTRAVENOUS at 09:47

## 2021-12-06 RX ADMIN — PERFLUTREN 4 ML: 6.52 INJECTION, SUSPENSION INTRAVENOUS at 09:39

## 2021-12-06 RX ADMIN — ZOLPIDEM TARTRATE 5 MG: 5 TABLET ORAL at 01:01

## 2021-12-06 ASSESSMENT — ENCOUNTER SYMPTOMS
DIETARY ISSUES: NPO
ACTIVITY IMPAIRMENT: NORMAL
NO PATIENT REPORTED PAIN: 1

## 2021-12-06 ASSESSMENT — MIFFLIN-ST. JEOR: SCORE: 1788.25

## 2021-12-06 NOTE — PROGRESS NOTES
Pt here for dobutamine stress test.  Test, meds and side effects reviewed with patient.  Achieved target HR at 30 mcg Dobutamine and a total of 0.4 mg IV atropine.  Gave a total of 7 mg IV Metoprolol to bring HR back to baseline. Patient c/o 6/10 chest pain, SOB, and numbness/tingling in both hands/fingers. MD aware and OKed going back to to ED. Post monitoring complete and VSS. Pt transferred back to ED via transport. RN escorted pt to ED.

## 2021-12-06 NOTE — H&P
[facility]    History and Physical - ED Observation       Date of Admission:  2021    Assessment & Plan      Chiquita Butler is a 61 year old adult admitted on 2021. Chiquita Butler has a past medical history significant for diverticulosis, gastric bypass, hypertension, panic attacks, insomnia, and bilateral mastectomy (2020) who presents to the Emergency Dept for evaluation of palpitations, chest tightness, and tinnitus.    ##Chest tightness  ##Palpitations  ##Tinnitus  61 yr old genderqueer person who presents for 3 days of palpitations associated with chest tightness as well as tinnitus. Recently started taking naprosyn. History of congenital heart valve disease with open heart in 1960s, ablation in  at [facility]. No recent echo, pt reports having stress test done once, years ago that she reports was normal. Father  by MI. In the ED, BP elevated 170s over 100s. Afebrile. EKG shows NSR. Chest XR normal. Labs unremarkable, Cr 0.65, BUN 23. Mag 2.6. CR P<2.9, , troponin negative at 58. TSH normal. No white count with WBC 5. Hgb 12.9. D-Dimer <0.27. INR 1.02. MRI Brain for stroke was completed and shows no acute process but Nonspecific findings in the periventricular white matter, for which  the differential diagnosis includes sequela of infectious or inflammatory insults, vasculopathy or demyelination. Recent starting of naprosyn could cause tinnitus. Blood pressure has stayed elevated and patient has some cardiac risk factors. Plan: Admit to ED Observation for ACS Rule Out and Neurology consult regarding MRI Brain.   -Serial troponins  -Telemetry  -Dobutamine stress test   -Neurology consult  -Continue PTA amlodipine, losartan. HOLD metoprolol.  -Hydralazine PRN elevated BP    ##HTN: Blood pressure elevated in ED, 172/102.   -Continue PTA amlodipine, losartan.   -HOLD metoprolol due to stress test in AM  -Hydralazine PRN    ##Insomnia:  "Continue PTA ambien as needed       Diet: Regular Diet Adult    DVT Prophylaxis: Low Risk/Ambulatory with no VTE prophylaxis indicated  Melo Catheter: Not present  Central Lines: None  Code Status:   full      Disposition Plan   Expected discharge: tomorrow, recommended to prior living arrangement once cardiac workup complete.     The patient's care was discussed with the Bedside Nurse, Patient and ED MD, Dr Nuñez.    Bridgett Tai, [facility]  ED Observation, Ascom:[id number]  ______________________________________________________________________    Chief Complaint   Palpitations, tinnitus, chest tithgtness    History is obtained from the patient    History of Present Illness   Per ED, \"Chiquita Butler is a 61 year old adult who presents with 1 week of intermittent bilateral tinnitus and palpitations. The symptoms come and go without clear stimulus or pattern. They have had intermittent temporal headache and has one now. There is occasional nausea and one episode of vomiting shortly before arrival in the ED. The patient had an episode of tinnitus and palpitations while speaking at the pulpit earlier today. They have a tight feeling in the neck. Blood pressure has been elevated. The patient does take naproxen, but has had no change in dose or frequency and has tolerated it well in the past. The patient denies chest pain. There is mild shortness of breath. No fever, chills, URI symptoms, facial pressure, sore throat, cough or sputum, abdominal pain, diarrhea, dysuria, leg pain. Chronic bilateral ankle swelling is unchanged from baseline.\"    Review of Systems    Constitutional: Negative for chills and fever.   HENT: Positive for tinnitus. Negative for congestion, sore throat and trouble swallowing.    Eyes: Negative for visual disturbance.   Respiratory: Negative for cough, shortness of breath and wheezing.    Cardiovascular: Positive for palpitations. Negative for " chest pain and leg swelling.   Gastrointestinal: Positive for nausea and vomiting. Negative for constipation and diarrhea.   Genitourinary: Negative for dysuria.   Musculoskeletal: Negative for back pain and neck pain.   Skin: Negative for rash.   Neurological: Positive for headaches. Negative for weakness and numbness.   Hematological: Negative for adenopathy.   Psychiatric/Behavioral: Negative for confusion.     Past Medical History    I have reviewed this patient's medical history and updated it with pertinent information if needed.   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-     Panic attack 12/17/13    ER Visit       Past Surgical History   I have reviewed this patient's surgical history and updated it with pertinent information if needed.  Past Surgical History:   Procedure Laterality Date     BYPASS GASTRIC MASON-EN-Y, LIVER BIOPSY, COMBINED  2004     CATHETER, ABLATION  1962    cardiac, related to scarring around PDA ligation site      COLONOSCOPY N/A 4/20/2017    Procedure: COLONOSCOPY;;  Surgeon: August Perez MD;  Location: [facility]     COLONOSCOPY N/A 5/15/2017    Procedure: COLONOSCOPY;  Colonoscopy/DX:RLQ abdominal pain/2 day prep emailed;  Surgeon: Gold Tamayo MD;  Location: [facility]     HYSTERECTOMY  1993    Partial, fibroids     INCISION AND DRAINAGE BREAST Bilateral 1/8/2021    Procedure: INCISION AND DRAINAGE, BREAST. Right breast chest hematoma evacuation.;  Surgeon: Gracie Rapp MD;  Location: [facility]     REPAIR VALVE PULMONARY  1960     TRANSGENDER MASTECTOMY Bilateral 12/14/2020    Procedure: Bilateral Simple Mastectomy, On-Q;  Surgeon: Gracie Rapp MD;  Location: [facility] OR       Social History   I have reviewed this patient's social history and updated it with pertinent information if needed.  Social History     Tobacco Use     Smoking status:  Never Smoker     Smokeless tobacco: Never Used   Substance Use Topics     Alcohol use: Not Currently     Alcohol/week: 2.5 standard drinks     Types: 3 Standard drinks or equivalent per week     Comment: 1-2 drinks/week     Drug use: No       Family History   I have reviewed this patient's family history and updated it with pertinent information if needed.  Family History   Problem Relation Age of Onset     Alzheimer Disease Mother 60         78     Myocardial Infarction Father 49             C.A.D. Father      Glaucoma Maternal Grandmother      Colorectal Cancer Maternal Grandmother         95, pancreatic     Diabetes Sister      Eye Disorder Sister      Depression Other         multiple siblings     Cerebrovascular Disease Paternal Grandmother         80's     Breast Cancer No family hx of        Prior to Admission Medications   Prior to Admission Medications   Prescriptions Last Dose Informant Patient Reported? Taking?   Cholecalciferol (VITAMIN D) 2000 UNITS tablet   No No   Sig: Take 2,000 Units by mouth daily   albuterol (PROAIR HFA/PROVENTIL HFA/VENTOLIN HFA) 108 (90 Base) MCG/ACT inhaler   No No   Sig: Inhale 2 puffs into the lungs every 6 hours as needed for shortness of breath / dyspnea or wheezing   amLODIPine (NORVASC) 10 MG tablet   No No   Sig: Take 1 tablet (10 mg) by mouth daily   busPIRone (BUSPAR) 7.5 MG tablet   No No   Sig: Take 1 tablet (7.5 mg) by mouth 2 times daily   citalopram (CELEXA) 40 MG tablet   No No   Sig: Take 1 tablet (40 mg) by mouth daily   losartan (COZAAR) 100 MG tablet   No No   Sig: Take 1 tablet (100 mg) by mouth daily   metoprolol succinate ER (TOPROL XL) 100 MG 24 hr tablet   No No   Sig: Take 1 tablet (100 mg) by mouth daily   multivitamin, therapeutic with minerals (MULTI-VITAMIN) TABS   Yes No   Sig: Take 1 tablet by mouth daily   naproxen (NAPROSYN) 375 MG tablet   No No   Sig: Take 1 tablet (375 mg) by mouth 2 times daily (with meals)   potassium chloride  (KLOR-CON) 20 MEQ packet   No No   Sig: Take 20 mEq by mouth daily   zolpidem (AMBIEN) 5 MG tablet   No No   Sig: Take 1 tablet (5 mg) by mouth nightly as needed for sleep      Facility-Administered Medications: None     Allergies   No Known Allergies    Physical Exam   Vital Signs: Temp: 98.6  F (37  C)   BP: (!) 172/112 Pulse: 66   Resp: 14 SpO2: 99 % O2 Device: None (Room air)    Weight: 251 lbs 0 oz    Constitutional: awake, alert, cooperative, no apparent distress, and appears stated age  Eyes: Lids and lashes normal, pupils equal, round and reactive to light, extra ocular muscles intact, sclera clear, conjunctiva normal  ENT: Normocephalic, atraumatic,  external ears without lesions, oral pharynx with moist mucous membranes.  Respiratory: No increased work of breathing, good air exchange, clear to auscultation bilaterally, no crackles or wheezing  Cardiovascular: Normal apical impulse, regular rate and rhythm, normal S1 and S2, no S3 or S4, and no murmur noted  Abdomen: Normal bowel sounds, soft, non-distended, non-tender  Skin: normal skin color, texture, turgor and no redness, warmth, or swelling  Musculoskeletal: There is no redness, warmth, or swelling of the joints.  Full range of motion noted.  Motor strength is 5 out of 5 all extremities bilaterally.  Tone is normal.  Neurologic: Awake, alert, oriented to name, place and time.  Cranial nerves II-XII are grossly intact.  Motor is 5 out of 5 bilaterally.  Gait is normal.    Data   Data reviewed today: I reviewed all medications, new labs and imaging results over the last 24 hours. I personally reviewed the EKG and chest XR image(s) showing no acute process. MRI brain with increase in white matter.     EKG  Interpreted by CECY ESCOBAR MD  Time reviewed: 1501  Symptoms at time of EKG: palpitations   Rhythm: normal sinus   Rate: Normal  Axis: Normal  Ectopy: none  Conduction: 3rd degree AV block  ST Segments/ T Waves: short FL ST depression V5 and V6  Q  Waves: none  Comparison to prior: No old EKG available     Clinical Impression: non-specific EKG      Recent Labs   Lab 12/05/21  1533   WBC 5.0   HGB 12.9   MCV 93      INR 1.02      POTASSIUM 3.4   CHLORIDE 110*   CO2 28   BUN 23   CR 0.65   ANIONGAP 5   ROJAS 8.7   *   ALBUMIN 3.1*   PROTTOTAL 7.2   BILITOTAL 0.3   ALKPHOS 94   ALT 38   AST 24     Recent Results (from the past 24 hour(s))   XR Chest Port 1 View    Narrative    EXAM: XR CHEST PORT 1 VIEW  12/5/2021 3:28 PM     HISTORY:  chest pain       COMPARISON:  Radiograph 7/27/2021    TECHNIQUE: AP view of the chest the 90 degrees    FINDINGS:     The trachea is midline. The cardiomediastinal silhouette is within  normal limits. Atherosclerotic calcifications of the aortic knob. The  pulmonary vasculature is distinct.  No appreciable pneumothorax,  pleural effusion, or focal consolidative opacity. Irregularity of the  contour of the left fourth rib. No acute osseous abnormality.        Impression    IMPRESSION:   No acute radiographic finding.    I have personally reviewed the examination and initial interpretation  and I agree with the findings.    ONEYDA PERRY MD         SYSTEM ID:  [id number]   MR Brain for Stroke [facility] w/o & w Contras    Narrative    MRI brain without and with contrast  MRA of the head without contrast  Neck MRA without and with contrast    Provided History:  headache, hearing changes.  ICD-10:    Comparison:  None      Technique:   Brain MRI:  Axial diffusion, FLAIR, T2-weighted, susceptibility, and  coronal T1-weighted images were obtained without intravenous contrast.  Following intravenous gadolinium-based contrast administration, axial  and coronal T1-weighted images were obtained.    Head MRA: 3D time-of-flight MRA of the [place] was performed  without intravenous contrast.  Neck MRA:  Limited non contrast 2DTOF images were obtained of the  mid-cervical region. Following intravenous gadolinium-based  contrast  administration, a contrast enhanced MRA of the neck/cervical vessels  was performed.  Three-dimensional reconstructions of the neck and head MRA were  created, which were reviewed by the radiologist.    Dose: 10CC GADAVIST    Findings:   Brain MRI: Axial diffusion weighted images demonstrate no definite  acute infarct. On the FLAIR images, there is nonspecific moderate  periventricular and subcortical T2-hyperintensity. There is no  definite intracranial hemorrhage on susceptibility images. Ventricles  are proportionate to the cerebral sulci. Contrast-enhanced images of  the brain demonstrate no abnormal intra- or extra-axial enhancement.  Flow voids within the major intracranial vessels are present.    The paranasal sinuses and mastoid air cells are relatively clear.  There is a mildly dilated presumed venous structure within the right  orbit, which measures up to 6 mm diameter may represent a orbital  varix.    Head MRA demonstrates no definite aneurysm or stenosis of the major  intracranial arteries. Anterior communicating artery is patent.  Posterior communicating arteries are not well visualized.    Neck MRA demonstrates patent major cervical arteries. The normal  distal right internal carotid artery measures 5 mm. The normal distal  left internal carotid artery measures 5 mm. Antegrade flow in the  major cervical vasculature.      Impression    Impression:  1. No evidence of acute infarction or intracranial hemorrhage.  Nonspecific findings in the periventricular white matter, for which  the differential diagnosis includes sequela of infectious or  inflammatory insults, vasculopathy or demyelination.  2. No abnormal enhancing lesions intracranially.  3. Head MRA demonstrates no definite aneurysm or stenosis of the major  intracranial arteries.  4. Neck MRA demonstrates patent major cervical arteries.  5. Mildly dilated presumed venous structure within the right orbit may  represent a orbital  varix.        I have personally reviewed the examination and initial interpretation  and I agree with the findings.    CECY ONEILL MD         SYSTEM ID:  [id number]

## 2021-12-06 NOTE — DISCHARGE SUMMARY
Discharge Summary    Chiquita Butler MRN# [id number]   YOB: 1960 Age: 61 year old     Date of Admission:  12/5/2021  Date of Discharge:  12/6/2021  6:15 PM  Admitting Physician:  Bridgett Tai CNP  Discharge Physician:  OLU ODEN  Discharging Service:  Emergency Department Observation Unit     Primary Provider: Amarilis Agosto          Discharge Diagnosis:     Palpitations    Chest tightness    Tinnitus, bilateral    * No resolved hospital problems. *               Discharge Disposition:   Discharged to home           Condition on Discharge:   Discharge condition: Stable   Code status on discharge: Full Code           Procedures:   Cardiology procedures perfromed:   Stress testing  CT coronary              Discharge Medications:     Current Discharge Medication List      START taking these medications    Details   rosuvastatin (CRESTOR) 40 MG tablet Take 1 tablet (40 mg) by mouth daily  Qty: 30 tablet, Refills: 3    Associated Diagnoses: Coronary artery disease due to lipid rich plaque         CONTINUE these medications which have NOT CHANGED    Details   amLODIPine (NORVASC) 10 MG tablet Take 1 tablet (10 mg) by mouth daily  Qty: 90 tablet, Refills: 3    Associated Diagnoses: Benign essential hypertension      busPIRone (BUSPAR) 7.5 MG tablet Take 1 tablet (7.5 mg) by mouth 2 times daily  Qty: 180 tablet, Refills: 3    Associated Diagnoses: Depression, unspecified depression type      Cholecalciferol (VITAMIN D) 2000 UNITS tablet Take 2,000 Units by mouth daily  Qty: 100 tablet, Refills: 3    Associated Diagnoses: Health care maintenance      citalopram (CELEXA) 40 MG tablet Take 1 tablet (40 mg) by mouth daily  Qty: 90 tablet, Refills: 3    Associated Diagnoses: Recurrent major depressive disorder, in partial remission (H)      diclofenac (VOLTAREN) 75 MG EC tablet Take 75 mg by mouth 2 times daily      losartan (COZAAR) 100 MG tablet Take 1 tablet (100 mg) by mouth daily  Qty: 90 tablet,  Refills: 3    Associated Diagnoses: Benign essential hypertension      metoprolol succinate ER (TOPROL XL) 100 MG 24 hr tablet Take 1 tablet (100 mg) by mouth daily  Qty: 90 tablet, Refills: 3    Associated Diagnoses: Benign essential hypertension      multivitamin, therapeutic with minerals (MULTI-VITAMIN) TABS Take 1 tablet by mouth daily    Associated Diagnoses: Myopia of both eyes      naproxen (NAPROSYN) 375 MG tablet Take 1 tablet (375 mg) by mouth 2 times daily (with meals)  Qty: 60 tablet, Refills: 3    Associated Diagnoses: Chronic pain of left knee      zolpidem (AMBIEN) 5 MG tablet Take 1 tablet (5 mg) by mouth nightly as needed for sleep  Qty: 30 tablet, Refills: 2    Associated Diagnoses: Persistent disorder of initiating or maintaining sleep      albuterol (PROAIR HFA/PROVENTIL HFA/VENTOLIN HFA) 108 (90 Base) MCG/ACT inhaler Inhale 2 puffs into the lungs every 6 hours as needed for shortness of breath / dyspnea or wheezing  Qty: 8.5 g, Refills: 0      potassium chloride (KLOR-CON) 20 MEQ packet Take 20 mEq by mouth daily  Qty: 60 packet, Refills: 1    Associated Diagnoses: Hypokalemia                   Consultations:   Consultation during this admission received from cardiology             Brief History of Illness:   Chiquita Butler is a 61 year old adult admitted on 12/5/2021. Chiquita Butler has a past medical history significant for diverticulosis, gastric bypass, hypertension, panic attacks, insomnia, and bilateral mastectomy (12/2020) who presents to the Emergency Dept for evaluation of palpitations, chest tightness, and tinnitus.          Hospital Course:   ##Chest tightness  ##Palpitations  ##Tinnitus  61 yr old genderqueer person who presents for 3 days of palpitations associated with chest tightness as well as tinnitus. Recently started taking naprosyn. 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 No recent echo, pt reports having stress  "test done once, years ago that she reports was normal. Father  by MI. In the ED, BP elevated 170s over 100s. Afebrile. EKG shows NSR. Chest XR normal. Labs unremarkable, Cr 0.65, BUN 23. Mag 2.6. CR P<2.9, , troponin negative at 58. TSH normal. No white count with WBC 5. Hgb 12.9. D-Dimer <0.27. INR 1.02. MRI Brain for stroke was completed and shows no acute process but Nonspecific findings in the periventricular white matter, for which the differential diagnosis includes sequela of infectious or inflammatory insults, vasculopathy or demyelination. Recent starting of naprosyn could cause tinnitus. Patient underwent dobutamine stress test which showed the ECG portion of the study as non-diagnostic due to inferolateral ST depressions at baseline. This was discussed with cardiology. They recommended CT coronary for further evaluation. This showed minimal, non-obstructive coronary artery disease without any evidence of high-grade stenoses. Per cardiology, start Crestor 40 mg daily and follow up with cardiology outpatient.   -Continue PTA amlodipine, losartan. HOLD metoprolol.  -Start Crestor 40 mg daily.      #Nonspecific findings in the periventricular white matter on MRI:  Neurology was consulted. They discussed brain MRI findings with patient.  They think MRI findings are on the spectrum of normal and an incidental finding. No neurological follow up needed.      ##HTN: Blood pressure elevated in ED, 172/102.   -Continue PTA amlodipine, losartan.   -Resume metoprolol due to stress test in AM        ##Insomnia: Continue PTA ambien as needed               Final Day of Progress before Discharge:       Physical Exam:  Blood pressure (!) 140/91, pulse 61, temperature 98  F (36.7  C), temperature source Oral, resp. rate 18, height 1.778 m (5' 10\"), weight 115.3 kg (254 lb 3.1 oz), SpO2 97 %, not currently breastfeeding.    EXAM:  Physical Exam   Constitutional: Pt is oriented to person, place, and time.Pt " appears well-developed and well-nourished.   HENT:   Head: Normocephalic and atraumatic.   Eyes: Conjunctivae are normal. Pupils are equal, round, and reactive to light.   Neck: Normal range of motion. Neck supple.   Cardiovascular: Normal rate, regular rhythm, normal heart sounds and intact distal pulses.    Pulmonary/Chest: Effort normal and breath sounds normal. No respiratory distress. Pt has no wheezes. Pt has no rales  Abdominal: Soft. Bowel sounds are normal. Pt exhibits no distension and no mass. No tenderness. Pt has no rebound and no guarding.   Musculoskeletal: Normal range of motion. Pt exhibits no edema.   Neurological: Pt is alert and oriented to person, place, and time. Normal reflexes.   Skin: Skin is warm and dry. No rash noted.   Psychiatric: Pt has a normal mood and affect. Behavior is normal. Judgment and thought content normal.             Data:  All laboratory data reviewed             Significant Results:   None  Results for orders placed or performed during the hospital encounter of 12/05/21   XR Chest Port 1 View     Status: None    Narrative    EXAM: XR CHEST PORT 1 VIEW  12/5/2021 3:28 PM     HISTORY:  chest pain       COMPARISON:  Radiograph 7/27/2021    TECHNIQUE: AP view of the chest the 90 degrees    FINDINGS:     The trachea is midline. The cardiomediastinal silhouette is within  normal limits. Atherosclerotic calcifications of the aortic knob. The  pulmonary vasculature is distinct.  No appreciable pneumothorax,  pleural effusion, or focal consolidative opacity. Irregularity of the  contour of the left fourth rib. No acute osseous abnormality.        Impression    IMPRESSION:   No acute radiographic finding.    I have personally reviewed the examination and initial interpretation  and I agree with the findings.    ONEYDA PERRY MD         SYSTEM ID:  [id number]   MR Brain for Stroke [facility] w/o & w Contras     Status: None    Narrative    MRI brain without and with contrast  MRA of  the head without contrast  Neck MRA without and with contrast    Provided History:  headache, hearing changes.  ICD-10:    Comparison:  None      Technique:   Brain MRI:  Axial diffusion, FLAIR, T2-weighted, susceptibility, and  coronal T1-weighted images were obtained without intravenous contrast.  Following intravenous gadolinium-based contrast administration, axial  and coronal T1-weighted images were obtained.    Head MRA: 3D time-of-flight MRA of the [place] was performed  without intravenous contrast.  Neck MRA:  Limited non contrast 2DTOF images were obtained of the  mid-cervical region. Following intravenous gadolinium-based contrast  administration, a contrast enhanced MRA of the neck/cervical vessels  was performed.  Three-dimensional reconstructions of the neck and head MRA were  created, which were reviewed by the radiologist.    Dose: 10CC GADAVIST    Findings:   Brain MRI: Axial diffusion weighted images demonstrate no definite  acute infarct. On the FLAIR images, there is nonspecific moderate  periventricular and subcortical T2-hyperintensity. There is no  definite intracranial hemorrhage on susceptibility images. Ventricles  are proportionate to the cerebral sulci. Contrast-enhanced images of  the brain demonstrate no abnormal intra- or extra-axial enhancement.  Flow voids within the major intracranial vessels are present.    The paranasal sinuses and mastoid air cells are relatively clear.  There is a mildly dilated presumed venous structure within the right  orbit, which measures up to 6 mm diameter may represent a orbital  varix.    Head MRA demonstrates no definite aneurysm or stenosis of the major  intracranial arteries. Anterior communicating artery is patent.  Posterior communicating arteries are not well visualized.    Neck MRA demonstrates patent major cervical arteries. The normal  distal right internal carotid artery measures 5 mm. The normal distal  left internal carotid artery  measures 5 mm. Antegrade flow in the  major cervical vasculature.      Impression    Impression:  1. No evidence of acute infarction or intracranial hemorrhage.  Nonspecific findings in the periventricular white matter, for which  the differential diagnosis includes sequela of infectious or  inflammatory insults, vasculopathy or demyelination.  2. No abnormal enhancing lesions intracranially.  3. Head MRA demonstrates no definite aneurysm or stenosis of the major  intracranial arteries.  4. Neck MRA demonstrates patent major cervical arteries.  5. Mildly dilated presumed venous structure within the right orbit may  represent a orbital varix.        I have personally reviewed the examination and initial interpretation  and I agree with the findings.    CECY ONEILL MD         SYSTEM ID:  [id number]   Radiologist Consult For Cardiology     Status: None    Narrative    EXAM: RADIOLOGIST CONSULT FOR CARDIOLOGY  12/6/2021 4:08 PM     HISTORY:  CHEST PAIN       COMPARISON:  Radiograph of 5/20/2021. CT 13 2016.    TECHNIQUE: CT imaging obtained through the mid chest without contrast.  Axial MIP reformatted images obtained and reviewed.     CONTRAST:  none.    FINDINGS:      Mediastinum: The distal tracheobronchial tree is patent. Heart size is  normal. No pericardial effusion.  Normal thoracic vasculature. No  thoracic lymphadenopathy by size criteria.     Lungs: No focal consolidative airspace/interstitial opacity. No areas  of bronchiectasis or architectural distortion. No pleural effusion or  pneumothorax.    Bones and soft tissues: No conspicuous osseous lesions.     Upper abdomen: Limited. Postoperative changes of gastric bypass.      Impression    IMPRESSION: No acute airspace opacity. Please see cardiology report  for detailed analysis of the cardiac structures.    I have personally reviewed the examination and initial interpretation  and I agree with the findings.    REBECA BUTTS MD         SYSTEM ID:   [id number]   Comprehensive metabolic panel     Status: Abnormal   Result Value Ref Range    Sodium 143 133 - 144 mmol/L    Potassium 3.4 3.4 - 5.3 mmol/L    Chloride 110 (H) 94 - 109 mmol/L    Carbon Dioxide (CO2) 28 20 - 32 mmol/L    Anion Gap 5 3 - 14 mmol/L    Urea Nitrogen 23 7 - 30 mg/dL    Creatinine 0.65 0.52 - 1.25 mg/dL    Calcium 8.7 8.5 - 10.1 mg/dL    Glucose 125 (H) 70 - 99 mg/dL    Alkaline Phosphatase 94 40 - 150 U/L    AST 24 0 - 45 U/L    ALT 38 0 - 70 U/L    Protein Total 7.2 6.8 - 8.8 g/dL    Albumin 3.1 (L) 3.4 - 5.0 g/dL    Bilirubin Total 0.3 0.2 - 1.3 mg/dL    GFR Estimate >90 >60 mL/min/1.73m2    Narrative    The sex of this patient cannot be reliably determined based on discrepancies in demographics (legal sex, sex assigned at birth, gender identity).  Both male and female reference ranges are provided where applicable.  Careful evaluation of the patient's results as compared to the gender specific reference intervals is required in this setting.    Magnesium     Status: Abnormal   Result Value Ref Range    Magnesium 2.6 (H) 1.6 - 2.3 mg/dL   TSH with free T4 reflex     Status: Normal   Result Value Ref Range    TSH 2.49 0.40 - 4.00 mU/L   Troponin I     Status: Abnormal   Result Value Ref Range    Troponin I High Sensitivity 58 (H) <54 ng/L    Narrative    The sex of this patient cannot be reliably determined based on discrepancies in demographics (legal sex, sex assigned at birth, gender identity).  Both male and female reference ranges are provided where applicable.  Careful evaluation of the patient's results as compared to the gender specific reference intervals is required in this setting.    Nt probnp inpatient (BNP)     Status: Normal   Result Value Ref Range    N terminal Pro BNP Inpatient 332 0 - 900 pg/mL   D dimer quantitative     Status: Normal   Result Value Ref Range    D-Dimer Quantitative <0.27 0.00 - 0.50 ug/mL FEU    Narrative    This D-dimer assay is intended for use in  conjunction with a clinical pretest probability assessment model to exclude pulmonary embolism (PE) and deep venous thrombosis (DVT) in outpatients suspected of PE or DVT. The cut-off value is 0.50 ug/mL FEU.   CRP inflammation     Status: Normal   Result Value Ref Range    CRP Inflammation <2.9 0.0 - 8.0 mg/L   Salicylate level     Status: Normal   Result Value Ref Range    Salicylate <2 <20 mg/dL   INR     Status: Normal   Result Value Ref Range    INR 1.02 0.85 - 1.15   CBC with platelets and differential     Status: None   Result Value Ref Range    WBC Count 5.0 4.0 - 11.0 10e3/uL    RBC Count 4.38 3.80 - 5.90 10e6/uL    Hemoglobin 12.9 11.7 - 17.7 g/dL    Hematocrit 40.9 35.0 - 53.0 %    MCV 93 78 - 100 fL    MCH 29.5 26.5 - 33.0 pg    MCHC 31.5 31.5 - 36.5 g/dL    RDW 14.0 10.0 - 15.0 %    Platelet Count 266 150 - 450 10e3/uL    % Neutrophils 61 %    % Lymphocytes 26 %    % Monocytes 11 %    % Eosinophils 0 %    % Basophils 1 %    % Immature Granulocytes 1 %    NRBCs per 100 WBC 0 <1 /100    Absolute Neutrophils 3.1 1.6 - 8.3 10e3/uL    Absolute Lymphocytes 1.3 0.8 - 5.3 10e3/uL    Absolute Monocytes 0.5 0.0 - 1.3 10e3/uL    Absolute Eosinophils 0.0 0.0 - 0.7 10e3/uL    Absolute Basophils 0.0 0.0 - 0.2 10e3/uL    Absolute Immature Granulocytes 0.1 <=0.4 10e3/uL    Absolute NRBCs 0.0 10e3/uL    Narrative    The sex of this patient cannot be reliably determined based on discrepancies in demographics (legal sex, sex assigned at birth, gender identity).  Both male and female reference ranges are provided where applicable.  Careful evaluation of the patient s results as compared to the gender specific reference intervals is required in this setting.    Troponin I (second draw)     Status: Normal   Result Value Ref Range    Troponin I High Sensitivity 53 <54 ng/L    Narrative    The sex of this patient cannot be reliably determined based on discrepancies in demographics (legal sex, sex assigned at birth, gender  identity).  Both male and female reference ranges are provided where applicable.  Careful evaluation of the patient s results as compared to the gender specific reference intervals is required in this setting.    Asymptomatic COVID-19 Virus (Coronavirus) by PCR Nasopharyngeal     Status: Normal    Specimen: Nasopharyngeal; Swab   Result Value Ref Range    SARS CoV2 PCR Negative Negative, Testing sent to reference lab. Results will be returned via unsolicited result    Narrative    Testing was performed using the [employer] Xpress SARS-CoV-2 Assay on the  Cepheid Gene-Xpert Instrument Systems. Additional information about  this Emergency Use Authorization (EUA) assay can be found via the Lab  Guide. This test should be ordered for the detection of SARS-CoV-2 in  individuals who meet SARS-CoV-2 clinical and/or epidemiological  criteria. Test performance is unknown in asymptomatic patients. This  test is for in vitro diagnostic use under the FDA EUA for  laboratories certified under CLIA to perform high complexity testing.  This test has not been FDA cleared or approved. A negative result  does not rule out the presence of PCR inhibitors in the specimen or  target RNA in concentration below the limit of detection for the  assay. The possibility of a false negative should be considered if  the patient's recent exposure or clinical presentation suggests  COVID-19. This test was validated by the [facility]. This laboratory is certified under  the Clinical Laboratory Improvement Amendments of 1988 (CLIA-88) as  qualified to perform high complexity laboratory testing.     EKG 12-lead     Status: None   Result Value Ref Range    Systolic Blood Pressure  mmHg    Diastolic Blood Pressure  mmHg    Ventricular Rate 66 BPM    Atrial Rate 66 BPM    OK Interval 130 ms    QRS Duration 104 ms     ms    QTc 454 ms    P Axis 58 degrees    R AXIS 46 degrees    T Axis 32 degrees     Interpretation ECG       Sinus rhythm  Possible Left atrial enlargement  Left ventricular hypertrophy with repolarization abnormality  Abnormal ECG  Unconfirmed report - interpretation of this ECG is computer generated - see medical record for final interpretation  Confirmed by - EMERGENCY ROOM, PHYSICIAN ([id number]),  NOMAN AGARWAL ([id number]) on 2021 8:21:22 AM     Neurology General Adult IP Consult: Patient to be seen: Routine within 24 hrs; Call back #: [id number]; MRI Brain with increase/abnormal white matter. Pt with headaches, palpitations, tinnitus.; Consultant may enter orders: Yes; Requesting provider? At...     Status: None ()    Narrative    Vannesa Vinson MD     2021 11:39 AM  [facility]  Neurology Consultation    Patient Name:  Chiquita Butler  MRN:  [id number]    :  1960  Date of Service:  2021  Primary care provider:  Amarilis Agosto      The neurology consultation service was asked to see Chiquita Butler   by ED OBS to evaluate for changes on brain MRI.     History of Present Illness   Chiquita Butler is a 61 year old adult with HTN, depression, and   gastric bypass who presented with 2-3 days of intermittent   palpitations and bilateral tinnitus. Initial evaluation in the ED   included brain MRI with non-specific white matter changes. The   neurology service was asked to evaluate for these changes.    Chiquita reports that the tinnitus is only present when palpitations   are present. Currently, there is no tinnitus. With these events,   there is also numbness and tingling that resolve once the   palpitations resolve. Chiquita denies headaches or vision changes,   such as blurry vision or double vision. There is some throat pain   with the palpitations. No dysarthria.       ROS  A 10-point ROS performed and negative unless documented in   HPI.    PMH  Past Medical History:   Diagnosis Date     Congenital heart valve abnormality 2014     Diverticulosis       "Hypertension      Keratoconus of both eyes 01-     Panic attack 12/17/13    ER Visit     Past Surgical History:   Procedure Laterality Date     BYPASS GASTRIC MASON-EN-Y, LIVER BIOPSY, COMBINED  2004     CATHETER, ABLATION  1962    cardiac, related to scarring around PDA ligation site      COLONOSCOPY N/A 4/20/2017    Procedure: COLONOSCOPY;;  Surgeon: Augsut Perez MD;  Location: [facility]     COLONOSCOPY N/A 5/15/2017    Procedure: COLONOSCOPY;  Colonoscopy/DX:RLQ abdominal pain/2 day   prep emailed;  Surgeon: Gold Tamayo MD;  Location: [facility] GI     HYSTERECTOMY  1993    Partial, fibroids     INCISION AND DRAINAGE BREAST Bilateral 1/8/2021    Procedure: INCISION AND DRAINAGE, BREAST. Right breast chest   hematoma evacuation.;  Surgeon: Gracie Rapp MD;    Location: [facility] OR     REPAIR VALVE PULMONARY  1960     TRANSGENDER MASTECTOMY Bilateral 12/14/2020    Procedure: Bilateral Simple Mastectomy, On-Q;  Surgeon: Gracie Rapp MD;  Location: [facility] OR       Medications   (Not in a hospital admission)      Allergies  No Known Allergies    Social History  Tobacco: smokes about 1 cigarette per day.  Alcohol: Rare.  Recreational drugs: none.    Family History    No known family history of neurologic diseases or strokes.     Physical Examination     Vitals:   BP (!) 157/102   Pulse 66   Temp 98.4  F (36.9  C) (Oral)     Resp 10   Ht 1.778 m (5' 10\")   Wt 113.9 kg (251 lb)   LMP    (LMP Unknown)   SpO2 98%   BMI 36.01 kg/m      General: Lying in bed, NAD  HEENT: Normocephalic, atraumatic. Sclera anicteric. Moist mucus   membranes.  Cardiac: Extremities appear well-perfused.  Chest: Non-labored, no accessory muscle use.  Abdomen: Soft, non-distended, non-tender.  Extremities: Warm, no edema, pedal pulses palpable.  Skin: Warm, dry. No jaundice.     Neuro:  Mental status: Alert and conversant. Oriented to self, location,   month/year. Able to identify the current president of the " [place]   Follows simple commands and two-step commands that cross midline   with good left-right differentiation. Naming, reading, repetition   intact.   Cranial nerves: Visual fields intact. Pupils 3-4mm, equal, round,   reactive. Conjugate gaze. EOMI. Facial sensation intact to light   touch in V1-V3 regions. Face symmetric. Shoulder shrug strong.   Tongue protrudes without deviation, uvula midline. No dysarthria.  Motor: Strength is 5/5 in the upper and lower extremities, though   there is some pain limited strength in the R knee secondary to   arthritis.  Reflexes: 2+ biceps, brachioradialis, patellar, achilles   reflexes. Toes mute.  Sensory: Intact to light touch and vibration in the upper and   lower extremities.  Coordination: FNF intact bilaterally without dysmetria.  Gait: Deferred.     Investigations     Labs  BMP  Recent Labs   Lab 12/05/21  1533      POTASSIUM 3.4   CHLORIDE 110*   CO2 28   BUN 23   CR 0.65   ROJAS 8.7       CBC  Recent Labs   Lab 12/05/21  1533   WBC 5.0   HGB 12.9          COAGS  Recent Labs   Lab 12/05/21  1533   INR 1.02     Imaging  Personally reviewed.      Impression & Recommendations   Chiquita Butler is a 61 year old adult with history of HTN, gastric   bypass, and depression who presented for evaluation of episodic   palpitations and tinnitus. As part of the work-up, a brain MRI   was obtained and showed non-specific white matter changes. The   neurology service was asked to evaluate these.    Neurologic examination is reassuring and there were no focal   deficits. The changes on brain MRI are most likely secondary to   the patient's HTN and vascular changes. The ddx for tinnitus is   broad and sound to be most associated with palpitations.   Therefore, recommend cardiac work-up per primary providers. No   additional work-up needed at this time from a neurology   perspective.    #Non-specific white matter changes on brain MRI  -No additional work-up needed at this time    -Neurology will sign off    Thank you for allowing the neurology service to participate in   the care of Kenrick Butler.  Please contact us with questions.      The patient was seen and discussed with the attending   neurologist, Dr. Jo.    Vannesa Vinson MD  Neurology PGY-3  2021 11:26 AM   Dobutamine Stress Echocardiogram     Status: None    Narrative    [id number]  [id number]  [id number]  [id number]^ZHERA^JUAN^IGNACIA     [facility],[place]  Echocardiography Laboratory  [address] [place] [id number]     Name: KENRICK BUTLER  MRN: [id number]  : 1960  Study Date: 2021 09:10 AM  Age: 61 yrs  Gender: Female  Patient Location: [facility] [employer]  Reason For Study: Palpitations, Chest Tightness  Ordering Physician: JUAN SHAHID  Performed By: Morenita Harmon RDCS, T     BSA: 2.3 m2  Height: 70 in  Weight: 251 lb  HR: 73  BP: 164/105 mmHg  ______________________________________________________________________________  ______________________________________________________________________________  Interpretation Summary  Normal, dobutamine echocardiogram without evidence of ischemia at a diagnostic  workload (88% MPHR.)     Baseline ECG shows LVH with repolarization abnormality. ECG portion of the  study is non-diagnostic due to inferolateral ST depressions at baseline. ST  depressions increased with dobutamine infusion. Frequent PVCs (including rare  couplets) and PACs were present with dobutamine and in recovery.  Patient reported chest pain and palpitations at baseline which increased with  dobutamine and improved in recovery.     The target heart rate was achieved. Normal heart rate response to dobutamine.  Baseline hypertension with normal blood pressure response to dobutamine.  Normal biventricular size and global systolic function at baseline, LVEF=60-  65%. Moderate concentric LVH.     With low-dose dobutamine, LVEF augmented and LV cavity size  decreased  appropriately.  With peak dobutamine, LVEF increased further to >70% and LV cavity size  decreased appropriately.  No regional wall motion abnormalities at rest or with dobutamine.  No significant valvular abnormalities are noted on screening Doppler exam.  The aortic root and visualized ascending aorta are normal.  ______________________________________________________________________________  Stress  The drug infusion was stopped due to target heart rate achieved.  The patient did not exhibit any symptoms during drug infusion.  The maximum dose of dobutamine was 30mcg/kg/min.  The maximum dose of atropine was 0.4mg.  The maximum dose of metoprolol was 7mg.  Definity (NDC #49314-484-34) given intravenously.  Patient was given 4ml mixture of 1.5ml Definity and 8.5ml saline.  6 ml wasted.  Definity Lot # [id number] .  Definity Expiration 12/1/22 .     Stress Results                                       Maximum Predicted HR:   157 bpm             Target HR: 133 bpm        % Maximum Predicted HR: 88 %                             Stage DurationHeart Rate   BP                                 (mm:ss)   (bpm)                        Baseline            73    164/105                          Peak    7:07      138    202/89                         Stress Duration:   7:07 mm:ss *                      Maximum Stress HR: 138 bpm *     Procedure  Dobutamine stress echo with two dimensional color and spectral Doppler  performed. Contrast Definity.     ______________________________________________________________________________  MMode/2D Measurements & Calculations  IVSd: 1.4 cm  LVIDd: 5.1 cm  LVPWd: 1.2 cm  LV mass(C)dI: 121.2 grams/m2  asc Aorta Diam: 3.8 cm  RWT: 0.47     ______________________________________________________________________________  Report approved by: Jose Pascual 12/06/2021 10:55 AM         CTA  ANGIOGRAM CORONARY ARTERY     Status: None    Narrative    Procedure: CTA ANGIOGRAM CORONARY  ARTERY   Examination Date: 2021 4:08 PM   Indication:61 years Female  chest pain   Ordering Provider: Shazia MAYES  Overall quality of the study: Good.     PROCEDURE: ECG gated multi-slice computed tomography of the heart   with and without intravenous contrast  (Isovue 370, 120 mL, wasted 0  mL) was  performed on a Siemens Dual Source Flash scanner without  incident. Medical therapy was used to optimize heart rate (metoprolol  100 mg oral/ metoprolol 0 mg IV/ivabradine 0 mg oral). Sublingual  Nitrostat 0.8 mg was given prior to scanning. Coronary artery calcium  score was performed using the Flash scanner protocol. CTA was  performed in the spiral mode at a heart rate of 58 bpm with 120 kVp.  Images were reconstructed and analyzed on a [employer] workstation.  Scan protocol was optimized to minimize radiation exposure. The total  radiation exposure including calcium score was calculated to be 479  DLP and 6.7 mSv.        Impression    IMPRESSION:  1.  Minimal, non-obstructive coronary artery disease without any  evidence of high-grade stenoses.  2.  Total Agatston score 78 placing the patient in the 86th percentile  when compared to age and gender matched control group.  3.  Please review Radiology report for incidental noncardiac findings  that will follow separately.    FINDINGS:    CORONARY CALCIUM SCORE    Total Agatston calcium score: 78   Left main: 0  Left anterior descendin  Left circumflex: 23  Right coronary artery: 4   This places the patient in the 86th  percentile when compared to age  and gender matched control group.    CORONARY ANGIOGRAPHY    DOMINANCE: Right dominant system.   Normal coronary origins and course.    LEFT MAIN:   The LM is a large caliber vessel.   The left main arises normally from the left coronary cusp and is  widely patent without detectable atherosclerosis or stenosis.     LEFT ANTERIOR DESCENDING:   The LAD is a large caliber vessel. It has a type III morphology.  It  gives rise to two moderate caliber diagonal branches.   Proximal LAD: Minimal (<25%) stenosis composed of calcified plaque.  Mid LAD: Minimal (<25%) stenosis composed of calcified plaque.  The remainder of the left anterior descending and its major diagonal  branches are patent with minimal luminal irregularities.    LEFT CIRCUMFLEX:   The LCx is a moderate caliber vessel. It gives rise to a large caliber  branching first obtuse marginal and a moderate caliber second obtuse  marginal before terminating in the left AV groove.  First marginal: Minimal (<25%) stenosis composed of calcified plaque.  The remainder of the left circumflex and its major marginal branches  are patent with minimal luminal irregularities.    RIGHT CORONARY ARTERY:   The RCA is a large caliber, dominant vessel.    Distal RCA: Minimal (<25%) stenosis composed of mixed plaque.  The remainder of the right coronary and the posterior descending  artery are patent with minimal luminal irregularities.    ADDITIONAL FINDINGS:   The proximal ascending aorta is normal in size.   Normal pulmonary venous anatomy with all pulmonary veins draining into  the left atrium.    The left atrial appendage is free of thrombus.  There is no left ventricular mass or thrombus.   Normal pericardial thickness. There is no pericardial effusion.  Please review Radiology report for incidental noncardiac findings that  will follow separately.    I have personally reviewed the examination and initial interpretation  and I agree with the findings.    LUIS DÍAZ MD         SYSTEM ID:  [id number]   CBC with platelets differential     Status: None    Narrative    The following orders were created for panel order CBC with platelets differential.  Procedure                               Abnormality         Status                     ---------                               -----------         ------                     CBC with platelets and d...[[id number]]                       Final result                 Please view results for these tests on the individual orders.      Recent Results (from the past 48 hour(s))   XR Chest Port 1 View    Narrative    EXAM: XR CHEST PORT 1 VIEW  12/5/2021 3:28 PM     HISTORY:  chest pain       COMPARISON:  Radiograph 7/27/2021    TECHNIQUE: AP view of the chest the 90 degrees    FINDINGS:     The trachea is midline. The cardiomediastinal silhouette is within  normal limits. Atherosclerotic calcifications of the aortic knob. The  pulmonary vasculature is distinct.  No appreciable pneumothorax,  pleural effusion, or focal consolidative opacity. Irregularity of the  contour of the left fourth rib. No acute osseous abnormality.        Impression    IMPRESSION:   No acute radiographic finding.    I have personally reviewed the examination and initial interpretation  and I agree with the findings.    ONEYDA PERRY MD         SYSTEM ID:  [id number]   MR Brain for Stroke [facility] w/o & w Contras    Narrative    MRI brain without and with contrast  MRA of the head without contrast  Neck MRA without and with contrast    Provided History:  headache, hearing changes.  ICD-10:    Comparison:  None      Technique:   Brain MRI:  Axial diffusion, FLAIR, T2-weighted, susceptibility, and  coronal T1-weighted images were obtained without intravenous contrast.  Following intravenous gadolinium-based contrast administration, axial  and coronal T1-weighted images were obtained.    Head MRA: 3D time-of-flight MRA of the [place] was performed  without intravenous contrast.  Neck MRA:  Limited non contrast 2DTOF images were obtained of the  mid-cervical region. Following intravenous gadolinium-based contrast  administration, a contrast enhanced MRA of the neck/cervical vessels  was performed.  Three-dimensional reconstructions of the neck and head MRA were  created, which were reviewed by the radiologist.    Dose: 10CC GADAVIST    Findings:   Brain MRI: Axial diffusion  weighted images demonstrate no definite  acute infarct. On the FLAIR images, there is nonspecific moderate  periventricular and subcortical T2-hyperintensity. There is no  definite intracranial hemorrhage on susceptibility images. Ventricles  are proportionate to the cerebral sulci. Contrast-enhanced images of  the brain demonstrate no abnormal intra- or extra-axial enhancement.  Flow voids within the major intracranial vessels are present.    The paranasal sinuses and mastoid air cells are relatively clear.  There is a mildly dilated presumed venous structure within the right  orbit, which measures up to 6 mm diameter may represent a orbital  varix.    Head MRA demonstrates no definite aneurysm or stenosis of the major  intracranial arteries. Anterior communicating artery is patent.  Posterior communicating arteries are not well visualized.    Neck MRA demonstrates patent major cervical arteries. The normal  distal right internal carotid artery measures 5 mm. The normal distal  left internal carotid artery measures 5 mm. Antegrade flow in the  major cervical vasculature.      Impression    Impression:  1. No evidence of acute infarction or intracranial hemorrhage.  Nonspecific findings in the periventricular white matter, for which  the differential diagnosis includes sequela of infectious or  inflammatory insults, vasculopathy or demyelination.  2. No abnormal enhancing lesions intracranially.  3. Head MRA demonstrates no definite aneurysm or stenosis of the major  intracranial arteries.  4. Neck MRA demonstrates patent major cervical arteries.  5. Mildly dilated presumed venous structure within the right orbit may  represent a orbital varix.        I have personally reviewed the examination and initial interpretation  and I agree with the findings.    CECY ONEILL MD         SYSTEM ID:  [id number]   Dobutamine Stress Echocardiogram    Narrative    [id number]  [id number]  [id number]  [id number]^ZEHRA^JUAN^IGNACIA      [facility],[place]  Echocardiography Laboratory  [address] [place] [address]     Name: KENRICK HILL  MRN: [id number]  : 1960  Study Date: 2021 09:10 AM  Age: 61 yrs  Gender: Female  Patient Location: [facility]  Reason For Study: Palpitations, Chest Tightness  Ordering Physician: JUAN SHAHID  Performed By: Morenita Harmon RDCS, TRACY     BSA: 2.3 m2  Height: 70 in  Weight: 251 lb  HR: 73  BP: 164/105 mmHg  ______________________________________________________________________________  ______________________________________________________________________________  Interpretation Summary  Normal, dobutamine echocardiogram without evidence of ischemia at a diagnostic  workload (88% MPHR.)     Baseline ECG shows LVH with repolarization abnormality. ECG portion of the  study is non-diagnostic due to inferolateral ST depressions at baseline. ST  depressions increased with dobutamine infusion. Frequent PVCs (including rare  couplets) and PACs were present with dobutamine and in recovery.  Patient reported chest pain and palpitations at baseline which increased with  dobutamine and improved in recovery.     The target heart rate was achieved. Normal heart rate response to dobutamine.  Baseline hypertension with normal blood pressure response to dobutamine.  Normal biventricular size and global systolic function at baseline, LVEF=60-  65%. Moderate concentric LVH.     With low-dose dobutamine, LVEF augmented and LV cavity size decreased  appropriately.  With peak dobutamine, LVEF increased further to >70% and LV cavity size  decreased appropriately.  No regional wall motion abnormalities at rest or with dobutamine.  No significant valvular abnormalities are noted on screening Doppler exam.  The aortic root and visualized ascending aorta are normal.  ______________________________________________________________________________  Stress  The drug infusion was stopped  due to target heart rate achieved.  The patient did not exhibit any symptoms during drug infusion.  The maximum dose of dobutamine was 30mcg/kg/min.  The maximum dose of atropine was 0.4mg.  The maximum dose of metoprolol was 7mg.  Definity (NDC #01715-029-94) given intravenously.  Patient was given 4ml mixture of 1.5ml Definity and 8.5ml saline.  6 ml wasted.  Definity Lot # [id number] .  Definity Expiration 12/1/22 .     Stress Results                                       Maximum Predicted HR:   157 bpm             Target HR: 133 bpm        % Maximum Predicted HR: 88 %                             Stage DurationHeart Rate   BP                                 (mm:ss)   (bpm)                        Baseline            73    164/105                          Peak    7:07      138    202/89                         Stress Duration:   7:07 mm:ss *                      Maximum Stress HR: 138 bpm *     Procedure  Dobutamine stress echo with two dimensional color and spectral Doppler  performed. Contrast Definity.     ______________________________________________________________________________  MMode/2D Measurements & Calculations  IVSd: 1.4 cm  LVIDd: 5.1 cm  LVPWd: 1.2 cm  LV mass(C)dI: 121.2 grams/m2  asc Aorta Diam: 3.8 cm  RWT: 0.47     ______________________________________________________________________________  Report approved by: Jose Pascual 12/06/2021 10:55 AM         CTA  ANGIOGRAM CORONARY ARTERY    Narrative    Procedure: CTA ANGIOGRAM CORONARY ARTERY   Examination Date: 12/6/2021 4:08 PM   Indication:61 years Female  chest pain   Ordering Provider: Shazia MAYES  Overall quality of the study: Good.     PROCEDURE: ECG gated multi-slice computed tomography of the heart   with and without intravenous contrast  (Isovue 370, 120 mL, wasted 0  mL) was  performed on a Siemens Dual Source Flash scanner without  incident. Medical therapy was used to optimize heart rate (metoprolol  100 mg oral/ metoprolol 0  mg IV/ivabradine 0 mg oral). Sublingual  Nitrostat 0.8 mg was given prior to scanning. Coronary artery calcium  score was performed using the Flash scanner protocol. CTA was  performed in the spiral mode at a heart rate of 58 bpm with 120 kVp.  Images were reconstructed and analyzed on a [employer] workstation.  Scan protocol was optimized to minimize radiation exposure. The total  radiation exposure including calcium score was calculated to be 479  DLP and 6.7 mSv.        Impression    IMPRESSION:  1.  Minimal, non-obstructive coronary artery disease without any  evidence of high-grade stenoses.  2.  Total Agatston score 78 placing the patient in the 86th percentile  when compared to age and gender matched control group.  3.  Please review Radiology report for incidental noncardiac findings  that will follow separately.    FINDINGS:    CORONARY CALCIUM SCORE    Total Agatston calcium score: 78   Left main: 0  Left anterior descendin  Left circumflex: 23  Right coronary artery: 4   This places the patient in the 86th  percentile when compared to age  and gender matched control group.    CORONARY ANGIOGRAPHY    DOMINANCE: Right dominant system.   Normal coronary origins and course.    LEFT MAIN:   The LM is a large caliber vessel.   The left main arises normally from the left coronary cusp and is  widely patent without detectable atherosclerosis or stenosis.     LEFT ANTERIOR DESCENDING:   The LAD is a large caliber vessel. It has a type III morphology. It  gives rise to two moderate caliber diagonal branches.   Proximal LAD: Minimal (<25%) stenosis composed of calcified plaque.  Mid LAD: Minimal (<25%) stenosis composed of calcified plaque.  The remainder of the left anterior descending and its major diagonal  branches are patent with minimal luminal irregularities.    LEFT CIRCUMFLEX:   The LCx is a moderate caliber vessel. It gives rise to a large caliber  branching first obtuse marginal and a moderate caliber  second obtuse  marginal before terminating in the left AV groove.  First marginal: Minimal (<25%) stenosis composed of calcified plaque.  The remainder of the left circumflex and its major marginal branches  are patent with minimal luminal irregularities.    RIGHT CORONARY ARTERY:   The RCA is a large caliber, dominant vessel.    Distal RCA: Minimal (<25%) stenosis composed of mixed plaque.  The remainder of the right coronary and the posterior descending  artery are patent with minimal luminal irregularities.    ADDITIONAL FINDINGS:   The proximal ascending aorta is normal in size.   Normal pulmonary venous anatomy with all pulmonary veins draining into  the left atrium.    The left atrial appendage is free of thrombus.  There is no left ventricular mass or thrombus.   Normal pericardial thickness. There is no pericardial effusion.  Please review Radiology report for incidental noncardiac findings that  will follow separately.    I have personally reviewed the examination and initial interpretation  and I agree with the findings.    LUIS DÍAZ MD         SYSTEM ID:  [id number]   Radiologist Consult For Cardiology    Narrative    EXAM: RADIOLOGIST CONSULT FOR CARDIOLOGY  12/6/2021 4:08 PM     HISTORY:  CHEST PAIN       COMPARISON:  Radiograph of 5/20/2021. CT 13 2016.    TECHNIQUE: CT imaging obtained through the mid chest without contrast.  Axial MIP reformatted images obtained and reviewed.     CONTRAST:  none.    FINDINGS:      Mediastinum: The distal tracheobronchial tree is patent. Heart size is  normal. No pericardial effusion.  Normal thoracic vasculature. No  thoracic lymphadenopathy by size criteria.     Lungs: No focal consolidative airspace/interstitial opacity. No areas  of bronchiectasis or architectural distortion. No pleural effusion or  pneumothorax.    Bones and soft tissues: No conspicuous osseous lesions.     Upper abdomen: Limited. Postoperative changes of gastric bypass.      Impression     IMPRESSION: No acute airspace opacity. Please see cardiology report  for detailed analysis of the cardiac structures.    I have personally reviewed the examination and initial interpretation  and I agree with the findings.    REBECA BUTTS MD         SYSTEM ID:  [id number]                Pending Results:   Unresulted Labs Ordered in the Past 30 Days of this Admission     No orders found for last 31 day(s).                  Discharge Instructions and Follow-Up:     Discharge Procedure Orders   Cardiology Eval Adult Referral   Standing Status: Future   Referral Priority: Routine Referral Type: CV Cardio consult   Requested Specialty: Cardiovascular Disease   Number of Visits Requested: 1     Reason for your hospital stay   Order Comments: Chest pain and palpitation     Activity   Order Comments: Your activity upon discharge: activity as tolerated     Order Specific Question Answer Comments   Is discharge order? Yes      Follow Up and recommended labs and tests   Order Comments: Follow up with cardiology as discussed     When to contact your care team   Order Comments: Please go to your nearest emergency room If you were to have a change in the type of chest pain i.e more severe, lasting longer or radiating to your shoulder, arm, neck, jaw or back, shortness of breath or increased pain with breathing, coughing up blood, feel dizzy or lightheaded, or notice swelling in one leg.     Discharge Instructions   Order Comments: You were admitted to the observation unit for evaluation of your chest pain.  Your EKG was normal. Troponins (a lab test to check if there was damage to the heart tissue) were checked and these were negative. Chest x-ray was normal.  You underwent a stress test and this was normal. Your case was reviewed by cardioloy. They recommend starting Crestor 40 mg daily. Prescription sen to your pharmacy. Cardiology outpatient referral is placed. I recommend continuing your home medications and it will be  very important to follow up with your primary care doctor early next week or sooner if your symptoms return.     Full Code     Order Specific Question Answer Comments   Code status determined by: Discussion with patient/ legal decision maker      Diet   Order Comments: Follow this diet upon discharge: Regular     Order Specific Question Answer Comments   Is discharge order? Yes           Attestation:  Shazia Koch PA-C.

## 2021-12-06 NOTE — PLAN OF CARE
Patient A&O, AVSS, on RA,  no report of heart palpitations, cp, sob, or tinnitus during her stay. Cardiac workup (stress test and CT angio) completed. Patient ambulatory and independent with cares. Tolerating regular diet. Patient will follow-up with cardiology outpatient. Plan to discharge home, patient will start on Crestor and will pickup at her home pharmacy. Ride to arrive around 6 pm.

## 2021-12-06 NOTE — PROGRESS NOTES
Pt arrived from ED for Coronary CT angiogram. Test, meds and side effects reviewed with pt.  Resting HR in the 70's bpm. Given 100 mg PO Metoprolol in ED per order. Administered 0.8 mg SL nitro on CTA table per order. CTA completed.  Patient tolerated procedure well and denies symptoms of allergic reaction.  Post monitoring completed and VSS.  D/C instructions reviewed with pt whom verbalized understanding of need to increase PO fluids today. Patient transported to OBS and verbal report called to RN.

## 2021-12-06 NOTE — PROGRESS NOTES
Chiquita Butler is a 61 year old adult patient.  1. Palpitations    2. Chest tightness    3. Tinnitus, bilateral      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-     Panic attack 12/17/13    ER Visit     Current Outpatient Medications   Medication Sig Dispense Refill     amLODIPine (NORVASC) 10 MG tablet Take 1 tablet (10 mg) by mouth daily 90 tablet 3     busPIRone (BUSPAR) 7.5 MG tablet Take 1 tablet (7.5 mg) by mouth 2 times daily 180 tablet 3     Cholecalciferol (VITAMIN D) 2000 UNITS tablet Take 2,000 Units by mouth daily 100 tablet 3     citalopram (CELEXA) 40 MG tablet Take 1 tablet (40 mg) by mouth daily 90 tablet 3     diclofenac (VOLTAREN) 75 MG EC tablet Take 75 mg by mouth 2 times daily       losartan (COZAAR) 100 MG tablet Take 1 tablet (100 mg) by mouth daily 90 tablet 3     metoprolol succinate ER (TOPROL XL) 100 MG 24 hr tablet Take 1 tablet (100 mg) by mouth daily 90 tablet 3     multivitamin, therapeutic with minerals (MULTI-VITAMIN) TABS Take 1 tablet by mouth daily       naproxen (NAPROSYN) 375 MG tablet Take 1 tablet (375 mg) by mouth 2 times daily (with meals) (Patient taking differently: Take 375 mg by mouth daily ) 60 tablet 3     zolpidem (AMBIEN) 5 MG tablet Take 1 tablet (5 mg) by mouth nightly as needed for sleep 30 tablet 2     albuterol (PROAIR HFA/PROVENTIL HFA/VENTOLIN HFA) 108 (90 Base) MCG/ACT inhaler Inhale 2 puffs into the lungs every 6 hours as needed for shortness of breath / dyspnea or wheezing (Patient not taking: Reported on 12/6/2021) 8.5 g 0     potassium chloride (KLOR-CON) 20 MEQ packet Take 20 mEq by mouth daily (Patient not taking: Reported on 12/6/2021) 60 packet 1     No Known Allergies  Active Problems:    Palpitations    Chest tightness    Tinnitus, bilateral    Blood pressure (!) 178/110, pulse 66, temperature 98.6  F (37  C), resp. rate 12, height  "1.778 m (5' 10\"), weight 113.9 kg (251 lb), SpO2 99 %, not currently breastfeeding.    Subjective:  Symptoms:  Resolved.  Chiquita Butler reports chest pain.    Diet:  NPO.    Activity level: Normal.    Pain:  Chiquita Butler reports no pain.      Objective:  General Appearance:  Comfortable.    Vital signs: (most recent): Blood pressure (!) 178/110, pulse 66, temperature 98.6  F (37  C), resp. rate 12, height 1.778 m (5' 10\"), weight 113.9 kg (251 lb), SpO2 99 %, not currently breastfeeding.  Vital signs are normal.    Output: Producing urine.    HEENT: Normal HEENT exam.    Lungs:  Normal effort and normal respiratory rate.  Breath sounds clear to auscultation.    Heart: Normal rate.  Regular rhythm.  S1 normal and S2 normal.    Abdomen: Abdomen is soft.  Bowel sounds are normal.   There is no abdominal tenderness.     Extremities: Normal range of motion.    Pulses: Distal pulses are intact.    Neurological: Patient is alert.    Pupils:  Pupils are equal, round, and reactive to light.    Skin:  Warm.      Assessment:    Condition: In stable condition.  Improving.   (62 yo with HTN presents with acute tinnitus and CP. MRI brain without acute pathologies but periventricular lesion-awaiting Neuro input.    Stress test this am for CP.).     The pt was seen and examined by myself. The case was reviewed and the plan was discussed with the VERNELL.    Taj Fry MD, MD  12/6/2021    "

## 2021-12-06 NOTE — CONSULTS
[facility]  Neurology Consultation    Patient Name:  Chiquita Butler  MRN:  [id number]    :  1960  Date of Service:  2021  Primary care provider:  Amarilis Agosto      The neurology consultation service was asked to see Chiquita Butler by ED OBS to evaluate for changes on brain MRI.     History of Present Illness   Chiquita Butler is a 61 year old adult with HTN, depression, and gastric bypass who presented with 2-3 days of intermittent palpitations and bilateral tinnitus. Initial evaluation in the ED included brain MRI with non-specific white matter changes. The neurology service was asked to evaluate for these changes.    Chiquita reports that the tinnitus is only present when palpitations are present. Currently, there is no tinnitus. With these events, there is also numbness and tingling that resolve once the palpitations resolve. Chiquita denies headaches or vision changes, such as blurry vision or double vision. There is some throat pain with the palpitations. No dysarthria.       ROS  A 10-point ROS performed and negative unless documented in HPI.    PMH  Past Medical History:   Diagnosis Date     Congenital heart valve abnormality 2014     Diverticulosis      Hypertension      Keratoconus of both eyes 2012     Panic attack 13    ER Visit     Past Surgical History:   Procedure Laterality Date     BYPASS GASTRIC MASON-EN-Y, LIVER BIOPSY, COMBINED       CATHETER, ABLATION      cardiac, related to scarring around PDA ligation site      COLONOSCOPY N/A 2017    Procedure: COLONOSCOPY;;  Surgeon: August Perez MD;  Location:  GI     COLONOSCOPY N/A 5/15/2017    Procedure: COLONOSCOPY;  Colonoscopy/DX:RLQ abdominal pain/2 day prep emailed;  Surgeon: Gold Tamayo MD;  [facility]     HYSTERECTOMY      Partial, fibroids     INCISION AND DRAINAGE BREAST Bilateral 2021    Procedure: INCISION AND DRAINAGE, BREAST. Right breast  "chest hematoma evacuation.;  Surgeon: Gracie Rapp MD;  [facility]: [facility]     REPAIR VALVE PULMONARY  1960     TRANSGENDER MASTECTOMY Bilateral 12/14/2020    Procedure: Bilateral Simple Mastectomy, On-Q;  Surgeon: Gracei Rapp MD;  [facility]       Medications   (Not in a hospital admission)      Allergies  No Known Allergies    Social History  Tobacco: smokes about 1 cigarette per day.  Alcohol: Rare.  Recreational drugs: none.    Family History    No known family history of neurologic diseases or strokes.     Physical Examination     Vitals:   BP (!) 157/102   Pulse 66   Temp 98.4  F (36.9  C) (Oral)   Resp 10   Ht 1.778 m (5' 10\")   Wt 113.9 kg (251 lb)   LMP  (LMP Unknown)   SpO2 98%   BMI 36.01 kg/m      General: Lying in bed, NAD  HEENT: Normocephalic, atraumatic. Sclera anicteric. Moist mucus membranes.  Cardiac: Extremities appear well-perfused.  Chest: Non-labored, no accessory muscle use.  Abdomen: Soft, non-distended, non-tender.  Extremities: Warm, no edema, pedal pulses palpable.  Skin: Warm, dry. No jaundice.     Neuro:  Mental status: Alert and conversant. Oriented to self, location, month/year. Able to identify the current president of the [place] Follows simple commands and two-step commands that cross midline with good left-right differentiation. Naming, reading, repetition intact.   Cranial nerves: Visual fields intact. Pupils 3-4mm, equal, round, reactive. Conjugate gaze. EOMI. Facial sensation intact to light touch in V1-V3 regions. Face symmetric. Shoulder shrug strong. Tongue protrudes without deviation, uvula midline. No dysarthria.  Motor: Strength is 5/5 in the upper and lower extremities, though there is some pain limited strength in the R knee secondary to arthritis.  Reflexes: 2+ biceps, brachioradialis, patellar, achilles reflexes. Toes mute.  Sensory: Intact to light touch and vibration in the upper and lower extremities.  Coordination: FNF intact " bilaterally without dysmetria.  Gait: Deferred.     Investigations     Labs  BMP  Recent Labs   Lab 12/05/21  1533      POTASSIUM 3.4   CHLORIDE 110*   CO2 28   BUN 23   CR 0.65   ROJAS 8.7       CBC  Recent Labs   Lab 12/05/21  1533   WBC 5.0   HGB 12.9          COAGS  Recent Labs   Lab 12/05/21  1533   INR 1.02     Imaging  Personally reviewed.      Impression & Recommendations   Chiquita Butler is a 61 year old adult with history of HTN, gastric bypass, and depression who presented for evaluation of episodic palpitations and tinnitus. As part of the work-up, a brain MRI was obtained and showed non-specific white matter changes. The neurology service was asked to evaluate these.    Neurologic examination is reassuring and there were no focal deficits. The changes on brain MRI are most likely secondary to the patient's HTN and vascular changes. The ddx for tinnitus is broad and sound to be most associated with palpitations. Therefore, recommend cardiac work-up per primary providers. No additional work-up needed at this time from a neurology perspective.    #Non-specific white matter changes on brain MRI  -No additional work-up needed at this time   -Neurology will sign off    Thank you for allowing the neurology service to participate in the care of Chiquita Butler.  Please contact us with questions.      The patient was seen and discussed with the attending neurologist, Dr. Jo.    Vannesa Vinson MD  Neurology PGY-3  12/06/2021 11:26 AM

## 2021-12-07 NOTE — PLAN OF CARE
Reviewed discharge orders with the patient. Medication orders were reviewed and instructions given as to the frequency to take each med, along with when last medication was given. Patient belongings sent with patient. Patient instructed to follow up with primary physican with any further questions they may have. Patient states they understand discharge orders as they are written and has no questions. IV removed prior to leaving.

## 2021-12-08 NOTE — TELEPHONE ENCOUNTER
RECORDS RECEIVED FROM:   DATE RECEIVED:   NOTES STATUS DETAILS   OFFICE NOTE from referring provider    Internal Hospital f/u   OFFICE NOTE from other cardiologist    N/A    DISCHARGE SUMMARY from hospital    Internal 12-5-21  [facility]   DISCHARGE REPORT from the ER   N/A    OPERATIVE REPORT    N/A    MEDICATION LIST   Internal    LABS     BMP   Internal 2-24-21   CBC   Internal 12-5-21   CMP   Internal 12-5-21   Lipids   N/A    TSH   Internal 12-5-21   DIAGNOSTIC PROCEDURES     EKG   Internal 12-5-21   Monitor Reports   N/A    IMAGING (DISC & REPORT)      Echo   Internal 12-6-21   Stress Tests   Internal 12-6-21   Cath   N/A    MRI/MRA   N/A    CT/CTA   Internal 12-6-21 CTA coronary artery

## 2021-12-13 DIAGNOSIS — G89.29 CHRONIC PAIN OF LEFT KNEE: ICD-10-CM

## 2021-12-13 DIAGNOSIS — M25.562 CHRONIC PAIN OF LEFT KNEE: ICD-10-CM

## 2021-12-13 NOTE — TELEPHONE ENCOUNTER
Refill DECLINED. Pt having side effect and was in hospital. Discontinued med. Please alert pharmacy.

## 2021-12-13 NOTE — TELEPHONE ENCOUNTER
"Request for medication refill:  naproxen (NAPROSYN) 375 MG tablet    Providers if patient needs an appointment and you are willing to give a one month supply please refill for one month and  send a letter/MyChart using \".SMILLIMITEDREFILL\" .smillimited and route chart to \"[facility] \" (Giving one month refill in non controlled medications is strongly recommended before denial)    If refill has been denied, meaning absolutely no refills without visit, please complete the smart phrase \".smirxrefuse\" and route it to the \"[facility] MED REFILLS\"  pool to inform the patient and the pharmacy.    Monica Buchanan MA        "

## 2021-12-28 ENCOUNTER — PRE VISIT (OUTPATIENT)
Dept: CARDIOLOGY | Facility: CLINIC | Age: 61
End: 2021-12-28
Payer: COMMERCIAL

## 2021-12-28 ENCOUNTER — OFFICE VISIT (OUTPATIENT)
Dept: CARDIOLOGY | Facility: CLINIC | Age: 61
End: 2021-12-28
Attending: PHYSICIAN ASSISTANT
Payer: COMMERCIAL

## 2021-12-28 VITALS
HEART RATE: 65 BPM | OXYGEN SATURATION: 98 % | SYSTOLIC BLOOD PRESSURE: 148 MMHG | BODY MASS INDEX: 36.59 KG/M2 | WEIGHT: 255 LBS | DIASTOLIC BLOOD PRESSURE: 89 MMHG

## 2021-12-28 DIAGNOSIS — I25.10 CORONARY ARTERY DISEASE DUE TO LIPID RICH PLAQUE: ICD-10-CM

## 2021-12-28 DIAGNOSIS — I25.83 CORONARY ARTERY DISEASE DUE TO LIPID RICH PLAQUE: ICD-10-CM

## 2021-12-28 PROCEDURE — 99204 OFFICE O/P NEW MOD 45 MIN: CPT | Performed by: INTERNAL MEDICINE

## 2021-12-28 PROCEDURE — G0463 HOSPITAL OUTPT CLINIC VISIT: HCPCS

## 2021-12-28 ASSESSMENT — PAIN SCALES - GENERAL: PAINLEVEL: NO PAIN (0)

## 2021-12-28 NOTE — LETTER
12/28/2021      RE: Chiquita Butler  [address]  [facility] [address]       Dear Colleague,    Thank you for the opportunity to participate in the care of your patient, Chiquita Butler, at the [facility] [place] [facility]. Please see a copy of my visit note below.       SUBJECTIVE:  Chiquita Butler is a 61 year old adult who presents for post ED visit follow-up.  Patient attended emergency room on 12/5/2021 with palpitations, chest pain and tinnitus.  Was evaluated and discharged home.  Patient has a history of PDA ligation by history at the age of 1 in 1960.  At the age of 28 patient had palpitations and there is a history of possible SVT ablation.  Since then patient remained asymptomatic till the ED visit.  Patient had palpitations with a fast heartbeat lasting for 30 minutes.  This was associated with chest pain, throbbing headache and tinnitus.  By the time patient arrived to the emergency room symptoms subsided.  Patient was evaluated.  Past medical history significant for hypertension and hyperlipidemia.  On treatment for both.  Non-smoker.  No diabetes.  No premature coronary artery disease in family.  Patient is from [place].  Was living in [place] for some time.  Now moved to [place] and planning to stay here.  Since ED visit patient has no palpitations or other symptoms.  Overall patient is doing well and had no cardiac complaints today.      Patient Active Problem List    Diagnosis Date Noted     Palpitations 12/05/2021     Priority: Medium     Chest tightness 12/05/2021     Priority: Medium     Hx of History of congenital heart valve disease with open heart in 1960s, ablation in 1996 at [facility].  12/2021: Dobutamine stress test indeterminate due to ST seg depression. Moderate concentric LVH.  CTA- Minimal, non-obstructive coronary artery disease without any evidence of high-grade stenoses.         Tinnitus, bilateral  12/05/2021     Priority: Medium     Morbid obesity (H) 02/26/2021     Priority: Medium     Hematoma of skin 12/29/2020     Priority: Medium     Added automatically from request for surgery [id number]       Body mass index (BMI) of 36.0 to 36.9 12/08/2020     Priority: Medium     Gender dysphoria in adult 03/01/2019     Priority: Medium     MDD (major depressive disorder) 09/07/2014     Priority: Medium     Vitamin D deficiency 09/07/2014     Priority: Medium     Problem list name updated by automated process. Provider to review       H/O gastric bypass 09/07/2014     Priority: Medium     RNY 2004       Congenital heart valve 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     Priority: Medium     Hypertension      Priority: Medium     Had carotid screening bilaterally, normal in Dec 2018 at a [facility] screening. Normal EKG at that time as well.        Keratoconus of both eyes 01/02/2012     Priority: Medium     Followed by ophtho q 6mos. Dx in 2002. Wears hyprid soft/hard lenses; anticipates cornea transplants in future.       .  Current Outpatient Medications   Medication Sig     amLODIPine (NORVASC) 10 MG tablet Take 1 tablet (10 mg) by mouth daily     busPIRone (BUSPAR) 7.5 MG tablet Take 1 tablet (7.5 mg) by mouth 2 times daily     Cholecalciferol (VITAMIN D) 2000 UNITS tablet Take 2,000 Units by mouth daily     citalopram (CELEXA) 40 MG tablet Take 1 tablet (40 mg) by mouth daily     diclofenac (VOLTAREN) 75 MG EC tablet Take 75 mg by mouth 2 times daily     losartan (COZAAR) 100 MG tablet Take 1 tablet (100 mg) by mouth daily     metoprolol succinate ER (TOPROL XL) 100 MG 24 hr tablet Take 1 tablet (100 mg) by mouth daily     multivitamin, therapeutic with minerals (MULTI-VITAMIN) TABS Take 1 tablet by mouth daily     potassium chloride (KLOR-CON) 20 MEQ packet Take 20 mEq by mouth daily     rosuvastatin (CRESTOR) 40 MG tablet Take 1 tablet (40 mg) by mouth daily     zolpidem (AMBIEN) 5 MG tablet Take 1 tablet (5 mg) by mouth nightly as  needed for sleep     albuterol (PROAIR HFA/PROVENTIL HFA/VENTOLIN HFA) 108 (90 Base) MCG/ACT inhaler Inhale 2 puffs into the lungs every 6 hours as needed for shortness of breath / dyspnea or wheezing (Patient not taking: Reported on 12/6/2021)     No current facility-administered medications for this visit.     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-     Panic attack 12/17/13    ER Visit     Past Surgical History:   Procedure Laterality Date     BYPASS GASTRIC MASON-EN-Y, LIVER BIOPSY, COMBINED  2004     CATHETER, ABLATION  1962    cardiac, related to scarring around PDA ligation site      COLONOSCOPY N/A 4/20/2017    Procedure: COLONOSCOPY;;  Surgeon: August Perez MD;  [facility]     COLONOSCOPY N/A 5/15/2017    Procedure: COLONOSCOPY;  Colonoscopy/DX:RLQ abdominal pain/2 day prep emailed;  Surgeon: Gold Tamayo MD;  [facility]     HYSTERECTOMY  1993    Partial, fibroids     INCISION AND DRAINAGE BREAST Bilateral 1/8/2021    Procedure: INCISION AND DRAINAGE, BREAST. Right breast chest hematoma evacuation.;  Surgeon: Gracie Rapp MD;  [facility]     REPAIR VALVE PULMONARY  1960     TRANSGENDER MASTECTOMY Bilateral 12/14/2020    Procedure: Bilateral Simple Mastectomy, On-Q;  Surgeon: Gracie Rapp MD;  [facility]     No Known Allergies  Social History     Socioeconomic History     Marital status:      Spouse name: Aissatou     Number of children: Not on file     Years of education: Not on file     Highest education level: Not on file   Occupational History     Not on file   Tobacco Use     Smoking status: Never Smoker     Smokeless tobacco: Never Used   Substance and Sexual Activity     Alcohol use: Not Currently     Alcohol/week: 2.5 standard drinks     Types: 3 Standard drinks or equivalent per week     Comment: 1-2 drinks/week      Drug use: No     Sexual activity: Yes     Partners: Female     Birth control/protection: None   Other Topics Concern      Service Not Asked     Blood Transfusions Not Asked     Caffeine Concern Not Asked     Occupational Exposure Not Asked     Hobby Hazards Not Asked     Sleep Concern Yes     Comment: 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 Not Asked   Social History Narrative    Lives in [facility] [place] [facility] with partner Aissatou, adopted daughter Marely. Dtr Dorina born . Baby due 2020.      at [employer]. Plays trumpet.     Gets exercise! 10,000 steps..     Social Determinants of Health     Financial Resource Strain: Not on file   Food Insecurity: Not on file   Transportation Needs: Not on file   Physical Activity: Not on file   Stress: Not on file   Social Connections: Not on file   Intimate Partner Violence: Not on file   Housing Stability: Not on file     Family History   Problem Relation Age of Onset     Alzheimer Disease Mother 60         78     Myocardial Infarction Father 49             C.A.D. Father      Glaucoma Maternal Grandmother      Colorectal Cancer Maternal Grandmother         95, pancreatic     Diabetes Sister      Eye Disorder Sister      Depression Other         multiple siblings     Cerebrovascular Disease Paternal Grandmother         80's     Breast Cancer No family hx of           REVIEW OF SYSTEMS:  General: negative, fever, chills, night sweats  Skin: negative, acne, rash and scaling  Eyes: negative, double vision, eye pain and photophobia  Ears/Nose/Throat: negative, nasal congestion and purulent rhinorrhea  Respiratory: No dyspnea on exertion, No cough, No hemoptysis and  negative  Cardiovascular: negative, palpitations, tachycardia, irregular heart beat, chest pain, exertional chest pain or pressure, paroxysmal nocturnal dyspnea, dyspnea on exertion, orthopnea and lower extremity edema       OBJECTIVE:  Blood pressure (!) 148/89, pulse 65, weight 115.7 kg (255 lb), SpO2 98 %, not currently breastfeeding.  General Appearance: healthy, alert, active and no distress  Head: Normocephalic. No masses, lesions, tenderness or abnormalities  Eyes: conjuctiva clear, PERRL, EOM intact  Ears: External ears normal. Canals clear. TM's normal.  Nose: Nares normal  Mouth: normal  Neck: Supple, no cervical adenopathy, no thyromegaly  Lungs: clear to auscultation  Cardiac: regular rate and rhythm, normal S1 and S2, no murmur       ASSESSMENT/PLAN:  Patient here for post ED discharge follow-up.  On 12/5/2021 attended emergency room with history of palpitations, chest pain and tinnitus.  Past cardiac history is significant for PDA ligation at age 1.  Also had SVT ablation at age 28.  Since then patient remained asymptomatic till now.  She also have hypertension and hyperlipidemia on treatment for both.  Reviewed ED results.  Normal lab results including troponin.  EKG was normal sinus rhythm.  Possible short DC interval.  Otherwise unremarkable.  Prior EKG showed normal sinus rhythm and normal DC interval.  Patient had a dobutamine stress echo which was completely normal.  EKG showed nonspecific changes subsequently patient had a CT coronary angiogram.  This showed no flow-limiting lesions.  Total calcium score of 78 placing her at the 88th percentile.  Patient was started on high-dose of statin.  All results discussed with patient.  She is advised to continue her current medications.  She was taking ibuprofen for arthritis and this was discontinued at the emergency room.  Due to history of prior cardiac surgery patient will return to clinic in 1 year with a repeat echocardiogram.  Per orders.   Return  to Clinic 1 year with an echocardiogram.  Total visit duration 45 minutes.  This includes face-to-face interview, physical exam, chart review, review of ED records review of stress echo EKG coronary CT angiogram and documentation.      Please do not hesitate to contact me if you have any questions/concerns.     Sincerely,     DANIELA Dia MD

## 2021-12-28 NOTE — PROGRESS NOTES
SUBJECTIVE:  Chiquita Butler is a 61 year old adult who presents for post ED visit follow-up.  Patient attended emergency room on 12/5/2021 with palpitations, chest pain and tinnitus.  Was evaluated and discharged home.  Patient has a history of PDA ligation by history at the age of 1 in 1960.  At the age of 28 patient had palpitations and there is a history of possible SVT ablation.  Since then patient remained asymptomatic till the ED visit.  Patient had palpitations with a fast heartbeat lasting for 30 minutes.  This was associated with chest pain, throbbing headache and tinnitus.  By the time patient arrived to the emergency room symptoms subsided.  Patient was evaluated.  Past medical history significant for hypertension and hyperlipidemia.  On treatment for both.  Non-smoker.  No diabetes.  No premature coronary artery disease in family.  Patient is from [place].  Was living in [place] for some time.  Now moved to [place] and planning to stay here.  Since ED visit patient has no palpitations or other symptoms.  Overall patient is doing well and had no cardiac complaints today.      Patient Active Problem List    Diagnosis Date Noted     Palpitations 12/05/2021     Priority: Medium     Chest tightness 12/05/2021     Priority: Medium     Hx of History of congenital heart valve disease with open heart in 1960s, ablation in 1996 at [facility].  12/2021: Dobutamine stress test indeterminate due to ST seg depression. Moderate concentric LVH.  CTA- Minimal, non-obstructive coronary artery disease without any evidence of high-grade stenoses.         Tinnitus, bilateral 12/05/2021     Priority: Medium     Morbid obesity (H) 02/26/2021     Priority: Medium     Hematoma of skin 12/29/2020     Priority: Medium     Added automatically from request for surgery [id number]       Body mass index (BMI) of 36.0 to 36.9 12/08/2020     Priority: Medium     Gender dysphoria in adult 03/01/2019     Priority: Medium     MDD  (major depressive disorder) 09/07/2014     Priority: Medium     Vitamin D deficiency 09/07/2014     Priority: Medium     Problem list name updated by automated process. Provider to review       H/O gastric bypass 09/07/2014     Priority: Medium     RNY 2004       Congenital heart valve 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     Priority: Medium     Hypertension      Priority: Medium     Had carotid screening bilaterally, normal in Dec 2018 at a [facility] screening. Normal EKG at that time as well.        Keratoconus of both eyes 01/02/2012     Priority: Medium     Followed by ophtho q 6mos. Dx in 2002. Wears hyprid soft/hard lenses; anticipates cornea transplants in future.       .  Current Outpatient Medications   Medication Sig     amLODIPine (NORVASC) 10 MG tablet Take 1 tablet (10 mg) by mouth daily     busPIRone (BUSPAR) 7.5 MG tablet Take 1 tablet (7.5 mg) by mouth 2 times daily     Cholecalciferol (VITAMIN D) 2000 UNITS tablet Take 2,000 Units by mouth daily     citalopram (CELEXA) 40 MG tablet Take 1 tablet (40 mg) by mouth daily     diclofenac (VOLTAREN) 75 MG EC tablet Take 75 mg by mouth 2 times daily     losartan (COZAAR) 100 MG tablet Take 1 tablet (100 mg) by mouth daily     metoprolol succinate ER (TOPROL XL) 100 MG 24 hr tablet Take 1 tablet (100 mg) by mouth daily     multivitamin, therapeutic with minerals (MULTI-VITAMIN) TABS Take 1 tablet by mouth daily     potassium chloride (KLOR-CON) 20 MEQ packet Take 20 mEq by mouth daily     rosuvastatin (CRESTOR) 40 MG tablet Take 1 tablet (40 mg) by mouth daily     zolpidem (AMBIEN) 5 MG tablet Take 1 tablet (5 mg) by mouth nightly as needed for sleep     albuterol (PROAIR HFA/PROVENTIL HFA/VENTOLIN HFA) 108 (90 Base) MCG/ACT inhaler Inhale 2 puffs into the lungs every 6 hours as needed for shortness of breath / dyspnea or wheezing (Patient not taking: Reported on 12/6/2021)     No current facility-administered medications for this visit.     Past Medical History:    Diagnosis Date     Congenital heart valve abnormality 9/7/2014     Diverticulosis      Hypertension      Keratoconus of both eyes 01-     Panic attack 12/17/13    ER Visit     Past Surgical History:   Procedure Laterality Date     BYPASS GASTRIC MASON-EN-Y, LIVER BIOPSY, COMBINED  2004     CATHETER, ABLATION  1962    cardiac, related to scarring around PDA ligation site      COLONOSCOPY N/A 4/20/2017    Procedure: COLONOSCOPY;;  Surgeon: August Perez MD;  Location: [facility]     COLONOSCOPY N/A 5/15/2017    Procedure: COLONOSCOPY;  Colonoscopy/DX:RLQ abdominal pain/2 day prep emailed;  Surgeon: Gold Tamayo MD;  Location: [facility]     HYSTERECTOMY  1993    Partial, fibroids     INCISION AND DRAINAGE BREAST Bilateral 1/8/2021    Procedure: INCISION AND DRAINAGE, BREAST. Right breast chest hematoma evacuation.;  Surgeon: Gracie Rapp MD;  Location: [facility]     REPAIR VALVE PULMONARY  1960     TRANSGENDER MASTECTOMY Bilateral 12/14/2020    Procedure: Bilateral Simple Mastectomy, On-Q;  Surgeon: Gracie Rapp MD;  Location: [facility] OR     No Known Allergies  Social History     Socioeconomic History     Marital status:      Spouse name: Aissatou     Number of children: Not on file     Years of education: Not on file     Highest education level: Not on file   Occupational History     Not on file   Tobacco Use     Smoking status: Never Smoker     Smokeless tobacco: Never Used   Substance and Sexual Activity     Alcohol use: Not Currently     Alcohol/week: 2.5 standard drinks     Types: 3 Standard drinks or equivalent per week     Comment: 1-2 drinks/week     Drug use: No     Sexual activity: Yes     Partners: Female     Birth control/protection: None   Other Topics Concern      Service Not Asked     Blood Transfusions Not Asked     Caffeine Concern Not Asked     Occupational Exposure Not Asked     Hobby Hazards Not Asked     Sleep Concern Yes     Comment: occ use ambien due  to shift work      Stress Concern Not Asked     Weight Concern Yes     Special Diet Yes     Comment: due to gastric bypass      Back Care Not Asked     Exercise Yes     Bike Helmet Yes     Seat Belt Yes     Self-Exams Not Asked     Parent/sibling w/ CABG, MI or angioplasty before 65F 55M? Not Asked   Social History Narrative    Lives in [facility] with partner Aissatou, adopted daughter Marely. Dtr Dorina born . Baby due 2020.      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     Gets exercise! 10,000 steps..     Social Determinants of Health     Financial Resource Strain: Not on file   Food Insecurity: Not on file   Transportation Needs: Not on file   Physical Activity: Not on file   Stress: Not on file   Social Connections: Not on file   Intimate Partner Violence: Not on file   Housing Stability: Not on file     Family History   Problem Relation Age of Onset     Alzheimer Disease Mother 60         78     Myocardial Infarction Father 49             C.A.D. Father      Glaucoma Maternal Grandmother      Colorectal Cancer Maternal Grandmother         95, pancreatic     Diabetes Sister      Eye Disorder Sister      Depression Other         multiple siblings     Cerebrovascular Disease Paternal Grandmother         80's     Breast Cancer No family hx of           REVIEW OF SYSTEMS:  General: negative, fever, chills, night sweats  Skin: negative, acne, rash and scaling  Eyes: negative, double vision, eye pain and photophobia  Ears/Nose/Throat: negative, nasal congestion and purulent rhinorrhea  Respiratory: No dyspnea on exertion, No cough, No hemoptysis and negative  Cardiovascular: negative, palpitations, tachycardia, irregular heart beat, chest pain, exertional chest pain or pressure, paroxysmal nocturnal dyspnea, dyspnea on exertion, orthopnea and lower extremity edema       OBJECTIVE:  Blood pressure (!) 148/89, pulse 65, weight 115.7 kg (255 lb), SpO2 98 %, not currently breastfeeding.  General  Appearance: healthy, alert, active and no distress  Head: Normocephalic. No masses, lesions, tenderness or abnormalities  Eyes: conjuctiva clear, PERRL, EOM intact  Ears: External ears normal. Canals clear. TM's normal.  Nose: Nares normal  Mouth: normal  Neck: Supple, no cervical adenopathy, no thyromegaly  Lungs: clear to auscultation  Cardiac: regular rate and rhythm, normal S1 and S2, no murmur       ASSESSMENT/PLAN:  Patient here for post ED discharge follow-up.  On 12/5/2021 attended emergency room with history of palpitations, chest pain and tinnitus.  Past cardiac history is significant for PDA ligation at age 1.  Also had SVT ablation at age 28.  Since then patient remained asymptomatic till now.  She also have hypertension and hyperlipidemia on treatment for both.  Reviewed ED results.  Normal lab results including troponin.  EKG was normal sinus rhythm.  Possible short MT interval.  Otherwise unremarkable.  Prior EKG showed normal sinus rhythm and normal MT interval.  Patient had a dobutamine stress echo which was completely normal.  EKG showed nonspecific changes subsequently patient had a CT coronary angiogram.  This showed no flow-limiting lesions.  Total calcium score of 78 placing her at the 88th percentile.  Patient was started on high-dose of statin.  All results discussed with patient.  She is advised to continue her current medications.  She was taking ibuprofen for arthritis and this was discontinued at the emergency room.  Due to history of prior cardiac surgery patient will return to clinic in 1 year with a repeat echocardiogram.  Per orders.   Return to Clinic 1 year with an echocardiogram.  Total visit duration 45 minutes.  This includes face-to-face interview, physical exam, chart review, review of ED records review of stress echo EKG coronary CT angiogram and documentation.

## 2021-12-28 NOTE — NURSING NOTE
Chief Complaint   Patient presents with     New Patient     follow up after 12-5-21 admission at [facility]     Vitals were taken and medications reconciled.    Sandoval Bonilla, PARAG  8:11 AM

## 2021-12-30 ENCOUNTER — IMMUNIZATION (OUTPATIENT)
Dept: FAMILY MEDICINE | Facility: CLINIC | Age: 61
End: 2021-12-30
Payer: COMMERCIAL

## 2021-12-30 DIAGNOSIS — Z23 HIGH PRIORITY FOR 2019-NCOV VACCINE: Primary | ICD-10-CM

## 2021-12-30 PROCEDURE — 91306 COVID-19,PF,MODERNA (18+ YRS BOOSTER .25ML): CPT

## 2021-12-30 PROCEDURE — 0064A COVID-19,PF,MODERNA (18+ YRS BOOSTER .25ML): CPT

## 2021-12-30 PROCEDURE — 99207 PR NO CHARGE LOS: CPT

## 2022-01-23 ENCOUNTER — HEALTH MAINTENANCE LETTER (OUTPATIENT)
Age: 62
End: 2022-01-23

## 2022-01-31 DIAGNOSIS — F33.41 RECURRENT MAJOR DEPRESSIVE DISORDER, IN PARTIAL REMISSION (H): ICD-10-CM

## 2022-01-31 RX ORDER — CITALOPRAM HYDROBROMIDE 40 MG/1
40 TABLET ORAL DAILY
Qty: 90 TABLET | Refills: 3 | Status: SHIPPED | OUTPATIENT
Start: 2022-01-31 | End: 2023-01-31

## 2022-01-31 NOTE — TELEPHONE ENCOUNTER
"Request for medication refill:  citalopram (CELEXA) 40 MG tablet    Providers if patient needs an appointment and you are willing to give a one month supply please refill for one month and  send a letter/MyChart using \".SMILLIMITEDREFILL\" .smillimited and route chart to \"[facility] \" (Giving one month refill in non controlled medications is strongly recommended before denial)    If refill has been denied, meaning absolutely no refills without visit, please complete the smart phrase \".smirxrefuse\" and route it to the \"P [facility] MED REFILLS\"  pool to inform the patient and the pharmacy.    Monica Buchanan MA        "

## 2022-02-08 DIAGNOSIS — I10 BENIGN ESSENTIAL HYPERTENSION: ICD-10-CM

## 2022-02-09 RX ORDER — LOSARTAN POTASSIUM 100 MG/1
100 TABLET ORAL DAILY
Qty: 90 TABLET | Refills: 3 | Status: SHIPPED | OUTPATIENT
Start: 2022-02-09 | End: 2023-01-31

## 2022-02-11 ENCOUNTER — OFFICE VISIT (OUTPATIENT)
Dept: FAMILY MEDICINE | Facility: CLINIC | Age: 62
End: 2022-02-11
Payer: COMMERCIAL

## 2022-02-11 VITALS
SYSTOLIC BLOOD PRESSURE: 128 MMHG | OXYGEN SATURATION: 98 % | RESPIRATION RATE: 16 BRPM | DIASTOLIC BLOOD PRESSURE: 84 MMHG | TEMPERATURE: 97.8 F | HEART RATE: 61 BPM

## 2022-02-11 DIAGNOSIS — M76.31 IT BAND SYNDROME, RIGHT: Primary | ICD-10-CM

## 2022-02-11 PROCEDURE — 99214 OFFICE O/P EST MOD 30 MIN: CPT | Mod: GC | Performed by: STUDENT IN AN ORGANIZED HEALTH CARE EDUCATION/TRAINING PROGRAM

## 2022-02-11 NOTE — PROGRESS NOTES
Assessment & Plan     Chiquita was seen today for musculoskeletal problem.    Diagnoses and all orders for this visit:    It band syndrome, right  Acute exacerbation of chronic right IT band syndrome. Lateral right thigh pain over the past week with walking and stairs, which has not been relieved by stretching, PT exercises, ibuprofen, naproxen, or foam roller. Tender across the lateral right thigh and over the right lateral femoral head. Start diclofenac gel, ice, rest, and avoid  exercises that provoke the pain. Can also try tens unit.   -     Orthopedic  Referral; Future  -     diclofenac (VOLTAREN) 1 % topical gel; Apply 2 g topically 4 times daily  -     Tens Unit/Supplies Order for DME - ONLY FOR DME    Review of prior external note(s) from - orthopedic note      Return for with orthopedics. Also schedule a physical with Dr. Agosto.     Fiona Naqvi MD  [facility]    Subjective   Chiquita Butler is a 62 year old  who presents for the following health issues     Patient presents with:  Musculoskeletal Problem: Persistent IT band pain in the right leg, patient states it is debilitating-cannot do PT exercises, limiting mobility. Needing pain relief to get daily tasks done    - right leg pain for several weeks, has been going to PT  - has had 4 session. Last went 1 week ago. Not helping pain. Has been doing step up exercisese  - whole lateral side of right leg hurts  - exercises with  3-4x a weeks  - has been using a roller 4x daily  - also noticed lump on lateral leg  - stretching exercises  - not having much relief  - wants to go to massage therapist , but theirs is out of town  - worse up and down steps  - ibuprofen and alleve a few times. Didn't notice a difference      Chart Review:  - saw ortho 12/1/21 for right IT band. Had been using ibuprofen, ice, CBD oil, diclofenac, Ace wrap. Diagnosis patellofemoral degernative joint disease, IT band tendinitis. Right knee  cortisone injection, PT. Topical voltaren gel. Follow up as needed. Consider MRI  - PMH: HTN, HLD, depression, insomnia,     Review of Systems   Constitutional, HEENT, cardiovascular, pulmonary, GI, and  systems are negative, except as otherwise noted.      Objective    /84 (BP Location: Left arm, Patient Position: Sitting, Cuff Size: Adult Large)   Pulse 61   Temp 97.8  F (36.6  C) (Oral)   Resp 16   LMP  (LMP Unknown)   SpO2 98%   Breastfeeding No   There is no height or weight on file to calculate BMI.    Physical Exam   Physical Exam  Constitutional:       Appearance: Normal appearance.   HENT:      Head: Normocephalic.   Eyes:      Conjunctiva/sclera: Conjunctivae normal.   Cardiovascular:      Rate and Rhythm: Normal rate.   Pulmonary:      Effort: Pulmonary effort is normal.   Musculoskeletal:      Right hip: Normal strength.      Left hip: Normal strength.      Comments: Full strength of hip flexion and knee flexion and extension bilaterally. No patellar reflexes bilaterally obtained. 2+ achilles reflexes bilaterally.     Atypical fat distribution of bilateral lateral thighs. Moderately tender on the right. Not erythematous or hot. Tenderness to palpation of the distal aspect of the IT band over the lateral femoral condyle.   Skin:     General: Skin is warm and dry.   Neurological:      Mental Status: Chiquita Butler is alert.   Psychiatric:         Mood and Affect: Mood normal.         Behavior: Behavior normal.         Thought Content: Thought content normal.         Judgment: Judgment normal.        ----- Service Performed and Documented by Resident  ------

## 2022-02-11 NOTE — PATIENT INSTRUCTIONS
Patient Education   Here is the plan from today's visit    1. It band syndrome, right  Start trying the voltaren gel.  Avoid using ibuprofen or alleve when on the voltaren  Let your leg rest by not doing painful exercises.  Follow up with orthopedics  Continue to ice  You can try using the tens unit  - Orthopedic [facility] Referral; Future  [phone]      Please call or return to clinic if your symptoms don't go away.    Follow up plan  Return for with orthopedics. Also schedule a physical with Dr. Agosto.    Thank you for coming to [facility] today.  Lab Testing:  **If you had lab testing today and your results are reassuring or normal they will be mailed to you or sent through [facility] within 7 days.   **If the lab tests need quick action we will call you with the results.  **If you are having labs done on a different day, please call [phone] to schedule at [facility] or [phone] for other [facility] Outpatient Lab locations. Labs do not offer walk-in appointments.  The phone number we will call with results is # [phone] (home) [phone] (work). If this is not the best number please call our clinic and change the number.  Medication Refills:  If you need any refills please call your pharmacy and they will contact us.   If you need to  your refill at a new pharmacy, please contact the new pharmacy directly. The new pharmacy will help you get your medications transferred faster.   Scheduling:  If you have any concerns about today's visit or wish to schedule another appointment please call our office during normal business hours [phone] (8-5:00 M-F)  If a referral was made to an [facility] specialty provider and you do not get a call from central scheduling, please refer to directions on your visit summary or call our office during normal business hours for assistance.   If a Mammogram was ordered for you at the Breast Center call [phone] to schedule or change  your appointment.  If you had an XRay/CT/Ultrasound/MRI ordered the number is [phone] to schedule or change your radiology appointment.   [facility] has limited ultrasound appointments available on Wednesdays, if you would like your ultrasound at [facility], please call [phone] to schedule.   Medical Concerns:  If you have urgent medical concerns please call [phone] at any time of the day.    Fiona Naqvi MD

## 2022-02-13 NOTE — PROGRESS NOTES
Preceptor Attestation:   Patient seen, evaluated and discussed with the resident. I have verified the content of the note, which accurately reflects my assessment of the patient and the plan of care.   Supervising Physician:  Latanya Ley, DO

## 2022-02-28 NOTE — TELEPHONE ENCOUNTER
DIAGNOSIS: right IT Band pain   APPOINTMENT DATE: 3.2.22   NOTES STATUS DETAILS   OFFICE NOTE from referring provider Internal 2.11.22 Dr Fiona Naqvi, [facility]   MEDICATION LIST Internal

## 2022-03-02 ENCOUNTER — ANCILLARY PROCEDURE (OUTPATIENT)
Dept: GENERAL RADIOLOGY | Facility: CLINIC | Age: 62
End: 2022-03-02
Attending: FAMILY MEDICINE
Payer: COMMERCIAL

## 2022-03-02 ENCOUNTER — OFFICE VISIT (OUTPATIENT)
Dept: ORTHOPEDICS | Facility: CLINIC | Age: 62
End: 2022-03-02
Payer: COMMERCIAL

## 2022-03-02 ENCOUNTER — PRE VISIT (OUTPATIENT)
Dept: ORTHOPEDICS | Facility: CLINIC | Age: 62
End: 2022-03-02
Payer: COMMERCIAL

## 2022-03-02 VITALS — BODY MASS INDEX: 36.51 KG/M2 | WEIGHT: 255 LBS | HEIGHT: 70 IN

## 2022-03-02 DIAGNOSIS — R22.41 SUBCUTANEOUS MASS OF RIGHT LOWER EXTREMITY: Primary | ICD-10-CM

## 2022-03-02 DIAGNOSIS — R22.41 SUBCUTANEOUS MASS OF RIGHT LOWER EXTREMITY: ICD-10-CM

## 2022-03-02 DIAGNOSIS — M79.651 PAIN OF RIGHT LATERAL UPPER THIGH: ICD-10-CM

## 2022-03-02 PROCEDURE — 99203 OFFICE O/P NEW LOW 30 MIN: CPT | Performed by: FAMILY MEDICINE

## 2022-03-02 PROCEDURE — 73552 X-RAY EXAM OF FEMUR 2/>: CPT | Mod: RT | Performed by: RADIOLOGY

## 2022-03-02 NOTE — PROGRESS NOTES
"Sports Medicine Clinic Visit    PCP: Amarilis Agosto    Chiquita Butler is a 62 year old adult who is seen  as self referral presenting with right lateral thigh tenderness and pain, Patient is reporting two palpable, tender masses over lateral right thigh that have appeared in the last two months, with no injury, and no associated weight loss.  Lateral thigh discomfort makes it difficult to walk up or down stairs, squats, stride.    In the last year pt has been working with  at a health club and focusing on planks and other core strengthening and fitness exercises. Pt has also seen a sports physical therapist through [facility] over the course of six visits, to try to help with IT band tendinitis, but it has not been helpful.  Patient has tried ice, heat, Tylenol, ibuprofen, Voltaren gel, rest from exercise.  Patient has tried a local right-sided knee injection 3 months ago from her primary provider.    Patient indicates a right-sided knee injury 2 years ago but no history of or recent injuries.    BUN 23, creatinine 0.65, GFR greater than 90,12/5/21    Patient  at [employer].  Patient musician, plays trumpet.        Location of Pain: right IT Band and knee  Duration of Pain: 2 month(s)  Rating of Pain: 7/10  Pain is better with: massage, stretching and physical therapy.   Pain is worse with: exercise  Treatment so far consists of: Ice, Heat, Tylenol, Ibuprofen, Other medications: Voltaren gel, Rest, physical therapy and right knee joint injection on 12/1/2021.   Prior History of related problems: Patient has been seen at [facility] for this issue.     Ht 1.778 m (5' 10\")   Wt 115.7 kg (255 lb)   LMP  (LMP Unknown)   BMI 36.59 kg/m          PMH:  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-     Panic attack 12/17/13    ER Visit       Active problem " list:  Patient Active Problem List   Diagnosis     Keratoconus of both eyes     Hypertension     MDD (major depressive disorder)     Vitamin D deficiency     H/O gastric bypass     Congenital heart valve abnormality     Gender dysphoria in adult     Body mass index (BMI) of 36.0 to 36.9     Hematoma of skin     Morbid obesity (H)     Palpitations     Chest tightness     Tinnitus, bilateral       FH:  Family History   Problem Relation Age of Onset     Alzheimer Disease Mother 60         78     Myocardial Infarction Father 49             C.A.D. Father      Glaucoma Maternal Grandmother      Colorectal Cancer Maternal Grandmother         95, pancreatic     Diabetes Sister      Eye Disorder Sister      Depression Other         multiple siblings     Cerebrovascular Disease Paternal Grandmother         80's     Breast Cancer No family hx of        SH:  Social History     Socioeconomic History     Marital status:      Spouse name: Aissatou     Number of children: Not on file     Years of education: Not on file     Highest education level: Not on file   Occupational History     Not on file   Tobacco Use     Smoking status: Never Smoker     Smokeless tobacco: Never Used   Vaping Use     Vaping Use: Never used   Substance and Sexual Activity     Alcohol use: Not Currently     Alcohol/week: 2.5 standard drinks     Types: 3 Standard drinks or equivalent per week     Comment: 1-2 drinks/week     Drug use: No     Sexual activity: Yes     Partners: Female     Birth control/protection: None   Other Topics Concern      Service Not Asked     Blood Transfusions Not Asked     Caffeine Concern Not Asked     Occupational Exposure Not Asked     Hobby Hazards Not Asked     Sleep Concern Yes     Comment: occ use ambien due to shift work      Stress Concern Not Asked     Weight Concern Yes     Special Diet Yes     Comment: due to gastric bypass      Back Care Not Asked     Exercise Yes     Bike Helmet Yes     Seat Belt  Yes     Self-Exams Not Asked     Parent/sibling w/ CABG, MI or angioplasty before 65F 55M? Not Asked   Social History Narrative    Lives in [facility] with partner Aissatou, adopted daughter Marely. Dtr Dorina born 2014. Baby due Feb 2020.      at [employer]. Plays [employer].     Gets exercise! 10,000 steps..     Social Determinants of Health     Financial Resource Strain: Not on file   Food Insecurity: Not on file   Transportation Needs: Not on file   Physical Activity: Not on file   Stress: Not on file   Social Connections: Not on file   Intimate Partner Violence: Not on file   Housing Stability: Not on file       MEDS:  See EMR, reviewed  ALL:  See EMR, reviewed    REVIEW OF SYSTEMS:  CONSTITUTIONAL:NEGATIVE for fever, chills, change in weight  INTEGUMENTARY/SKIN: NEGATIVE for worrisome rashes, moles or lesions  EYES: NEGATIVE for vision changes or irritation  ENT/MOUTH: NEGATIVE for ear, mouth and throat problems  RESP:NEGATIVE for significant cough or SOB  BREAST: NEGATIVE for masses, tenderness or discharge  CV: NEGATIVE for chest pain, palpitations or peripheral edema  GI: NEGATIVE for nausea, abdominal pain, heartburn, or change in bowel habits  :NEGATIVE for frequency, dysuria, or hematuria  :NEGATIVE for frequency, dysuria, or hematuria  NEURO: NEGATIVE for weakness, dizziness or paresthesias  ENDOCRINE: NEGATIVE for temperature intolerance, skin/hair changes  HEME/ALLERGY/IMMUNE: NEGATIVE for bleeding problems  PSYCHIATRIC: NEGATIVE for changes in mood or affect      Objective: discomfort of the right hip to internal rotation, external rotation abduction.   2 visible subcutaneous masses along the lateral thigh, 1 in the upper third of the thigh and 1 in the lower third of the thigh.  Each mass is tender to the touch and approximately 4 cm x 6 cm in size.  Ill-defined edges.  Not firm to the touch.  Tender along the greater trochanter on the right but also tender along the course of  the entire lateral thigh down to the lateral knee.  No effusion at the right knee.  Full flexion extension at the right knee.      We went over x-rays of the femur that showed no bony mass.  She has mild degenerative changes at the right hip and mild degenerative changes at the right knee.    Assessment: Right-sided lateral subcutaneous thigh masses, tender, x2 months.  Right-sided lateral thigh discomfort, suspect IT band tendinitis, but pain has not been improved by 2 months of rest, physical therapy at [facility], ibuprofen, Tylenol, Voltaren gel, cortisone injection.      Plan: MRI of the right thigh is pending to evaluate subcutaneous masses.  May also assist in diagnosing possible gluteus medius tendinitis and IT band tendinitis.  Follow-up face-to-face visit after the MRI.

## 2022-03-02 NOTE — LETTER
"  3/2/2022      RE: Chiquita Butler  [address]  [facility] [address]       Sports Medicine Clinic Visit    PCP: Amarilis Agosto    Chiquita Butler is a 62 year old adult who is seen  as self referral presenting with right lateral thigh tenderness and pain, Patient is reporting two palpable, tender masses over lateral right thigh that have appeared in the last two months, with no injury, and no associated weight loss.  Lateral thigh discomfort makes it difficult to walk up or down stairs, squats, stride.    In the last year pt has been working with  at a health club and focusing on planks and other core strengthening and fitness exercises. Pt has also seen a sports physical therapist through [facility] over the course of six visits, to try to help with IT band tendinitis, but it has not been helpful.  Patient has tried ice, heat, Tylenol, ibuprofen, Voltaren gel, rest from exercise.  Patient has tried a local right-sided knee injection 3 months ago from her primary provider.    Patient indicates a right-sided knee injury 2 years ago but no history of or recent injuries.    BUN 23, creatinine 0.65, GFR greater than 90,12/5/21    Patient  at [employer].  Patient musician, plays trumpet.        Location of Pain: right IT Band and knee  Duration of Pain: 2 month(s)  Rating of Pain: 7/10  Pain is better with: massage, stretching and physical therapy.   Pain is worse with: exercise  Treatment so far consists of: Ice, Heat, Tylenol, Ibuprofen, Other medications: Voltaren gel, Rest, physical therapy and right knee joint injection on 12/1/2021.   Prior History of related problems: Patient has been seen at [facility] for this issue.     Ht 1.778 m (5' 10\")   Wt 115.7 kg (255 lb)   LMP  (LMP Unknown)   BMI 36.59 kg/m          PMH:  Past Medical History:   Diagnosis Date     Congenital heart valve abnormality 9/7/2014     Diverticulosis      Hypertension      Keratoconus of " both eyes 2012     Panic attack 11    ER Visit       Active problem list:  Patient Active Problem List   Diagnosis     Keratoconus of both eyes     Hypertension     MDD (major depressive disorder)     Vitamin D deficiency     H/O gastric bypass     Congenital heart valve abnormality     Gender dysphoria in adult     Body mass index (BMI) of 36.0 to 36.9     Hematoma of skin     Morbid obesity (H)     Palpitations     Chest tightness     Tinnitus, bilateral       FH:  Family History   Problem Relation Age of Onset     Alzheimer Disease Mother 60         78     Myocardial Infarction Father 49             C.A.D. Father      Glaucoma Maternal Grandmother      Colorectal Cancer Maternal Grandmother         95, pancreatic     Diabetes Sister      Eye Disorder Sister      Depression Other         multiple siblings     Cerebrovascular Disease Paternal Grandmother         80's     Breast Cancer No family hx of        SH:  Social History     Socioeconomic History     Marital status:      Spouse name: Aissatou     Number of children: Not on file     Years of education: Not on file     Highest education level: Not on file   Occupational History     Not on file   Tobacco Use     Smoking status: Never Smoker     Smokeless tobacco: Never Used   Vaping Use     Vaping Use: Never used   Substance and Sexual Activity     Alcohol use: Not Currently     Alcohol/week: 2.5 standard drinks     Types: 3 Standard drinks or equivalent per week     Comment: 1-2 drinks/week     Drug use: No     Sexual activity: Yes     Partners: Female     Birth control/protection: None   Other Topics Concern      Service Not Asked     Blood Transfusions Not Asked     Caffeine Concern Not Asked     Occupational Exposure Not Asked     Hobby Hazards Not Asked     Sleep Concern Yes     Comment: occ use ambien due to shift work      Stress Concern Not Asked     Weight Concern Yes     Special Diet Yes     Comment: due to gastric  bypass      Back Care Not Asked     Exercise Yes     Bike Helmet Yes     Seat Belt Yes     Self-Exams Not Asked     Parent/sibling w/ CABG, MI or angioplasty before 65F 55M? Not Asked   Social History Narrative    Lives in [facility] with partner Aissatou, adopted daughter Marely. Dtr [place]ina born 2014. Baby due Feb 2020.      at [employer]. Plays trumpet.     Gets exercise! 10,000 steps..     Social Determinants of Health     Financial Resource Strain: Not on file   Food Insecurity: Not on file   Transportation Needs: Not on file   Physical Activity: Not on file   Stress: Not on file   Social Connections: Not on file   Intimate Partner Violence: Not on file   Housing Stability: Not on file       MEDS:  See EMR, reviewed  ALL:  See EMR, reviewed    REVIEW OF SYSTEMS:  CONSTITUTIONAL:NEGATIVE for fever, chills, change in weight  INTEGUMENTARY/SKIN: NEGATIVE for worrisome rashes, moles or lesions  EYES: NEGATIVE for vision changes or irritation  ENT/MOUTH: NEGATIVE for ear, mouth and throat problems  RESP:NEGATIVE for significant cough or SOB  BREAST: NEGATIVE for masses, tenderness or discharge  CV: NEGATIVE for chest pain, palpitations or peripheral edema  GI: NEGATIVE for nausea, abdominal pain, heartburn, or change in bowel habits  :NEGATIVE for frequency, dysuria, or hematuria  :NEGATIVE for frequency, dysuria, or hematuria  NEURO: NEGATIVE for weakness, dizziness or paresthesias  ENDOCRINE: NEGATIVE for temperature intolerance, skin/hair changes  HEME/ALLERGY/IMMUNE: NEGATIVE for bleeding problems  PSYCHIATRIC: NEGATIVE for changes in mood or affect      Objective: discomfort of the right hip to internal rotation, external rotation abduction.   2 visible subcutaneous masses along the lateral thigh, 1 in the upper third of the thigh and 1 in the lower third of the thigh.  Each mass is tender to the touch and approximately 4 cm x 6 cm in size.  Ill-defined edges.  Not firm to the touch.   Tender along the greater trochanter on the right but also tender along the course of the entire lateral thigh down to the lateral knee.  No effusion at the right knee.  Full flexion extension at the right knee.      We went over x-rays of the femur that showed no bony mass.  She has mild degenerative changes at the right hip and mild degenerative changes at the right knee.    Assessment: Right-sided lateral subcutaneous thigh masses, tender, x2 months.  Right-sided lateral thigh discomfort, suspect IT band tendinitis, but pain has not been improved by 2 months of rest, physical therapy at [facility], ibuprofen, Tylenol, Voltaren gel, cortisone injection.      Plan: MRI of the right thigh is pending to evaluate subcutaneous masses.  May also assist in diagnosing possible gluteus medius tendinitis and IT band tendinitis.  Follow-up face-to-face visit after the MRI.    Maxwell Gonzalez MD

## 2022-03-14 ENCOUNTER — ANCILLARY PROCEDURE (OUTPATIENT)
Dept: MRI IMAGING | Facility: CLINIC | Age: 62
End: 2022-03-14
Attending: FAMILY MEDICINE
Payer: COMMERCIAL

## 2022-03-14 DIAGNOSIS — R22.41 SUBCUTANEOUS MASS OF RIGHT LOWER EXTREMITY: ICD-10-CM

## 2022-03-14 DIAGNOSIS — M79.651 PAIN OF RIGHT LATERAL UPPER THIGH: ICD-10-CM

## 2022-03-14 PROCEDURE — 73720 MRI LWR EXTREMITY W/O&W/DYE: CPT | Mod: RT | Performed by: RADIOLOGY

## 2022-03-14 PROCEDURE — A9585 GADOBUTROL INJECTION: HCPCS | Performed by: RADIOLOGY

## 2022-03-14 RX ORDER — GADOBUTROL 604.72 MG/ML
15 INJECTION INTRAVENOUS ONCE
Status: COMPLETED | OUTPATIENT
Start: 2022-03-14 | End: 2022-03-14

## 2022-03-14 RX ADMIN — GADOBUTROL 12 ML: 604.72 INJECTION INTRAVENOUS at 10:04

## 2022-03-16 ENCOUNTER — OFFICE VISIT (OUTPATIENT)
Dept: ORTHOPEDICS | Facility: CLINIC | Age: 62
End: 2022-03-16
Payer: COMMERCIAL

## 2022-03-16 VITALS — BODY MASS INDEX: 36.51 KG/M2 | WEIGHT: 255 LBS | HEIGHT: 70 IN

## 2022-03-16 DIAGNOSIS — M22.41 CHONDROMALACIA OF RIGHT PATELLOFEMORAL JOINT: Primary | ICD-10-CM

## 2022-03-16 DIAGNOSIS — M17.11 PRIMARY OSTEOARTHRITIS OF RIGHT KNEE: ICD-10-CM

## 2022-03-16 PROCEDURE — 20610 DRAIN/INJ JOINT/BURSA W/O US: CPT | Mod: RT | Performed by: FAMILY MEDICINE

## 2022-03-16 RX ORDER — LIDOCAINE HYDROCHLORIDE 10 MG/ML
4 INJECTION, SOLUTION EPIDURAL; INFILTRATION; INTRACAUDAL; PERINEURAL
Status: SHIPPED | OUTPATIENT
Start: 2022-03-16

## 2022-03-16 RX ORDER — TRIAMCINOLONE ACETONIDE 40 MG/ML
40 INJECTION, SUSPENSION INTRA-ARTICULAR; INTRAMUSCULAR
Status: DISCONTINUED | OUTPATIENT
Start: 2022-03-16 | End: 2022-08-26

## 2022-03-16 RX ADMIN — TRIAMCINOLONE ACETONIDE 40 MG: 40 INJECTION, SUSPENSION INTRA-ARTICULAR; INTRAMUSCULAR at 08:23

## 2022-03-16 RX ADMIN — LIDOCAINE HYDROCHLORIDE 4 ML: 10 INJECTION, SOLUTION EPIDURAL; INFILTRATION; INTRACAUDAL; PERINEURAL at 08:23

## 2022-03-16 ASSESSMENT — PATIENT HEALTH QUESTIONNAIRE - PHQ9: SUM OF ALL RESPONSES TO PHQ QUESTIONS 1-9: 0

## 2022-03-16 NOTE — PROGRESS NOTES
March 16, 2022: Chiquita Butler is a 62 year old adult who is seen in f/u up for Right femur MRI results.    MRI of the thigh with markers placed indicated no subcutaneous mass.  Nonloculated collections of fat.  Grade IV chondromalacia of the patellofemoral joint and a mild to moderate amount of right hip DJD.  MRI right knee in 2016 showed patellofemoral chondromalacia as well as some lateral tibiofemoral DJD    Pt has also seen a sports physical therapist through [facility] over the course of six visits, to try to help with IT band tendinitis, but it has not been helpful.  Pt continues with knee pain that keeps pt from being physically active with workout program. Patient has tried ice, heat, Tylenol, ibuprofen, Voltaren gel, rest from exercise.  Patient has tried a local right-sided knee injection 3 months ago from primary provider.  Patient is looking for other options for knee pain.        EXAMINATION: MRI of the right knee without contrast     DATE:  7/9/2016     HISTORY: Knee pain     TECHNIQUE: Multiplanar, multisequence MR imaging of the right knee was  obtained using standard sequences in 3 orthogonal planes without the  use of intravenous or intra-articular gadolinium contrast.     Comparison: None.     Findings:     In the medial compartment, the medial meniscus is intact. There is no  high-grade or full-thickness cartilage loss or subchondral edema.     In the lateral compartment, the lateral meniscus is intact. There is a  focal area of high-grade to full-thickness cartilage loss along the  posterior aspect of the lateral femoral condyle with subjacent  subchondral cystic changes and bone marrow edema.      In the patellofemoral compartment, there are multifocal areas of  moderate to full-thickness cartilage loss, greatest along the lateral  patellar facet and lateral trochlear surface with subchondral bone  marrow edema and subchondral cystic changes.     The anterior and posterior cruciate ligaments are  intact.     The tibial collateral ligament is intact. The anterior iliotibial  band, fibular collateral ligament, biceps femoris tendon and, and  popliteus tendons are intact.     There is no significant joint effusion. No popliteal cyst. Small joint  body and small multiloculated cystic structure adjacent to the  posterior cruciate ligament.     The extensor mechanism is intact and normal in appearance.      Nonspecific cystic changes in the region of the tibial spine, likely  intraosseous cysts. Soft tissue edema in the subcutaneous tissues  anterior to the patella.                                                                      IMPRESSION:  1. Osteoarthrosis in the right knee, marked in the patellofemoral  joint compartment, greatest in the lateral patellofemoral joint  compartment, as above. There is also a focal area of high-grade  cartilage loss along the posterior aspect of the lateral femoral  condyle.  2. Small joint body in the posterior knee, as well as a multiloculated  cystic structure, adjacent to the posterior cruciate ligament, likely  a pericruciate cyst versus a ganglion cyst.  3. The anterior and posterior cruciate ligaments, medial and lateral  supporting structures, and bilateral menisci are intact.     MARGARITA CANSECO MD        PMH:  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-     Panic attack 12/17/13    ER Visit       Active problem list:  Patient Active Problem List   Diagnosis     Keratoconus of both eyes     Hypertension     MDD (major depressive disorder)     Vitamin D deficiency     H/O gastric bypass     Congenital heart valve abnormality     Gender dysphoria in adult     Body mass index (BMI) of 36.0 to 36.9     Hematoma of skin     Morbid obesity (H)     Palpitations     Chest tightness     Tinnitus, bilateral       FH:  Family History   Problem Relation Age of  Onset     Alzheimer Disease Mother 60         78     Myocardial Infarction Father 49             C.A.D. Father      Glaucoma Maternal Grandmother      Colorectal Cancer Maternal Grandmother         95, pancreatic     Diabetes Sister      Eye Disorder Sister      Depression Other         multiple siblings     Cerebrovascular Disease Paternal Grandmother         80's     Breast Cancer No family hx of        SH:  Social History     Socioeconomic History     Marital status:      Spouse name: Aissatou     Number of children: Not on file     Years of education: Not on file     Highest education level: Not on file   Occupational History     Not on file   Tobacco Use     Smoking status: Never Smoker     Smokeless tobacco: Never Used   Vaping Use     Vaping Use: Never used   Substance and Sexual Activity     Alcohol use: Not Currently     Alcohol/week: 2.5 standard drinks     Types: 3 Standard drinks or equivalent per week     Comment: 1-2 drinks/week     Drug use: No     Sexual activity: Yes     Partners: Female     Birth control/protection: None   Other Topics Concern      Service Not Asked     Blood Transfusions Not Asked     Caffeine Concern Not Asked     Occupational Exposure Not Asked     Hobby Hazards Not Asked     Sleep Concern Yes     Comment: 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 Not Asked   Social History Narrative    Lives in [facility] with partner Aissatou, adopted daughter Marely. Dtr [place] born . Baby due 2020.      at [employer]. Plays trumpet.     Gets exercise! 10,000 steps..     Social Determinants of Health     Financial Resource Strain: Not on file   Food Insecurity: Not on file    Transportation Needs: Not on file   Physical Activity: Not on file   Stress: Not on file   Social Connections: Not on file   Intimate Partner Violence: Not on file   Housing Stability: Not on file       MEDS:  See EMR, reviewed  ALL:  See EMR, reviewed    REVIEW OF SYSTEMS:  CONSTITUTIONAL:NEGATIVE for fever, chills, change in weight  INTEGUMENTARY/SKIN: NEGATIVE for worrisome rashes, moles or lesions  EYES: NEGATIVE for vision changes or irritation  ENT/MOUTH: NEGATIVE for ear, mouth and throat problems  RESP:NEGATIVE for significant cough or SOB  BREAST: NEGATIVE for masses, tenderness or discharge  CV: NEGATIVE for chest pain, palpitations or peripheral edema  GI: NEGATIVE for nausea, abdominal pain, heartburn, or change in bowel habits  :NEGATIVE for frequency, dysuria, or hematuria  :NEGATIVE for frequency, dysuria, or hematuria  NEURO: NEGATIVE for weakness, dizziness or paresthesias  ENDOCRINE: NEGATIVE for temperature intolerance, skin/hair changes  HEME/ALLERGY/IMMUNE: NEGATIVE for bleeding problems  PSYCHIATRIC: NEGATIVE for changes in mood or affect        Objective: Right knee reveals no effusion.  I can flex and extend the knee fully.  Tender over the medial lateral patellar facets.  Nontender over the medial or lateral joint line.  Anterior and posterior drawer negative appropriate conversation and affect.  Overlying skin is normal.    Assessment chondromalacia of the right knee.    Plan: Patient would like to try an additional cortisone shot and would like to contact  insurance company to see if Synvisc is an option for future injections.  Pt knows if they allow the injection pt will notify it so we can get it from the pharmacy and have it available for an upcoming injection if needed.  If this cortisone shot is not useful it may be helpful to get weightbearing x-rays of the knee at a future visit.            Large Joint Injection/Arthocentesis: R knee joint    Date/Time: 3/16/2022 8:23  AM  Performed by: Maxwell Gonzalez MD  Authorized by: Maxwell Gonzalez MD     Indications:  Pain and osteoarthritis  Needle Size:  25 G  Guidance: landmark guided    Approach:  Anterolateral  Location:  Knee      Medications:  40 mg triamcinolone 40 MG/ML; 4 mL lidocaine (PF) 1 %  Outcome:  Tolerated well, no immediate complications  Procedure discussed: discussed risks, benefits, and alternatives    Consent Given by:  Patient  Timeout: timeout called immediately prior to procedure    Prep: patient was prepped and draped in usual sterile fashion     Paul Souza ATC on 3/16/2022 at 8:24 AM

## 2022-03-16 NOTE — LETTER
3/16/2022      RE: Chiquita Butler  [address]  [facility] [address]       March 16, 2022: Chiquita Butler is a 62 year old adult who is seen in f/u up for Right femur MRI results.    MRI of the thigh with markers placed indicated no subcutaneous mass.  Nonloculated collections of fat.  Grade IV chondromalacia of the patellofemoral joint and a mild to moderate amount of right hip DJD.  MRI right knee in 2016 showed patellofemoral chondromalacia as well as some lateral tibiofemoral DJD    Pt has also seen a sports physical therapist through [facility] over the course of six visits, to try to help with IT band tendinitis, but it has not been helpful.  Pt continues with knee pain that keeps pt from being physically active with workout program. Patient has tried ice, heat, Tylenol, ibuprofen, Voltaren gel, rest from exercise.  Patient has tried a local right-sided knee injection 3 months ago from primary provider.  Patient is looking for other options for knee pain.        EXAMINATION: MRI of the right knee without contrast     DATE:  7/9/2016     HISTORY: Knee pain     TECHNIQUE: Multiplanar, multisequence MR imaging of the right knee was  obtained using standard sequences in 3 orthogonal planes without the  use of intravenous or intra-articular gadolinium contrast.     Comparison: None.     Findings:     In the medial compartment, the medial meniscus is intact. There is no  high-grade or full-thickness cartilage loss or subchondral edema.     In the lateral compartment, the lateral meniscus is intact. There is a  focal area of high-grade to full-thickness cartilage loss along the  posterior aspect of the lateral femoral condyle with subjacent  subchondral cystic changes and bone marrow edema.      In the patellofemoral compartment, there are multifocal areas of  moderate to full-thickness cartilage loss, greatest along the lateral  patellar facet and lateral trochlear surface with subchondral bone  marrow edema and  subchondral cystic changes.     The anterior and posterior cruciate ligaments are intact.     The tibial collateral ligament is intact. The anterior iliotibial  band, fibular collateral ligament, biceps femoris tendon and, and  popliteus tendons are intact.     There is no significant joint effusion. No popliteal cyst. Small joint  body and small multiloculated cystic structure adjacent to the  posterior cruciate ligament.     The extensor mechanism is intact and normal in appearance.      Nonspecific cystic changes in the region of the tibial spine, likely  intraosseous cysts. Soft tissue edema in the subcutaneous tissues  anterior to the patella.                                                                      IMPRESSION:  1. Osteoarthrosis in the right knee, marked in the patellofemoral  joint compartment, greatest in the lateral patellofemoral joint  compartment, as above. There is also a focal area of high-grade  cartilage loss along the posterior aspect of the lateral femoral  condyle.  2. Small joint body in the posterior knee, as well as a multiloculated  cystic structure, adjacent to the posterior cruciate ligament, likely  a pericruciate cyst versus a ganglion cyst.  3. The anterior and posterior cruciate ligaments, medial and lateral  supporting structures, and bilateral menisci are intact.     MARGARITA CANSECO MD        PMH:  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-     Panic attack 12/17/13    ER Visit       Active problem list:  Patient Active Problem List   Diagnosis     Keratoconus of both eyes     Hypertension     MDD (major depressive disorder)     Vitamin D deficiency     H/O gastric bypass     Congenital heart valve abnormality     Gender dysphoria in adult     Body mass index (BMI) of 36.0 to 36.9     Hematoma of skin     Morbid obesity (H)     Palpitations     Chest  tightness     Tinnitus, bilateral       FH:  Family History   Problem Relation Age of Onset     Alzheimer Disease Mother 60         78     Myocardial Infarction Father 49             C.A.D. Father      Glaucoma Maternal Grandmother      Colorectal Cancer Maternal Grandmother         95, pancreatic     Diabetes Sister      Eye Disorder Sister      Depression Other         multiple siblings     Cerebrovascular Disease Paternal Grandmother         80's     Breast Cancer No family hx of        SH:  Social History     Socioeconomic History     Marital status:      Spouse name: Aissatou     Number of children: Not on file     Years of education: Not on file     Highest education level: Not on file   Occupational History     Not on file   Tobacco Use     Smoking status: Never Smoker     Smokeless tobacco: Never Used   Vaping Use     Vaping Use: Never used   Substance and Sexual Activity     Alcohol use: Not Currently     Alcohol/week: 2.5 standard drinks     Types: 3 Standard drinks or equivalent per week     Comment: 1-2 drinks/week     Drug use: No     Sexual activity: Yes     Partners: Female     Birth control/protection: None   Other Topics Concern      Service Not Asked     Blood Transfusions Not Asked     Caffeine Concern Not Asked     Occupational Exposure Not Asked     Hobby Hazards Not Asked     Sleep Concern Yes     Comment: 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 Not Asked   Social History Narrative    Lives in [facility] with partner Aissatou, adopted daughter Marely. Dtr [place] born . Baby due 2020.      at [employer]. Plays trumpet.     Gets exercise! 10,000 steps..     Social Determinants of  Health     Financial Resource Strain: Not on file   Food Insecurity: Not on file   Transportation Needs: Not on file   Physical Activity: Not on file   Stress: Not on file   Social Connections: Not on file   Intimate Partner Violence: Not on file   Housing Stability: Not on file       MEDS:  See EMR, reviewed  ALL:  See EMR, reviewed    REVIEW OF SYSTEMS:  CONSTITUTIONAL:NEGATIVE for fever, chills, change in weight  INTEGUMENTARY/SKIN: NEGATIVE for worrisome rashes, moles or lesions  EYES: NEGATIVE for vision changes or irritation  ENT/MOUTH: NEGATIVE for ear, mouth and throat problems  RESP:NEGATIVE for significant cough or SOB  BREAST: NEGATIVE for masses, tenderness or discharge  CV: NEGATIVE for chest pain, palpitations or peripheral edema  GI: NEGATIVE for nausea, abdominal pain, heartburn, or change in bowel habits  :NEGATIVE for frequency, dysuria, or hematuria  :NEGATIVE for frequency, dysuria, or hematuria  NEURO: NEGATIVE for weakness, dizziness or paresthesias  ENDOCRINE: NEGATIVE for temperature intolerance, skin/hair changes  HEME/ALLERGY/IMMUNE: NEGATIVE for bleeding problems  PSYCHIATRIC: NEGATIVE for changes in mood or affect        Objective: Right knee reveals no effusion.  I can flex and extend the knee fully.  Tender over the medial lateral patellar facets.  Nontender over the medial or lateral joint line.  Anterior and posterior drawer negative appropriate conversation and affect.  Overlying skin is normal.    Assessment chondromalacia of the right knee.    Plan: Patient would like to try an additional cortisone shot and would like to contact  insurance company to see if Synvisc is an option for future injections.  Pt knows if they allow the injection pt will notify it so we can get it from the pharmacy and have it available for an upcoming injection if needed.  If this cortisone shot is not useful it may be helpful to get weightbearing x-rays of the knee at a future visit.            Large  Joint Injection/Arthocentesis: R knee joint    Date/Time: 3/16/2022 8:23 AM  Performed by: Maxwell Gonzalez MD  Authorized by: Maxwell Gonzalez MD     Indications:  Pain and osteoarthritis  Needle Size:  25 G  Guidance: landmark guided    Approach:  Anterolateral  Location:  Knee      Medications:  40 mg triamcinolone 40 MG/ML; 4 mL lidocaine (PF) 1 %  Outcome:  Tolerated well, no immediate complications  Procedure discussed: discussed risks, benefits, and alternatives    Consent Given by:  Patient  Timeout: timeout called immediately prior to procedure    Prep: patient was prepped and draped in usual sterile fashion     Paul Souza ATC on 3/16/2022 at 8:24 AM                Maxwell Gonzalez MD

## 2022-03-16 NOTE — NURSING NOTE
[address] [address]  Dept: [phone]  ______________________________________________________________________________    Patient: Chiquita Butler   : 1960   MRN: [id number]   2022    INVASIVE PROCEDURE SAFETY CHECKLIST    Date: 3/16/22   Procedure: Right knee IA CSI Kenalog  Patient Name: Chiquita Butler  MRN: [id number]  YOB: 1960    Action: Complete sections as appropriate. Any discrepancy results in a HARD COPY until resolved.     PRE PROCEDURE:  Patient ID verified with 2 identifiers (name and  or MRN): Yes  Procedure and site verified with patient/designee (when able): Yes  Accurate consent documentation in medical record: Yes  H&P (or appropriate assessment) documented in medical record: Yes  H&P must be up to 20 days prior to procedure and updates within 24 hours of procedure as applicable: NA  Relevant diagnostic and radiology test results appropriately labeled and displayed as applicable: Yes  Procedure site(s) marked with provider initials: NA    TIMEOUT:  Time-Out performed immediately prior to starting procedure, including verbal and active participation of all team members addressing the following:Yes  * Correct patient identify  * Confirmed that the correct side and site are marked  * An accurate procedure consent form  * Agreement on the procedure to be done  * Correct patient position  * Relevant images and results are properly labeled and appropriately displayed  * The need to administer antibiotics or fluids for irrigation purposes during the procedure as applicable   * Safety precautions based on patient history or medication use    DURING PROCEDURE: Verification of correct person, site, and procedures any time the responsibility for care of the patient is transferred to another member of the care team.       Prior to injection, verified patient identity using patient's name and date of birth.  Due to  injection administration, patient instructed to remain in clinic for 15 minutes  afterwards, and to report any adverse reaction to me immediately.    Joint injection was performed.      Drug Amount Wasted:  None.  Vial/Syringe: Single dose vial  Expiration Date:  7/1/23      Paul Souza, [facility]  March 16, 2022

## 2022-03-16 NOTE — LETTER
3/16/2022      RE: Chiquita Butler  [address]  [facility] [address]       March 16, 2022: Chiquita Butler is a 62 year old adult who is seen in f/u up for Right femur MRI results.    MRI of the thigh with markers placed indicated no subcutaneous mass.  Nonloculated collections of fat.  Grade IV chondromalacia of the patellofemoral joint and a mild to moderate amount of right hip DJD.  MRI right knee in 2016 showed patellofemoral chondromalacia as well as some lateral tibiofemoral DJD    Pt has also seen a sports physical therapist through [facility] over the course of six visits, to try to help with IT band tendinitis, but it has not been helpful.  Pt continues with knee pain that keeps pt from being physically active with workout program. Patient has tried ice, heat, Tylenol, ibuprofen, Voltaren gel, rest from exercise.  Patient has tried a local right-sided knee injection 3 months ago from primary provider.  Patient is looking for other options for knee pain.        EXAMINATION: MRI of the right knee without contrast     DATE:  7/9/2016     HISTORY: Knee pain     TECHNIQUE: Multiplanar, multisequence MR imaging of the right knee was  obtained using standard sequences in 3 orthogonal planes without the  use of intravenous or intra-articular gadolinium contrast.     Comparison: None.     Findings:     In the medial compartment, the medial meniscus is intact. There is no  high-grade or full-thickness cartilage loss or subchondral edema.     In the lateral compartment, the lateral meniscus is intact. There is a  focal area of high-grade to full-thickness cartilage loss along the  posterior aspect of the lateral femoral condyle with subjacent  subchondral cystic changes and bone marrow edema.      In the patellofemoral compartment, there are multifocal areas of  moderate to full-thickness cartilage loss, greatest along the lateral  patellar facet and lateral trochlear surface with subchondral bone  marrow edema and  subchondral cystic changes.     The anterior and posterior cruciate ligaments are intact.     The tibial collateral ligament is intact. The anterior iliotibial  band, fibular collateral ligament, biceps femoris tendon and, and  popliteus tendons are intact.     There is no significant joint effusion. No popliteal cyst. Small joint  body and small multiloculated cystic structure adjacent to the  posterior cruciate ligament.     The extensor mechanism is intact and normal in appearance.      Nonspecific cystic changes in the region of the tibial spine, likely  intraosseous cysts. Soft tissue edema in the subcutaneous tissues  anterior to the patella.                                                                      IMPRESSION:  1. Osteoarthrosis in the right knee, marked in the patellofemoral  joint compartment, greatest in the lateral patellofemoral joint  compartment, as above. There is also a focal area of high-grade  cartilage loss along the posterior aspect of the lateral femoral  condyle.  2. Small joint body in the posterior knee, as well as a multiloculated  cystic structure, adjacent to the posterior cruciate ligament, likely  a pericruciate cyst versus a ganglion cyst.  3. The anterior and posterior cruciate ligaments, medial and lateral  supporting structures, and bilateral menisci are intact.     MARGARITA CANSECO MD        PMH:  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-     Panic attack 12/17/13    ER Visit       Active problem list:  Patient Active Problem List   Diagnosis     Keratoconus of both eyes     Hypertension     MDD (major depressive disorder)     Vitamin D deficiency     H/O gastric bypass     Congenital heart valve abnormality     Gender dysphoria in adult     Body mass index (BMI) of 36.0 to 36.9     Hematoma of skin     Morbid obesity (H)     Palpitations     Chest  tightness     Tinnitus, bilateral       FH:  Family History   Problem Relation Age of Onset     Alzheimer Disease Mother 60         78     Myocardial Infarction Father 49             C.A.D. Father      Glaucoma Maternal Grandmother      Colorectal Cancer Maternal Grandmother         95, pancreatic     Diabetes Sister      Eye Disorder Sister      Depression Other         multiple siblings     Cerebrovascular Disease Paternal Grandmother         80's     Breast Cancer No family hx of        SH:  Social History     Socioeconomic History     Marital status:      Spouse name: Aissatou     Number of children: Not on file     Years of education: Not on file     Highest education level: Not on file   Occupational History     Not on file   Tobacco Use     Smoking status: Never Smoker     Smokeless tobacco: Never Used   Vaping Use     Vaping Use: Never used   Substance and Sexual Activity     Alcohol use: Not Currently     Alcohol/week: 2.5 standard drinks     Types: 3 Standard drinks or equivalent per week     Comment: 1-2 drinks/week     Drug use: No     Sexual activity: Yes     Partners: Female     Birth control/protection: None   Other Topics Concern      Service Not Asked     Blood Transfusions Not Asked     Caffeine Concern Not Asked     Occupational Exposure Not Asked     Hobby Hazards Not Asked     Sleep Concern Yes     Comment: 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 Not Asked   Social History Narrative    Lives in [facility] with partner Aissatou, adopted daughter Marely. Dtr Dana born . Baby due 2020.      at [employer]. Plays trumpet.     Gets exercise! 10,000 steps..     Social Determinants of  Health     Financial Resource Strain: Not on file   Food Insecurity: Not on file   Transportation Needs: Not on file   Physical Activity: Not on file   Stress: Not on file   Social Connections: Not on file   Intimate Partner Violence: Not on file   Housing Stability: Not on file       MEDS:  See EMR, reviewed  ALL:  See EMR, reviewed    REVIEW OF SYSTEMS:  CONSTITUTIONAL:NEGATIVE for fever, chills, change in weight  INTEGUMENTARY/SKIN: NEGATIVE for worrisome rashes, moles or lesions  EYES: NEGATIVE for vision changes or irritation  ENT/MOUTH: NEGATIVE for ear, mouth and throat problems  RESP:NEGATIVE for significant cough or SOB  BREAST: NEGATIVE for masses, tenderness or discharge  CV: NEGATIVE for chest pain, palpitations or peripheral edema  GI: NEGATIVE for nausea, abdominal pain, heartburn, or change in bowel habits  :NEGATIVE for frequency, dysuria, or hematuria  :NEGATIVE for frequency, dysuria, or hematuria  NEURO: NEGATIVE for weakness, dizziness or paresthesias  ENDOCRINE: NEGATIVE for temperature intolerance, skin/hair changes  HEME/ALLERGY/IMMUNE: NEGATIVE for bleeding problems  PSYCHIATRIC: NEGATIVE for changes in mood or affect        Objective: Right knee reveals no effusion.  I can flex and extend the knee fully.  Tender over the medial lateral patellar facets.  Nontender over the medial or lateral joint line.  Anterior and posterior drawer negative appropriate conversation and affect.  Overlying skin is normal.    Assessment chondromalacia of the right knee.    Plan: Patient would like to try an additional cortisone shot and would like to contact  insurance company to see if Synvisc is an option for future injections.  Pt knows if they allow the injection pt will notify it so we can get it from the pharmacy and have it available for an upcoming injection if needed.  If this cortisone shot is not useful it may be helpful to get weightbearing x-rays of the knee at a future visit.            Large  Joint Injection/Arthocentesis: R knee joint    Date/Time: 3/16/2022 8:23 AM  Performed by: Maxwell Gonzalez MD  Authorized by: Maxwell Gonzalez MD     Indications:  Pain and osteoarthritis  Needle Size:  25 G  Guidance: landmark guided    Approach:  Anterolateral  Location:  Knee      Medications:  40 mg triamcinolone 40 MG/ML; 4 mL lidocaine (PF) 1 %  Outcome:  Tolerated well, no immediate complications  Procedure discussed: discussed risks, benefits, and alternatives    Consent Given by:  Patient  Timeout: timeout called immediately prior to procedure    Prep: patient was prepped and draped in usual sterile fashion     Paul Souza ATC on 3/16/2022 at 8:24 AM        Maxwell Gonzalez MD

## 2022-03-21 DIAGNOSIS — I10 BENIGN ESSENTIAL HYPERTENSION: ICD-10-CM

## 2022-03-21 NOTE — TELEPHONE ENCOUNTER
"Request for medication refill: metoprolol succinate ER (TOPROL XL) 100 MG 24 hr tablet    Providers if patient needs an appointment and you are willing to give a one month supply please refill for one month and  send a letter/MyChart using \".SMILLIMITEDREFILL\" .smillimited and route chart to \"P [facility] \" (Giving one month refill in non controlled medications is strongly recommended before denial)    If refill has been denied, meaning absolutely no refills without visit, please complete the smart phrase \".smirxrefuse\" and route it to the \"P [facility] MED REFILLS\"  pool to inform the patient and the pharmacy.    Sylwia Zhong        "

## 2022-03-24 RX ORDER — METOPROLOL SUCCINATE 100 MG/1
100 TABLET, EXTENDED RELEASE ORAL DAILY
Qty: 90 TABLET | Refills: 3 | Status: SHIPPED | OUTPATIENT
Start: 2022-03-24 | End: 2022-11-07

## 2022-04-10 ENCOUNTER — TELEPHONE (OUTPATIENT)
Dept: FAMILY MEDICINE | Facility: CLINIC | Age: 62
End: 2022-04-10
Payer: COMMERCIAL

## 2022-04-10 NOTE — TELEPHONE ENCOUNTER
" Message Return    4/10/2022  4:03 PM    Message returned by Nicole Church MD    Patient: Chiquita Butler   Phone number-  [phone] (home) [phone] (work)        Phone conversation with: Patient    Situation: Chiquita Butler  Is a 62 year old  adult who is calling because of  Acute onset bilateral hand cramps  Background :   Hand seized up during art project with daughter  Started on R hand during activity and then moved to L hand  Notes that fingers looked \"crooked\"  \"looks like arthritis\"  Using a stress ball to work fingers, which is helping  Has improved since squeezing stress ball  Notes that that spasms were painful, lasted a few seconds  No other muscle cramps  Has experienced this before, a few months ago  Does note that she's been dehydrated    No other symptoms    Assessment: Patient calling regarding new onset of bilateral hand cramping, now resolved. Likely 2/2 dehydration +/- mild electrolyte derangement, given history of mild and intermittent hypokalemia (take K supplements). Reviewed medications; only potential contributor to electrolyte abnormalities is albuterol, however this is only a temporary K shift so would not recommend any changes at this time.   Recommendation/Plan: Given that patient notes dehydration, encouraged her to continue pushing fluids as well as consuming foods with high mineral content (K/Mag/Ca). Recommended that if there is recurrence or worsening to schedule a visit in clinic.    Nicole Church MD      "

## 2022-04-26 DIAGNOSIS — I25.83 CORONARY ARTERY DISEASE DUE TO LIPID RICH PLAQUE: ICD-10-CM

## 2022-04-26 DIAGNOSIS — I25.10 CORONARY ARTERY DISEASE DUE TO LIPID RICH PLAQUE: ICD-10-CM

## 2022-04-26 DIAGNOSIS — G47.00 PERSISTENT DISORDER OF INITIATING OR MAINTAINING SLEEP: ICD-10-CM

## 2022-04-26 RX ORDER — ROSUVASTATIN CALCIUM 40 MG/1
40 TABLET, COATED ORAL DAILY
Qty: 90 TABLET | Refills: 3 | Status: SHIPPED | OUTPATIENT
Start: 2022-04-26 | End: 2023-05-05

## 2022-04-26 RX ORDER — ZOLPIDEM TARTRATE 5 MG/1
5 TABLET ORAL
Qty: 30 TABLET | Refills: 0 | Status: SHIPPED | OUTPATIENT
Start: 2022-04-26 | End: 2022-08-26

## 2022-04-26 NOTE — TELEPHONE ENCOUNTER
Verify that the refill encounter hasn't been started Yes    [facility] phone call message- patient requesting a refill:    Full Medication Name:   zolpidem (AMBIEN) 5 MG tablet 30 tablet     rosuvastatin (CRESTOR) 40 MG tablet 30 tablet         Dose: see chart     Pharmacy confirmed as     [id number] - [address] [address]  Phone: [phone] Fax: [phone]      Medication tab checked to see if medication has been sent  Yes    Additional Comments: Patient states she was prescribed   rosuvastatin (CRESTOR) 40 MG tablet 30 tablet     In the hospital but needs a refill for her cholesterol.     OK to leave a message on voice mail? Yes    Advised patient refill may take up to 2 business days? Yes    Primary language: English      needed? No    Call taken on April 26, 2022 at 1:20 PM by Bibiana Hickman    Route to [facility] MED REFILL

## 2022-04-26 NOTE — TELEPHONE ENCOUNTER
Patient requesting refill of rosuvastatin and zolpidem. Last prescribed rosuvastatin in the hospital. Last OV 2/11/22. Routing to PCP to refill if appropriate.     Maryuri Webster RN

## 2022-07-26 ENCOUNTER — IMMUNIZATION (OUTPATIENT)
Dept: FAMILY MEDICINE | Facility: CLINIC | Age: 62
End: 2022-07-26
Payer: COMMERCIAL

## 2022-07-26 DIAGNOSIS — Z23 ENCOUNTER FOR IMMUNIZATION: Primary | ICD-10-CM

## 2022-07-26 PROCEDURE — 0064A COVID-19,PF,MODERNA (18+ YRS BOOSTER .25ML): CPT

## 2022-07-26 PROCEDURE — 99207 PR NO CHARGE LOS: CPT

## 2022-07-26 PROCEDURE — 91306 COVID-19,PF,MODERNA (18+ YRS BOOSTER .25ML): CPT

## 2022-08-02 ENCOUNTER — TELEPHONE (OUTPATIENT)
Dept: FAMILY MEDICINE | Facility: CLINIC | Age: 62
End: 2022-08-02

## 2022-08-02 NOTE — TELEPHONE ENCOUNTER
[facility] phone call message- patient requesting a refill:    Full Medication Name:   losartan (COZAAR) 100 MG tablet 90 tablet     citalopram (CELEXA) 40 MG tablet 90 tablet         Pharmacy confirmed as   [facility][id number] - [address] AT [address] [address] [address]  Phone: [phone] Fax: [phone]  : Yes    Additional Comments: Patient called and said that she is completely out of medications and needs a refill. She is also leaving out of town this Friday 8/5/22 and was hoping to get it filled before then. Call back number for patient is [phone].      OK to leave a message on voice mail? Yes    Primary language: English      needed? No    Call taken on August 2, 2022 at 9:39 AM by Britney Koch

## 2022-08-02 NOTE — TELEPHONE ENCOUNTER
Patient last seen 2/11/2022, last script for meds:  Losartan 2/9/2022 90d 3 refills  Citalopram 1/31/2022 90d 3 refills  Called [facility] to check for refills, she has 2 available on both meds, asked that they process 1 of each. Called patient and left message on identified vm.    Betsy Vallejo RN

## 2022-08-26 ENCOUNTER — OFFICE VISIT (OUTPATIENT)
Dept: FAMILY MEDICINE | Facility: CLINIC | Age: 62
End: 2022-08-26
Payer: COMMERCIAL

## 2022-08-26 VITALS
HEIGHT: 70 IN | SYSTOLIC BLOOD PRESSURE: 116 MMHG | DIASTOLIC BLOOD PRESSURE: 77 MMHG | HEART RATE: 60 BPM | WEIGHT: 253 LBS | TEMPERATURE: 98 F | BODY MASS INDEX: 36.22 KG/M2 | OXYGEN SATURATION: 97 % | RESPIRATION RATE: 16 BRPM

## 2022-08-26 DIAGNOSIS — E66.812 CLASS 2 SEVERE OBESITY WITH SERIOUS COMORBIDITY AND BODY MASS INDEX (BMI) OF 36.0 TO 36.9 IN ADULT, UNSPECIFIED OBESITY TYPE (H): ICD-10-CM

## 2022-08-26 DIAGNOSIS — Z00.00 ENCOUNTER FOR PREVENTATIVE ADULT HEALTH CARE EXAMINATION: Primary | ICD-10-CM

## 2022-08-26 DIAGNOSIS — R60.0 LOCALIZED EDEMA: ICD-10-CM

## 2022-08-26 DIAGNOSIS — Z78.0 POSTMENOPAUSAL STATUS: ICD-10-CM

## 2022-08-26 DIAGNOSIS — Z98.84 H/O GASTRIC BYPASS: ICD-10-CM

## 2022-08-26 DIAGNOSIS — R79.89 ELEVATED SERUM CREATININE: ICD-10-CM

## 2022-08-26 DIAGNOSIS — M25.511 CHRONIC RIGHT SHOULDER PAIN: ICD-10-CM

## 2022-08-26 DIAGNOSIS — Z13.820 SCREENING FOR OSTEOPOROSIS: ICD-10-CM

## 2022-08-26 DIAGNOSIS — E66.01 CLASS 2 SEVERE OBESITY WITH SERIOUS COMORBIDITY AND BODY MASS INDEX (BMI) OF 36.0 TO 36.9 IN ADULT, UNSPECIFIED OBESITY TYPE (H): ICD-10-CM

## 2022-08-26 DIAGNOSIS — Z71.89 ACP (ADVANCE CARE PLANNING): ICD-10-CM

## 2022-08-26 DIAGNOSIS — I10 PRIMARY HYPERTENSION: ICD-10-CM

## 2022-08-26 DIAGNOSIS — Z12.4 SCREENING FOR MALIGNANT NEOPLASM OF CERVIX: ICD-10-CM

## 2022-08-26 DIAGNOSIS — G89.29 CHRONIC RIGHT SHOULDER PAIN: ICD-10-CM

## 2022-08-26 PROBLEM — F33.41 RECURRENT MAJOR DEPRESSIVE DISORDER, IN PARTIAL REMISSION (H): Status: ACTIVE | Noted: 2022-08-26

## 2022-08-26 LAB
ANION GAP SERPL CALCULATED.3IONS-SCNC: 8 MMOL/L (ref 7–15)
BUN SERPL-MCNC: 18.3 MG/DL (ref 8–23)
CALCIUM SERPL-MCNC: 9.2 MG/DL (ref 8.8–10.2)
CHLORIDE SERPL-SCNC: 106 MMOL/L (ref 98–107)
CREAT SERPL-MCNC: 1.2 MG/DL (ref 0.51–1.17)
DEPRECATED HCO3 PLAS-SCNC: 28 MMOL/L (ref 22–29)
FERRITIN SERPL-MCNC: 42 NG/ML (ref 11–409)
GFR SERPL CREATININE-BSD FRML MDRD: 50 ML/MIN/1.73M2
GLUCOSE SERPL-MCNC: 84 MG/DL (ref 70–99)
HBA1C MFR BLD: 5.3 % (ref 0–5.6)
POTASSIUM SERPL-SCNC: 4 MMOL/L (ref 3.4–5.3)
SODIUM SERPL-SCNC: 142 MMOL/L (ref 136–145)
VIT B12 SERPL-MCNC: 708 PG/ML (ref 232–1245)

## 2022-08-26 PROCEDURE — 80048 BASIC METABOLIC PNL TOTAL CA: CPT | Performed by: FAMILY MEDICINE

## 2022-08-26 PROCEDURE — 36415 COLL VENOUS BLD VENIPUNCTURE: CPT | Performed by: FAMILY MEDICINE

## 2022-08-26 PROCEDURE — 82607 VITAMIN B-12: CPT | Performed by: FAMILY MEDICINE

## 2022-08-26 PROCEDURE — 82728 ASSAY OF FERRITIN: CPT | Performed by: FAMILY MEDICINE

## 2022-08-26 PROCEDURE — 99213 OFFICE O/P EST LOW 20 MIN: CPT | Mod: 25 | Performed by: FAMILY MEDICINE

## 2022-08-26 PROCEDURE — 99396 PREV VISIT EST AGE 40-64: CPT | Performed by: FAMILY MEDICINE

## 2022-08-26 PROCEDURE — 83036 HEMOGLOBIN GLYCOSYLATED A1C: CPT | Performed by: FAMILY MEDICINE

## 2022-08-26 RX ORDER — ESTRADIOL 0.1 MG/G
2 CREAM VAGINAL EVERY OTHER DAY
Qty: 127.5 UNITS | Refills: 0 | Status: SHIPPED | OUTPATIENT
Start: 2022-08-26 | End: 2022-09-09

## 2022-08-26 SDOH — HEALTH STABILITY: PHYSICAL HEALTH: ON AVERAGE, HOW MANY MINUTES DO YOU ENGAGE IN EXERCISE AT THIS LEVEL?: 60 MIN

## 2022-08-26 SDOH — HEALTH STABILITY: PHYSICAL HEALTH: ON AVERAGE, HOW MANY DAYS PER WEEK DO YOU ENGAGE IN MODERATE TO STRENUOUS EXERCISE (LIKE A BRISK WALK)?: 4 DAYS

## 2022-08-26 ASSESSMENT — ENCOUNTER SYMPTOMS
NAUSEA: 0
HEMATURIA: 0
HEARTBURN: 0
JOINT SWELLING: 1
ARTHRALGIAS: 1
HEMATOCHEZIA: 0
EYE PAIN: 0
ABDOMINAL PAIN: 0
MYALGIAS: 1
SORE THROAT: 0
WEAKNESS: 0
BREAST MASS: 0
PALPITATIONS: 0
FREQUENCY: 1
DIZZINESS: 0
PARESTHESIAS: 0
CONSTIPATION: 0
HEADACHES: 0
FEVER: 0
DIARRHEA: 0
NERVOUS/ANXIOUS: 0
CHILLS: 0
DYSURIA: 0
SHORTNESS OF BREATH: 0
COUGH: 0

## 2022-08-26 ASSESSMENT — SOCIAL DETERMINANTS OF HEALTH (SDOH)
WITHIN THE LAST YEAR, HAVE TO BEEN RAPED OR FORCED TO HAVE ANY KIND OF SEXUAL ACTIVITY BY YOUR PARTNER OR EX-PARTNER?: NO
WITHIN THE LAST YEAR, HAVE YOU BEEN AFRAID OF YOUR PARTNER OR EX-PARTNER?: NO
WITHIN THE LAST YEAR, HAVE YOU BEEN HUMILIATED OR EMOTIONALLY ABUSED IN OTHER WAYS BY YOUR PARTNER OR EX-PARTNER?: NO
WITHIN THE LAST YEAR, HAVE YOU BEEN KICKED, HIT, SLAPPED, OR OTHERWISE PHYSICALLY HURT BY YOUR PARTNER OR EX-PARTNER?: NO
HOW HARD IS IT FOR YOU TO PAY FOR THE VERY BASICS LIKE FOOD, HOUSING, MEDICAL CARE, AND HEATING?: NOT HARD AT ALL

## 2022-08-26 NOTE — PATIENT INSTRUCTIONS
Look into [employer]evin and [employer] vegan cheeses.     Musculoskeletal    1.   X-ray for left shoulder pain and limited range of motion.    2.   Referred to Sports medicine to review X-rays.    3.  Practice wall crawl exercise demonstrated in clinic twice a day to preserve range of motion. Avoid shoulder presses for now.     4. DX Hip/Pelvis/Spine - Call [phone] for Radiology and imaging services.    5. Use compression socks for swelling.     Preventative    1. Complete annual at-home colorectal cancer screening.     2. Pap smear ordered. Prescribed estradiol cream, use every other day for 2 weeks prior to pap.    3. Referred to weight management at [facility]    4. Labs done today

## 2022-08-26 NOTE — PROGRESS NOTES
SUBJECTIVE:   CC: Chiquita Butler is an 62 year old woman who presents for preventive health visit.     Patient has been advised of split billing requirements and indicates understanding: Yes  Healthy Habits:     Getting at least 3 servings of Calcium per day:  Yes    Bi-annual eye exam:  Yes    Dental care twice a year:  Yes    Sleep apnea or symptoms of sleep apnea:  None    Diet:  Low salt    Frequency of exercise:  4-5 days/week    Duration of exercise:  Greater than 60 minutes    Taking medications regularly:  Yes    PHQ-2 Total Score: 0      Today's PHQ-2 Score:   PHQ-2 ( 1999 [employer]) 8/26/2022   Q1: Little interest or pleasure in doing things 0   Q2: Feeling down, depressed or hopeless 0   PHQ-2 Score 0   PHQ-2 Total Score (12-17 Years)- Positive if 3 or more points; Administer PHQ-A if positive -   Q1: Little interest or pleasure in doing things Not at all   Q2: Feeling down, depressed or hopeless Not at all   PHQ-2 Score 0       Abuse: Current or Past (Physical, Sexual or Emotional) - No  Do you feel safe in your environment? No    Have you ever done Advance Care Planning? (For example, a Health Directive, POLST, or a discussion with a medical provider or your loved ones about your wishes): Yes, patient states has an Advance Care Planning document and will bring a copy to the clinic.    Social History     Tobacco Use     Smoking status: Never Smoker     Smokeless tobacco: Never Used   Substance Use Topics     Alcohol use: Not Currently     Alcohol/week: 2.5 standard drinks     Types: 3 Standard drinks or equivalent per week     Comment: 1-2 drinks/week       Alcohol Use 8/26/2022   Prescreen: >3 drinks/day or >7 drinks/week? No     Reviewed orders with patient.  Reviewed health maintenance and updated orders accordingly - Yes  Lab work is in process    Breast Cancer Screening:  Any new diagnosis of family breast, ovarian, or bowel cancer? No    FHS-7: No flowsheet data found.    Pertinent mammograms are  reviewed under the imaging tab.  Further mammograms unnecessary as patient has had top surgery  History of abnormal Pap smear: NO next Pap will be last  PAP / HPV Latest Ref Rng & Units 5/2/2017   PAP (Historical) - NIL   HPV16 NEG Negative   HPV18 NEG Negative   HRHPV NEG Negative     Reviewed and updated as needed this visit by clinical staff   Tobacco  Allergies  Meds  Problems  Med Hx  Surg Hx  Fam Hx  Soc   Hx          Reviewed and updated as needed this visit by Provider   Tobacco  Allergies  Meds  Problems  Med Hx  Surg Hx  Fam Hx           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-     Panic attack 12/17/13    ER Visit      Past Surgical History:   Procedure Laterality Date     AS REPR PDA BY CLAIRE  1960    age 1     BYPASS GASTRIC MASON-EN-Y, LIVER BIOPSY, COMBINED  01/01/2004     CATHETER, ABLATION  01/01/1962    cardiac, related to scarring around PDA ligation site      COLONOSCOPY N/A 04/20/2017    Procedure: COLONOSCOPY;;  Surgeon: August Perez MD;  Location: [facility]     COLONOSCOPY N/A 05/15/2017    Procedure: COLONOSCOPY;  Colonoscopy/DX:RLQ abdominal pain/2 day prep emailed;  Surgeon: Gold Tamayo MD;  Location: [facility]     HYSTERECTOMY  01/01/1993    Partial, fibroids     INCISION AND DRAINAGE BREAST Bilateral 01/08/2021    Procedure: INCISION AND DRAINAGE, BREAST. Right breast chest hematoma evacuation.;  Surgeon: Gracie Rapp MD;  Location: [facility]     TRANSGENDER MASTECTOMY Bilateral 12/14/2020    Procedure: Bilateral Simple Mastectomy, On-Q;  Surgeon: Gracie Rapp MD;  Location: [facility]      Musculoskeletal/Arthritis  Patient recently fell on her left knee, although she noticed she has gained much greater upper body strength as she was able to catch herself. She overall attests to having a very healthy lifestyle with being  highly physically active and eating write.    Patient complains of bilateral knee pain associated with osteoarthritis that makes moving around and getting up often uncomfortable. She has limited range of motion in her left arm, which has been occurring for the last several months.     She gets annual injections for her knee and regular PT which she is currently satisfied with.     Lifestyle   She recently lost her job and is now making 3/4 her previous income working in [employer] and is overall financially stable, although she is cutting back on spending. She reports that her mood is pretty good right now.     Patient reports drinking a couple drinks a week with no concerns about drinking.    She reports that her daughter is doing well and is excelling academically.     She is concerned about Aissatou's smoking habits. She reports that she is snoring loudly at night which sometimes wakes her up at night. She has noticed she has has significant weight gain. Aissatou's parents were also smokers and use CPAP machines. Patient affirms this is a significant stressor for her.     Patient is getting more potassium and calcium drinking sports drinks with electrolytes. She struggles to get potassium in, and attests to trying to add more kale and blueberries to her diet when she can.     Patient reports wanting to lose 30 lbs. due to knee pain and foot pain and wanting to fit into smaller clothes. She was previously 317 lbs. with heel spurs that she addressed through bariatric surgery and life style changes. She denies wanting to lose weight due to societal expectations, and is more motivated by joint pain and being more able to easily travel.     Sleep  Patient affirms she has stopped aviane and is taking melatonin, but reports having trouble going to sleep due to stress and frequently urinates at night. She drinks two caffeinated drinks a day.     Medication  She is still taking her medication for blood pressure and high  "cholesterol, and attests to having headaches when she misses these dosages. She agrees that her healthier life style may be helping her with these issues.     Patient denies and  discharge, pain, or any other concerns.     Review of Systems   Constitutional: Negative for chills and fever.   HENT: Negative for congestion, ear pain, hearing loss and sore throat.    Eyes: Negative for pain and visual disturbance.   Respiratory: Negative for cough and shortness of breath.    Cardiovascular: Negative for chest pain and palpitations.   Gastrointestinal: Negative for abdominal pain, constipation, diarrhea and nausea.   Genitourinary: Positive for frequency. Negative for dysuria, genital sores, hematuria and urgency.   Musculoskeletal: Positive for arthralgias, joint swelling and myalgias.   Skin: Negative for rash.   Neurological: Negative for dizziness, weakness and headaches.   Psychiatric/Behavioral: The patient is not nervous/anxious.        OBJECTIVE:   /77   Pulse 60   Temp 98  F (36.7  C) (Oral)   Resp 16   Ht 1.778 m (5' 10\")   Wt 114.8 kg (253 lb)   LMP  (LMP Unknown)   SpO2 97%   BMI 36.30 kg/m    Physical Examv  GENERAL: healthy, alert and no distress  RESP: lungs clear to auscultation - no rales, rhonchi or wheezes  CV: regular rate and rhythm, normal S1 S2, no S3 or S4, no murmur, click or rub, no peripheral edema and peripheral pulses strong  ABDOMEN: Scattered scars consistent with history of surgery otherwise normal  MS: Range of motion with shoulder aid reduction limited to 90 degrees on the right. Walks with antalgic gait favoring left knee. 2+ pedal edema limited to area surrounding area of lateral malleolus bilaterally.  SKIN: Abrasion on anterior of left knee  PSYCH: Mood is good. Affect is appropriate to content. Speech normal in rate and rhythm. Dressed appropriately for weather and occasion.    BP Readings from Last 6 Encounters:   08/26/22 116/77   02/11/22 128/84   12/28/21 (!) " 148/89   12/06/21 (!) 140/91   07/30/21 (!) 157/101   07/27/21 (!) 118/90         Diagnostic Test Results:  Labs reviewed in Epic  Results for orders placed or performed in visit on 08/26/22 (from the past 24 hour(s))   Hemoglobin A1c   Result Value Ref Range    Hemoglobin A1C 5.3 0.0 - 5.6 %       ASSESSMENT/PLAN:       ICD-10-CM    1. Encounter for preventative adult health care examination  Z00.00 See below reviewed in detail   2. Primary hypertension  I10 Controlled, consider deprescribing  Basic metabolic panel     Basic metabolic panel   3. Chronic right shoulder pain  M25.511 Decreased range of motion, Xray showing significant degenerative changes vs AVN sequelae. Referred to see Dr. Doss in sports medicine. Taught Wall crawl exercise and instructed to use twice a day, instructed to  avoid exercises that cause pain.  [facility]    G89.29 X-ray rt Shoulder G/E 3 vw   4. Screening for osteoporosis  Z13.820 Reviewed last Dexa which was normal in 2012.  DX Hip/Pelvis/Spine   5. Screening for malignant neoplasm of cervix  Z12.4 Discussed post menopausal and need for topical estrogen 2 weeks before Pap collection. Scheduled Pap now so she knows when to start utilizing cream.    Pap imaged thin layer screen with HPV - recommended age 30 - 65 years     CANCELED: Pap imaged thin layer screen with HPV - recommended age 30 - 65 years   6. Postmenopausal status  Z78.0 See above  estradiol (ESTRACE) 0.1 MG/GM vaginal cream   7. ACP (advance care planning)  Z71.89 Has documents at home that I requested she provide to me. Updated code status as full. Health care agent is wife Aissatou Brown  Full Code   8. H/O gastric bypass  Z98.84 Needs mineral screening    Ferritin     Vitamin B12     Ferritin     Vitamin B12   9. BMI 36  E66.01 Patient wants to lose additional weight for treatment of knee arthritis. Weight managemnt referral as she has maximized sleep, nutrition, and  activity.    Comprehensive Weight Management    Z68.36 Hemoglobin A1c     Hemoglobin A1c   10. Localized edema  R60.0 At Ankles, localized compression socks     Results for orders placed or performed in visit on 08/26/22   X-ray rt Shoulder G/E 3 vw     Status: None    Narrative    3 views right shoulder radiographs 8/26/2022 3:34 PM    History: Chronic right shoulder pain; Chronic right shoulder pain    Comparison: None available    Findings:    AP, Grashey, transscapular Y, axillary  views of the right shoulder  were obtained.     Mild degenerative changes of the AC joint.    Moderate to high-grade degenerative changes of the glenohumeral joint.  Humeral head is flattened, this could be sequela of AVN or high-grade  degenerative changes. Subcortical cystic changes of the humeral head.  Soft tissue is unremarkable. The visualized lung is clear.      Impression    Impression:  1. No acute osseous abnormality.  2. High-grade disc generative changes of the glenohumeral joint, large  osteophyte and joint space narrowing. Flattening of the humeral head  could be sequela of AVN versus high-grade degenerative changes.  Recommend clinical correlation.    JUTTA ELLERMANN, MD         SYSTEM ID:  [id number]   Results for orders placed or performed in visit on 08/26/22   Hemoglobin A1c     Status: Normal   Result Value Ref Range    Hemoglobin A1C 5.3 0.0 - 5.6 %         Patient has been advised of split billing requirements and indicates understanding: Yes    COUNSELING:  Reviewed preventive health counseling, as reflected in patient instructions       Regular exercise       Healthy diet/nutrition       Alcohol Use       Osteoporosis prevention/bone health       Colorectal Cancer Screening       Advance Care Planning      Chiquita Botellot reports that Chiquita Butler has never smoked. Chiquita Butler has never used smokeless tobacco.      Counseling Resources:  ATP IV Guidelines  Pooled Cohorts Equation Calculator  Breast Cancer Risk  Calculator  BRCA-Related Cancer Risk Assessment: FHS-7 Tool  FRAX Risk Assessment  ICSI Preventive Guidelines  Dietary Guidelines for Americans, 2010  [employer]'s MyPlate  ASA Prophylaxis  Lung CA Screening    Amarilis Agosto MD  [facility]  Split billing: discussed with patient. 59minutes excluding preventive visit    Answers for HPI/ROS submitted by the patient on 8/26/2022  Blood in stool: No  heartburn: No  peripheral edema: No  mood changes: No  Skin sensation changes: No  pelvic pain: No  vaginal bleeding: No  vaginal discharge: No  tenderness: No  breast mass: No  breast discharge: No  impotence: No  penile discharge: No    =

## 2022-08-29 NOTE — PROGRESS NOTES
Addendum:   Cr 1.2 and GFR low - etiology unclear. Contacted pt. Rec repeat labs in next 10 days     Add MACR, UA, Cx reflex

## 2022-08-30 ENCOUNTER — OFFICE VISIT (OUTPATIENT)
Dept: FAMILY MEDICINE | Facility: CLINIC | Age: 62
End: 2022-08-30
Payer: COMMERCIAL

## 2022-08-30 VITALS
HEIGHT: 70 IN | OXYGEN SATURATION: 97 % | BODY MASS INDEX: 36.22 KG/M2 | WEIGHT: 253 LBS | HEART RATE: 61 BPM | RESPIRATION RATE: 16 BRPM | SYSTOLIC BLOOD PRESSURE: 136 MMHG | TEMPERATURE: 98 F | DIASTOLIC BLOOD PRESSURE: 89 MMHG

## 2022-08-30 DIAGNOSIS — M25.511 CHRONIC RIGHT SHOULDER PAIN: Primary | ICD-10-CM

## 2022-08-30 DIAGNOSIS — G89.29 CHRONIC RIGHT SHOULDER PAIN: Primary | ICD-10-CM

## 2022-08-30 DIAGNOSIS — M19.011 PRIMARY OSTEOARTHRITIS OF RIGHT SHOULDER: ICD-10-CM

## 2022-08-30 PROCEDURE — 82274 ASSAY TEST FOR BLOOD FECAL: CPT | Performed by: FAMILY MEDICINE

## 2022-08-30 PROCEDURE — 99214 OFFICE O/P EST MOD 30 MIN: CPT | Performed by: FAMILY MEDICINE

## 2022-08-30 NOTE — PROGRESS NOTES
SUBJECTIVE: Chiquita Butler is a 62 year old adult who presents to the clinic complaining of right shoulder pain for the past few months. She reports that the pain is primarily located on the posterior aspect of her right shoulder and rates it as an average of 7/10 on the pain scale. She describes the pain as steady and experiences throbbing with movement. Notes that she can no longer reach the dishes at the top of the shelf or  her 2-year-old child. She can no longer sleep on her right side and wakes up 1-2 times a night from the pain a couple of times a week. She uses ice packs twice a day and takes Aleve, with mild and temporary relief of the pain. She has no history of injury, trauma, or steroid injections in the shoulder.    PAST MEDICAL, SOCIAL, SURGICAL AND FAMILY HISTORY: Chiquita Butler  has a past medical history of Congenital heart valve abnormality (9/7/2014), Diverticulosis, Hypertension, Keratoconus of both eyes (01-), and Panic attack (12/17/13).    Chiquita Butler has no past medical history of Complication of anesthesia.  Chiquita Butler  has a past surgical history that includes Hysterectomy (01/01/1993); Bypass gastric romaine-en-y, liver biopsy, combined (01/01/2004); catheter, ablation (01/01/1962); Colonoscopy (N/A, 04/20/2017); Colonoscopy (N/A, 05/15/2017); Transgender Mastectomy (Bilateral, 12/14/2020); Incision and drainage breast (Bilateral, 01/08/2021); and REPR PDA BY CLAIRE (1960).  Chiquita Butler's family history includes Alzheimer Disease (age of onset: 60) in Chiquita Butler's mother; C.A.D. in Chiquita Butler's father; Cerebrovascular Disease in Chiquita Butler's paternal grandmother; Colorectal Cancer in Chiquita Butler's maternal grandmother; Depression in an other family member; Diabetes in Chiquita Butler's sister; Eye Disorder in Chiquita Butler's sister; Glaucoma in Chiquita Butler's maternal grandmother; Myocardial Infarction (age of onset: 49) in Chiquita Butler's father.  Chiquita Butler  reports that Chiquita Butler has never smoked. Chiquita Butler has never used smokeless tobacco. Chiquita Butler reports previous alcohol use of about 2.5 standard drinks of alcohol per week. Chiquita Butler reports that Chiquita Butler does not use drugs.    ALLERGIES: Chiquita Butler has No Known Allergies.    CURRENT MEDICATIONS: Chiquita Butler has a current medication list which includes the following prescription(s): albuterol, amlodipine, buspirone, vitamin d3, citalopram, diclofenac, diclofenac, estradiol, losartan, metoprolol succinate er, multivitamin w/minerals, potassium chloride, and rosuvastatin, and the following Facility-Administered Medications: lidocaine (pf).     REVIEW OF SYSTEMS: 6 point review of systems is negative except as noted above.    EXAM:  LMP  (LMP Unknown)   CONSTITUTIONIAL: healthy, alert and no distress  HEAD: Normocephalic. No masses, lesions, tenderness or abnormalities  ENT: ENT exam normal, no neck nodes or sinus tenderness  SKIN: urticarial lesion on right posterior forearm  GAIT: normal  Stance: normal  NEUROLOGIC: Normal muscle tone and strength, reflexes normal, sensation grossly normal.  PSYCHIATRIC: affect normal/bright and mentation appears normal.    MUSCULOSKELETAL: Right shoulder    Palpation:  Non-tender SC joint, clavicle, acromion, subacromial space, proximal bicep tendon and upper trapezius muscle   Tender: Ac joint  Range of Motion   Abduction: 110 (active)   Adduction: 40 (active)   Flexion: 90 (active)   Extension: 40 (active)        Active = Passive  Strength: rotator cuff strength full  Special tests: Positive Cross-Arm adduction on right, Positive Neer's test on right,  Positive Simental on right, Negative Messina's, Negative anterior and posterior apprehension    Apley's Scratch Test: Right UE to L1, Left UE to T7    ASSESSMENT/PLAN:  Osteoarthritis of the right shoulder:  -Appointment has been made with Dr. Doss on 9/14/2022 at 2 pm on 4th floor of [facility] for glenohumeral  ultrasound-guided corticosteroid injection of right shoulder.   -If chronic pain and ROM do not improve, consider referral to orthopedic surgery for total right shoulder replacement.    X-RAY INTERPRETATION:   X-Ray of the Right Shoulder:   1. No acute osseous abnormality.  2. High-grade disc generative changes of the glenohumeral joint, large  osteophyte and joint space narrowing. Flattening of the humeral head  could be sequela of AVN versus high-grade degenerative changes.  Recommend clinical correlation.    Patient seen, evaluated and discussed with the resident. I have verified the content of the note, which accurately reflects my assessment of the patient and the plan of care.  Resident: Dee Dee Akbar DO, MPH (PGY-1)   Supervising Physician:  Kasey Doss MD

## 2022-08-30 NOTE — PATIENT INSTRUCTIONS
Patient Education   Here is the plan from today's visit    1. Chronic right shoulder pain    2. Primary osteoarthritis of right shoulder  -Appointment has been made with Dr. Doss on 9/14/2022 at 2 pm on 4th floor of [facility] for glenohumeral ultrasound-guided corticosteroid injection of right shoulder.   -If chronic pain and ROM do not improve, consider referral to orthopedic surgery for total right shoulder replacement.    Please call or return to clinic if your symptoms don't go away.    Follow up plan  Return in about 2 weeks (around 9/13/2022).    Thank you for coming to [facility] today.  Lab Testing:  **If you had lab testing today and your results are reassuring or normal they will be mailed to you or sent through [employer] within 7 days.   **If the lab tests need quick action we will call you with the results.  **If you are having labs done on a different day, please call [phone] to schedule at [facility] or [phone] for other [facility]patient Lab locations. Labs do not offer walk-in appointments.  The phone number we will call with results is # [phone] (home) [phone] (work). If this is not the best number please call our clinic and change the number.  Medication Refills:  If you need any refills please call your pharmacy and they will contact us.   If you need to  your refill at a new pharmacy, please contact the new pharmacy directly. The new pharmacy will help you get your medications transferred faster.   Scheduling:  If you have any concerns about today's visit or wish to schedule another appointment please call our office during normal business hours [phone] (8-5:00 M-F)  If a referral was made to an [facility] specialty provider and you do not get a call from central scheduling, please refer to directions on your visit summary or call our office during normal business hours for assistance.   If a Mammogram was ordered for you at the Breast Center  call [phone] to schedule or change your appointment.  If you had an XRay/CT/Ultrasound/MRI ordered the number is [phone] to schedule or change your radiology appointment.   [facility] has limited ultrasound appointments available on Wednesdays, if you would like your ultrasound at [facility], please call [phone] to schedule.   Medical Concerns:  If you have urgent medical concerns please call [phone] at any time of the day.    Kasey Doss MD

## 2022-09-01 LAB — HEMOCCULT STL QL IA: NEGATIVE

## 2022-09-10 ENCOUNTER — HEALTH MAINTENANCE LETTER (OUTPATIENT)
Age: 62
End: 2022-09-10

## 2022-09-14 ENCOUNTER — OFFICE VISIT (OUTPATIENT)
Dept: ORTHOPEDICS | Facility: CLINIC | Age: 62
End: 2022-09-14
Payer: COMMERCIAL

## 2022-09-14 VITALS — BODY MASS INDEX: 36.22 KG/M2 | HEIGHT: 70 IN | WEIGHT: 253 LBS

## 2022-09-14 DIAGNOSIS — M19.011 OSTEOARTHRITIS OF GLENOHUMERAL JOINT, RIGHT: ICD-10-CM

## 2022-09-14 DIAGNOSIS — M25.511 PAIN IN JOINT OF RIGHT SHOULDER: Primary | ICD-10-CM

## 2022-09-14 PROCEDURE — 99207 PR DROP WITH A PROCEDURE: CPT | Performed by: FAMILY MEDICINE

## 2022-09-14 PROCEDURE — 20610 DRAIN/INJ JOINT/BURSA W/O US: CPT | Mod: RT | Performed by: FAMILY MEDICINE

## 2022-09-14 RX ORDER — LIDOCAINE HYDROCHLORIDE 10 MG/ML
4 INJECTION, SOLUTION EPIDURAL; INFILTRATION; INTRACAUDAL; PERINEURAL
Status: SHIPPED | OUTPATIENT
Start: 2022-09-14

## 2022-09-14 RX ORDER — TRIAMCINOLONE ACETONIDE 40 MG/ML
40 INJECTION, SUSPENSION INTRA-ARTICULAR; INTRAMUSCULAR
Status: SHIPPED | OUTPATIENT
Start: 2022-09-14

## 2022-09-14 RX ADMIN — TRIAMCINOLONE ACETONIDE 40 MG: 40 INJECTION, SUSPENSION INTRA-ARTICULAR; INTRAMUSCULAR at 14:10

## 2022-09-14 RX ADMIN — LIDOCAINE HYDROCHLORIDE 4 ML: 10 INJECTION, SOLUTION EPIDURAL; INFILTRATION; INTRACAUDAL; PERINEURAL at 14:10

## 2022-09-14 NOTE — PROGRESS NOTES
Subjective:   Patient is a 62-year-old female with chronic right shoulder pain and Osteoarthritis of the right shoulder    Objective:  Review of imaging demonstrates glenohumeral osteoarthritis    A/P: 62-year-old with right shoulder osteoarthritis and possible AVN    Procedure: Risks and benefits discussed with the patient and was consented.  Anatomy was identified using a linear probe.  Patient was positioned in the lateral decub position Skin marker was used to determine the positioning of the probe.  Glenohumeral, GH joint identified and picture stored.  The area was broken down and turned into a sterile procedure.  chloroprep used to clean the area. Patient had sterile towels draped. Sterile gel and probe covered with tagaderm. 3 cc of 1% lidocaine with a 25-gauge needle injected as a track, this was followed by 4 cc 1% lidocaine with 1cc 40 mg Kenalog.  Videos and pictures stored in [facility]  Mild bleeding post procedure.  Area cleaned and band aid was placed.    Large Joint Injection: R glenohumeral joint    Date/Time: 9/14/2022 2:10 PM  Performed by: Kasey Doss MD  Authorized by: Kasey Doss MD     Indications:  Pain  Needle Size:  22 G  Guidance: ultrasound    Approach:  Posterior  Location:  Shoulder      Site:  R glenohumeral joint  Medications:  40 mg triamcinolone 40 MG/ML; 4 mL lidocaine (PF) 1 %  Procedure discussed: discussed risks, benefits, and alternatives    Consent Given by:  Patient  Timeout: timeout called immediately prior to procedure    Prep: patient was prepped and draped in usual sterile fashion      I agree with the following injection documentation.    MANUELITO Doss MD

## 2022-09-14 NOTE — LETTER
9/14/2022      RE: Chiquita Butler  [address]  [place] [address]     Dear Colleague,    Thank you for referring your patient, Chiquita Butler, to the [facility] [place]. Please see a copy of my visit note below.    Subjective:   Patient is a 62-year-old female with chronic right shoulder pain and Osteoarthritis of the right shoulder    Objective:  Review of imaging demonstrates glenohumeral osteoarthritis    A/P: 62-year-old with right shoulder osteoarthritis and possible AVN    Procedure: Risks and benefits discussed with the patient and was consented.  Anatomy was identified using a linear probe.  Patient was positioned in the lateral decub position Skin marker was used to determine the positioning of the probe.  Glenohumeral, GH joint identified and picture stored.  The area was broken down and turned into a sterile procedure.  chloroprep used to clean the area. Patient had sterile towels draped. Sterile gel and probe covered with tagaderm. 3 cc of 1% lidocaine with a 25-gauge needle injected as a track, this was followed by 4 cc 1% lidocaine with 1cc 40 mg Kenalog.  Videos and pictures stored in [facility]  Mild bleeding post procedure.  Area cleaned and band aid was placed.    Large Joint Injection: R glenohumeral joint    Date/Time: 9/14/2022 2:10 PM  Performed by: Kasey Doss MD  Authorized by: Kasey Doss MD     Indications:  Pain  Needle Size:  22 G  Guidance: ultrasound    Approach:  Posterior  Location:  Shoulder      Site:  R glenohumeral joint  Medications:  40 mg triamcinolone 40 MG/ML; 4 mL lidocaine (PF) 1 %  Procedure discussed: discussed risks, benefits, and alternatives    Consent Given by:  Patient  Timeout: timeout called immediately prior to procedure    Prep: patient was prepped and draped in usual sterile fashion      I agree with the following injection documentation.    MANUELITO Doss MD        Again, thank you for  allowing me to participate in the care of your patient.      Sincerely,    Kasey Doss MD

## 2022-09-14 NOTE — NURSING NOTE
[address] [address]  Dept: [phone]  ______________________________________________________________________________    Patient: Chiquita Butler   : 1960   MRN: [id number]   2022    INVASIVE PROCEDURE SAFETY CHECKLIST    Date: 2022   Procedure:Right shoulder glenohumeral cortisone injection with ultrasound guidance  Patient Name: Chiquita Butler  MRN: [id number]  YOB: 1960    Action: Complete sections as appropriate. Any discrepancy results in a HARD COPY until resolved.     PRE PROCEDURE:  Patient ID verified with 2 identifiers (name and  or MRN): Yes  Procedure and site verified with patient/designee (when able): Yes  Accurate consent documentation in medical record: Yes  H&P (or appropriate assessment) documented in medical record: Yes  H&P must be up to 20 days prior to procedure and updates within 24 hours of procedure as applicable: NA  Relevant diagnostic and radiology test results appropriately labeled and displayed as applicable: Yes  Procedure site(s) marked with provider initials: NA    TIMEOUT:  Time-Out performed immediately prior to starting procedure, including verbal and active participation of all team members addressing the following:Yes  * Correct patient identify  * Confirmed that the correct side and site are marked  * An accurate procedure consent form  * Agreement on the procedure to be done  * Correct patient position  * Relevant images and results are properly labeled and appropriately displayed  * The need to administer antibiotics or fluids for irrigation purposes during the procedure as applicable   * Safety precautions based on patient history or medication use    DURING PROCEDURE: Verification of correct person, site, and procedures any time the responsibility for care of the patient is transferred to another member of the care team.       Prior to injection, verified  patient identity using patient's name and date of birth.  Due to injection administration, patient instructed to remain in clinic for 15 minutes  afterwards, and to report any adverse reaction to me immediately.    Joint injection was performed.      Drug Amount Wasted:  Yes: 3 mg/ml   Vial/Syringe: Single dose vial  Expiration Date:  06/01/2026      Myranda Luong, ATC  September 14, 2022

## 2022-09-23 ENCOUNTER — TELEPHONE (OUTPATIENT)
Dept: CARDIOLOGY | Facility: CLINIC | Age: 62
End: 2022-09-23

## 2022-09-30 ENCOUNTER — VIRTUAL VISIT (OUTPATIENT)
Dept: ENDOCRINOLOGY | Facility: CLINIC | Age: 62
End: 2022-09-30
Payer: COMMERCIAL

## 2022-09-30 ENCOUNTER — TELEPHONE (OUTPATIENT)
Dept: ENDOCRINOLOGY | Facility: CLINIC | Age: 62
End: 2022-09-30

## 2022-09-30 DIAGNOSIS — E66.812 CLASS 2 SEVERE OBESITY DUE TO EXCESS CALORIES WITH SERIOUS COMORBIDITY AND BODY MASS INDEX (BMI) OF 36.0 TO 36.9 IN ADULT (H): Primary | ICD-10-CM

## 2022-09-30 DIAGNOSIS — Z98.84 STATUS POST BARIATRIC SURGERY: ICD-10-CM

## 2022-09-30 DIAGNOSIS — E66.01 CLASS 2 SEVERE OBESITY DUE TO EXCESS CALORIES WITH SERIOUS COMORBIDITY AND BODY MASS INDEX (BMI) OF 36.0 TO 36.9 IN ADULT (H): Primary | ICD-10-CM

## 2022-09-30 PROCEDURE — 99205 OFFICE O/P NEW HI 60 MIN: CPT | Mod: 95 | Performed by: INTERNAL MEDICINE

## 2022-09-30 NOTE — PROGRESS NOTES
"    New Medical Weight Management Consult    PATIENT:  Chiquita Butler  MRN:         [id number]  :         1960  EDGAR:         2022        I had the pleasure of seeing your patient, Chiquita Butler. Full intake/assessment was done to determine barriers to weight loss success and develop a treatment plan. Chiquita Butler is a 62 year old adult interested in treatment of medical problems associated with excess weight so that she can \"be around longer\" for her partner and their young children. Chiquita Butler has a height of 5'10\", a weight of 253 lbs, and the calculated BMI of 36.3.    ASSESSMENT/PLAN:  Chiquita is a patient with early onset severe obesity with significant element of familial/genetic influence and without current health consequences other than well controlled hypertension. Chiquita Butler does not need aggressive weight loss plan due to lack of concurrent medical comorbidities. She describes herself as a \"foodie\" or \"snob\" and eats 3 healthy meals per day. She does not have cravings for sugary foods, rather occasional savory items such as microwaved tortilla and cheese/avocado. She does admit to snacking after waking up in the middle of the night on occasion or when staying up late to work at her computer. Her only source of liquid calories comes in 1.5 cups of coffee with half and half creamer. Despite mild limitations due to her arthritis, she reports working out 3 times per week with a .    Chiquita Butler's problem is complicated by prior gastric bypass surgery done approximately 20 years ago in [place]. She experienced 100 lbs weight loss followed by gradual 50 lb weight gain over 20 years. Currently taking daily multivitamin, Vit D (2000 units), and B complex vitamin--? dose. Does not take Zinc, iron, or calcium. Reports getting about 2 servings of dairy (yogurt) per day.        Chiquita Butler's ability to lose weight is impacted by sleep disturbances, late night snacking, young children, " "and a full time job.     PLAN:    -Reduce/switch creamer use in daily coffee for lower calorie options  -Decrease portion sizes targeting starches and added sugars for elimination  -Start topiramate to reduce savory food cravings and help maintain sleep throughout the night  -Suggested calorie tracking applications such as my fitness pal and lose it for data collection prior to nutrition appointment  -Will also obtain post bariatric surgery nutrition labs and recommend nutritionist appointment in 1 month. Pt is not meeting nutritional recomendations for Fe or calcium intake (but did have recent DXA with nl bone density)  -Medication management pharmacist appointment in 4-6 weeks  -Follow up with me in 2-3 months    Post-bariatric surgery gain  Ancillary testing: N/A. Labs: nutritional labs (\"annual\" being done)   Food Plan:  Volumetrics and High protein/low carbohydrate.  Activity Plan: continue as prior.  Supplementary:  N/A.  Medication:  The patient will begin medication in pursuit of improved medical status as influenced by body weight. Chiquita Butler will start topiramate. Patient was made aware that topiramate is not approved for the treatment of obesity.  There is a mutual understanding of the goals and risks of this therapy. The patient is in agreement. Chiquita Butler is educated on dosage regimen and possible side effects.      No orders of the defined types were placed in this encounter.      Chiquita Butler has the following co-morbidities:       9/29/2022   I have the following health issues associated with obesity: High Blood Pressure   I have the following symptoms associated with obesity: Knee Pain, Lower Extremity Swelling       Patient Goals 9/29/2022   I am interested in having a healthier weight to diminish current health problems: Yes   I am interested in having a healthier weight in order to prevent future health problems: Yes   I am interested in having a healthier weight in order to have a future " "surgery: No       Referring Provider 9/29/2022   Please name the provider who referred you to Medical Weight Management.  If you do not know, please answer: \"I Don't Know\". Dr. Amarilis Agosto       Weight History 9/29/2022   How concerned are you about your weight? Very Concerned   Would you describe your weight gain as gradual? Yes   I became overweight: As a Teenager   The following factors have contributed to my weight gain:  Eating Too Much, Genetic (Runs in the Family), Stress   I have tried the following methods to lose weight: Watching Portions or Calories, Exercise, Weight Watchers, Weight Loss Surgery   My lowest weight since age 16 was: 170   My highest weight since age 16 was: 312   The most weight I have ever lost was: (lbs) 110   I have the following family history of obesity/being overweight:  One or more of my siblings are overweight   Has anyone in your family had weight loss surgery? No   How has your weight changed over the last year?  Gained       Diet Recall Review with Patient 9/29/2022   If you do eat breakfast, what types of food do you eat? oatmeal, tort with cheese, smoothie   Do you typically eat lunch? Yes   If you do eat lunch, what types of food do you typically eat?  tuNa crackers SALANDA AND SAMBUSA   Do you typically eat supper? Yes   If you do eat supper, what types of food do you typically eat? WHAT EVER THE KIDS EAT   Do you typically eat snacks? Yes   If you do snack, what types of food do you typically eat? TORRTILA WITH CHEESE   Do you like vegetables?  Yes   Do you drink water? Yes   How many glasses of juice do you drink in a typical day? 100   How many of glasses of milk do you drink in a typical day? 0   If you do drink milk, what type? N/A   How many 8oz glasses of sugar containing drinks such as Donal-Aid/sweet tea do you drink in a day? 0   How many cans/bottles of sugar pop/soda/tea/sports drinks do you drink in a day? 0   How many cans/bottles of diet pop/soda/tea or sports " drink do you drink in a day? 0   How often do you have a drink of alcohol? 2-4 Times a Month   If you do drink, how many drinks might you have in a day? 1 or 2       Eating Habits 9/29/2022   Generally, my meals include foods like these: bread, pasta, rice, potatoes, corn, crackers, sweet dessert, pop, or juice. Once a Week   Generally, my meals include foods like these: fried meats, brats, burgers, french fries, pizza, cheese, chips, or ice cream. Never   Eat fast food (like McDonal[employer], [employer], Taco Bell). Less Than Weekly   Eat at a buffet or sit-down restaurant. Never   Eat most of my meals in front of the TV or computer. Never   Often skip meals, eat at random times, have no regular eating times. Less Than Weekly   Rarely sit down for a meal but snack or graze throughout.  Never   Eat extra snacks between meals. A Few Times a Week   Eat most of my food at the end of the day. Almost Everyday   Eat in the middle of the night or wake up at night to eat. A Few Times a Week   Eat extra snacks to prevent or correct low blood sugar. Never   Eat to prevent acid reflux or stomach pain. Never   Worry about not having enough food to eat. Never   Have you been to the food shelf at least a few times this year? No   I eat when I am depressed. Never   I eat when I am stressed. Less Than Weekly   I eat when I am bored. Never   I eat when I am anxious. Never   I eat when I am happy or as a reward. Less Than Weekly   I feel hungry all the time even if I just have eaten. Never   Feeling full is important to me. Less Than Weekly   I finish all the food on my plate even if I am already full. Never   I can't resist eating delicious food or walk past the good food/smell. Once a Week   I eat/snack without noticing that I am eating. Never   I eat when I am preparing the meal. A Few Times a Week   I eat more than usual when I see others eating. Never   I have trouble not eating sweets, ice cream, cookies, or chips if they are around  the house. Never   I think about food all day. Never   Please list any other foods you crave? fresh fish.       Amount of Food 9/29/2022   I make myself vomit what I have eaten or use laxatives to get rid of food. Never   I eat a large amount of food, like a loaf of bread, a box of cookies, a pint/quart of ice cream, all at once. Never   I eat a large amount of food even when I am not hungry. Never   I eat rapidly. Never   I eat alone because I feel embarrassed and do not want others to see how much I have eaten. Never   I eat until I am uncomfortably full. Never   I feel bad, disgusted, or guilty after I overeat. Never   I make myself vomit what I have eaten or use laxatives to get rid of food. Never       Activity/Exercise History 9/29/2022   How much of a typical 12 hour day do you spend sitting? Less Than Half the Day   How much of a typical 12 hour day do you spend lying down? Less Than Half the Day   How much of a typical day do you spend walking/standing? Half the Day   How many hours (not including work) do you spend on the TV/Video Games/Computer/Tablet/Phone? 2-3 Hours   How many times a week are you active for the purpose of exercise? 4-5 TImes a Week   What keeps you from being more active? Lack of Time, Other   How many total minutes do you spend doing some activity for the purpose of exercising when you exercise? 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 100%   How many hours do you spend commuting to work daily?  0   What is your marital status? /In a Relationship   Do you have children? Yes   If you have  children, are they overweight? No   Who do you live with?  Partner and two kids ages 7 and 2   Are they supportive of your health goals? Yes   Who does the food shopping?  me       Mental Health History Reviewed With Patient 9/29/2022   Have you ever been physically or sexually abused? No   If yes, do you feel that the abuse is affecting your weight? N/A   If yes, would you like to talk to a counselor about the abuse? N/A   How often in the past 2 weeks have you felt little interest or pleasure in doing things? Not at all   Over the past 2 weeks how often have you felt down, depressed, or hopeless? Not at all       Sleep History Reviewed With Patient 9/29/2022   How many hours do you sleep at night? 6   Do you think that you snore loudly or has anybody ever heard you snore loudly (louder than talking or so loud it can be heard behind a shut door)? No   Has anyone seen or heard you stop breathing during your sleep? No   Do you often feel tired, fatigued, or sleepy during the day? No   Do you have a TV/Computer in your bedroom? No       MEDICATIONS:   Current Outpatient Medications   Medication Sig Dispense Refill     albuterol (PROAIR HFA/PROVENTIL HFA/VENTOLIN HFA) 108 (90 Base) MCG/ACT inhaler Inhale 2 puffs into the lungs every 6 hours as needed for shortness of breath / dyspnea or wheezing 8.5 g 0     amLODIPine (NORVASC) 10 MG tablet Take 1 tablet (10 mg) by mouth daily 90 tablet 3     busPIRone (BUSPAR) 7.5 MG tablet Take 1 tablet (7.5 mg) by mouth 2 times daily 180 tablet 3     Cholecalciferol (VITAMIN D) 2000 UNITS tablet Take 2,000 Units by mouth daily (Patient not taking: No sig reported) 100 tablet 3     citalopram (CELEXA) 40 MG tablet Take 1 tablet (40 mg) by mouth daily 90 tablet 3     diclofenac (VOLTAREN) 1 % topical gel Apply 2 g topically 4 times daily 100 g 0     diclofenac (VOLTAREN) 75 MG EC tablet Take 75 mg by mouth 2 times daily       losartan (COZAAR) 100 MG tablet Take 1 tablet (100 mg) by  mouth daily 90 tablet 3     metoprolol succinate ER (TOPROL XL) 100 MG 24 hr tablet Take 1 tablet (100 mg) by mouth daily 90 tablet 3     multivitamin w/minerals (THERA-VIT-M) tablet Take 1 tablet by mouth daily       potassium chloride (KLOR-CON) 20 MEQ packet Take 20 mEq by mouth daily 60 packet 1     rosuvastatin (CRESTOR) 40 MG tablet Take 1 tablet (40 mg) by mouth daily 90 tablet 3       ALLERGIES:   No Known Allergies    PHYSICAL EXAM:  LMP  (LMP Unknown)     Waist circumference:      Wt Readings from Last 4 Encounters:   09/14/22 114.8 kg (253 lb)   08/30/22 114.8 kg (253 lb)   08/26/22 114.8 kg (253 lb)   03/16/22 115.7 kg (255 lb)     A & O x 3  HEENT: NCAT  Respirations unlabored  Location of obesity: Mixed Obesity    FOLLOW-UP:   12 weeks.      Sincerely,    Tim Jennings MS

## 2022-09-30 NOTE — TELEPHONE ENCOUNTER
LM for patient regarding video visit  Appointment today with Dr. Dubose at 10:00.    Call back number was provided.

## 2022-09-30 NOTE — PROGRESS NOTES
"    New Medical Weight Management Consult    PATIENT:  Chiquita Butler  MRN:         [id number]  :         1960  EDGAR:         2022        I had the pleasure of seeing your patient, Chiquita Butler. Full intake/assessment was done to determine barriers to weight loss success and develop a treatment plan. Chiquita Butler is a 62 year old adult interested in treatment of medical problems associated with excess weight.     Chiquita Butler has the following co-morbidities:       2022   I have the following health issues associated with obesity: High Blood Pressure   I have the following symptoms associated with obesity: Knee Pain, Lower Extremity Swelling       Patient Goals 2022   I am interested in having a healthier weight to diminish current health problems: Yes   I am interested in having a healthier weight in order to prevent future health problems: Yes   I am interested in having a healthier weight in order to have a future surgery: No       Referring Provider 2022   Please name the provider who referred you to Medical Weight Management.  If you do not know, please answer: \"I Don't Know\". Dr. Amarilis Agosto       Weight History 2022   How concerned are you about your weight? Very Concerned   Would you describe your weight gain as gradual? Yes   I became overweight: As a Teenager   The following factors have contributed to my weight gain:  Eating Too Much, Genetic (Runs in the Family), Stress   I have tried the following methods to lose weight: Watching Portions or Calories, Exercise, Weight Watchers, Weight Loss Surgery   My lowest weight since age 16 was: 170   My highest weight since age 16 was: 312   The most weight I have ever lost was: (lbs) 110   I have the following family history of obesity/being overweight:  One or more of my siblings are overweight   Has anyone in your family had weight loss surgery? No   How has your weight changed over the last year?  Gained       Diet Recall Review " with Patient 9/29/2022   If you do eat breakfast, what types of food do you eat? oatmeal, tort with cheese, smoothie   Do you typically eat lunch? Yes   If you do eat lunch, what types of food do you typically eat?  tuNa crackers SALANDA AND SAMBUSA   Do you typically eat supper? Yes   If you do eat supper, what types of food do you typically eat? WHAT EVER THE KIDS EAT   Do you typically eat snacks? Yes   If you do snack, what types of food do you typically eat? TORRTILA WITH CHEESE   Do you like vegetables?  Yes   Do you drink water? Yes   How many glasses of juice do you drink in a typical day? 100   How many of glasses of milk do you drink in a typical day? 0   If you do drink milk, what type? N/A   How many 8oz glasses of sugar containing drinks such as Donal-Aid/sweet tea do you drink in a day? 0   How many cans/bottles of sugar pop/soda/tea/sports drinks do you drink in a day? 0   How many cans/bottles of diet pop/soda/tea or sports drink do you drink in a day? 0   How often do you have a drink of alcohol? 2-4 Times a Month   If you do drink, how many drinks might you have in a day? 1 or 2       Eating Habits 9/29/2022   Generally, my meals include foods like these: bread, pasta, rice, potatoes, corn, crackers, sweet dessert, pop, or juice. Once a Week   Generally, my meals include foods like these: fried meats, brats, burgers, french fries, pizza, cheese, chips, or ice cream. Never   Eat fast food (like McDonalds, [employer], Taco Bell). Less Than Weekly   Eat at a buffet or sit-down restaurant. Never   Eat most of my meals in front of the TV or computer. Never   Often skip meals, eat at random times, have no regular eating times. Less Than Weekly   Rarely sit down for a meal but snack or graze throughout.  Never   Eat extra snacks between meals. A Few Times a Week   Eat most of my food at the end of the day. Almost Everyday   Eat in the middle of the night or wake up at night to eat. A Few Times a Week    Eat extra snacks to prevent or correct low blood sugar. Never   Eat to prevent acid reflux or stomach pain. Never   Worry about not having enough food to eat. Never   Have you been to the food shelf at least a few times this year? No   I eat when I am depressed. Never   I eat when I am stressed. Less Than Weekly   I eat when I am bored. Never   I eat when I am anxious. Never   I eat when I am happy or as a reward. Less Than Weekly   I feel hungry all the time even if I just have eaten. Never   Feeling full is important to me. Less Than Weekly   I finish all the food on my plate even if I am already full. Never   I can't resist eating delicious food or walk past the good food/smell. Once a Week   I eat/snack without noticing that I am eating. Never   I eat when I am preparing the meal. A Few Times a Week   I eat more than usual when I see others eating. Never   I have trouble not eating sweets, ice cream, cookies, or chips if they are around the house. Never   I think about food all day. Never   Please list any other foods you crave? fresh fish.       Amount of Food 9/29/2022   I make myself vomit what I have eaten or use laxatives to get rid of food. Never   I eat a large amount of food, like a loaf of bread, a box of cookies, a pint/quart of ice cream, all at once. Never   I eat a large amount of food even when I am not hungry. Never   I eat rapidly. Never   I eat alone because I feel embarrassed and do not want others to see how much I have eaten. Never   I eat until I am uncomfortably full. Never   I feel bad, disgusted, or guilty after I overeat. Never   I make myself vomit what I have eaten or use laxatives to get rid of food. Never       Activity/Exercise History 9/29/2022   How much of a typical 12 hour day do you spend sitting? Less Than Half the Day   How much of a typical 12 hour day do you spend lying down? Less Than Half the Day   How much of a typical day do you spend walking/standing? Half the Day    How many hours (not including work) do you spend on the TV/Video Games/Computer/Tablet/Phone? 2-3 Hours   How many times a week are you active for the purpose of exercise? 4-5 TImes a Week   What keeps you from being more active? Lack of Time, Other   How many total minutes do you spend doing some activity for the purpose of exercising when you exercise? 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 100%   How many hours do you spend commuting to work daily?  0   What is your marital status? /In a Relationship   Do you have children? Yes   If you have children, are they overweight? No   Who do you live with?  Partner and two kids ages 7 and 2   Are they supportive of your health goals? Yes   Who does the food shopping?  me       Mental Health History Reviewed With Patient 9/29/2022   Have you ever been physically or sexually abused? No   If yes, do you feel that the abuse is affecting your weight? N/A   If yes, would you like to talk to a counselor about the abuse? N/A   How often in the past 2 weeks have you felt little interest or pleasure in doing things? Not at all   Over the past 2 weeks how often have you felt down, depressed, or hopeless? Not at all       Sleep History Reviewed With Patient 9/29/2022   How many hours do you sleep at night? 6   Do you think that you snore loudly or has anybody ever heard you snore loudly (louder than talking or so loud it can be heard behind a shut door)? No   Has anyone seen or heard you stop breathing during your sleep? No   Do you often feel tired, fatigued, or sleepy during the  day? No   Do you have a TV/Computer in your bedroom? No       MEDICATIONS:   Current Outpatient Medications   Medication Sig Dispense Refill     albuterol (PROAIR HFA/PROVENTIL HFA/VENTOLIN HFA) 108 (90 Base) MCG/ACT inhaler Inhale 2 puffs into the lungs every 6 hours as needed for shortness of breath / dyspnea or wheezing 8.5 g 0     amLODIPine (NORVASC) 10 MG tablet Take 1 tablet (10 mg) by mouth daily 90 tablet 3     busPIRone (BUSPAR) 7.5 MG tablet Take 1 tablet (7.5 mg) by mouth 2 times daily 180 tablet 3     Cholecalciferol (VITAMIN D) 2000 UNITS tablet Take 2,000 Units by mouth daily (Patient not taking: No sig reported) 100 tablet 3     citalopram (CELEXA) 40 MG tablet Take 1 tablet (40 mg) by mouth daily 90 tablet 3     diclofenac (VOLTAREN) 1 % topical gel Apply 2 g topically 4 times daily 100 g 0     diclofenac (VOLTAREN) 75 MG EC tablet Take 75 mg by mouth 2 times daily       losartan (COZAAR) 100 MG tablet Take 1 tablet (100 mg) by mouth daily 90 tablet 3     metoprolol succinate ER (TOPROL XL) 100 MG 24 hr tablet Take 1 tablet (100 mg) by mouth daily 90 tablet 3     multivitamin w/minerals (THERA-VIT-M) tablet Take 1 tablet by mouth daily       potassium chloride (KLOR-CON) 20 MEQ packet Take 20 mEq by mouth daily 60 packet 1     rosuvastatin (CRESTOR) 40 MG tablet Take 1 tablet (40 mg) by mouth daily 90 tablet 3       ALLERGIES:   No Known Allergies      10:46 end; approx 10:17 start    TIME: 60 min spent on evaluation, management, counseling, education, & motivational interviewing (video visit) combined with previsit prep and post visit follow up care/charting same day. I interviewed, evaluated, pt, discussed with MS whose note here reflects my history, evaluation, A and P.    Sincerely,    MD Jeyson ElliottS

## 2022-09-30 NOTE — TELEPHONE ENCOUNTER
2nd attempt to reach patient.    Patient is on video visit via Check-Cap.    1st attempt left message    2nd attempt no answer and mailbox is full

## 2022-09-30 NOTE — PATIENT INSTRUCTIONS
Thank you for allowing us the privilege of caring for you. We hope we provided you with the excellent service you deserve.    Please let us know if there is anything else we can do for you so that we can be sure you are completely satisfied with your care experience.    Your visit was with Dr. Plascencia today.    Instructions per today's visit:  ----We plan a visit with our bariatric nutritionist in about 4 wks; please get the nonfasting labs done in the next couple of weeks in preparation for the visit  ----We discussed using a tracker like [employer] or FoundValue! To help you with food tracking/journaling which you can also do ahead of the nutrition visit for further food goals discussion at that visit  ----we discussed initial food goals including the following:   -Reduce/switch creamer use in daily coffee to lower calorie options  -Decrease portion sizes targeting starches and added sugars for elimination; avoid snacking and avoid eating after supper and overnight  ----We can start topiramate (see below) to reduce savory food cravings and help maintain sleep throughout the night in support of your food goals  ----We can plan on a return visit with Dr. Plascencia in about 2-3 mo and the medication management pharmacist in about 4-6 wks for medication reassessment    Please call our contact center at [phone] to schedule your next appointments.    Meal Replacement Products:    Here is the link to our new e-store where you can purchase our meal replacement products    [facility] E-Store  [email or web address]    The one week starter kit is a great way to sample a variety of products and see what works for you.    If you want more information about the product go to: [facility]    Free Shipping for orders over $75    Benefits of meal replacements products:    Portion and calorie control  Improved nutrition  Structured eating  Simplified food choices  Avoid contact with trigger  foods    Interested in working with a health ?  Health coaches work with you to improve your overall health and wellbeing.  They look at the whole person, and may involve discussion of different areas of life, including, but not limited to the four pillars of health (sleep, exercise, nutrition, and stress management). Discuss with your care team if you would like to start working a health .    Health Coaching-3 Pack: Schedule by calling [phone]    $99 for three health coaching visits    Visits may be done in person or via phone    Coaching is a partnership between the  and the client; Coaches do not prescribe or diagnose    Coaching helps inspire the client to reach his/her personal goals    Bluetooth Scale:    We hope to provide you with high quality telephone and virtual healthcare visits while social distancing for COVID-19 is necessary, as well as in the future when virtual visits may be more convenient for you.    Our technology team made it possible for Bluetooth scales to send weight measurements to our electronic medical record. This allows weights from you weighing at home to securely flow into the medical record, which will improve telephone and virtual visits.  Additionally, studies have shown that adults actually lose more weight when their weights are automatically sent to someone else, and also that this process is not stressful for those adults.    Below is a link for purchasing the scale, with a discount code for our patients. You may call your insurance company to see if they will reimburse you for the cost of the scale, as a piece of durable medical equipment. The scales only go up to a weight of 400 pounds. This is an issue and we are working with the developer on increasing this. We found no scales that go over 400lb that have blue-tooth for connecting to [employer].    Scale to purchase: the Withings  Body  Scale:  [email or web address]=aw.ds    Discount Code: We have a discount code for our patients to bring the cost down to $50, the code is:  withmed     Steps to link the scale to [employer] via an Android Phone (you can always disconnect at any point in the future):  1. The order must be placed first before the patient can access Track My Health within [employer].  2. Download Google Fit stephany from the Google Play Store  a. Log in or register using your Google account  3. Download the [employer]itour Mediat stephany from Google Play Store  a. Select add organization  b. Search for [employer] and select it  c. Log into [employer]  d. Select Track My Health  e. Select the green connect my account button  f. When prompted log into your Google account  g. Select okay to confirm the account  4. Download the Withings Health Mate stephany from Google Play Store  a. [place] for [employer]box  b. Go to profile  c. Tap google fit under the Apps section  d. Select the option to activate Google Fit integration  e. Select the same Google account  f. Select okay to confirm the account  g.  Steps to link the scale to [employer] via an iPhone (you can always disconnect at any point in the future):  **Note AddressHealth is not available for download on an iPad**  1. The order must be placed first before the patient can access Track My Health within [employer].  2. Locate the Health stephany on your iPhone.  a. Set up your Apple Health account as prompted  b. The Sources page will show Apps that communicate with your Health stephany. Once all steps are completed, you should see Health [employer] and MyChart listed under the Apps section and your iPhone under the devices section.  i. Select Health Mate  1. Under 'ALLOW  HEALTH MATE  TO WRITE DATA ensure the toggle is on for Weight.  2. This will allow the scale to add your weight to the streamit Health  ii. Select MyChart  1. Under 'ALLOW  MYCHART  TO READ DATA ensure the  toggle is on for Weight.  2. This allows Innoz to grab the weight from [employer] so your provider can see your weights.  3. Download the Innoz kasi from the Kasi Store  a. Select add organization                                                  b. Search for [employer] ([employer]) and select it  c. Log into Innoz  d. Select Track My Health  e. Select the green connect my account button  f. Follow prompts to link your device to Innoz.  4. Download the Withings health mate kasi in the Kasi Store  a. [place] for [employer]  b. Go to profile  c. Welltok under the Apps section  d. If prompted to allow access with the Health Kasi, toggle weight on for read and write access.      For any questions/concerns contact Kinjal Elizabeth LPN at [phone]    To schedule appointments with our team, please call [phone]    Please call during clinic hours Monday through Friday 8:00a - 4:00p if you have questions or you can contact us via Innoz at anytime.      Lab results will be communicated through My Chart or letter (if My Chart not used). Please call the clinic if you have not received communication after 1 week or if you have any questions.    Clinic Fax: [phone]    Thank you,  Medical Weight Management Team      MEDICATION STARTED AT THIS APPOINTMENT  We are starting topiramate before supper.  Start one tab, 25 mg, for a week. Go up to 50 mg (2 tabs) for the next week. At the third week, take   3 tabs (75 mg).  Stay at 3 tabs until you are seen again. Call the nurse if you have any questions or concerns. (Do not stop taking it if you don't think it's working. For some people it works even though they do not feel much different.)    Topiramate (Topamax) is a medication that is used most often to treat migraine headaches or for seizures. It has also been found to help with weight loss. Although it's not currently FDA approved for weight loss, it has been used safely for a number of years to help people who are carrying  extra weight.     Just how topiramate helps with weight loss has not been exactly determined. However it seems to work on areas of the brain to quiet down signals related to eating.      Topiramate may make you:    >feel less interest in eating in between meals   >think less about food and eating   >find it easier to push the plate away   >find giving up pop easier    >have an easier time eating less    For some of our patients, the pills work right away. They feel and think quite differently about food. Other patients don't feel much of a change but find in fact they have lost weight! Like all weight loss medications, topiramate works best when you help it work.  This means:    1) Have less tempting high calorie (fattening) food around the house or office    2) Have lower calorie food (fruits, vegetables,low fat meats and dairy) for snacks    3) Eat out only one time or less each week.   4) Eat your meals at a table with the TV or computer off.    Side-effects. Topiramate is generally well tolerated. The main side-effects we see are:   Tingling in hands,feet, or face (usually not very troublesome)   Mental confusion and word finding trouble (about 10% of patients have this.)     Feeling sleepy or a bit dopey- this goes away very soon after starting.    One of the dangers of topiramate is the possibility of birth defects--if you get pregnant when you are on it, there is the risk that your baby will be born with a cleft lip or palate.  If you are on topiramate and of child bearing age, you need to be on a reliable form of birth control or refrain from sexual intercourse.     Please refer to the pharmacy insert for more information on side-effects. Since many pharmacists are not familiar with the use of topiramate in weight loss, calling the clinic will get you the most accurate information on the use of this medication for weight loss.     In order to get refills of this or any medication we prescribe you must be  seen in the medical weight mgmt clinic every 2-3 months. Please have your pharmacy fax a refill request.

## 2022-09-30 NOTE — LETTER
"2022       RE: Chiquita Butler  [address]  [facility] [address]     Dear Colleague,    Thank you for referring your patient, Chiquita Butler, to the [facility] [place] at [facility]. Please see a copy of my visit note below.        New Medical Weight Management Consult    PATIENT:  Chiquita Butler  MRN:         [id number]  :         1960  EDGAR:         2022        I had the pleasure of seeing your patient, Chiquita Butler. Full intake/assessment was done to determine barriers to weight loss success and develop a treatment plan. Chiquita Butler is a 62 year old adult interested in treatment of medical problems associated with excess weight so that she can \"be around longer\" for her partner and their young children. Chiquita Butler has a height of 5'10\", a weight of 253 lbs, and the calculated BMI of 36.3.    ASSESSMENT/PLAN:  Chiquita is a patient with early onset severe obesity with significant element of familial/genetic influence and without current health consequences other than well controlled hypertension. Chiquita Butler does not need aggressive weight loss plan due to lack of concurrent medical comorbidities. She describes herself as a \"foodie\" or \"snob\" and eats 3 healthy meals per day. She does not have cravings for sugary foods, rather occasional savory items such as microwaved tortilla and cheese/avocado. She does admit to snacking after waking up in the middle of the night on occasion or when staying up late to work at her computer. Her only source of liquid calories comes in 1.5 cups of coffee with half and half creamer. Despite mild limitations due to her arthritis, she reports working out 3 times per week with a .    Chiquita Butler's problem is complicated by prior gastric bypass surgery done approximately 20 years ago in [place]. She experienced 100 lbs weight loss followed by gradual 50 lb weight gain " "over 20 years. Currently taking daily multivitamin, Vit D (2000 units), and B complex vitamin--? dose. Does not take Zinc, iron, or calcium. Reports getting about 2 servings of dairy (yogurt) per day.        Chiquita Butler's ability to lose weight is impacted by sleep disturbances, late night snacking, young children, and a full time job.     PLAN:    -Reduce/switch creamer use in daily coffee for lower calorie options  -Decrease portion sizes targeting starches and added sugars for elimination  -Start topiramate to reduce savory food cravings and help maintain sleep throughout the night  -Suggested calorie tracking applications such as my fitness pal and lose it for data collection prior to nutrition appointment  -Will also obtain post bariatric surgery nutrition labs and recommend nutritionist appointment in 1 month. Pt is not meeting nutritional recomendations for Fe or calcium intake (but did have recent DXA with nl bone density)  -Medication management pharmacist appointment in 4-6 weeks  -Follow up with me in 2-3 months    Post-bariatric surgery gain  Ancillary testing: N/A. Labs: nutritional labs (\"annual\" being done)   Food Plan:  Volumetrics and High protein/low carbohydrate.  Activity Plan: continue as prior.  Supplementary:  N/A.  Medication:  The patient will begin medication in pursuit of improved medical status as influenced by body weight. Chiquita Butler will start topiramate. Patient was made aware that topiramate is not approved for the treatment of obesity.  There is a mutual understanding of the goals and risks of this therapy. The patient is in agreement. Chiquita Butler is educated on dosage regimen and possible side effects.      No orders of the defined types were placed in this encounter.      Chiquita Butler has the following co-morbidities:       9/29/2022   I have the following health issues associated with obesity: High Blood Pressure   I have the following symptoms associated with obesity: Knee " "Pain, Lower Extremity Swelling       Patient Goals 9/29/2022   I am interested in having a healthier weight to diminish current health problems: Yes   I am interested in having a healthier weight in order to prevent future health problems: Yes   I am interested in having a healthier weight in order to have a future surgery: No       Referring Provider 9/29/2022   Please name the provider who referred you to Medical Weight Management.  If you do not know, please answer: \"I Don't Know\". Dr. Amarilis Agosto       Weight History 9/29/2022   How concerned are you about your weight? Very Concerned   Would you describe your weight gain as gradual? Yes   I became overweight: As a Teenager   The following factors have contributed to my weight gain:  Eating Too Much, Genetic (Runs in the Family), Stress   I have tried the following methods to lose weight: Watching Portions or Calories, Exercise, Weight Watchers, Weight Loss Surgery   My lowest weight since age 16 was: 170   My highest weight since age 16 was: 312   The most weight I have ever lost was: (lbs) 110   I have the following family history of obesity/being overweight:  One or more of my siblings are overweight   Has anyone in your family had weight loss surgery? No   How has your weight changed over the last year?  Gained       Diet Recall Review with Patient 9/29/2022   If you do eat breakfast, what types of food do you eat? oatmeal, tort with cheese, smoothie   Do you typically eat lunch? Yes   If you do eat lunch, what types of food do you typically eat?  tuNa crackers SALANDA AND SAMBUSA   Do you typically eat supper? Yes   If you do eat supper, what types of food do you typically eat? WHAT EVER THE KIDS EAT   Do you typically eat snacks? Yes   If you do snack, what types of food do you typically eat? TORRTILA WITH CHEESE   Do you like vegetables?  Yes   Do you drink water? Yes   How many glasses of juice do you drink in a typical day? 100   How many of glasses of " milk do you drink in a typical day? 0   If you do drink milk, what type? N/A   How many 8oz glasses of sugar containing drinks such as Donal-Aid/sweet tea do you drink in a day? 0   How many cans/bottles of sugar pop/soda/tea/sports drinks do you drink in a day? 0   How many cans/bottles of diet pop/soda/tea or sports drink do you drink in a day? 0   How often do you have a drink of alcohol? 2-4 Times a Month   If you do drink, how many drinks might you have in a day? 1 or 2       Eating Habits 9/29/2022   Generally, my meals include foods like these: bread, pasta, rice, potatoes, corn, crackers, sweet dessert, pop, or juice. Once a Week   Generally, my meals include foods like these: fried meats, brats, burgers, french fries, pizza, cheese, chips, or ice cream. Never   Eat fast food (like [employer], [employer], Taco Bell). Less Than Weekly   Eat at a buffet or sit-down restaurant. Never   Eat most of my meals in front of the TV or computer. Never   Often skip meals, eat at random times, have no regular eating times. Less Than Weekly   Rarely sit down for a meal but snack or graze throughout.  Never   Eat extra snacks between meals. A Few Times a Week   Eat most of my food at the end of the day. Almost Everyday   Eat in the middle of the night or wake up at night to eat. A Few Times a Week   Eat extra snacks to prevent or correct low blood sugar. Never   Eat to prevent acid reflux or stomach pain. Never   Worry about not having enough food to eat. Never   Have you been to the food shelf at least a few times this year? No   I eat when I am depressed. Never   I eat when I am stressed. Less Than Weekly   I eat when I am bored. Never   I eat when I am anxious. Never   I eat when I am happy or as a reward. Less Than Weekly   I feel hungry all the time even if I just have eaten. Never   Feeling full is important to me. Less Than Weekly   I finish all the food on my plate even if I am already full. Never   I can't resist  eating delicious food or walk past the good food/smell. Once a Week   I eat/snack without noticing that I am eating. Never   I eat when I am preparing the meal. A Few Times a Week   I eat more than usual when I see others eating. Never   I have trouble not eating sweets, ice cream, cookies, or chips if they are around the house. Never   I think about food all day. Never   Please list any other foods you crave? fresh fish.       Amount of Food 9/29/2022   I make myself vomit what I have eaten or use laxatives to get rid of food. Never   I eat a large amount of food, like a loaf of bread, a box of cookies, a pint/quart of ice cream, all at once. Never   I eat a large amount of food even when I am not hungry. Never   I eat rapidly. Never   I eat alone because I feel embarrassed and do not want others to see how much I have eaten. Never   I eat until I am uncomfortably full. Never   I feel bad, disgusted, or guilty after I overeat. Never   I make myself vomit what I have eaten or use laxatives to get rid of food. Never       Activity/Exercise History 9/29/2022   How much of a typical 12 hour day do you spend sitting? Less Than Half the Day   How much of a typical 12 hour day do you spend lying down? Less Than Half the Day   How much of a typical day do you spend walking/standing? Half the Day   How many hours (not including work) do you spend on the TV/Video Games/Computer/Tablet/Phone? 2-3 Hours   How many times a week are you active for the purpose of exercise? 4-5 TImes a Week   What keeps you from being more active? Lack of Time, Other   How many total minutes do you spend doing some activity for the purpose of exercising when you exercise? 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 100%   How many hours do you spend commuting to work daily?  0   What is your marital status? /In a Relationship   Do you have children? Yes   If you have children, are they overweight? No   Who do you live with?  Partner and two kids ages 7 and 2   Are they supportive of your health goals? Yes   Who does the food shopping?  me       Mental Health History Reviewed With Patient 9/29/2022   Have you ever been physically or sexually abused? No   If yes, do you feel that the abuse is affecting your weight? N/A   If yes, would you like to talk to a counselor about the abuse? N/A   How often in the past 2 weeks have you felt little interest or pleasure in doing things? Not at all   Over the past 2 weeks how often have you felt down, depressed, or hopeless? Not at all       Sleep History Reviewed With Patient 9/29/2022   How many hours do you sleep at night? 6   Do you think that you snore loudly or has anybody ever heard you snore loudly (louder than talking or so loud it can be heard behind a shut door)? No   Has anyone seen or heard you stop breathing during your sleep? No   Do you often feel tired, fatigued, or sleepy during the day? No   Do you have a TV/Computer in your bedroom? No       MEDICATIONS:   Current Outpatient Medications   Medication Sig Dispense Refill     albuterol (PROAIR HFA/PROVENTIL HFA/VENTOLIN HFA) 108 (90 Base) MCG/ACT inhaler Inhale 2 puffs into the lungs every 6 hours as needed for shortness of breath / dyspnea or wheezing 8.5 g 0     amLODIPine (NORVASC) 10 MG tablet Take 1 tablet (10 mg) by mouth daily 90 tablet 3     busPIRone (BUSPAR) 7.5 MG tablet Take 1 tablet (7.5 mg) by mouth 2 times daily 180 tablet 3     Cholecalciferol (VITAMIN D) 2000 UNITS tablet Take 2,000 Units by mouth daily (Patient not taking: No sig reported) 100  tablet 3     citalopram (CELEXA) 40 MG tablet Take 1 tablet (40 mg) by mouth daily 90 tablet 3     diclofenac (VOLTAREN) 1 % topical gel Apply 2 g topically 4 times daily 100 g 0     diclofenac (VOLTAREN) 75 MG EC tablet Take 75 mg by mouth 2 times daily       losartan (COZAAR) 100 MG tablet Take 1 tablet (100 mg) by mouth daily 90 tablet 3     metoprolol succinate ER (TOPROL XL) 100 MG 24 hr tablet Take 1 tablet (100 mg) by mouth daily 90 tablet 3     multivitamin w/minerals (THERA-VIT-M) tablet Take 1 tablet by mouth daily       potassium chloride (KLOR-CON) 20 MEQ packet Take 20 mEq by mouth daily 60 packet 1     rosuvastatin (CRESTOR) 40 MG tablet Take 1 tablet (40 mg) by mouth daily 90 tablet 3       ALLERGIES:   No Known Allergies    PHYSICAL EXAM:  LMP  (LMP Unknown)     Waist circumference:      Wt Readings from Last 4 Encounters:   22 114.8 kg (253 lb)   22 114.8 kg (253 lb)   22 114.8 kg (253 lb)   22 115.7 kg (255 lb)     A & O x 3  HEENT: NCAT  Respirations unlabored  Location of obesity: Mixed Obesity    FOLLOW-UP:   12 weeks.      Sincerely,    Tim Jennings MS            New Medical Weight Management Consult    PATIENT:  Chiquita Butler  MRN:         [id number]  :         1960  EDGAR:         2022        I had the pleasure of seeing your patient, Chiquita Butler. Full intake/assessment was done to determine barriers to weight loss success and develop a treatment plan. Chiquita Butler is a 62 year old adult interested in treatment of medical problems associated with excess weight.     Chiquita Butler has the following co-morbidities:       2022   I have the following health issues associated with obesity: High Blood Pressure   I have the following symptoms associated with obesity: Knee Pain, Lower Extremity Swelling       Patient Goals 2022   I am interested in having a healthier weight to diminish current health problems: Yes   I am interested in having a healthier  "weight in order to prevent future health problems: Yes   I am interested in having a healthier weight in order to have a future surgery: No       Referring Provider 9/29/2022   Please name the provider who referred you to Medical Weight Management.  If you do not know, please answer: \"I Don't Know\". Dr. Amarilis Agosto       Weight History 9/29/2022   How concerned are you about your weight? Very Concerned   Would you describe your weight gain as gradual? Yes   I became overweight: As a Teenager   The following factors have contributed to my weight gain:  Eating Too Much, Genetic (Runs in the Family), Stress   I have tried the following methods to lose weight: Watching Portions or Calories, Exercise, Weight Watchers, Weight Loss Surgery   My lowest weight since age 16 was: 170   My highest weight since age 16 was: 312   The most weight I have ever lost was: (lbs) 110   I have the following family history of obesity/being overweight:  One or more of my siblings are overweight   Has anyone in your family had weight loss surgery? No   How has your weight changed over the last year?  Gained       Diet Recall Review with Patient 9/29/2022   If you do eat breakfast, what types of food do you eat? oatmeal, tort with cheese, smoothie   Do you typically eat lunch? Yes   If you do eat lunch, what types of food do you typically eat?  tuNa crackers SALANDA AND SAMBUSA   Do you typically eat supper? Yes   If you do eat supper, what types of food do you typically eat? WHAT EVER THE KIDS EAT   Do you typically eat snacks? Yes   If you do snack, what types of food do you typically eat? TORRTILA WITH CHEESE   Do you like vegetables?  Yes   Do you drink water? Yes   How many glasses of juice do you drink in a typical day? 100   How many of glasses of milk do you drink in a typical day? 0   If you do drink milk, what type? N/A   How many 8oz glasses of sugar containing drinks such as Donal-Aid/sweet tea do you drink in a day? 0   How many " cans/bottles of sugar pop/soda/tea/sports drinks do you drink in a day? 0   How many cans/bottles of diet pop/soda/tea or sports drink do you drink in a day? 0   How often do you have a drink of alcohol? 2-4 Times a Month   If you do drink, how many drinks might you have in a day? 1 or 2       Eating Habits 9/29/2022   Generally, my meals include foods like these: bread, pasta, rice, potatoes, corn, crackers, sweet dessert, pop, or juice. Once a Week   Generally, my meals include foods like these: fried meats, brats, burgers, french fries, pizza, cheese, chips, or ice cream. Never   Eat fast food (like [employer], [employer], Taco Bell). Less Than Weekly   Eat at a buffet or sit-down restaurant. Never   Eat most of my meals in front of the TV or computer. Never   Often skip meals, eat at random times, have no regular eating times. Less Than Weekly   Rarely sit down for a meal but snack or graze throughout.  Never   Eat extra snacks between meals. A Few Times a Week   Eat most of my food at the end of the day. Almost Everyday   Eat in the middle of the night or wake up at night to eat. A Few Times a Week   Eat extra snacks to prevent or correct low blood sugar. Never   Eat to prevent acid reflux or stomach pain. Never   Worry about not having enough food to eat. Never   Have you been to the food shelf at least a few times this year? No   I eat when I am depressed. Never   I eat when I am stressed. Less Than Weekly   I eat when I am bored. Never   I eat when I am anxious. Never   I eat when I am happy or as a reward. Less Than Weekly   I feel hungry all the time even if I just have eaten. Never   Feeling full is important to me. Less Than Weekly   I finish all the food on my plate even if I am already full. Never   I can't resist eating delicious food or walk past the good food/smell. Once a Week   I eat/snack without noticing that I am eating. Never   I eat when I am preparing the meal. A Few Times a Week   I eat  more than usual when I see others eating. Never   I have trouble not eating sweets, ice cream, cookies, or chips if they are around the house. Never   I think about food all day. Never   Please list any other foods you crave? fresh fish.       Amount of Food 9/29/2022   I make myself vomit what I have eaten or use laxatives to get rid of food. Never   I eat a large amount of food, like a loaf of bread, a box of cookies, a pint/quart of ice cream, all at once. Never   I eat a large amount of food even when I am not hungry. Never   I eat rapidly. Never   I eat alone because I feel embarrassed and do not want others to see how much I have eaten. Never   I eat until I am uncomfortably full. Never   I feel bad, disgusted, or guilty after I overeat. Never   I make myself vomit what I have eaten or use laxatives to get rid of food. Never       Activity/Exercise History 9/29/2022   How much of a typical 12 hour day do you spend sitting? Less Than Half the Day   How much of a typical 12 hour day do you spend lying down? Less Than Half the Day   How much of a typical day do you spend walking/standing? Half the Day   How many hours (not including work) do you spend on the TV/Video Games/Computer/Tablet/Phone? 2-3 Hours   How many times a week are you active for the purpose of exercise? 4-5 TImes a Week   What keeps you from being more active? Lack of Time, Other   How many total minutes do you spend doing some activity for the purpose of exercising when you exercise? 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 100%   How many hours do you  spend commuting to work daily?  0   What is your marital status? /In a Relationship   Do you have children? Yes   If you have children, are they overweight? No   Who do you live with?  Partner and two kids ages 7 and 2   Are they supportive of your health goals? Yes   Who does the food shopping?  me       Mental Health History Reviewed With Patient 9/29/2022   Have you ever been physically or sexually abused? No   If yes, do you feel that the abuse is affecting your weight? N/A   If yes, would you like to talk to a counselor about the abuse? N/A   How often in the past 2 weeks have you felt little interest or pleasure in doing things? Not at all   Over the past 2 weeks how often have you felt down, depressed, or hopeless? Not at all       Sleep History Reviewed With Patient 9/29/2022   How many hours do you sleep at night? 6   Do you think that you snore loudly or has anybody ever heard you snore loudly (louder than talking or so loud it can be heard behind a shut door)? No   Has anyone seen or heard you stop breathing during your sleep? No   Do you often feel tired, fatigued, or sleepy during the day? No   Do you have a TV/Computer in your bedroom? No       MEDICATIONS:   Current Outpatient Medications   Medication Sig Dispense Refill     albuterol (PROAIR HFA/PROVENTIL HFA/VENTOLIN HFA) 108 (90 Base) MCG/ACT inhaler Inhale 2 puffs into the lungs every 6 hours as needed for shortness of breath / dyspnea or wheezing 8.5 g 0     amLODIPine (NORVASC) 10 MG tablet Take 1 tablet (10 mg) by mouth daily 90 tablet 3     busPIRone (BUSPAR) 7.5 MG tablet Take 1 tablet (7.5 mg) by mouth 2 times daily 180 tablet 3     Cholecalciferol (VITAMIN D) 2000 UNITS tablet Take 2,000 Units by mouth daily (Patient not taking: No sig reported) 100 tablet 3     citalopram (CELEXA) 40 MG tablet Take 1 tablet (40 mg) by mouth daily 90 tablet 3     diclofenac (VOLTAREN) 1 % topical gel Apply 2 g topically 4 times daily 100 g 0      diclofenac (VOLTAREN) 75 MG EC tablet Take 75 mg by mouth 2 times daily       losartan (COZAAR) 100 MG tablet Take 1 tablet (100 mg) by mouth daily 90 tablet 3     metoprolol succinate ER (TOPROL XL) 100 MG 24 hr tablet Take 1 tablet (100 mg) by mouth daily 90 tablet 3     multivitamin w/minerals (THERA-VIT-M) tablet Take 1 tablet by mouth daily       potassium chloride (KLOR-CON) 20 MEQ packet Take 20 mEq by mouth daily 60 packet 1     rosuvastatin (CRESTOR) 40 MG tablet Take 1 tablet (40 mg) by mouth daily 90 tablet 3       ALLERGIES:   No Known Allergies      10:46 end; approx 10:17 start    TIME: 60 min spent on evaluation, management, counseling, education, & motivational interviewing (video visit) combined with previsit prep and post visit follow up care/charting same day. I interviewed, evaluated, pt, discussed with MS whose note here reflects my history, evaluation, A and P.    Sincerely,    MD Jeyson Elliott

## 2022-10-01 ENCOUNTER — TELEPHONE (OUTPATIENT)
Dept: CARDIOLOGY | Facility: CLINIC | Age: 62
End: 2022-10-01

## 2022-10-01 NOTE — TELEPHONE ENCOUNTER
Attempted to reach patient to schedule her cardiology follow up. Please schedule an echocardiogram prior and a follow up with Dr. Dia.  Cardio Team

## 2022-10-03 ENCOUNTER — MYC MEDICAL ADVICE (OUTPATIENT)
Dept: SURGERY | Facility: CLINIC | Age: 62
End: 2022-10-03

## 2022-10-03 NOTE — TELEPHONE ENCOUNTER
----- Message from Lauren Turner Bloch, RP sent at 10/2/2022  7:36 PM CDT -----  Regarding: RE: referring for med mgmt pharm visit in 4-6 wks  Schedulers,     Please schedule follow up with initial visit with [place] pharmacist covering for me for 4-6 weeks from now. Thank you!     Lauren Bloch, PharmD, BCACP   Medication Therapy Management Pharmacist   [facility]        ----- Message -----  From: Carine Dubose MD  Sent: 9/30/2022   7:30 PM CDT  To: Lauren Turner Bloch, RPH, Kinjal Plunkett  Subject: referring for med mgmt pharm visit in 4-6 wks    New med start; I know you will be out Nory. Please forward as needed,    Thanks!    Carine

## 2022-10-04 ENCOUNTER — TELEPHONE (OUTPATIENT)
Dept: ENDOCRINOLOGY | Facility: CLINIC | Age: 62
End: 2022-10-04

## 2022-10-04 PROBLEM — F64.0 TRANSSEXUALISM: Status: RESOLVED | Noted: 2020-03-06 | Resolved: 2020-12-10

## 2022-10-04 NOTE — TELEPHONE ENCOUNTER
Reason for call:  Other   Patient called regarding (reason for call): call back  Additional comments: pt has questions about prescription Topiramate    Phone number to reach patient:  Home number on file [phone] (home)    Best Time:      Can we leave a detailed message on this number?  YES    Travel screening: Not Applicable

## 2022-10-04 NOTE — TELEPHONE ENCOUNTER
Attempt to call patient to discuss Topiramate. No answer, unable to leave a message, as mailbox is full. Sent [employer] message.

## 2022-10-07 ENCOUNTER — LAB (OUTPATIENT)
Dept: LAB | Facility: CLINIC | Age: 62
End: 2022-10-07
Payer: COMMERCIAL

## 2022-10-07 DIAGNOSIS — E66.812 CLASS 2 SEVERE OBESITY DUE TO EXCESS CALORIES WITH SERIOUS COMORBIDITY AND BODY MASS INDEX (BMI) OF 36.0 TO 36.9 IN ADULT (H): ICD-10-CM

## 2022-10-07 DIAGNOSIS — E66.01 CLASS 2 SEVERE OBESITY DUE TO EXCESS CALORIES WITH SERIOUS COMORBIDITY AND BODY MASS INDEX (BMI) OF 36.0 TO 36.9 IN ADULT (H): ICD-10-CM

## 2022-10-07 DIAGNOSIS — Z98.84 STATUS POST BARIATRIC SURGERY: ICD-10-CM

## 2022-10-07 LAB
ALT SERPL W P-5'-P-CCNC: 39 U/L (ref 10–50)
AST SERPL W P-5'-P-CCNC: 34 U/L (ref 10–50)
CHOLEST SERPL-MCNC: 126 MG/DL
ERYTHROCYTE [DISTWIDTH] IN BLOOD BY AUTOMATED COUNT: 13 % (ref 10–15)
HCT VFR BLD AUTO: 42 % (ref 35–53)
HDLC SERPL-MCNC: 64 MG/DL
HGB BLD-MCNC: 13.2 G/DL (ref 11.7–17.7)
IRON BINDING CAPACITY (ROCHE): 330 UG/DL (ref 240–430)
IRON SATN MFR SERPL: 23 % (ref 15–46)
IRON SERPL-MCNC: 75 UG/DL (ref 37–157)
LDLC SERPL CALC-MCNC: 21 MG/DL
MCH RBC QN AUTO: 29.9 PG (ref 26.5–33)
MCHC RBC AUTO-ENTMCNC: 31.4 G/DL (ref 31.5–36.5)
MCV RBC AUTO: 95 FL (ref 78–100)
NONHDLC SERPL-MCNC: 62 MG/DL
PLATELET # BLD AUTO: 203 10E3/UL (ref 150–450)
PTH-INTACT SERPL-MCNC: 66 PG/ML (ref 15–65)
RBC # BLD AUTO: 4.42 10E6/UL (ref 3.8–5.9)
TRIGL SERPL-MCNC: 204 MG/DL
WBC # BLD AUTO: 3.2 10E3/UL (ref 4–11)

## 2022-10-07 PROCEDURE — 84460 ALANINE AMINO (ALT) (SGPT): CPT

## 2022-10-07 PROCEDURE — 80048 BASIC METABOLIC PNL TOTAL CA: CPT

## 2022-10-07 PROCEDURE — 84590 ASSAY OF VITAMIN A: CPT | Mod: 90

## 2022-10-07 PROCEDURE — 99000 SPECIMEN HANDLING OFFICE-LAB: CPT

## 2022-10-07 PROCEDURE — 83550 IRON BINDING TEST: CPT

## 2022-10-07 PROCEDURE — 82746 ASSAY OF FOLIC ACID SERUM: CPT

## 2022-10-07 PROCEDURE — 84450 TRANSFERASE (AST) (SGOT): CPT

## 2022-10-07 PROCEDURE — 84630 ASSAY OF ZINC: CPT | Mod: 90

## 2022-10-07 PROCEDURE — 82306 VITAMIN D 25 HYDROXY: CPT

## 2022-10-07 PROCEDURE — 85027 COMPLETE CBC AUTOMATED: CPT

## 2022-10-07 PROCEDURE — 83540 ASSAY OF IRON: CPT

## 2022-10-07 PROCEDURE — 36415 COLL VENOUS BLD VENIPUNCTURE: CPT

## 2022-10-07 PROCEDURE — 83970 ASSAY OF PARATHORMONE: CPT

## 2022-10-07 PROCEDURE — 82525 ASSAY OF COPPER: CPT | Mod: 90

## 2022-10-07 PROCEDURE — 84425 ASSAY OF VITAMIN B-1: CPT | Mod: 90

## 2022-10-07 PROCEDURE — 80061 LIPID PANEL: CPT

## 2022-10-08 LAB
ANION GAP SERPL CALCULATED.3IONS-SCNC: 12 MMOL/L (ref 7–15)
BUN SERPL-MCNC: 18.3 MG/DL (ref 8–23)
CALCIUM SERPL-MCNC: 9.2 MG/DL (ref 8.8–10.2)
CHLORIDE SERPL-SCNC: 107 MMOL/L (ref 98–107)
CREAT SERPL-MCNC: 0.85 MG/DL (ref 0.51–1.17)
DEPRECATED HCO3 PLAS-SCNC: 23 MMOL/L (ref 22–29)
FOLATE SERPL-MCNC: 18.7 NG/ML (ref 4.6–34.8)
GFR SERPL CREATININE-BSD FRML MDRD: 76 ML/MIN/1.73M2
GLUCOSE SERPL-MCNC: 142 MG/DL (ref 70–99)
POTASSIUM SERPL-SCNC: 3.9 MMOL/L (ref 3.4–5.3)
SODIUM SERPL-SCNC: 142 MMOL/L (ref 136–145)

## 2022-10-10 ENCOUNTER — TELEPHONE (OUTPATIENT)
Dept: PHARMACY | Facility: CLINIC | Age: 62
End: 2022-10-10

## 2022-10-10 NOTE — TELEPHONE ENCOUNTER
Mailbox full, couldn't lvm. Sent mychart to notify pt that 10/24 appt with Nick Farmer [facility] needs rescheduling. Provider no longer available that date. R/S to next available with Nick or with Seema Dow in weight mtm.

## 2022-10-11 LAB — DEPRECATED CALCIDIOL+CALCIFEROL SERPL-MC: 27 UG/L (ref 20–75)

## 2022-10-12 LAB
ANNOTATION COMMENT IMP: ABNORMAL
COPPER SERPL-MCNC: 104.8 UG/DL
RETINYL PALMITATE SERPL-MCNC: 0.11 MG/L
VIT A SERPL-MCNC: 0.46 MG/L
ZINC SERPL-MCNC: 58.8 UG/DL

## 2022-10-15 LAB — VIT B1 PYROPHOSHATE BLD-SCNC: 120 NMOL/L

## 2022-10-21 ENCOUNTER — VIRTUAL VISIT (OUTPATIENT)
Dept: PHARMACY | Facility: CLINIC | Age: 62
End: 2022-10-21
Payer: COMMERCIAL

## 2022-10-21 DIAGNOSIS — E66.01 MORBID OBESITY (H): Primary | ICD-10-CM

## 2022-10-21 PROCEDURE — 99207 PR NO CHARGE LOS: CPT | Performed by: PHARMACIST

## 2022-10-21 RX ORDER — TOPIRAMATE 25 MG/1
25 TABLET, FILM COATED ORAL DAILY
Qty: 63 TABLET | Refills: 1 | Status: SHIPPED | OUTPATIENT
Start: 2022-10-21 | End: 2023-03-17

## 2022-10-21 RX ORDER — DICLOFENAC SODIUM 75 MG/1
1 TABLET, DELAYED RELEASE ORAL 2 TIMES DAILY
COMMUNITY
Start: 2021-11-28 | End: 2024-08-21

## 2022-10-21 NOTE — PROGRESS NOTES
Clinical Pharmacy Consult:                                                    Chiquita Butler is a 62 year old adult called for a clinical pharmacist consult.  She was referred to me from Dr. Carine Dubose MD. Unclear as to whether patient insurance covers comprehensive medication review with Medication Therapy Management (MTM) and patient does not have time for comprehensive visit today. Therefore brief consult was completed today.    Reason for Consult: Topiramate follow up/start.    Discussion: Patient has been contemplating starting Topiramate for weight loss and craving management since visit with Dr. Dubose on 9/30/22. Has not received yet. Patient has no questions, feels that this medication may be helpful for her and will start as planned with Dr. Dubose. Provided patient education on topirimate including side effects and to avoid alcohol while taking it.    Also discussed labs from 10/7. Patient was not fasting at the time of this lab, which may have led to elevated blood glucose and triglycerides.    Plan:  1. Start topiramate.  2. Follow up in about 1 month to determine tolerability    Seema Dow, Pharm D., MPH  MTM Pharmacist - [facility]  Pager: [phone]

## 2022-10-21 NOTE — PATIENT INSTRUCTIONS
Recommendations from today's MTM visit:                                                       1) Start Topiramate 25 mg tablet: 1 tablet (25 mg) by mouth nightly for 1 week, then if tolerating increase to 2 tablets (50 mg) nightly for 1 week, then if tolerating increase to 3 tablets (75 mg) nightly thereafter. Stay at this dose until follow up with the provider.     Topiramate and Alcohol Use:     In general, when we are focusing on leading a healthier lifestyle with your nutrition, try to refrain from consistent daily alcohol use as this contains empty calories with no nutritional value. An interaction between topiramate and alcohol exists: alcohol may enhance the brain depressant effect of topiramate and alcohol may also increase the concentration of topiramate within the body. This means you may become more drowsy/tired when taking in combination and be at risk for further cognitive issues. If you are going to drink alcohol, you should do so sparingly and limit to 1 alcoholic beverage when you do drink, this is especially true for the first time that you drink alcohol on this medication so you can safely see how you feel on the medication.      2) Topiramate helps to control cravings for food. See below for some tips to help with mindful eating which may help you to get the most out of this new medication.    3) Call your insurance to ask if they cover full visits with a pharmacist. They may refer to it as Medication Therapy Management and this can help us to determine whether our future visits will be covered (or any future visits with [facility]!).    Follow-up: 1 month with Omero StephensonD or with Dr. Dubose to evaluate tolerability. Call [phone] to schedule.     It was great speaking with you today.  I value your experience and would be very thankful for your time in providing feedback in our clinic survey. In the next few days, you may receive an email or text message from [employer] with a link  "to a survey related to your  clinical pharmacist.\"     My Clinical Pharmacist's contact information:                                                      Please feel free to contact me with any questions or concerns you have.      Seema Dow, PharmD, MPH  Medication Therapy Management Pharmacist  [facility]    Secure Voicemail: [phone]      Topiramate (Topamax) is a medication that is used most often to treat migraine headaches or for seizures. It has also been found to help with weight loss. Although it's not currently FDA approved for weight loss, it has been used safely for a number of years to help people who are carrying extra weight.      Just how topiramate helps with weight loss has not been exactly determined. However it seems to work on areas of the brain to quiet down signals related to eating.       Topiramate may make you:                >feel less interest in eating in between meals               >think less about food and eating               >find it easier to push the plate away               >find giving up pop easier                          >have an easier time eating less     For some of our patients, the pills work right away. They feel and think quite differently about food. Other patients don't feel much of a change but find in fact they have lost weight! Like all weight loss medications, topiramate works best when you help it work.  This means:                           1) Have less tempting high calorie (fattening) food around the house or office                2) Have lower calorie food (fruits, vegetables,low fat meats and dairy) for snacks                 3) Eat out only one time or less each week.               4) Eat your meals at a table with the TV or computer off.     Side-effects. Topiramate is generally well tolerated. The main side-effects we see are:               Tingling in hands,feet, or face (usually not very troublesome)      "          Mental confusion and word finding trouble (about 10% of patients have this.)                        Feeling sleepy or a bit dopey- this goes away very soon after starting.     One of the dangers of topiramate is the possibility of birth defects--if you get pregnant when you are on it, there is the risk that your baby will be born with a cleft lip or palate.  If you are on topiramate and of child bearing age, you need to be on a reliable form of birth control or refrain from sexual intercourse.      Please refer to the pharmacy insert for more information on side-effects. Since many pharmacists are not familiar with the use of topiramate in weight loss, calling the clinic will get you the most accurate information on the use of this medication for weight loss.                In order to get refills of this or any medication we prescribe you must be seen in the medical weight mgmt clinic every 2-3 months. Please have your pharmacy fax a refill request.    ----------------------------------------------------------------------------------------------------------------------------------------------------  Tips for how to practice mindful eating:   -Eat more slowly and don t rush your meals.  -Chew thoroughly.  -Eliminate distractions by turning off the TV and putting down your phone.  -Eat in silence.  -Focus on how the food makes you feel.  -Stop eating when you re full.  -Ask yourself why you re eating, whether you re truly hungry, and whether the food you chose is healthy.    Overall mindful eating involves:   -Eat slowly and without distraction (do not eat meals in front of the Television, or while completing tasks, etc)   -Listen to physical hunger cues and eat only until you re full  -Distinguish between true hunger and non-hunger triggers for eating  -Engage your senses by noticing colors, smells, sounds, textures, and flavors  -Learn to cope with guilt and anxiety about food  -Eat to maintain overall  "health and well-being   -Notice the effects food has on your feelings and figure  -Appreciate your food      Try doing the \"Plate Method\" at home by following these rules: Start with a 9-inch dinner plate.  -Fill half with nonstarchy vegetables, such as salad, green beans, broccoli, cauliflower, cabbage, etc.   -Fill one quarter with a lean protein, such as chicken, turkey, salmon.   -Fill one quarter with carb foods. Foods that are higher in carbs include grains, starchy vegetables (such as potatoes and peas), rice, pasta, beans, fruit, and yogurt. A cup of milk also counts as a carb food.    *This may help to lower portions and focus on getting in those healthier food choices.        "

## 2022-10-21 NOTE — Clinical Note
Evonne Dubose,  I saw Chiquita today for a brief discussion of her topiramate start. She had not gotten a notification that it had been sent to the pharmacy, so she had reviewed the info you sent and discussed on 9/30 and would like to start it. I provided further education and sent the prescription.  Also of note, I asked her whether she'd been fasting on 10/7 when we got her labs and she had not. So I told her we will keep an eye on it and I saw her PCP was also cc'd on it, keeping all in the loop.  Plan is for patient to follow up with you or a PharmD (there may be a copay to see a pharmD) in 1 month - patient to schedule this.  Thanks! Seema Dow, OmeroD, MPH Medication Therapy Management Pharmacist [facility]  Secure Voicemail: [phone]

## 2022-10-21 NOTE — Clinical Note
Kobe Agosto!  I was so excited to see your name on this referral! I'm covering the other PharmD, Nory's maternity leave and so I'll be with the weight management group for a few months.   I told Chiquita we Candis's folks whether at [facility] or out in the world would keep taking good care of her.  Of note in addition to her starting topiramate today for weight loss - we did some non-fasting labs and blood glucose and triglycerides slightly elevated. I recommended we keep an eye on them and will retest when fasting and that I'd keep you in the loop on that too so that whether she saw you or weight management next, we could follow up.  Seema Dow, OmeroD, MPH Medication Therapy Management Pharmacist [facility]  Secure Voicemail: [phone]

## 2022-11-04 DIAGNOSIS — I10 BENIGN ESSENTIAL HYPERTENSION: ICD-10-CM

## 2022-11-04 NOTE — TELEPHONE ENCOUNTER
[facility] phone call message- patient requesting a refill:    Full Medication Name: metoprolol succinate ER (TOPROL XL) 100 MG 24 hr tablet    Dose: Take 1 tablet (100 mg) by mouth daily     Pharmacy confirmed as     [id number] - [address] AT [address] [place] [id number]  Phone: [phone] Fax: [phone]    : Yes    Additional Comments: Patient is requesting a refill of medication.      OK to leave a message on voice mail? Yes    Primary language: English      needed? No    Call taken on November 4, 2022 at 1:17 PM by Naila Dueñas

## 2022-11-07 ENCOUNTER — DOCUMENTATION ONLY (OUTPATIENT)
Dept: FAMILY MEDICINE | Facility: CLINIC | Age: 62
End: 2022-11-07

## 2022-11-07 RX ORDER — METOPROLOL SUCCINATE 100 MG/1
100 TABLET, EXTENDED RELEASE ORAL DAILY
Qty: 90 TABLET | Refills: 3 | Status: SHIPPED | OUTPATIENT
Start: 2022-11-07 | End: 2023-11-13

## 2022-11-07 NOTE — TELEPHONE ENCOUNTER

## 2022-11-07 NOTE — PROGRESS NOTES
"When opening a documentation only encounter, be sure to enter in \"Chief Complaint\" Forms and in \" Comments\" Title of form, description if needed.    Chiquita is a 62 year old  adult  Form received via: Fax  Form now resides in: Provider Ready    Betsy Vallejo RN                  "

## 2022-11-09 NOTE — PROGRESS NOTES
Call patient let her knows forms have been faxed and will leave copy at [facility] for them.    Bibiana Hickman  Care Coordinator  [facility]  Phone:[phone]

## 2022-11-09 NOTE — PROGRESS NOTES
Faxed signed [place] [employer] - Medical Statement to [phone].  Will scan copy into chart.    Bibiana Hickman  Care Coordinator  [facility]  Phone:[phone]

## 2022-12-04 DIAGNOSIS — I10 BENIGN ESSENTIAL HYPERTENSION: ICD-10-CM

## 2022-12-05 RX ORDER — AMLODIPINE BESYLATE 10 MG/1
TABLET ORAL
Qty: 90 TABLET | Refills: 3 | Status: SHIPPED | OUTPATIENT
Start: 2022-12-05 | End: 2024-02-08

## 2022-12-05 NOTE — TELEPHONE ENCOUNTER
"Request for medication refill:  amLODIPine (NORVASC) 10 MG tablet    Providers if patient needs an appointment and you are willing to give a one month supply please refill for one month and  send a letter/MyChart using \".SMILLIMITEDREFILL\" .smillimited and route chart to \"[facility] \" (Giving one month refill in non controlled medications is strongly recommended before denial)    If refill has been denied, meaning absolutely no refills without visit, please complete the smart phrase \".smirxrefuse\" and route it to the \"P [facility] MED REFILLS\"  pool to inform the patient and the pharmacy.    Zack Brandon MA        "

## 2023-01-17 DIAGNOSIS — F32.A DEPRESSION, UNSPECIFIED DEPRESSION TYPE: ICD-10-CM

## 2023-01-17 RX ORDER — BUSPIRONE HYDROCHLORIDE 7.5 MG/1
TABLET ORAL
Qty: 180 TABLET | Refills: 3 | Status: SHIPPED | OUTPATIENT
Start: 2023-01-17 | End: 2024-02-08

## 2023-01-17 NOTE — TELEPHONE ENCOUNTER
"Request for medication refill:  busPIRone (BUSPAR) 7.5 MG tablet    Providers if patient needs an appointment and you are willing to give a one month supply please refill for one month and  send a letter/MyChart using \".SMILLIMITEDREFILL\" .smillimited and route chart to \"P [facility] \" (Giving one month refill in non controlled medications is strongly recommended before denial)    If refill has been denied, meaning absolutely no refills without visit, please complete the smart phrase \".smirxrefuse\" and route it to the \"P [facility]MI MED REFILLS\"  pool to inform the patient and the pharmacy.    Zack Brandon MA        "

## 2023-01-20 NOTE — PROGRESS NOTES
Forms not picked up @ [facility]. Scanned into chart 01/20/2023.    Bibiana Hickman  Care Coordinator  [facility]  Phone:[phone]

## 2023-01-31 DIAGNOSIS — I10 BENIGN ESSENTIAL HYPERTENSION: ICD-10-CM

## 2023-01-31 DIAGNOSIS — F33.41 RECURRENT MAJOR DEPRESSIVE DISORDER, IN PARTIAL REMISSION (H): ICD-10-CM

## 2023-01-31 RX ORDER — LOSARTAN POTASSIUM 100 MG/1
TABLET ORAL
Qty: 90 TABLET | Refills: 3 | Status: SHIPPED | OUTPATIENT
Start: 2023-01-31 | End: 2024-02-08

## 2023-01-31 RX ORDER — CITALOPRAM HYDROBROMIDE 40 MG/1
TABLET ORAL
Qty: 90 TABLET | Refills: 3 | Status: SHIPPED | OUTPATIENT
Start: 2023-01-31 | End: 2024-02-08

## 2023-01-31 NOTE — TELEPHONE ENCOUNTER
"Request for medication refill:  citalopram (CELEXA) 40 MG tablet    Providers if patient needs an appointment and you are willing to give a one month supply please refill for one month and  send a letter/MyChart using \".SMILLIMITEDREFILL\" .smillimited and route chart to \"P [facility] \" (Giving one month refill in non controlled medications is strongly recommended before denial)    If refill has been denied, meaning absolutely no refills without visit, please complete the smart phrase \".smirxrefuse\" and route it to the \"P [facility]MI MED REFILLS\"  pool to inform the patient and the pharmacy.    Zack Brandon MA        "

## 2023-01-31 NOTE — TELEPHONE ENCOUNTER
"Request for medication refill:  losartan (COZAAR) 100 MG tablet    Providers if patient needs an appointment and you are willing to give a one month supply please refill for one month and  send a letter/MyChart using \".SMILLIMITEDREFILL\" .smillimited and route chart to \"P [facility] \" (Giving one month refill in non controlled medications is strongly recommended before denial)    If refill has been denied, meaning absolutely no refills without visit, please complete the smart phrase \".smirxrefuse\" and route it to the \"P [facility] MED REFILLS\"  pool to inform the patient and the pharmacy.    Zack Brandon MA        "

## 2023-02-17 DIAGNOSIS — E66.01 MORBID OBESITY (H): ICD-10-CM

## 2023-02-18 RX ORDER — TOPIRAMATE 25 MG/1
25 TABLET, FILM COATED ORAL DAILY
Qty: 63 TABLET | Refills: 1 | OUTPATIENT
Start: 2023-02-18

## 2023-02-18 NOTE — TELEPHONE ENCOUNTER
Received refill request for topiramate    . Patient needs appointment scheduled prior to any refills. Clinic Coordinator notified and will follow up with the patient as appropriate. The pharmacy has been notified that the medication will not be refilled prior to an appointment being scheduled.    анна 9/30/22  Nv: none

## 2023-02-20 ENCOUNTER — TELEPHONE (OUTPATIENT)
Dept: ENDOCRINOLOGY | Facility: CLINIC | Age: 63
End: 2023-02-20
Payer: COMMERCIAL

## 2023-02-20 NOTE — TELEPHONE ENCOUNTER
2/20 sent MYC msg for staff msg (phone not in service)    Sarah Jennings, RN  P [facility] Bariatric Scheduling Registration Pool  Pt  LIZKENRICK CAL [[id number]]     Please call to schedule. Wt mgmnt. Carine Dubose MD  lv  9/22. Med topiramate     Thanks

## 2023-02-28 ENCOUNTER — VIRTUAL VISIT (OUTPATIENT)
Dept: FAMILY MEDICINE | Facility: CLINIC | Age: 63
End: 2023-02-28
Payer: COMMERCIAL

## 2023-02-28 ENCOUNTER — TELEPHONE (OUTPATIENT)
Dept: FAMILY MEDICINE | Facility: CLINIC | Age: 63
End: 2023-02-28

## 2023-02-28 DIAGNOSIS — U07.1 COVID-19 VIRUS INFECTION: Primary | ICD-10-CM

## 2023-02-28 PROCEDURE — 99213 OFFICE O/P EST LOW 20 MIN: CPT | Mod: 95 | Performed by: STUDENT IN AN ORGANIZED HEALTH CARE EDUCATION/TRAINING PROGRAM

## 2023-02-28 NOTE — PROGRESS NOTES
"Chiquita is a 63 year old who is being evaluated via a billable telephone visit.      What phone number would you like to be contacted at? [phone]   How would you like to obtain your AVS? Valentine    Distant Location (provider location):  On-site    Assessment & Plan     COVID-19 virus infection  Tested positive. Symptoms improving, though still within timeline of Paxlovid. Discussed the role of Paxlovid (how it will help lessen symptom progression, but not necessarily timeline nor transmission). Discussed current medications - most notably statin. Have asked them to hold on this medication until treatment with Paxlovid is done.     - nirmatrelvir and ritonavir (PAXLOVID) therapy pack; Take 3 tablets by mouth 2 times daily for 5 days (Take 2 Nirmatrelvir tablets and 1 Ritonavir tablet twice daily for 5 days)         BMI:   Estimated body mass index is 36.3 kg/m  as calculated from the following:    Height as of 9/14/22: 1.778 m (5' 10\").    Weight as of 9/14/22: 114.8 kg (253 lb).       No follow-ups on file.    DO BARBARA Andersen [facility]    Subjective   Chiquita is a 63 year old, presenting for the following health issues: COVID positive      HPI     COVID  Tested positive on Friday     Third time with COVID (was hospitalized for the first one in 2021, one day)    Symptoms that prompted test - Thursday - \"cold\" - scratchy throat / cough / phlegm (dark green, no blood) / achiness / fatigue / nightsweats / chills    Feels better now    Review of Systems   Constitutional, HEENT, cardiovascular, pulmonary, gi and gu systems are negative, except as otherwise noted.      Objective           Vitals:  No vitals were obtained today due to virtual visit.    Physical Exam   healthy, alert and no distress  PSYCH: Alert and oriented times 3; coherent speech, normal   rate and volume, able to articulate logical thoughts, able   to abstract reason, no tangential thoughts, no hallucinations   or delusions  " Her affect is normal  RESP: No cough, no audible wheezing, able to talk in full sentences  Remainder of exam unable to be completed due to telephone visits    ----- Service Performed and Documented by Resident or Fellow ------        Phone call duration: 15 minutes

## 2023-02-28 NOTE — TELEPHONE ENCOUNTER
Because of patients medications and health issues, patient scheduled for a virtual visit with provider to determine if appropriate for Paxlovid.    Betsy Vallejo RN

## 2023-02-28 NOTE — TELEPHONE ENCOUNTER
COVID Positive/Requesting COVID treatment    Patient is positive for COVID and requesting treatment options.    Date of positive COVID test (PCR or at home)? 2/24  Current COVID symptoms: cough, shortness of breath or difficulty breathing, fatigue and muscle or body aches  Date COVID symptoms began: 2/24    Message should be routed to clinic RN pool. Best phone number to use for call back: [phone]

## 2023-03-17 ENCOUNTER — VIRTUAL VISIT (OUTPATIENT)
Dept: ENDOCRINOLOGY | Facility: CLINIC | Age: 63
End: 2023-03-17
Payer: COMMERCIAL

## 2023-03-17 DIAGNOSIS — E66.01 CLASS 2 SEVERE OBESITY DUE TO EXCESS CALORIES WITH SERIOUS COMORBIDITY IN ADULT, UNSPECIFIED BMI (H): Primary | ICD-10-CM

## 2023-03-17 DIAGNOSIS — E66.812 CLASS 2 SEVERE OBESITY DUE TO EXCESS CALORIES WITH SERIOUS COMORBIDITY IN ADULT, UNSPECIFIED BMI (H): Primary | ICD-10-CM

## 2023-03-17 PROCEDURE — 99214 OFFICE O/P EST MOD 30 MIN: CPT | Mod: VID | Performed by: INTERNAL MEDICINE

## 2023-03-17 RX ORDER — TOPIRAMATE 25 MG/1
TABLET, FILM COATED ORAL
Qty: 300 TABLET | Refills: 2 | Status: SHIPPED | OUTPATIENT
Start: 2023-03-17 | End: 2024-08-21

## 2023-03-17 NOTE — LETTER
"3/17/2023       RE: Chiquita Butler  [address]  [facility] [address]     Dear Colleague,    Thank you for referring your patient, Chiquita Butler, to the [facility] WEIGHT MANAGEMENT [facility]IN[facility]. Please see a copy of my visit note below.      Return Medical Weight Management Note     Chiquita Butler  MRN:  [id number]  :  1960  EDGAR:  3/17/2023    Dear Amairlis Agosto MD,    I had the pleasure of seeing your patient Chiquita Butler. She is a 63 year old adult who I am continuing to see for treatment of obesity related to:       2022   I have the following health issues associated with obesity: High Blood Pressure   I have the following symptoms associated with obesity: Knee Pain, Lower Extremity Swelling       INTERVAL HISTORY:    Pt seen last about 6 mo ago, reviewed and relevant history is as follows, as extracted--  ------------------------------  \"Chiquita Butler is a 62 year old adult interested in treatment of medical problems associated with excess weight so that she can \"be around longer\" for her partner and their young children. Chiquita Butler has a height of 5'10\", a weight of 253 lbs, and the calculated BMI of 36.3\"  ------------------------------    CURRENT WEIGHT:      239# (today)     Wt Readings from Last 3 Encounters:   22 114.8 kg (253 lb)   22 114.8 kg (253 lb)   22 114.8 kg (253 lb)         MEDICATIONS:   Current Outpatient Medications   Medication Sig Dispense Refill     albuterol (PROAIR HFA/PROVENTIL HFA/VENTOLIN HFA) 108 (90 Base) MCG/ACT inhaler Inhale 2 puffs into the lungs every 6 hours as needed for shortness of breath / dyspnea or wheezing 8.5 g 0     amLODIPine (NORVASC) 10 MG tablet TAKE 1 TABLET(10 MG) BY MOUTH DAILY 90 tablet 3     busPIRone (BUSPAR) 7.5 MG tablet TAKE 1 TABLET(7.5 MG) BY MOUTH TWICE DAILY 180 tablet 3     Cholecalciferol (VITAMIN D) 2000 UNITS tablet Take 2,000 Units by mouth " "daily (Patient not taking: No sig reported) 100 tablet 3     citalopram (CELEXA) 40 MG tablet TAKE 1 TABLET(40 MG) BY MOUTH DAILY 90 tablet 3     diclofenac (VOLTAREN) 1 % topical gel Apply 2 g topically 4 times daily 100 g 0     diclofenac (VOLTAREN) 75 MG EC tablet Take 1 tablet by mouth 2 times daily       losartan (COZAAR) 100 MG tablet TAKE 1 TABLET(100 MG) BY MOUTH DAILY 90 tablet 3     metoprolol succinate ER (TOPROL XL) 100 MG 24 hr tablet Take 1 tablet (100 mg) by mouth daily 90 tablet 3     multivitamin w/minerals (THERA-VIT-M) tablet Take 1 tablet by mouth daily       potassium chloride (KLOR-CON) 20 MEQ packet Take 20 mEq by mouth daily 60 packet 1     rosuvastatin (CRESTOR) 40 MG tablet Take 1 tablet (40 mg) by mouth daily 90 tablet 3     topiramate (TOPAMAX) 25 MG tablet Take 1 tablet (25 mg) by mouth daily 63 tablet 1       ASSESSMENT/PLAN:  Chiquita is a patient with early onset severe obesity with significant element of familial/genetic influence and without current health consequences other than well controlled hypertension. Chiquita Butler does not need aggressive weight loss plan due to lack of concurrent medical comorbidities. She describes herself as a \"foodie\" or \"snob\" and eats 3 healthy meals per day. She does not have cravings for sugary foods, rather occasional savory items such as microwaved tortilla and cheese/avocado. She does admit to snacking after waking up in the middle of the night on occasion or when staying up late to work at her computer. Her only source of liquid calories comes in 1.5 cups of coffee with half and half creamer. Despite mild limitations due to her arthritis, she reports working out 3 times per week with a .     Chiquita Butler's problem is complicated by prior gastric bypass surgery done approximately 20 years ago in [place]. She experienced 100 lbs weight loss followed by gradual 50 lb weight gain over 20 years. Currently taking daily multivitamin, Vit D " "(2000 units), and B complex vitamin--? dose. Does not take Zinc, iron, or calcium. Reports getting about 2 servings of dairy (yogurt) per day.           Chiquita Butler's ability to lose weight is impacted by sleep disturbances, late night snacking, young children, and a full time job.      PLAN:    (continuing)  -Reduce/switch creamer use in daily coffee for lower calorie options  -Decrease portion sizes targeting starches and added sugars for elimination  -Start topiramate to reduce savory food cravings and help maintain sleep throughout the night  -Suggested calorie tracking applications such as [employer] fitness pal and lose it for data collection prior to nutrition appointment  -Will also obtain post bariatric surgery nutrition labs and recommend nutritionist appointment in 1 month. Pt is not meeting nutritional recomendations for Fe or calcium intake (but did have recent DXA with nl bone density)  -Medication management pharmacist appointment in 4-6 weeks  -Follow up with me in 2-3 months     Post-bariatric surgery gain  Ancillary testing: N/A. Labs: nutritional labs (\"annual\" being done)   Food Plan:  Volumetrics and High protein/low carbohydrate.  Activity Plan: continue as prior.  Supplementary:  N/A.  Medication:  The patient will continue medication in pursuit of improved medical status as influenced by body weight. Chiquita Butler will start topiramate. Patient was made aware that topiramate is not approved for the treatment of obesity.  There is a mutual understanding of the goals and risks of this therapy. The patient is in agreement. Chiquita Butler is educated on dosage regimen and possible side effects.     Specifically--  --topiramate increase to to 25 mg A, 100 mg P before supper; pt is not having any symptoms on current dosing but not getting much of an effect yet  --myfitness/LoseIt! apps--she will look into these to help with self-monitoring/tracking between visits  --specific goals framed--avoid eating after " supper and between meals snacking; with meals limit starches/added sugrs  --we can plan return again in about 3 mo      Time: total 30 min spent on evaluation, management, counseling, education, & motivational interviewing (video) combined with previsit prep and post visit follow up care/charting same day.  Video visit start time 9:43; end approx 9:57    Sincerely,    Carine Dubose MD

## 2023-03-17 NOTE — PROGRESS NOTES
"    Return Medical Weight Management Note     Chiquita Butler  MRN:  [id number]  :  1960  EDGAR:  3/17/2023    Dear Amarilis Agosto MD,    I had the pleasure of seeing your patient Chiquita Butler. She is a 63 year old adult who I am continuing to see for treatment of obesity related to:       2022   I have the following health issues associated with obesity: High Blood Pressure   I have the following symptoms associated with obesity: Knee Pain, Lower Extremity Swelling       INTERVAL HISTORY:    Pt seen last about 6 mo ago, reviewed and relevant history is as follows, as extracted--  ------------------------------  \"Chiquita Butler is a 62 year old adult interested in treatment of medical problems associated with excess weight so that she can \"be around longer\" for her partner and their young children. Chiquita Butler has a height of 5'10\", a weight of 253 lbs, and the calculated BMI of 36.3\"  ------------------------------    CURRENT WEIGHT:      239# (today)     Wt Readings from Last 3 Encounters:   22 114.8 kg (253 lb)   22 114.8 kg (253 lb)   22 114.8 kg (253 lb)         MEDICATIONS:   Current Outpatient Medications   Medication Sig Dispense Refill     albuterol (PROAIR HFA/PROVENTIL HFA/VENTOLIN HFA) 108 (90 Base) MCG/ACT inhaler Inhale 2 puffs into the lungs every 6 hours as needed for shortness of breath / dyspnea or wheezing 8.5 g 0     amLODIPine (NORVASC) 10 MG tablet TAKE 1 TABLET(10 MG) BY MOUTH DAILY 90 tablet 3     busPIRone (BUSPAR) 7.5 MG tablet TAKE 1 TABLET(7.5 MG) BY MOUTH TWICE DAILY 180 tablet 3     Cholecalciferol (VITAMIN D) 2000 UNITS tablet Take 2,000 Units by mouth daily (Patient not taking: No sig reported) 100 tablet 3     citalopram (CELEXA) 40 MG tablet TAKE 1 TABLET(40 MG) BY MOUTH DAILY 90 tablet 3     diclofenac (VOLTAREN) 1 % topical gel Apply 2 g topically 4 times daily 100 g 0     diclofenac (VOLTAREN) 75 MG EC tablet Take 1 tablet by mouth 2 times daily       " "losartan (COZAAR) 100 MG tablet TAKE 1 TABLET(100 MG) BY MOUTH DAILY 90 tablet 3     metoprolol succinate ER (TOPROL XL) 100 MG 24 hr tablet Take 1 tablet (100 mg) by mouth daily 90 tablet 3     multivitamin w/minerals (THERA-VIT-M) tablet Take 1 tablet by mouth daily       potassium chloride (KLOR-CON) 20 MEQ packet Take 20 mEq by mouth daily 60 packet 1     rosuvastatin (CRESTOR) 40 MG tablet Take 1 tablet (40 mg) by mouth daily 90 tablet 3     topiramate (TOPAMAX) 25 MG tablet Take 1 tablet (25 mg) by mouth daily 63 tablet 1       ASSESSMENT/PLAN:  Chiquita is a patient with early onset severe obesity with significant element of familial/genetic influence and without current health consequences other than well controlled hypertension. Chiquita Butler does not need aggressive weight loss plan due to lack of concurrent medical comorbidities. She describes herself as a \"foodie\" or \"snob\" and eats 3 healthy meals per day. She does not have cravings for sugary foods, rather occasional savory items such as microwaved tortilla and cheese/avocado. She does admit to snacking after waking up in the middle of the night on occasion or when staying up late to work at her computer. Her only source of liquid calories comes in 1.5 cups of coffee with half and half creamer. Despite mild limitations due to her arthritis, she reports working out 3 times per week with a .     Chiquita Butler's problem is complicated by prior gastric bypass surgery done approximately 20 years ago in [place]. She experienced 100 lbs weight loss followed by gradual 50 lb weight gain over 20 years. Currently taking daily multivitamin, Vit D (2000 units), and B complex vitamin--? dose. Does not take Zinc, iron, or calcium. Reports getting about 2 servings of dairy (yogurt) per day.           Chiquita Butler's ability to lose weight is impacted by sleep disturbances, late night snacking, young children, and a full time job.      PLAN:  " "  (continuing)  -Reduce/switch creamer use in daily coffee for lower calorie options  -Decrease portion sizes targeting starches and added sugars for elimination  -Start topiramate to reduce savory food cravings and help maintain sleep throughout the night  -Suggested calorie tracking applications such as my fitness pal and lose it for data collection prior to nutrition appointment  -Will also obtain post bariatric surgery nutrition labs and recommend nutritionist appointment in 1 month. Pt is not meeting nutritional recomendations for Fe or calcium intake (but did have recent DXA with nl bone density)  -Medication management pharmacist appointment in 4-6 weeks  -Follow up with me in 2-3 months     Post-bariatric surgery gain  Ancillary testing: N/A. Labs: nutritional labs (\"annual\" being done)   Food Plan:  Volumetrics and High protein/low carbohydrate.  Activity Plan: continue as prior.  Supplementary:  N/A.  Medication:  The patient will continue medication in pursuit of improved medical status as influenced by body weight. Chiquita Butler will start topiramate. Patient was made aware that topiramate is not approved for the treatment of obesity.  There is a mutual understanding of the goals and risks of this therapy. The patient is in agreement. Chiquita Butler is educated on dosage regimen and possible side effects.     Specifically--  --topiramate increase to to 25 mg A, 100 mg P before supper; pt is not having any symptoms on current dosing but not getting much of an effect yet  --myfitness/LoseIt! apps--she will look into these to help with self-monitoring/tracking between visits  --specific goals framed--avoid eating after supper and between meals snacking; with meals limit starches/added sugrs  --we can plan return again in about 3 mo      Time: total 30 min spent on evaluation, management, counseling, education, & motivational interviewing (video) combined with previsit prep and post visit follow up " care/charting same day.  Video visit start time 9:43; end approx 9:57    Sincerely,    Carine Dubose MD

## 2023-03-17 NOTE — PATIENT INSTRUCTIONS
"Thank you for allowing us the privilege of caring for you. We hope we provided you with the excellent service you deserve.    Please let us know if there is anything else we can do for you so that we can be sure you are completely satisfied with your care experience.    Your visit was with Dr. Dubose today.    Instructions per today's visit:  --topiramate increase to to 25 mg A, 100 mg P before supper  --myfitness/LoseIt! apps--you can look into these to help with self-monitoring/tracking between visits  --specific goals framed--avoid eating after supper and between meals snacking; with meals limit starches/added sugars and emphasize having a \"lean green meal\" with lean protein and lots of nonstarchy veggies. Use the medication to support these changes with food.  --we can plan return again in about 3 mo with Dr. Dubose    Please call our contact center at [phone] to schedule your next appointments.    Meal Replacement Products:    Here is the link to our new e-store where you can purchase our meal replacement products    [employer]/store    The one week starter kit is a great way to sample a variety of products and see what works for you.    If you want more information about the product go to: [employer]    Free Shipping for orders over $75    Benefits of meal replacements products:    Portion and calorie control  Improved nutrition  Structured eating  Simplified food choices  Avoid contact with trigger foods    Interested in working with a health ?  Health coaches work with you to improve your overall health and wellbeing.  They look at the whole person, and may involve discussion of different areas of life, including, but not limited to the four pillars of health (sleep, exercise, nutrition, and stress management). Discuss with your care team if you would like to start working a health .    Health Coaching-3 Pack: Schedule by calling " [phone]    $99 for three health coaching visits    Visits may be done in person or via phone    Coaching is a partnership between the  and the client; Coaches do not prescribe or diagnose    Coaching helps inspire the client to reach his/her personal goals    Bluetooth Scale:    We hope to provide you with high quality telephone and virtual healthcare visits while social distancing for COVID-19 is necessary, as well as in the future when virtual visits may be more convenient for you.    Our technology team made it possible for Bluetooth scales to send weight measurements to our electronic medical record. This allows weights from you weighing at home to securely flow into the medical record, which will improve telephone and virtual visits.  Additionally, studies have shown that adults actually lose more weight when their weights are automatically sent to someone else, and also that this process is not stressful for those adults.    Below is a link for purchasing the scale, with a discount code for our patients. You may call your insurance company to see if they will reimburse you for the cost of the scale, as a piece of durable medical equipment. The scales only go up to a weight of 400 pounds. This is an issue and we are working with the developer on increasing this. We found no scales that go over 400lb that have blue-tooth for connecting to Nomorerack.com.    Scale to purchase: the Applango  Body  Scale: [email or web address]/en/body/shop?[email or web address]&gclsrc=aw.ds    Discount Code: We have a discount code for our patients to bring the cost down to $50, the code is:  withmed     Steps to link the scale to Nomorerack.com via an Android Phone (you can always disconnect at any point in the future):  1. The order must be placed first before the patient can access Track My Health within Nomorerack.com.  2. Download Google Fit stephany from the Google Play Store  a. Log in or  register using your Google account  3. Download the MyChart kasi from Google Play Store  a. Select add organization  b. Search for [employer] and select it  c. Log into Ubitricityt  d. Select Track My Health  e. Select the green connect my account button  f. When prompted log into your Google account  g. Select okay to confirm the account  4. Download the [employer] Health Mate kasi from Google Play Store  a. Rhome for [employer]  b. Go to profile  c. Tap google fit under the Apps section  d. Select the option to activate Google Fit integration  e. Select the same Google account  f. Select okay to confirm the account  g.  Steps to link the scale to [employer] via an iPhone (you can always disconnect at any point in the future):  **Note Clinipace WorldWide is not available for download on an iPad**  1. The order must be placed first before the patient can access [facility] within [employer].  2. Locate the Health kasi on your iPhone.  a. Set up your Apple Health account as prompted  b. The Sources page will show Apps that communicate with your Health kasi. Once all steps are completed, you should see Health ProfitPoint and MyChart listed under the Apps section and your iPhone under the devices section.  i. Select Health Mate  1. Under 'ALLOW  HEALTH MATE  TO WRITE DATA ensure the toggle is on for Weight.  2. This will allow the scale to add your weight to the Apple Health  ii. Select MyChart  1. Under 'ALLOW  AddyHART  TO READ DATA ensure the toggle is on for Weight.  2. This allows Ubitricityt to grab the weight from Clinipace WorldWide so your provider can see your weights.  3. Download the MyChart kasi from the Kasi Store  a. Select add organization                                                  b. Search for [place] and select it  c. Log into [employer]  d. Select Track My Health  e. Select the green connect my account button  f. Follow prompts to link your device to Ubitricityt.  4. Download the [employer] health mate kasi in the Kasi Store  a. Rhome for  Withings  b. Go to profile  c. [employer]anData under the Apps section  d. If prompted to allow access with the Health Kasi, toggle weight on for read and write access.      For any questions/concerns contact Kinjal Elizabeth LPN at [phone]    To schedule appointments with our team, please call [phone]    Please call during clinic hours Monday through Friday 8:00a - 4:00p if you have questions or you can contact us via [employer] at anytime.      Lab results will be communicated through My Chart or letter (if My Chart not used). Please call the clinic if you have not received communication after 1 week or if you have any questions.    Clinic Fax: [phone]    Thank you,  Medical Weight Management Team

## 2023-03-22 NOTE — PROGRESS NOTES
Preceptor Attestation:   I discussed the patient with the resident. I talked to the patient on the phone. I have verified the content of the note, which accurately reflects my assessment of the patient and the plan of care.   Supervising Physician:  Chiquita Green MD.

## 2023-03-24 ENCOUNTER — TELEPHONE (OUTPATIENT)
Dept: ENDOCRINOLOGY | Facility: CLINIC | Age: 63
End: 2023-03-24
Payer: COMMERCIAL

## 2023-03-24 NOTE — TELEPHONE ENCOUNTER
"Attempted patient-vm box not set up  Mychart sent    Schedule:  \"Video Visit Return\" with Dr. Dubose for RET MWM 3 mo follow up (around 6/17/23)   "

## 2023-05-03 DIAGNOSIS — I25.83 CORONARY ARTERY DISEASE DUE TO LIPID RICH PLAQUE: ICD-10-CM

## 2023-05-03 DIAGNOSIS — I25.10 CORONARY ARTERY DISEASE DUE TO LIPID RICH PLAQUE: ICD-10-CM

## 2023-05-03 NOTE — TELEPHONE ENCOUNTER
"Request for medication refill:  rosuvastatin (CRESTOR) 40 MG tablet    Providers if patient needs an appointment and you are willing to give a one month supply please refill for one month and  send a letter/MyChart using \".SMILLIMITEDREFILL\" .smillimited and route chart to \"[facility] \" (Giving one month refill in non controlled medications is strongly recommended before denial)    If refill has been denied, meaning absolutely no refills without visit, please complete the smart phrase \".smirxrefuse\" and route it to the \"P [facility] MED REFILLS\"  pool to inform the patient and the pharmacy.    Zack Brandon MA        "

## 2023-05-05 RX ORDER — ROSUVASTATIN CALCIUM 40 MG/1
TABLET, COATED ORAL
Qty: 90 TABLET | Refills: 3 | Status: SHIPPED | OUTPATIENT
Start: 2023-05-05 | End: 2024-05-16

## 2023-07-13 NOTE — PROGRESS NOTES
Surgery is scheduled with Dr. Rapp on 1/8 at [place].    Patient saw Dr. Rapp yesterday.    COVID-19 test scheduled for 1/4    H&P: already completed from previous surgery - still falls within 30 days.  POST-OP: 1/19, 2/16    They are aware to arrive at 11:30 AM but will receive a call 1-2 days prior to surgery from a pre-op nurse with exact arrival and start time. Patient is aware that surgery times often change and their arrival time may be different than what is currently scheduled.    The surgery packet was provided via [employer].   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

## 2023-09-14 ENCOUNTER — MYC MEDICAL ADVICE (OUTPATIENT)
Dept: FAMILY MEDICINE | Facility: CLINIC | Age: 63
End: 2023-09-14
Payer: COMMERCIAL

## 2023-09-14 DIAGNOSIS — M19.011 PRIMARY OSTEOARTHRITIS OF RIGHT SHOULDER: Primary | ICD-10-CM

## 2023-09-14 DIAGNOSIS — E66.812 CLASS 2 SEVERE OBESITY DUE TO EXCESS CALORIES WITH SERIOUS COMORBIDITY IN ADULT, UNSPECIFIED BMI (H): ICD-10-CM

## 2023-09-14 DIAGNOSIS — E66.01 CLASS 2 SEVERE OBESITY DUE TO EXCESS CALORIES WITH SERIOUS COMORBIDITY IN ADULT, UNSPECIFIED BMI (H): ICD-10-CM

## 2023-09-18 RX ORDER — MELOXICAM 15 MG/1
15 TABLET ORAL DAILY
Qty: 30 TABLET | Refills: 0 | Status: SHIPPED | OUTPATIENT
Start: 2023-09-18 | End: 2023-10-16

## 2023-09-19 RX ORDER — TOPIRAMATE 25 MG/1
TABLET, FILM COATED ORAL
Qty: 450 TABLET | Status: CANCELLED | OUTPATIENT
Start: 2023-09-19

## 2023-09-19 NOTE — TELEPHONE ENCOUNTER
Refill denied at this time. Patient needs appointment with Dr. Dubose. Jeyson message sent to patient to schedule appointment.

## 2023-09-19 NOTE — TELEPHONE ENCOUNTER
topiramate (TOPAMAX) 25 MG tablet     300 tablet 2 3/17/2023     Last Office Visit : 3-  Future Office visit:  none    Anti-Seizure Meds Protocol  [id number]/14/2023 12:16 PM   Protocol Details Review Authorizing provider's last note.    Normal CBC on file in past 26 months     Labs completed on : 10-  K+, Cl-, Creat, Anion gap      WNL

## 2023-09-25 ENCOUNTER — OFFICE VISIT (OUTPATIENT)
Dept: FAMILY MEDICINE | Facility: CLINIC | Age: 63
End: 2023-09-25
Payer: COMMERCIAL

## 2023-09-25 VITALS
HEIGHT: 70 IN | OXYGEN SATURATION: 96 % | SYSTOLIC BLOOD PRESSURE: 123 MMHG | RESPIRATION RATE: 16 BRPM | BODY MASS INDEX: 32.93 KG/M2 | HEART RATE: 64 BPM | DIASTOLIC BLOOD PRESSURE: 83 MMHG | TEMPERATURE: 97.7 F | WEIGHT: 230 LBS

## 2023-09-25 DIAGNOSIS — Z00.00 HEALTHCARE MAINTENANCE: Primary | ICD-10-CM

## 2023-09-25 DIAGNOSIS — I10 HYPERTENSION, UNSPECIFIED TYPE: ICD-10-CM

## 2023-09-25 DIAGNOSIS — Z01.818 PRE-OPERATIVE GENERAL PHYSICAL EXAMINATION: ICD-10-CM

## 2023-09-25 DIAGNOSIS — Z23 NEED FOR INFLUENZA VACCINATION: ICD-10-CM

## 2023-09-25 PROCEDURE — 90662 IIV NO PRSV INCREASED AG IM: CPT | Performed by: FAMILY MEDICINE

## 2023-09-25 PROCEDURE — 99213 OFFICE O/P EST LOW 20 MIN: CPT | Mod: 25 | Performed by: FAMILY MEDICINE

## 2023-09-25 PROCEDURE — 90471 IMMUNIZATION ADMIN: CPT | Performed by: FAMILY MEDICINE

## 2023-09-25 NOTE — PROGRESS NOTES
[address] [address]  Phone: [phone]  Fax: [phone]  Primary Provider: Amarilis Agosto  Pre-op Performing Provider: ZARINA MURILLO      PREOPERATIVE EVALUATION:  Today's date: 9/25/2023    Chiquita is a 63 year old adult who presents for a preoperative evaluation.      9/25/2023     9:05 AM   Additional Questions   Roomed by Doua   Accompanied by Self         9/25/2023     9:05 AM   Patient Reported Additional Medications   Patient reports taking the following new medications n/a       Surgical Information:  Surgery/Procedure: Cataracts  [facility]  Surgeon: Dr. Alba  Surgery Date: 9/29/2023  Where patient plans to recover: At home with family  Fax number for surgical facility: [phone]    Assessment & Plan   Pre-operative general physical examination  The proposed surgical procedure is considered LOW risk.  Risks and Recommendations:  The patient has the following additional risks and recommendations for perioperative complications:   - No identified additional risk factors other than previously addressed    Antiplatelet or Anticoagulation Medication Instructions:   - Patient is on no antiplatelet or anticoagulation medications.    Additional Medication Instructions:  Patient is to take all scheduled medications on the day of surgery per instructions provided by Dr. Alba. Is under conscious sedation for the procedure    RECOMMENDATION:  APPROVAL GIVEN to proceed with proposed procedure, without further diagnostic evaluation.    URI symptoms  - Patient has tested and is negative for COVID  - Discussed calling surgery tomorrow if not resolving to ask if any further testing is needed      Need for influenza vaccination  - INFLUENZA VACCINE 65+ (FLUZONE HD)       Hypertension, unspecified type  - Well controlled, no changes to medication    Subjective       HPI related to upcoming procedure:    Having          9/25/2023     9:12 AM   Preop Questions   1. Have you ever had a heart attack or stroke? No   2. Have you ever had surgery on your heart or blood vessels, such as a stent placement, a coronary artery bypass, or surgery on an artery in your head, neck, heart, or legs? YES -     Ablation 30 years ago  Had PDA closure and AORTIC STENOSIS repair at 1 years old    3. Do you have chest pain with activity? No   4. Do you have a history of  heart failure? No   5. Do you currently have a cold, bronchitis or symptoms of other infection? YES - URI symptoms, has tested and negative for COVID   6. Do you have a cough, shortness of breath, or wheezing? No   7. Do you or anyone in your family have previous history of blood clots? No   8. Do you or does anyone in your family have a serious bleeding problem such as prolonged bleeding following surgeries or cuts? No   9. Have you ever had problems with anemia or been told to take iron pills? No   10. Have you had any abnormal blood loss such as black, tarry or bloody stools, or abnormal vaginal bleeding? No   11. Have you ever had a blood transfusion? No   12. Are you willing to have a blood transfusion if it is medically needed before, during, or after your surgery? Yes   13. Have you or any of your relatives ever had problems with anesthesia? No   14. Do you have sleep apnea, excessive snoring or daytime drowsiness? No   15. Do you have any artifical heart valves or other implanted medical devices like a pacemaker, defibrillator, or continuous glucose monitor? No   16. Do you have artificial joints? No   17. Are you allergic to latex? No     Health Care Directive:  Patient does not have a Health Care Directive or Living Will: Advance Directive received and scanned. Click on Code in the patient header to view.    Preoperative Review of :   reviewed - no record of controlled substances prescribed.      Status of Chronic Conditions:  Hypertension - well  controlled on current regimen  History of gastric bypass - BMI 33  Congenital heart valve abnormality - Heart repair at 1 years old. Has had surgery since that time with no concerns.      Review of Systems  CONSTITUTIONAL: NEGATIVE for fever, chills, change in weight  ENT/MOUTH: NEGATIVE for ear, mouth and throat problems  RESP: NEGATIVE for significant cough or SOB  CV: NEGATIVE for chest pain, palpitations or peripheral edema    Patient Active Problem List    Diagnosis Date Noted    Primary osteoarthritis of right shoulder 08/30/2022     Priority: Medium    Recurrent major depressive disorder, in partial remission (H) 08/26/2022     Priority: Medium    Chronic right shoulder pain 08/26/2022     Priority: Medium    ACP (advance care planning) 08/26/2022     Priority: Medium     Full code. Health care agent wife Aissatou Brown. ACP documents at home - requested copy      Localized edema 08/26/2022     Priority: Medium     At ankles - recommend compression socks      Palpitations 12/05/2021     Priority: Medium    Chest tightness 12/05/2021     Priority: Medium     Hx of History of congenital heart valve disease with open heart in 1960s, ablation in 1996 at [facility].  12/2021: Dobutamine stress test indeterminate due to ST seg depression. Moderate concentric LVH.  CTA- Minimal, non-obstructive coronary artery disease without any evidence of high-grade stenoses.        Tinnitus, bilateral 12/05/2021     Priority: Medium    Morbid obesity (H) 02/26/2021     Priority: Medium    Hematoma of skin 12/29/2020     Priority: Medium     Added automatically from request for surgery [id number]      Body mass index (BMI) of 36.0 to 36.9 12/08/2020     Priority: Medium    Gender dysphoria in adult 03/01/2019     Priority: Medium    MDD (major depressive disorder) 09/07/2014     Priority: Medium    Vitamin D deficiency 09/07/2014     Priority: Medium     Problem list name updated by automated process. Provider to review      H/O  gastric bypass 09/07/2014     Priority: Medium     RNY 2004      Congenital heart valve 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     Priority: Medium    Hypertension      Priority: Medium     Had carotid screening bilaterally, normal in Dec 2018 at a [employer] screening. Normal EKG at that time as well.       Keratoconus of both eyes 01/02/2012     Priority: Medium     Followed by ophtho q 6mos. Dx in 2002. Wears hyprid soft/hard lenses; anticipates cornea transplants in future.         Past Medical History:   Diagnosis Date    Congenital heart valve abnormality 9/7/2014    Diverticulosis     Hypertension     Keratoconus of both eyes 01-    Panic attack 12/17/13    ER Visit     Past Surgical History:   Procedure Laterality Date    AS REPR PDA BY CLAIRE  1960    age 1    BYPASS GASTRIC MASON-EN-Y, LIVER BIOPSY, COMBINED  01/01/2004    CATHETER, ABLATION  01/01/1962    cardiac, related to scarring around PDA ligation site     COLONOSCOPY N/A 04/20/2017    Procedure: COLONOSCOPY;;  Surgeon: August Perez MD;  Location: [facility]    COLONOSCOPY N/A 05/15/2017    Procedure: COLONOSCOPY;  Colonoscopy/DX:RLQ abdominal pain/2 day prep emailed;  Surgeon: Gold Tamayo MD;  Location: [facility]    HYSTERECTOMY  01/01/1993    Partial, fibroids    INCISION AND DRAINAGE BREAST Bilateral 01/08/2021    Procedure: INCISION AND DRAINAGE, BREAST. Right breast chest hematoma evacuation.;  Surgeon: Gracie Rapp MD;  [facility]    TRANSGENDER MASTECTOMY Bilateral 12/14/2020    Procedure: Bilateral Simple Mastectomy, On-Q;  Surgeon: Gracie Rapp MD;  [facility]     Current Outpatient Medications   Medication Sig Dispense Refill    albuterol (PROAIR HFA/PROVENTIL HFA/VENTOLIN HFA) 108 (90 Base) MCG/ACT inhaler Inhale 2 puffs into the lungs every 6 hours as needed for shortness of breath / dyspnea or wheezing 8.5 g 0    amLODIPine (NORVASC) 10 MG tablet TAKE 1 TABLET(10 MG) BY MOUTH DAILY 90 tablet 3    busPIRone  (BUSPAR) 7.5 MG tablet TAKE 1 TABLET(7.5 MG) BY MOUTH TWICE DAILY 180 tablet 3    citalopram (CELEXA) 40 MG tablet TAKE 1 TABLET(40 MG) BY MOUTH DAILY 90 tablet 3    diclofenac (VOLTAREN) 1 % topical gel Apply 2 g topically 4 times daily 100 g 0    diclofenac (VOLTAREN) 75 MG EC tablet Take 1 tablet by mouth 2 times daily      losartan (COZAAR) 100 MG tablet TAKE 1 TABLET(100 MG) BY MOUTH DAILY 90 tablet 3    meloxicam (MOBIC) 15 MG tablet Take 1 tablet (15 mg) by mouth daily for 30 days 30 tablet 0    metoprolol succinate ER (TOPROL XL) 100 MG 24 hr tablet Take 1 tablet (100 mg) by mouth daily 90 tablet 3    multivitamin w/minerals (THERA-VIT-M) tablet Take 1 tablet by mouth daily      potassium chloride (KLOR-CON) 20 MEQ packet Take 20 mEq by mouth daily 60 packet 1    rosuvastatin (CRESTOR) 40 MG tablet TAKE 1 TABLET(40 MG) BY MOUTH DAILY 90 tablet 3    topiramate (TOPAMAX) 25 MG tablet 25 mg (1 tablet) AM; 100 mg (4 tablets) PM before supper 300 tablet 2    Cholecalciferol (VITAMIN D) 2000 UNITS tablet Take 2,000 Units by mouth daily (Patient not taking: Reported on 2023) 100 tablet 3       No Known Allergies     Social History     Tobacco Use    Smoking status: Never    Smokeless tobacco: Never   Substance Use Topics    Alcohol use: Not Currently     Alcohol/week: 2.5 standard drinks of alcohol     Types: 3 Standard drinks or equivalent per week     Comment: 1-2 drinks/week     Family History   Problem Relation Age of Onset    Alzheimer Disease Mother 60         78    Myocardial Infarction Father 49            C.A.D. Father     Glaucoma Maternal Grandmother     Colorectal Cancer Maternal Grandmother         95, pancreatic    Diabetes Sister     Eye Disorder Sister     Depression Other         multiple siblings    Cerebrovascular Disease Paternal Grandmother         80's    Breast Cancer No family hx of      History   Drug Use No         Objective     LMP  (LMP Unknown)     Physical Exam    GENERAL  APPEARANCE: healthy, alert and no distress     EYES: EOMI, PERRL     HENT: ear canals and TM's normal and nose and mouth without ulcers or lesions     NECK: no adenopathy, no asymmetry, masses, or scars and thyroid normal to palpation     RESP: No increased respiratory effort.      CV: regular rates and rhythm, normal S1 S2, no S3 or S4 and no murmur, click or rub     ABDOMEN:  soft, nontender, no HSM or masses and bowel sounds normal     MS: extremities normal- no gross deformities noted, no evidence of inflammation in joints, FROM in all extremities.     SKIN: no suspicious lesions or rashes     NEURO: Normal strength and tone, sensory exam grossly normal, mentation intact and speech normal     PSYCH: mentation appears normal. and affect normal/bright     LYMPHATICS: No cervical adenopathy    Recent Labs   Lab Test 10/07/22  1550 08/26/22  1428 12/05/21  1533   HGB 13.2  --  12.9     --  266   INR  --   --  1.02    142 143   POTASSIUM 3.9 4.0 3.4   CR 0.85 1.20* 0.65   A1C  --  5.3  --         Diagnostics:  No labs were ordered during this visit.   No EKG required for low risk surgery (cataract, skin procedure, breast biopsy, etc).    Revised Cardiac Risk Index (RCRI):  The patient has the following serious cardiovascular risks for perioperative complications:   - No serious cardiac risks = 0 points     RCRI Interpretation: 0 points: Class I (very low risk - 0.4% complication rate)         Signed Electronically by: Jarett Hillman MD  Copy of this evaluation report is provided to requesting physician.

## 2023-09-25 NOTE — PROGRESS NOTES
[address] [address]  Phone: [phone]  Fax: [phone]  Primary Provider: Amarilis Agosto  Pre-op Performing Provider: ZARINA MURILLO    {Provider  Link to PREOP SmartSet  Use this to apply standard patient instructions to AVS; includes medication directions, common orders, guidelines for anemia, warfarin, additional testing   :604834}  PREOPERATIVE EVALUATION:  Today's date: 9/25/2023    Chiquita is a 63 year old adult who presents for a preoperative evaluation.      9/25/2023     9:05 AM   Additional Questions   Roomed by Doua   Accompanied by Self         9/25/2023     9:05 AM   Patient Reported Additional Medications   Patient reports taking the following new medications n/a       Surgical Information:  Surgery/Procedure: Cataract   Surgery Location: [facility]  Surgeon: Dr. Alba  Surgery Date: 9/28/23  Time of Surgery: 1:30 PM  Where patient plans to recover: At home with family  Fax number for surgical facility: [phone]    {2021 Provider Charting Preference for Preop :657996}    Subjective       HPI related to upcoming procedure: ***        9/25/2023     9:12 AM   Preop Questions   1. Have you ever had a heart attack or stroke? No   2. Have you ever had surgery on your heart or blood vessels, such as a stent placement, a coronary artery bypass, or surgery on an artery in your head, neck, heart, or legs? YES - ***   3. Do you have chest pain with activity? No   4. Do you have a history of  heart failure? No   5. Do you currently have a cold, bronchitis or symptoms of other infection? YES - ***   6. Do you have a cough, shortness of breath, or wheezing? No   7. Do you or anyone in your family have previous history of blood clots? No   8. Do you or does anyone in your family have a serious bleeding problem such as prolonged bleeding following surgeries or cuts? No   9. Have you ever had problems with anemia or been told to take  iron pills? No   10. Have you had any abnormal blood loss such as black, tarry or bloody stools, or abnormal vaginal bleeding? No   11. Have you ever had a blood transfusion? No   12. Are you willing to have a blood transfusion if it is medically needed before, during, or after your surgery? Yes   13. Have you or any of your relatives ever had problems with anesthesia? No   14. Do you have sleep apnea, excessive snoring or daytime drowsiness? No   15. Do you have any artifical heart valves or other implanted medical devices like a pacemaker, defibrillator, or continuous glucose monitor? No   16. Do you have artificial joints? No   17. Are you allergic to latex? No       Health Care Directive:  Patient does not have a Health Care Directive or Living Will: {ADVANCE_DIRECTIVE_STATUS:509749}    Preoperative Review of :  {Mnpmpreport:642886}  {Review MNPMP for all patients per [employer] MNPMP Profile:449386}    {Chronic problem details (Optional) :329043}    Review of Systems  {ROS Preop Choices:762766}    Patient Active Problem List    Diagnosis Date Noted    Primary osteoarthritis of right shoulder 08/30/2022     Priority: Medium    Recurrent major depressive disorder, in partial remission (H) 08/26/2022     Priority: Medium    Chronic right shoulder pain 08/26/2022     Priority: Medium    ACP (advance care planning) 08/26/2022     Priority: Medium     Full code. Health care agent wife Aissatou Brown. ACP documents at home - requested copy      Localized edema 08/26/2022     Priority: Medium     At ankles - recommend compression socks      Palpitations 12/05/2021     Priority: Medium    Chest tightness 12/05/2021     Priority: Medium     Hx of History of congenital heart valve disease with open heart in 1960s, ablation in 1996 at [facility].  12/2021: Dobutamine stress test indeterminate due to ST seg depression. Moderate concentric LVH.  CTA- Minimal, non-obstructive coronary artery disease without any evidence of  high-grade stenoses.        Tinnitus, bilateral 12/05/2021     Priority: Medium    Morbid obesity (H) 02/26/2021     Priority: Medium    Hematoma of skin 12/29/2020     Priority: Medium     Added automatically from request for surgery [id number]      Body mass index (BMI) of 36.0 to 36.9 12/08/2020     Priority: Medium    Gender dysphoria in adult 03/01/2019     Priority: Medium    MDD (major depressive disorder) 09/07/2014     Priority: Medium    Vitamin D deficiency 09/07/2014     Priority: Medium     Problem list name updated by automated process. Provider to review      H/O gastric bypass 09/07/2014     Priority: Medium     RNY 2004      Congenital heart valve 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     Priority: Medium    Hypertension      Priority: Medium     Had carotid screening bilaterally, normal in Dec 2018 at a [employer] screening. Normal EKG at that time as well.       Keratoconus of both eyes 01/02/2012     Priority: Medium     Followed by ophtho q 6mos. Dx in 2002. Wears hyprid soft/hard lenses; anticipates cornea transplants in future.         Past Medical History:   Diagnosis Date    Congenital heart valve abnormality 9/7/2014    Diverticulosis     Hypertension     Keratoconus of both eyes 01-    Panic attack 12/17/13    ER Visit     Past Surgical History:   Procedure Laterality Date    AS REPR PDA BY CLAIRE  1960    age 1    BYPASS GASTRIC MASON-EN-Y, LIVER BIOPSY, COMBINED  01/01/2004    CATHETER, ABLATION  01/01/1962    cardiac, related to scarring around PDA ligation site     COLONOSCOPY N/A 04/20/2017    Procedure: COLONOSCOPY;;  Surgeon: August Perez MD;  Location:  GI    COLONOSCOPY N/A 05/15/2017    Procedure: COLONOSCOPY;  Colonoscopy/DX:RLQ abdominal pain/2 day prep emailed;  Surgeon: Gold Tamayo MD;  Location: [facility]    HYSTERECTOMY  01/01/1993    Partial, fibroids    INCISION AND DRAINAGE BREAST Bilateral 01/08/2021    Procedure: INCISION AND DRAINAGE, BREAST. Right breast chest  hematoma evacuation.;  Surgeon: Gracie Rapp MD;  [facility]    TRANSGENDER MASTECTOMY Bilateral 12/14/2020    Procedure: Bilateral Simple Mastectomy, On-Q;  Surgeon: Gracie Rapp MD;  [facility]     Current Outpatient Medications   Medication Sig Dispense Refill    albuterol (PROAIR HFA/PROVENTIL HFA/VENTOLIN HFA) 108 (90 Base) MCG/ACT inhaler Inhale 2 puffs into the lungs every 6 hours as needed for shortness of breath / dyspnea or wheezing 8.5 g 0    amLODIPine (NORVASC) 10 MG tablet TAKE 1 TABLET(10 MG) BY MOUTH DAILY 90 tablet 3    busPIRone (BUSPAR) 7.5 MG tablet TAKE 1 TABLET(7.5 MG) BY MOUTH TWICE DAILY 180 tablet 3    citalopram (CELEXA) 40 MG tablet TAKE 1 TABLET(40 MG) BY MOUTH DAILY 90 tablet 3    diclofenac (VOLTAREN) 1 % topical gel Apply 2 g topically 4 times daily 100 g 0    diclofenac (VOLTAREN) 75 MG EC tablet Take 1 tablet by mouth 2 times daily      losartan (COZAAR) 100 MG tablet TAKE 1 TABLET(100 MG) BY MOUTH DAILY 90 tablet 3    meloxicam (MOBIC) 15 MG tablet Take 1 tablet (15 mg) by mouth daily for 30 days 30 tablet 0    metoprolol succinate ER (TOPROL XL) 100 MG 24 hr tablet Take 1 tablet (100 mg) by mouth daily 90 tablet 3    multivitamin w/minerals (THERA-VIT-M) tablet Take 1 tablet by mouth daily      potassium chloride (KLOR-CON) 20 MEQ packet Take 20 mEq by mouth daily 60 packet 1    rosuvastatin (CRESTOR) 40 MG tablet TAKE 1 TABLET(40 MG) BY MOUTH DAILY 90 tablet 3    topiramate (TOPAMAX) 25 MG tablet 25 mg (1 tablet) AM; 100 mg (4 tablets) PM before supper 300 tablet 2    Cholecalciferol (VITAMIN D) 2000 UNITS tablet Take 2,000 Units by mouth daily (Patient not taking: Reported on 9/25/2023) 100 tablet 3       No Known Allergies     Social History     Tobacco Use    Smoking status: Never    Smokeless tobacco: Never   Substance Use Topics    Alcohol use: Not Currently     Alcohol/week: 2.5 standard drinks of alcohol     Types: 3 Standard drinks or  "equivalent per week     Comment: 1-2 drinks/week     {FAMILY HISTORY (Optional):201135872}  History   Drug Use No         Objective     /83 (BP Location: Left arm, Patient Position: Sitting, Cuff Size: Adult Large)   Pulse 64   Temp 97.7  F (36.5  C) (Oral)   Resp 16   Ht 1.778 m (5' 10\")   Wt 104.3 kg (230 lb)   LMP  (LMP Unknown)   SpO2 96%   BMI 33.00 kg/m      Physical Exam  {EXAM Preop Choices:959617}    Recent Labs   Lab Test 10/07/22  1550 22  1428 21  1533   HGB 13.2  --  12.9     --  266   INR  --   --  1.02    142 143   POTASSIUM 3.9 4.0 3.4   CR 0.85 1.20* 0.65   A1C  --  5.3  --         Diagnostics:  {LABS:289205}   {EK}    Revised Cardiac Risk Index (RCRI):  The patient has the following serious cardiovascular risks for perioperative complications:  {PREOP REVISED CARDIAC RISK INDEX (RCRI) :092002}     RCRI Interpretation: {REVISED CARDIAC RISK INTERPRETATION :128301}         Signed Electronically by: Jarett Hillman MD  Copy of this evaluation report is provided to requesting physician.    {Provider Resources  Preop [facility] Preop Guidelines  Revised Cardiac Risk Index :777301}  "

## 2023-09-25 NOTE — PATIENT INSTRUCTIONS
Preparing for Your Surgery    Eating and drinking guidelines  For your safety: Unless your surgeon tells you otherwise, follow the guidelines below.  Eat and drink as usual until 8 hours before you arrive for surgery. After that, no food or milk.  Drink clear liquids until 2 hours before you arrive. These are liquids you can see through, like water, Gatorade, and Propel Water. They also include plain black coffee and tea (no cream or milk), candy, and breath mints. You can spit out gum when you arrive.  If you drink alcohol: Stop drinking it the night before surgery.  If your care team tells you to take medicine on the morning of surgery, it's okay to take it with a sip of water.  Preventing infection  Shower or bathe the night before and morning of your surgery. Follow the instructions your clinic gave you. (If no instructions, use regular soap.)  Don't shave or clip hair near your surgery site. We'll remove the hair if needed.  Don't smoke or vape the morning of surgery. You may chew nicotine gum up to 2 hours before surgery. A nicotine patch is okay.  Note: Some surgeries require you to completely quit smoking and nicotine. Check with your surgeon.  Your care team will make every effort to keep you safe from infection. We will:  Clean our hands often with soap and water (or an alcohol-based hand rub).  Clean the skin at your surgery site with a special soap that kills germs.  Give you a special gown to keep you warm. (Cold raises the risk of infection.)  Wear special hair covers, masks, gowns and gloves during surgery.  Give antibiotic medicine, if prescribed. Not all surgeries need antibiotics.  What to bring on the day of surgery  Photo ID and insurance card  Copy of your health care directive, if you have one  Glasses and hearing aids (bring cases)  You can't wear contacts during surgery  Inhaler and eye drops, if you use them (tell us about these when you arrive)  CPAP machine or breathing device, if you use  them  A few personal items, if spending the night  If you have . . .  A pacemaker, ICD (cardiac defibrillator) or other implant: Bring the ID card.  An implanted stimulator: Bring the remote control.  A legal guardian: Bring a copy of the certified (court-stamped) guardianship papers.  Please remove any jewelry, including body piercings. Leave jewelry and other valuables at home.  If you're going home the day of surgery  You must have a responsible adult drive you home. They should stay with you overnight as well.  If you don't have someone to stay with you, and you aren't safe to go home alone, we may keep you overnight. Insurance often won't pay for this.  After surgery  If it's hard to control your pain or you need more pain medicine, please call your surgeon's office.  For informational purposes only. Not to replace the advice of your health care provider. Copyright   2003, 2019 [place] [employer] [facility]. All rights reserved. Clinically reviewed by Destini Dang MD. [employer] [id number] - REV 12/22.    How to Take Your Medication Before Surgery  - Take all of your medications before surgery as usual

## 2023-10-01 ENCOUNTER — HEALTH MAINTENANCE LETTER (OUTPATIENT)
Age: 63
End: 2023-10-01

## 2023-10-16 DIAGNOSIS — M19.011 PRIMARY OSTEOARTHRITIS OF RIGHT SHOULDER: ICD-10-CM

## 2023-10-16 RX ORDER — MELOXICAM 15 MG/1
15 TABLET ORAL DAILY
Qty: 30 TABLET | Refills: 0 | Status: SHIPPED | OUTPATIENT
Start: 2023-10-16 | End: 2024-02-08

## 2023-10-16 NOTE — TELEPHONE ENCOUNTER
"Last visit 9/25/23    Request for medication refill:  meloxicam (MOBIC) 15 MG tablet     Providers if patient needs an appointment and you are willing to give a one month supply please refill for one month and  send a letter/MyChart using \".SMILLIMITEDREFILL\" .smillimited and route chart to \"P SMI \" (Giving one month refill in non controlled medications is strongly recommended before denial)    If refill has been denied, meaning absolutely no refills without visit, please complete the smart phrase \".smirxrefuse\" and route it to the \"P SMI MED REFILLS\"  pool to inform the patient and the pharmacy.    Gaby Rodriguez CMA      "

## 2023-11-11 DIAGNOSIS — I10 BENIGN ESSENTIAL HYPERTENSION: ICD-10-CM

## 2023-11-13 RX ORDER — METOPROLOL SUCCINATE 100 MG/1
100 TABLET, EXTENDED RELEASE ORAL DAILY
Qty: 90 TABLET | Refills: 3 | Status: SHIPPED | OUTPATIENT
Start: 2023-11-13 | End: 2024-02-08

## 2023-11-13 NOTE — TELEPHONE ENCOUNTER
"Request for medication refill:  metoprolol succinate ER (TOPROL XL) 100 MG 24 hr tablet     Providers if patient needs an appointment and you are willing to give a one month supply please refill for one month and  send a letter/MyChart using \".SMILLIMITEDREFILL\" .smillimited and route chart to \"P [facility] \" (Giving one month refill in non controlled medications is strongly recommended before denial)    If refill has been denied, meaning absolutely no refills without visit, please complete the smart phrase \".smirxrefuse\" and route it to the \"P [facility] MED REFILLS\"  pool to inform the patient and the pharmacy.    Gaby Rodriguez, CMA      " Detail Level: Detailed

## 2024-01-19 ENCOUNTER — MYC MEDICAL ADVICE (OUTPATIENT)
Dept: FAMILY MEDICINE | Facility: CLINIC | Age: 64
End: 2024-01-19
Payer: COMMERCIAL

## 2024-01-19 DIAGNOSIS — G89.29 CHRONIC KNEE PAIN, UNSPECIFIED LATERALITY: Primary | ICD-10-CM

## 2024-01-19 DIAGNOSIS — M25.569 CHRONIC KNEE PAIN, UNSPECIFIED LATERALITY: Primary | ICD-10-CM

## 2024-01-25 ENCOUNTER — MYC MEDICAL ADVICE (OUTPATIENT)
Dept: ANESTHESIOLOGY | Facility: CLINIC | Age: 64
End: 2024-01-25
Payer: COMMERCIAL

## 2024-01-25 ENCOUNTER — TELEPHONE (OUTPATIENT)
Dept: ANESTHESIOLOGY | Facility: CLINIC | Age: 64
End: 2024-01-25
Payer: COMMERCIAL

## 2024-01-30 ENCOUNTER — TELEPHONE (OUTPATIENT)
Dept: ANESTHESIOLOGY | Facility: CLINIC | Age: 64
End: 2024-01-30
Payer: COMMERCIAL

## 2024-02-08 ENCOUNTER — MYC REFILL (OUTPATIENT)
Dept: FAMILY MEDICINE | Facility: CLINIC | Age: 64
End: 2024-02-08
Payer: COMMERCIAL

## 2024-02-08 DIAGNOSIS — Z00.00 HEALTH CARE MAINTENANCE: ICD-10-CM

## 2024-02-08 DIAGNOSIS — I10 BENIGN ESSENTIAL HYPERTENSION: ICD-10-CM

## 2024-02-08 DIAGNOSIS — E87.6 HYPOKALEMIA: ICD-10-CM

## 2024-02-08 DIAGNOSIS — F33.41 RECURRENT MAJOR DEPRESSIVE DISORDER, IN PARTIAL REMISSION (H): ICD-10-CM

## 2024-02-08 DIAGNOSIS — F32.A DEPRESSION, UNSPECIFIED DEPRESSION TYPE: ICD-10-CM

## 2024-02-08 DIAGNOSIS — M19.011 PRIMARY OSTEOARTHRITIS OF RIGHT SHOULDER: ICD-10-CM

## 2024-02-08 RX ORDER — POTASSIUM CHLORIDE 1.5 G/1.58G
20 POWDER, FOR SOLUTION ORAL DAILY
Qty: 60 PACKET | Refills: 1 | Status: CANCELLED | OUTPATIENT
Start: 2024-02-08

## 2024-02-08 NOTE — TELEPHONE ENCOUNTER
"Request for medication refill:  losartan (COZAAR) 100 MG tablet     Providers if patient needs an appointment and you are willing to give a one month supply please refill for one month and  send a letter/MyChart using \".SMILLIMITEDREFILL\" .smillimited and route chart to \"P [facility] \" (Giving one month refill in non controlled medications is strongly recommended before denial)    If refill has been denied, meaning absolutely no refills without visit, please complete the smart phrase \".smirxrefuse\" and route it to the \"P [facility] MED REFILLS\"  pool to inform the patient and the pharmacy.    Gaby Rodriguez, CMA      "

## 2024-02-09 RX ORDER — LOSARTAN POTASSIUM 100 MG/1
100 TABLET ORAL DAILY
Qty: 90 TABLET | Refills: 3 | Status: SHIPPED | OUTPATIENT
Start: 2024-02-09

## 2024-02-11 DIAGNOSIS — F33.41 RECURRENT MAJOR DEPRESSIVE DISORDER, IN PARTIAL REMISSION (H): ICD-10-CM

## 2024-02-11 DIAGNOSIS — I10 BENIGN ESSENTIAL HYPERTENSION: ICD-10-CM

## 2024-02-12 RX ORDER — CHOLECALCIFEROL (VITAMIN D3) 50 MCG
50 TABLET ORAL DAILY
Qty: 100 TABLET | Refills: 3 | Status: SHIPPED | OUTPATIENT
Start: 2024-02-12

## 2024-02-12 RX ORDER — BUSPIRONE HYDROCHLORIDE 7.5 MG/1
TABLET ORAL
Qty: 180 TABLET | Refills: 3 | Status: SHIPPED | OUTPATIENT
Start: 2024-02-12

## 2024-02-12 RX ORDER — CITALOPRAM HYDROBROMIDE 40 MG/1
40 TABLET ORAL DAILY
Qty: 90 TABLET | Refills: 3 | Status: SHIPPED | OUTPATIENT
Start: 2024-02-12

## 2024-02-12 RX ORDER — POTASSIUM CHLORIDE 1500 MG/1
20 TABLET, EXTENDED RELEASE ORAL DAILY
Qty: 90 TABLET | Refills: 3 | Status: SHIPPED | OUTPATIENT
Start: 2024-02-12 | End: 2025-02-11

## 2024-02-12 RX ORDER — MELOXICAM 15 MG/1
15 TABLET ORAL DAILY
Qty: 30 TABLET | Refills: 0 | Status: SHIPPED | OUTPATIENT
Start: 2024-02-12 | End: 2024-03-12

## 2024-02-12 RX ORDER — METOPROLOL SUCCINATE 100 MG/1
100 TABLET, EXTENDED RELEASE ORAL DAILY
Qty: 90 TABLET | Refills: 3 | Status: SHIPPED | OUTPATIENT
Start: 2024-02-12

## 2024-02-12 RX ORDER — AMLODIPINE BESYLATE 10 MG/1
10 TABLET ORAL DAILY
Qty: 90 TABLET | Refills: 3 | Status: SHIPPED | OUTPATIENT
Start: 2024-02-12

## 2024-02-12 NOTE — TELEPHONE ENCOUNTER

## 2024-02-15 RX ORDER — LOSARTAN POTASSIUM 100 MG/1
100 TABLET ORAL DAILY
Qty: 90 TABLET | Refills: 3 | OUTPATIENT
Start: 2024-02-15

## 2024-02-15 RX ORDER — AMLODIPINE BESYLATE 10 MG/1
TABLET ORAL
Qty: 90 TABLET | Refills: 3 | OUTPATIENT
Start: 2024-02-15

## 2024-02-15 RX ORDER — CITALOPRAM HYDROBROMIDE 40 MG/1
TABLET ORAL
Qty: 90 TABLET | Refills: 3 | OUTPATIENT
Start: 2024-02-15

## 2024-02-15 NOTE — TELEPHONE ENCOUNTER
Amlodipine and citalopram sent on 2/12/24    Losartan sent on 2/9/24    Duplicate requests    Betsy Vallejo RN

## 2024-02-22 ENCOUNTER — OFFICE VISIT (OUTPATIENT)
Dept: FAMILY MEDICINE | Facility: CLINIC | Age: 64
End: 2024-02-22
Payer: COMMERCIAL

## 2024-02-22 VITALS
OXYGEN SATURATION: 97 % | DIASTOLIC BLOOD PRESSURE: 85 MMHG | WEIGHT: 228 LBS | SYSTOLIC BLOOD PRESSURE: 133 MMHG | TEMPERATURE: 97.5 F | BODY MASS INDEX: 32.64 KG/M2 | HEART RATE: 54 BPM | HEIGHT: 70 IN | RESPIRATION RATE: 15 BRPM

## 2024-02-22 DIAGNOSIS — M79.89 MASS OF SOFT TISSUE OF THIGH: ICD-10-CM

## 2024-02-22 DIAGNOSIS — Z23 ENCOUNTER FOR ADMINISTRATION OF COVID-19 VACCINE: ICD-10-CM

## 2024-02-22 DIAGNOSIS — M79.651 PAIN OF RIGHT THIGH: Primary | ICD-10-CM

## 2024-02-22 PROCEDURE — 90715 TDAP VACCINE 7 YRS/> IM: CPT

## 2024-02-22 PROCEDURE — 90471 IMMUNIZATION ADMIN: CPT

## 2024-02-22 PROCEDURE — 90472 IMMUNIZATION ADMIN EACH ADD: CPT

## 2024-02-22 PROCEDURE — 90480 ADMN SARSCOV2 VAC 1/ONLY CMP: CPT

## 2024-02-22 PROCEDURE — 91320 SARSCV2 VAC 30MCG TRS-SUC IM: CPT

## 2024-02-22 PROCEDURE — 99213 OFFICE O/P EST LOW 20 MIN: CPT | Mod: 25

## 2024-02-22 PROCEDURE — 90677 PCV20 VACCINE IM: CPT

## 2024-02-22 ASSESSMENT — ASTHMA QUESTIONNAIRES
QUESTION_3 LAST FOUR WEEKS HOW OFTEN DID YOUR ASTHMA SYMPTOMS (WHEEZING, COUGHING, SHORTNESS OF BREATH, CHEST TIGHTNESS OR PAIN) WAKE YOU UP AT NIGHT OR EARLIER THAN USUAL IN THE MORNING: NOT AT ALL
QUESTION_5 LAST FOUR WEEKS HOW WOULD YOU RATE YOUR ASTHMA CONTROL: COMPLETELY CONTROLLED
ACT_TOTALSCORE: 25
QUESTION_1 LAST FOUR WEEKS HOW MUCH OF THE TIME DID YOUR ASTHMA KEEP YOU FROM GETTING AS MUCH DONE AT WORK, SCHOOL OR AT HOME: NONE OF THE TIME
QUESTION_4 LAST FOUR WEEKS HOW OFTEN HAVE YOU USED YOUR RESCUE INHALER OR NEBULIZER MEDICATION (SUCH AS ALBUTEROL): NOT AT ALL
ACT_TOTALSCORE: 25
QUESTION_2 LAST FOUR WEEKS HOW OFTEN HAVE YOU HAD SHORTNESS OF BREATH: NOT AT ALL

## 2024-02-22 ASSESSMENT — PATIENT HEALTH QUESTIONNAIRE - PHQ9
SUM OF ALL RESPONSES TO PHQ QUESTIONS 1-9: 1
10. IF YOU CHECKED OFF ANY PROBLEMS, HOW DIFFICULT HAVE THESE PROBLEMS MADE IT FOR YOU TO DO YOUR WORK, TAKE CARE OF THINGS AT HOME, OR GET ALONG WITH OTHER PEOPLE: NOT DIFFICULT AT ALL
SUM OF ALL RESPONSES TO PHQ QUESTIONS 1-9: 1

## 2024-02-22 NOTE — PROGRESS NOTES
"  Assessment & Plan     Pain of right thigh  Mass of soft tissue of thigh  2.5-3 months right lateral thigh pain. Initially suspicious for IT band syndrome given TTP all along IT band, but the worst of her pain is coming from one of the two soft tissue lumps on lateral thigh. Most suspicious for lipoma. Will evaluate with US -  to be scheduled at [facility] on 2/28/24. If lipoma, will consider referral to general surgery for removal given she is very symptomatic from this. If not lipoma, may confer with sports med to see if there's any benefit for her to see ortho.  - US Lower Extremity Non Vascular Right; Future    Encounter for administration of COVID-19 vaccine  Also received Tdap and PCV vaccines today.  - COVID-19 mRNA vaccine 12+y (COMIRNATY (PFIZER)) injection 30 mcg    Return in about 2 weeks (around 3/7/2024) for Follow up of thigh mass (if needed).    Subjective   Chiquita is a 64 year old, presenting for the following health issues:  Pain (Pain on right thigh for 3 months)        2/22/2024     9:38 AM   Additional Questions   Roomed by Kash   Accompanied by Self         2/22/2024    Information    services provided? No     HPI     Thigh pain x 2.5 months  Has a lump in right thigh, impeding walking, can't put on sock and shoe    Has tried ibuprofen, heat, ice, PT, acupuncture, working on it with . Massage gun helps only for a little bit. Tender over IT band, says that's also what her PT was thinking and they worked on stretching and exercises. This pain has continued, and she has localized it to the lump in her thigh. Has two, but the more distal lump is the one that is painful    Works at home        Objective    /85 (BP Location: Left arm, Patient Position: Sitting, Cuff Size: Adult Large)   Pulse 54   Temp 97.5  F (36.4  C) (Oral)   Resp 15   Ht 1.778 m (5' 10\")   Wt 103.4 kg (228 lb)   LMP  (LMP Unknown)   SpO2 97%   BMI 32.71 kg/m    Body mass index is " 32.71 kg/m .  Physical Exam  Vitals reviewed.   Constitutional:       General: She is not in acute distress.     Appearance: Normal appearance. She is not ill-appearing or toxic-appearing.   HENT:      Head: Normocephalic and atraumatic.   Eyes:      General: No scleral icterus.     Conjunctiva/sclera: Conjunctivae normal.   Cardiovascular:      Rate and Rhythm: Normal rate.   Pulmonary:      Effort: Pulmonary effort is normal.   Musculoskeletal:      Comments: On right lateral mid and distal thigh, patient has two ~5 cm lumps underneath skin. No associated skin changes, bruising, redness, or warmth. The more proximal lump is soft, non-tender, easily compressible without noted mass. Feels just like fatty tissue. The more distal lump is tender to palpation, and on deeper palpation has underlying irregular, bumpy texture. No circumscribed or rubbery mobile mass    Skin:     General: Skin is warm and dry.   Neurological:      General: No focal deficit present.      Mental Status: She is alert.        Signed Electronically by: Chaitanya Medina MD  Answers submitted by the patient for this visit:  Patient Health Questionnaire (Submitted on 2/22/2024)  If you checked off any problems, how difficult have these problems made it for you to do your work, take care of things at home, or get along with other people?: Not difficult at all  PHQ9 TOTAL SCORE: 1     Take over the counter pain medication/Prescriptions electronically submitted to pharmacy from [facility]

## 2024-02-22 NOTE — PROGRESS NOTES
Preceptor Attestation:  Patient seen and evaluated in person. I discussed the patient with the resident. I have verified the content of the note, which accurately reflects my assessment of the patient and the plan of care.   Supervising Physician:  Viv Guido DO

## 2024-02-28 ENCOUNTER — ANCILLARY PROCEDURE (OUTPATIENT)
Dept: ULTRASOUND IMAGING | Facility: CLINIC | Age: 64
End: 2024-02-28
Payer: COMMERCIAL

## 2024-02-28 DIAGNOSIS — M79.89 MASS OF SOFT TISSUE OF THIGH: ICD-10-CM

## 2024-02-28 DIAGNOSIS — M79.651 PAIN OF RIGHT THIGH: ICD-10-CM

## 2024-02-28 PROCEDURE — 76882 US LMTD JT/FCL EVL NVASC XTR: CPT | Mod: RT | Performed by: RADIOLOGY

## 2024-03-01 ENCOUNTER — MYC MEDICAL ADVICE (OUTPATIENT)
Dept: FAMILY MEDICINE | Facility: CLINIC | Age: 64
End: 2024-03-01
Payer: COMMERCIAL

## 2024-03-01 DIAGNOSIS — M79.651 PAIN OF RIGHT THIGH: Primary | ICD-10-CM

## 2024-03-01 DIAGNOSIS — R22.41 LEG MASS, RIGHT: ICD-10-CM

## 2024-03-01 NOTE — TELEPHONE ENCOUNTER
Seen in clinic for severe pain associated with mass R thigh  Palpates similar to lipoma but US without significant finding  Cannot walk  Proceed to CT, see Valentine boggsg

## 2024-03-02 NOTE — RESULT ENCOUNTER NOTE
I would consider an x-ray of the thigh.  I would also consider MRI if you think the mass is involving muscle.  I would then get an opinion from general surgery about removal. Ortho likely not see if no bony involvement.  thanks

## 2024-03-04 ENCOUNTER — ANCILLARY PROCEDURE (OUTPATIENT)
Dept: CT IMAGING | Facility: CLINIC | Age: 64
End: 2024-03-04
Attending: FAMILY MEDICINE
Payer: COMMERCIAL

## 2024-03-04 DIAGNOSIS — M79.651 PAIN OF RIGHT THIGH: ICD-10-CM

## 2024-03-04 DIAGNOSIS — R22.41 LEG MASS, RIGHT: ICD-10-CM

## 2024-03-04 DIAGNOSIS — M79.651 PAIN OF RIGHT THIGH: Primary | ICD-10-CM

## 2024-03-04 PROCEDURE — 73700 CT LOWER EXTREMITY W/O DYE: CPT | Mod: RT | Performed by: RADIOLOGY

## 2024-03-12 ENCOUNTER — ANCILLARY PROCEDURE (OUTPATIENT)
Dept: GENERAL RADIOLOGY | Facility: CLINIC | Age: 64
End: 2024-03-12
Attending: FAMILY MEDICINE
Payer: COMMERCIAL

## 2024-03-12 ENCOUNTER — OFFICE VISIT (OUTPATIENT)
Dept: FAMILY MEDICINE | Facility: CLINIC | Age: 64
End: 2024-03-12
Payer: COMMERCIAL

## 2024-03-12 VITALS
TEMPERATURE: 98 F | RESPIRATION RATE: 16 BRPM | HEART RATE: 56 BPM | BODY MASS INDEX: 32.71 KG/M2 | OXYGEN SATURATION: 97 % | HEIGHT: 70 IN | DIASTOLIC BLOOD PRESSURE: 82 MMHG | SYSTOLIC BLOOD PRESSURE: 137 MMHG

## 2024-03-12 DIAGNOSIS — M25.511 CHRONIC PAIN IN RIGHT SHOULDER: ICD-10-CM

## 2024-03-12 DIAGNOSIS — M54.16 LUMBAR RADICULOPATHY: ICD-10-CM

## 2024-03-12 DIAGNOSIS — G89.29 CHRONIC RIGHT HIP PAIN: ICD-10-CM

## 2024-03-12 DIAGNOSIS — M25.551 CHRONIC RIGHT HIP PAIN: ICD-10-CM

## 2024-03-12 DIAGNOSIS — M17.11 PRIMARY LOCALIZED OSTEOARTHROSIS OF RIGHT LOWER LEG: Primary | ICD-10-CM

## 2024-03-12 DIAGNOSIS — G89.29 CHRONIC PAIN IN RIGHT SHOULDER: ICD-10-CM

## 2024-03-12 DIAGNOSIS — M19.011 PRIMARY OSTEOARTHRITIS OF RIGHT SHOULDER: ICD-10-CM

## 2024-03-12 PROCEDURE — 72100 X-RAY EXAM L-S SPINE 2/3 VWS: CPT | Mod: FY | Performed by: STUDENT IN AN ORGANIZED HEALTH CARE EDUCATION/TRAINING PROGRAM

## 2024-03-12 PROCEDURE — 99214 OFFICE O/P EST MOD 30 MIN: CPT | Mod: 25 | Performed by: FAMILY MEDICINE

## 2024-03-12 PROCEDURE — 20610 DRAIN/INJ JOINT/BURSA W/O US: CPT | Performed by: FAMILY MEDICINE

## 2024-03-12 RX ORDER — MELOXICAM 15 MG/1
15 TABLET ORAL DAILY
Qty: 30 TABLET | Refills: 0 | Status: SHIPPED | OUTPATIENT
Start: 2024-03-12 | End: 2024-08-21

## 2024-03-12 RX ORDER — TRIAMCINOLONE ACETONIDE 40 MG/ML
40 INJECTION, SUSPENSION INTRA-ARTICULAR; INTRAMUSCULAR ONCE
Status: COMPLETED | OUTPATIENT
Start: 2024-03-12 | End: 2024-03-12

## 2024-03-12 RX ADMIN — TRIAMCINOLONE ACETONIDE 40 MG: 40 INJECTION, SUSPENSION INTRA-ARTICULAR; INTRAMUSCULAR at 12:26

## 2024-03-12 NOTE — PROGRESS NOTES
"  Assessment & Plan     Primary localized osteoarthrosis of right lower leg  Discussed risks vs benefits, expected duration of treatment and obtained consent. Provided injection for knee which was well tolerated.   - triamcinolone (KENALOG-40) injection 40 mg  - Large Joint/Bursa injection and/or drainage (Shoulder, Knee)    Lumbar radiculopathy  Concern for possible radicular component of pain in L4/5 distribution to thigh. Will obtain Lumbar X-ray to determine if degenerative changes exist, such as osteophyte impinging nerve.   - X-ray lumbar spine 2-3 views*; Future    Chronic pain in right shoulder- OA and concern for frozen shoulder  Chronic right hip pain  The patient needs further follow up in [facility] clinic to assess these joints. Scheduled for [facility] clinic on 3/20/24 for GH injection right shoulder.     BMI  Estimated body mass index is 32.71 kg/m  as calculated from the following:    Height as of this encounter: 1.778 m (5' 10\").    Weight as of 2/22/24: 103.4 kg (228 lb).       Return in about 1 week (around 3/19/2024), or if symptoms worsen or fail to improve, for Follow up.    Lorena Porras is a 64 year old, presenting for the following health issues:  Clinic Care Coordination - Initial (Pt here for knee injection)      3/12/2024     9:30 AM   Additional Questions   Roomed by Doua   Accompanied by Self         3/12/2024     9:30 AM   Patient Reported Additional Medications   Patient reports taking the following new medications n/a         3/12/2024    Information    services provided? No     HPI   Comes in for Right Hip and Right Knee Injections.   Reports that it is difficult to put socks on and tie shoe laces  Walking is impaired.   Known osteoarthritis of the knee, Right worse than Left.   Hip issues -- osteoarthritis related.   Had injection in left knee, years ago.   Reports right hip pain and tenderness with tenderness    No history of stress fractures. No groin pain, just " "tightness  No back pain, numbness or shooting down foot, only a pressure sensation to lateral thigh          Objective    /82 (BP Location: Left arm, Patient Position: Sitting, Cuff Size: Adult Large)   Pulse 56   Temp 98  F (36.7  C) (Oral)   Resp 16   Ht 1.778 m (5' 10\")   LMP  (LMP Unknown)   SpO2 97%   BMI 32.71 kg/m    Body mass index is 32.71 kg/m .  Physical Exam   GENERAL: alert and no distress  RESP: Non labored breathing  CV: Extremities warm and well perfused  MS:Reduced range of motion with spine flexion and extension.  Reduced passive range of motion of right arm with flexion and abduction with pain limiting extremis movement  Pain with right leg roll and internal/external rotation  Tenderness to right hip with palpation  Right knee effusion, intact range of motion.  SKIN: no suspicious lesions or rashes,warmth or redness over joints  NEURO: Normal tone, strength limited by pain of above extremities, mentation intact and speech normal  PSYCH: mentation appears normal, affect normal/bright     Burton Mclaughlin MD  [facility], PGY-1      Signed Electronically by: Kasey Doss MD    PROCEDURE:  JOINT ASPIRATION/INJECTION.         After a discussion of risks, benefits and side effects of procedure, informed patient consent was obtained.       The right knee was prepped and draped in the usual clean fashion (sterile not required for this procedure).  4 cc of 1% lidocaine was used for local analgesia.    ASPIRATION: Not performed.     INJECTION:  Using 4 cc of 1% lidocaine mixed                           with 40 mg of Kenalog, the solution was successfully injected                           without complication.  Patient did experience some pain                          relief following injection.    Kenalog: LOT [id number] EXP 2025-08       Patient seen, evaluated and discussed with the resident. I have verified the content of the note, which accurately reflects " my assessment of the patient and the plan of care.  I was present for the attempt by Dr. Mclaughlin and then I repositioned the needle to reinject.   Supervising Physician:  Kasey Doss MD

## 2024-03-12 NOTE — TELEPHONE ENCOUNTER
"Request for medication refill:  meloxicam (MOBIC) 15 MG tablet     Providers if patient needs an appointment and you are willing to give a one month supply please refill for one month and  send a letter/MyChart using \".SMILLIMITEDREFILL\" .smillimited and route chart to \"P [facility] \" (Giving one month refill in non controlled medications is strongly recommended before denial)    If refill has been denied, meaning absolutely no refills without visit, please complete the smart phrase \".smirxrefuse\" and route it to the \"P [facility] MED REFILLS\"  pool to inform the patient and the pharmacy.    Gaby Rodriguez, CMA      "

## 2024-03-12 NOTE — PATIENT INSTRUCTIONS
Patient Education   Here is the plan from today's visit    1. Primary localized osteoarthrosis of right lower leg  You received a steroid injection in your knee. This should set in tonight and really help your pain for several weeks. Please be aware to look for signs of infection which include fever, redness, swelling or increased pain -- you can call our clinic if you have concerns.   - triamcinolone (KENALOG-40) injection 40 mg  - Large Joint/Bursa injection and/or drainage (Shoulder, Knee)    2. Lumbar radiculopathy  We think you may have some nerve injury to explain your symptoms. X-ray will help us see this further as the 1st step in investigating this pain that radiates down your thigh  - X-ray lumbar spine 2-3 views*; Future    3. Chronic pain in right shoulder  4. Chronic right hip pain  I want you to follow up with Dr. Doss at [facility] 4th floor ([address] [facility] come at 7:45 am for 8am appointment on 3/20. You will check in at the kiosk just in front of the elevators, and they will direct you to seating area.     Please call or return to clinic if your symptoms don't go away.    Follow up plan  Return in about 1 week (around 3/19/2024), or if symptoms worsen or fail to improve, for Follow up.    Thank you for coming to [facility] today.  Lab Testing:  **If you had lab testing today and your results are reassuring or normal they will be mailed to you or sent through [employer] within 7 days.   **If the lab tests need quick action we will call you with the results.  **If you are having labs done on a different day, please call [phone] to schedule at [facility] or [phone] for other [facility] locations. Labs do not offer walk-in appointments.  The phone number we will call with results is # [phone] (home) . If this is not the best number please call our clinic and change the number.  Medication  Refills:  If you need any refills please call your pharmacy and they will contact us.   If you need to  your refill at a new pharmacy, please contact the new pharmacy directly. The new pharmacy will help you get your medications transferred faster.   Scheduling:  If you have any concerns about today's visit or wish to schedule another appointment please call our office during normal business hours [phone] (8-5:00 M-F). If you can no longer make a scheduled visit, please cancel via [employer] or call us to cancel.   If a referral was made to an [facility] specialty provider and you do not get a call from central scheduling, please refer to directions on your visit summary or call our office during normal business hours for assistance.   If a Mammogram was ordered for you at the [facility] call [phone] to schedule or change your appointment.  If you had an XRay/CT/Ultrasound/MRI ordered the number is [phone] to schedule or change your radiology appointment.   [facility] has limited ultrasound appointments available on Wednesdays, if you would like your ultrasound at [facility], please call [phone] to schedule.   Medical Concerns:  If you have urgent medical concerns please call [phone] at any time of the day.    Kasey Doss MD

## 2024-03-13 ENCOUNTER — TELEPHONE (OUTPATIENT)
Dept: ANESTHESIOLOGY | Facility: CLINIC | Age: 64
End: 2024-03-13
Payer: COMMERCIAL

## 2024-03-13 NOTE — TELEPHONE ENCOUNTER
Called to confirm cancellation of:    Appointment type: Return pain  Provider: Dr. Brumfield  Return date: 3/14 @ noon  Specialty phone number: [phone]

## 2024-03-20 ENCOUNTER — OFFICE VISIT (OUTPATIENT)
Dept: ORTHOPEDICS | Facility: CLINIC | Age: 64
End: 2024-03-20
Payer: COMMERCIAL

## 2024-03-20 DIAGNOSIS — M47.816 LUMBAR SPONDYLOSIS: ICD-10-CM

## 2024-03-20 DIAGNOSIS — M25.511 PAIN IN JOINT OF RIGHT SHOULDER: Primary | ICD-10-CM

## 2024-03-20 DIAGNOSIS — M70.61 GREATER TROCHANTERIC BURSITIS OF RIGHT HIP: ICD-10-CM

## 2024-03-20 DIAGNOSIS — M19.011 OSTEOARTHRITIS OF GLENOHUMERAL JOINT, RIGHT: ICD-10-CM

## 2024-03-20 PROCEDURE — 20611 DRAIN/INJ JOINT/BURSA W/US: CPT | Mod: RT | Performed by: FAMILY MEDICINE

## 2024-03-20 PROCEDURE — 99214 OFFICE O/P EST MOD 30 MIN: CPT | Mod: 25 | Performed by: FAMILY MEDICINE

## 2024-03-20 RX ORDER — LIDOCAINE HYDROCHLORIDE 10 MG/ML
7 INJECTION, SOLUTION EPIDURAL; INFILTRATION; INTRACAUDAL; PERINEURAL
Status: SHIPPED | OUTPATIENT
Start: 2024-03-20

## 2024-03-20 RX ORDER — TRIAMCINOLONE ACETONIDE 40 MG/ML
40 INJECTION, SUSPENSION INTRA-ARTICULAR; INTRAMUSCULAR
Status: SHIPPED | OUTPATIENT
Start: 2024-03-20

## 2024-03-20 RX ADMIN — LIDOCAINE HYDROCHLORIDE 7 ML: 10 INJECTION, SOLUTION EPIDURAL; INFILTRATION; INTRACAUDAL; PERINEURAL at 08:17

## 2024-03-20 RX ADMIN — TRIAMCINOLONE ACETONIDE 40 MG: 40 INJECTION, SUSPENSION INTRA-ARTICULAR; INTRAMUSCULAR at 08:17

## 2024-03-20 NOTE — NURSING NOTE
[address] [address]  Dept: [phone]  ______________________________________________________________________________    Patient: Chiquita Butler   : 1960   MRN: [id number]   2024    INVASIVE PROCEDURE SAFETY CHECKLIST    Date: 2024   Procedure:Right shoulder glenohumeral joint USGI  Patient Name: Chiquita Butler  MRN: [id number]  YOB: 1960    Action: Complete sections as appropriate. Any discrepancy results in a HARD COPY until resolved.     PRE PROCEDURE:  Patient ID verified with 2 identifiers (name and  or MRN): Yes  Procedure and site verified with patient/designee (when able): Yes  Accurate consent documentation in medical record: Yes  H&P (or appropriate assessment) documented in medical record: Yes  H&P must be up to 20 days prior to procedure and updates within 24 hours of procedure as applicable: NA  Relevant diagnostic and radiology test results appropriately labeled and displayed as applicable: Yes  Procedure site(s) marked with provider initials: NA    TIMEOUT:  Time-Out performed immediately prior to starting procedure, including verbal and active participation of all team members addressing the following:Yes  * Correct patient identify  * Confirmed that the correct side and site are marked  * An accurate procedure consent form  * Agreement on the procedure to be done  * Correct patient position  * Relevant images and results are properly labeled and appropriately displayed  * The need to administer antibiotics or fluids for irrigation purposes during the procedure as applicable   * Safety precautions based on patient history or medication use    DURING PROCEDURE: Verification of correct person, site, and procedures any time the responsibility for care of the patient is transferred to another member of the care team.       Prior to injection, verified patient identity using patient's name and date  of birth.  Due to injection administration, patient instructed to remain in clinic for 15 minutes  afterwards, and to report any adverse reaction to me immediately.    Joint injection was performed.      Drug Amount Wasted:  Yes: 3 mg/ml   Vial/Syringe: Single dose vial  Expiration Date:  05/2027      Myranda Luong, AMBER  March 20, 2024

## 2024-03-20 NOTE — PROGRESS NOTES
Assessment and plan: 64-year-old -American person with chronic right shoulder pain demonstrating glenohumeral osteoarthritis, mild right greater trochanteric bursitis and L5 moderate lumbar degenerative change    Right GH injection-  US guided injection performed today  Mild right greater trochanteric bursitis-  US views- mild effusion over greater troch  Working on bridges, leg lifts and clam shells  L5 moderate lumbar spondylosis-  Patient is actively pursuing physical therapy as she is having right knee pain and greater trochanteric tenderness on the lateral right leg.  Patient has had advanced imaging for possible fatty concentration in the lateral thigh.  This does not appear to be organized and I do not believe that this is the generator of her pain.  Patient also has right hip osteoarthritis that is severe and the nerves are shared between the hip and knee this may be contributing to some of the pain and the patient is having.  Encouraged a longer period of physical therapy to gain strength.  Patient's gait has also been problematic and this could be affecting her back.  Patient is off to [facility] and will have more walking to do.      Subjective: 64-year-old -American person with chronic right shoulder pain and substantial glenohumeral osteoarthritis  Patient also had a recent lumbar x-ray demonstrating moderate arthritis at L5.      Objective: Films reviewed demonstrating significant right glenohumeral osteoarthritis      Procedure: Ultrasound used because of body habitus and deep joint.  Anatomy was assessed with curvilinear probe and linear.   Linear probe positioning marked with a skin marker.  Area was broken down for sterile procedure and sterile gloves were placed.  Sterile gel was used along with ChloraPrep over the area and sterile towels.  3 cc 1% lidocaine used as a tract to anesthetize the area.  This was followed by 4 cc of 1% lidocaine mixed with 40 mg of Kenalog delivered to  the glenohumeral joint with ultrasound guidance.  Patient without any immediate complications.  Noted some pain relief with lidocaine.    Band-aid placed.  Images were not able to be stored on the [facility] ultrasound today because of machine malfunction.            Large Joint Injection: R glenohumeral joint    Date/Time: 3/20/2024 8:17 AM    Performed by: Kasey Doss MD  Authorized by: Kasey Doss MD    Indications:  Pain  Needle Size:  22 G  Guidance: ultrasound    Approach:  Posterior  Location:  Shoulder      Site:  R glenohumeral joint  Medications:  40 mg triamcinolone 40 MG/ML; 7 mL lidocaine (PF) 1 %  Outcome:  Tolerated well, no immediate complications  Procedure discussed: discussed risks, benefits, and alternatives    Consent Given by:  Patient  Timeout: timeout called immediately prior to procedure    Prep: patient was prepped and draped in usual sterile fashion      I agree with the following injection documentation.    MANUELITO Doss MD

## 2024-04-16 DIAGNOSIS — M25.551 RIGHT HIP PAIN: Primary | ICD-10-CM

## 2024-04-18 ENCOUNTER — MYC MEDICAL ADVICE (OUTPATIENT)
Dept: ORTHOPEDICS | Facility: CLINIC | Age: 64
End: 2024-04-18
Payer: COMMERCIAL

## 2024-04-22 NOTE — TELEPHONE ENCOUNTER
DIAGNOSIS: right hip pain    APPOINTMENT DATE: 4/26/2024   NOTES STATUS DETAILS   OFFICE NOTE from referring provider  Self Referred    OFFICE NOTE from other specialist Internal Sports Med OV 3/20/2024    MEDICATION LIST Internal    XRAYS (IMAGES & REPORTS) In process Xray Pelvis and Hip (Scheduled)     DX Hip/Pelvis/Spine 9/14/2022

## 2024-04-26 ENCOUNTER — OFFICE VISIT (OUTPATIENT)
Dept: ORTHOPEDICS | Facility: CLINIC | Age: 64
End: 2024-04-26
Payer: COMMERCIAL

## 2024-04-26 ENCOUNTER — ANCILLARY PROCEDURE (OUTPATIENT)
Dept: GENERAL RADIOLOGY | Facility: CLINIC | Age: 64
End: 2024-04-26
Attending: FAMILY MEDICINE
Payer: COMMERCIAL

## 2024-04-26 ENCOUNTER — PRE VISIT (OUTPATIENT)
Dept: ORTHOPEDICS | Facility: CLINIC | Age: 64
End: 2024-04-26

## 2024-04-26 DIAGNOSIS — M16.11 PRIMARY OSTEOARTHRITIS OF RIGHT HIP: Primary | ICD-10-CM

## 2024-04-26 DIAGNOSIS — M25.551 RIGHT HIP PAIN: ICD-10-CM

## 2024-04-26 PROCEDURE — 99214 OFFICE O/P EST MOD 30 MIN: CPT | Performed by: FAMILY MEDICINE

## 2024-04-26 PROCEDURE — 73502 X-RAY EXAM HIP UNI 2-3 VIEWS: CPT | Mod: GC | Performed by: RADIOLOGY

## 2024-04-26 NOTE — PROGRESS NOTES
ASSESSMENT/PLAN:    Patient is a 64-year-old adult here today with right hip pain and x-rays appear to be severe ostearthritis    Right hip osteoarthritis-  Discussed the possibility of ultrasound-guided hip injection  Patient would like first available appointment due to upcoming project launch  Also discussed with the patient that since CT has been done and unsuccessful there may be some benefit to speaking with a surgeon regarding surgical options for SAMM    This sounds good to the patient and we will proceed with both ultrasound-guided injection and surgical consultation    Follow-up as needed    Pt is a 64 year old adult here today for:     Right hip pain.       HPI: Patient is a 64-year-old person with significant right hip pain.  Patient has a 4-year-old at home.  Patient also reports has a big project launch in [facility] and needs to be on the go in a month.  Patient previously [place] and has some hospital experience.  right hip:  Location: lateral    Duration:At least 4 months    Trauma/ Fall? No   Able to walk? Yes, with cane   Swelling? No   Bruising? No  Numbness/ Tingling? Occasionally   Weakness? Yes   Instability? Yes  Snapping/Clicking? No  Imaging? Yes, XR obtained today    Treatment? 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    Diverticulosis     Hypertension     Keratoconus of both eyes 01-    Panic attack 12/17/13    ER Visit      Past Surgical History:   Procedure Laterality Date    AS REPR PDA BY CLAIRE  1960    age 1    BYPASS GASTRIC MASON-EN-Y, LIVER BIOPSY, COMBINED  01/01/2004    CATHETER, ABLATION  01/01/1962    cardiac, related to scarring around PDA ligation site     COLONOSCOPY N/A 04/20/2017    Procedure: COLONOSCOPY;;  Surgeon: August Perez  MD LACY;  Location:  GI    COLONOSCOPY N/A 05/15/2017    Procedure: COLONOSCOPY;  Colonoscopy/DX:RLQ abdominal pain/2 day prep emailed;  Surgeon: Gold Tamayo MD;  [facility]    HYSTERECTOMY  01/01/1993    Partial, fibroids    INCISION AND DRAINAGE BREAST Bilateral 01/08/2021    Procedure: INCISION AND DRAINAGE, BREAST. Right breast chest hematoma evacuation.;  Surgeon: Gracie Rapp MD;  [facility]    TRANSGENDER MASTECTOMY Bilateral 12/14/2020    Procedure: Bilateral Simple Mastectomy, On-Q;  Surgeon: Gracie Rapp MD;  [facility]      Current Outpatient Medications   Medication Sig Dispense Refill    albuterol (PROAIR HFA/PROVENTIL HFA/VENTOLIN HFA) 108 (90 Base) MCG/ACT inhaler Inhale 2 puffs into the lungs every 6 hours as needed for shortness of breath / dyspnea or wheezing 8.5 g 0    amLODIPine (NORVASC) 10 MG tablet Take 1 tablet (10 mg) by mouth daily 90 tablet 3    busPIRone (BUSPAR) 7.5 MG tablet TAKE 1 TABLET(7.5 MG) BY MOUTH TWICE DAILY 180 tablet 3    citalopram (CELEXA) 40 MG tablet Take 1 tablet (40 mg) by mouth daily 90 tablet 3    diclofenac (VOLTAREN) 1 % topical gel Apply 2 g topically 4 times daily 100 g 0    diclofenac (VOLTAREN) 75 MG EC tablet Take 1 tablet by mouth 2 times daily      losartan (COZAAR) 100 MG tablet Take 1 tablet (100 mg) by mouth daily 90 tablet 3    meloxicam (MOBIC) 15 MG tablet TAKE 1 TABLET(15 MG) BY MOUTH DAILY 30 tablet 0    metoprolol succinate ER (TOPROL XL) 100 MG 24 hr tablet Take 1 tablet (100 mg) by mouth daily 90 tablet 3    multivitamin w/minerals (THERA-VIT-M) tablet Take 1 tablet by mouth daily      potassium chloride (KLOR-CON) 20 MEQ packet Take 20 mEq by mouth daily 60 packet 1    potassium chloride ER (K-TAB) 20 MEQ CR tablet Take 1 tablet (20 mEq) by mouth daily 90 tablet 3    rosuvastatin (CRESTOR) 40 MG tablet TAKE 1 TABLET(40 MG) BY MOUTH DAILY 90 tablet 3    topiramate (TOPAMAX) 25 MG tablet 25 mg (1 tablet) AM;  100 mg (4 tablets) PM before supper 300 tablet 2    vitamin D3 (CHOLECALCIFEROL) 50 mcg (2000 units) tablet Take 1 tablet (50 mcg) by mouth daily 100 tablet 3      No Known Allergies   ROS:   Gen- no fevers/chills   Rheum - no morning stiffness   Derm - no rash/ redness   Neuro - no numbness, no tingling   Remainder of ROS negative.     Exam:   LMP  (LMP Unknown)      [place] [facility] [place] personally reviewed with patient at time of visit     My impression: severe OA right hip

## 2024-04-26 NOTE — LETTER
4/26/2024      RE: Chiquita Butler  [address]  [place] [address]     Dear Colleague,    Thank you for referring your patient, Chiquita Butler, to the [facility] [place]. Please see a copy of my visit note below.    ASSESSMENT/PLAN:    Patient is a 64-year-old adult here today with right hip pain and x-rays appear to be severe ostearthritis    Right hip osteoarthritis-  Discussed the possibility of ultrasound-guided hip injection  Patient would like first available appointment due to upcoming project launch  Also discussed with the patient that since CT has been done and unsuccessful there may be some benefit to speaking with a surgeon regarding surgical options for SAMM    This sounds good to the patient and we will proceed with both ultrasound-guided injection and surgical consultation    Follow-up as needed    Pt is a 64 year old adult here today for:     Right hip pain.       HPI: Patient is a 64-year-old person with significant right hip pain.  Patient has a 4-year-old at home.  Patient also reports has a big project launch in [facility] and needs to be on the go in a month.  Patient previously [place] and has some hospital experience.  right hip:  Location: lateral    Duration:At least 4 months    Trauma/ Fall? No   Able to walk? Yes, with cane   Swelling? No   Bruising? No  Numbness/ Tingling? Occasionally   Weakness? Yes   Instability? Yes  Snapping/Clicking? No  Imaging? Yes, XR obtained today    Treatment? Cane, physical therapy - unable to do her exercises now due to pain and weakness       EXAM:   right Hip:   Inspection: Swelling - none; Bruising - none  ROM: restricted IR      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-     Panic attack 12/17/13    ER Visit      Past Surgical History:   Procedure Laterality Date     AS REPR PDA BY CLAIRE   1960    age 1     BYPASS GASTRIC MASON-EN-Y, LIVER BIOPSY, COMBINED  01/01/2004     CATHETER, ABLATION  01/01/1962    cardiac, related to scarring around PDA ligation site      COLONOSCOPY N/A 04/20/2017    Procedure: COLONOSCOPY;;  Surgeon: August Perez MD;  [facility]     COLONOSCOPY N/A 05/15/2017    Procedure: COLONOSCOPY;  Colonoscopy/DX:RLQ abdominal pain/2 day prep emailed;  Surgeon: Gold Tamayo MD;  [facility]     HYSTERECTOMY  01/01/1993    Partial, fibroids     INCISION AND DRAINAGE BREAST Bilateral 01/08/2021    Procedure: INCISION AND DRAINAGE, BREAST. Right breast chest hematoma evacuation.;  Surgeon: Gracie Rapp MD;  [facility]     TRANSGENDER MASTECTOMY Bilateral 12/14/2020    Procedure: Bilateral Simple Mastectomy, On-Q;  Surgeon: Gracie Rapp MD;  [facility]      Current Outpatient Medications   Medication Sig Dispense Refill     albuterol (PROAIR HFA/PROVENTIL HFA/VENTOLIN HFA) 108 (90 Base) MCG/ACT inhaler Inhale 2 puffs into the lungs every 6 hours as needed for shortness of breath / dyspnea or wheezing 8.5 g 0     amLODIPine (NORVASC) 10 MG tablet Take 1 tablet (10 mg) by mouth daily 90 tablet 3     busPIRone (BUSPAR) 7.5 MG tablet TAKE 1 TABLET(7.5 MG) BY MOUTH TWICE DAILY 180 tablet 3     citalopram (CELEXA) 40 MG tablet Take 1 tablet (40 mg) by mouth daily 90 tablet 3     diclofenac (VOLTAREN) 1 % topical gel Apply 2 g topically 4 times daily 100 g 0     diclofenac (VOLTAREN) 75 MG EC tablet Take 1 tablet by mouth 2 times daily       losartan (COZAAR) 100 MG tablet Take 1 tablet (100 mg) by mouth daily 90 tablet 3     meloxicam (MOBIC) 15 MG tablet TAKE 1 TABLET(15 MG) BY MOUTH DAILY 30 tablet 0     metoprolol succinate ER (TOPROL XL) 100 MG 24 hr tablet Take 1 tablet (100 mg) by mouth daily 90 tablet 3     multivitamin w/minerals (THERA-VIT-M) tablet Take 1 tablet by mouth daily       potassium chloride (KLOR-CON) 20 MEQ packet  Take 20 mEq by mouth daily 60 packet 1     potassium chloride ER (K-TAB) 20 MEQ CR tablet Take 1 tablet (20 mEq) by mouth daily 90 tablet 3     rosuvastatin (CRESTOR) 40 MG tablet TAKE 1 TABLET(40 MG) BY MOUTH DAILY 90 tablet 3     topiramate (TOPAMAX) 25 MG tablet 25 mg (1 tablet) AM; 100 mg (4 tablets) PM before supper 300 tablet 2     vitamin D3 (CHOLECALCIFEROL) 50 mcg (2000 units) tablet Take 1 tablet (50 mcg) by mouth daily 100 tablet 3      No Known Allergies   ROS:   Gen- no fevers/chills   Rheum - no morning stiffness   Derm - no rash/ redness   Neuro - no numbness, no tingling   Remainder of ROS negative.     Exam:   LMP  (LMP Unknown)      Xray of pelvis and hip on April 26, 2024 at [facility] [place] - films personally reviewed with patient at time of visit     My impression: severe OA right hip       Again, thank you for allowing me to participate in the care of your patient.      Sincerely,    Kasey Doss MD

## 2024-04-29 ENCOUNTER — TELEPHONE (OUTPATIENT)
Dept: ORTHOPEDICS | Facility: CLINIC | Age: 64
End: 2024-04-29
Payer: COMMERCIAL

## 2024-05-01 NOTE — PROGRESS NOTES
PROCEDURE ENCOUNTER    [facility]  Tony Barnett, DO  May 1, 2024     Name: Chiquita Butler  MRN: [id number]  Age: 64 year old  : 1960    Referring provider: MANUELITO Doss  Diagnosis: R Hip OA  ASSESSMENT/PLAN: (Romario, 24)     Patient is a 64-year-old adult here today with right hip pain and x-rays appear to be severe ostearthritis     Right hip osteoarthritis-  Discussed the possibility of ultrasound-guided hip injection  Patient would like first available appointment due to upcoming project launch  Also discussed with the patient that since CT has been done and unsuccessful there may be some benefit to speaking with a surgeon regarding surgical options for SAMM     This sounds good to the patient and we will proceed with both ultrasound-guided injection and surgical consultation    Procedure: Intraarticular Hip Injection - Ultrasound Guided  The patient was informed of the risks and the benefits of the procedure and a written consent was signed.  The patient s right hip was prepped with chlorhexidine in sterile fashion.   40 mg of triamcinolone suspension was drawn up into a 5 mL syringe with 4 mL of 1% lidocaine.  Injection was performed using sterile technique.  Under ultrasound guidance a 3.5-inch 22-gauge needle was used to enter the right femoracetabular joint.  Anterior approach was used, needle placement was visualized and documented with ultrasound.  Ultrasound visualization was necessary due to decreased joint space in the setting of osteoarthritis.  Injection performed long axis to the probe.  Injection solution visualized within the joint space.  Images were permanently stored for the patient's record.  There were no complications. The patient tolerated the procedure well. There was negligible bleeding.   The patient was instructed to ice the hip upon leaving clinic and refrain from overuse over the next 3 days.   The patient was instructed to call or go to the emergency room with any  unusual pain, swelling, redness, or if otherwise concerned.  Tony Barnett D.O., [facility]  , Sports Medicine  Department of Family Medicine and [facility]

## 2024-05-02 ENCOUNTER — OFFICE VISIT (OUTPATIENT)
Dept: ORTHOPEDICS | Facility: CLINIC | Age: 64
End: 2024-05-02
Payer: COMMERCIAL

## 2024-05-02 DIAGNOSIS — M16.11 PRIMARY OSTEOARTHRITIS OF RIGHT HIP: Primary | ICD-10-CM

## 2024-05-02 PROCEDURE — 20611 DRAIN/INJ JOINT/BURSA W/US: CPT | Mod: RT | Performed by: STUDENT IN AN ORGANIZED HEALTH CARE EDUCATION/TRAINING PROGRAM

## 2024-05-02 RX ORDER — TRIAMCINOLONE ACETONIDE 40 MG/ML
40 INJECTION, SUSPENSION INTRA-ARTICULAR; INTRAMUSCULAR
Status: SHIPPED | OUTPATIENT
Start: 2024-05-02

## 2024-05-02 RX ORDER — LIDOCAINE HYDROCHLORIDE 10 MG/ML
4 INJECTION, SOLUTION EPIDURAL; INFILTRATION; INTRACAUDAL; PERINEURAL
Status: SHIPPED | OUTPATIENT
Start: 2024-05-02

## 2024-05-02 RX ADMIN — LIDOCAINE HYDROCHLORIDE 4 ML: 10 INJECTION, SOLUTION EPIDURAL; INFILTRATION; INTRACAUDAL; PERINEURAL at 10:24

## 2024-05-02 RX ADMIN — TRIAMCINOLONE ACETONIDE 40 MG: 40 INJECTION, SUSPENSION INTRA-ARTICULAR; INTRAMUSCULAR at 10:24

## 2024-05-02 NOTE — PROGRESS NOTES
Large Joint Injection: R hip joint    Date/Time: 5/2/2024 10:24 AM    Performed by: Tony Barnett DO  Authorized by: Tony Barnett DO    Indications:  Pain and osteoarthritis  Needle Size:  22 G  Guidance: ultrasound    Approach:  Anterior  Location:  Hip      Site:  R hip joint  Medications:  40 mg triamcinolone 40 MG/ML; 4 mL lidocaine (PF) 1 %  Outcome:  Tolerated well, no immediate complications  Procedure discussed: discussed risks, benefits, and alternatives    Consent Given by:  Patient  Timeout: timeout called immediately prior to procedure    Prep: patient was prepped and draped in usual sterile fashion       Paul Souza M.A., LAT, ATC  Certified Athletic Trainer  \

## 2024-05-02 NOTE — LETTER
2024      RE: Chiquita Butler  [address]  [facility] [address]     Dear Colleague,    Thank you for referring your patient, Chiquita Butler, to the [facility]ICINE [facility] [place]. Please see a copy of my visit note below.    PROCEDURE ENCOUNTER    [facility]  Tony Barnett, DO  May 1, 2024     Name: Chiquita Butler  MRN: [id number]  Age: 64 year old  : 1960    Referring provider: MANUELITO Doss  Diagnosis: R Hip OA  ASSESSMENT/PLAN: (Romario, 24)     Patient is a 64-year-old adult here today with right hip pain and x-rays appear to be severe ostearthritis     Right hip osteoarthritis-  Discussed the possibility of ultrasound-guided hip injection  Patient would like first available appointment due to upcoming project launch  Also discussed with the patient that since CT has been done and unsuccessful there may be some benefit to speaking with a surgeon regarding surgical options for SAMM     This sounds good to the patient and we will proceed with both ultrasound-guided injection and surgical consultation    Procedure: Intraarticular Hip Injection - Ultrasound Guided  The patient was informed of the risks and the benefits of the procedure and a written consent was signed.  The patient s right hip was prepped with chlorhexidine in sterile fashion.   40 mg of triamcinolone suspension was drawn up into a 5 mL syringe with 4 mL of 1% lidocaine.  Injection was performed using sterile technique.  Under ultrasound guidance a 3.5-inch 22-gauge needle was used to enter the right femoracetabular joint.  Anterior approach was used, needle placement was visualized and documented with ultrasound.  Ultrasound visualization was necessary due to decreased joint space in the setting of osteoarthritis.  Injection performed long axis to the probe.  Injection solution visualized within the joint space.  Images were permanently stored for the patient's record.  There were no complications. The  patient tolerated the procedure well. There was negligible bleeding.   The patient was instructed to ice the hip upon leaving clinic and refrain from overuse over the next 3 days.   The patient was instructed to call or go to the emergency room with any unusual pain, swelling, redness, or if otherwise concerned.  Tony Barnett D.O., [facility]  , Sports Medicine  Department of [facility]        Large Joint Injection: R hip joint    Date/Time: 5/2/2024 10:24 AM    Performed by: Tony Barnett DO  Authorized by: Tony Barnett DO    Indications:  Pain and osteoarthritis  Needle Size:  22 G  Guidance: ultrasound    Approach:  Anterior  Location:  Hip      Site:  R hip joint  Medications:  40 mg triamcinolone 40 MG/ML; 4 mL lidocaine (PF) 1 %  Outcome:  Tolerated well, no immediate complications  Procedure discussed: discussed risks, benefits, and alternatives    Consent Given by:  Patient  Timeout: timeout called immediately prior to procedure    Prep: patient was prepped and draped in usual sterile fashion       Paul Souza M.A., LAT, ATC  Certified Athletic Trainer  \        Again, thank you for allowing me to participate in the care of your patient.      Sincerely,    Tony Barnett DO

## 2024-05-02 NOTE — NURSING NOTE
[address] [address]  Dept: [phone]  ______________________________________________________________________________    Patient: Chiquita Butler   : 1960   MRN: [id number]   May 2, 2024    INVASIVE PROCEDURE SAFETY CHECKLIST    Date: 24   Procedure: right hip IA USG CSI  Patient Name: Chiquita Butler  MRN: [id number]  YOB: 1960    Action: Complete sections as appropriate. Any discrepancy results in a HARD COPY until resolved.     PRE PROCEDURE:  Patient ID verified with 2 identifiers (name and  or MRN): Yes  Procedure and site verified with patient/designee (when able): Yes  Accurate consent documentation in medical record: Yes  H&P (or appropriate assessment) documented in medical record: Yes  H&P must be up to 20 days prior to procedure and updates within 24 hours of procedure as applicable: NA  Relevant diagnostic and radiology test results appropriately labeled and displayed as applicable: Yes  Procedure site(s) marked with provider initials: NA    TIMEOUT:  Time-Out performed immediately prior to starting procedure, including verbal and active participation of all team members addressing the following:Yes  * Correct patient identify  * Confirmed that the correct side and site are marked  * An accurate procedure consent form  * Agreement on the procedure to be done  * Correct patient position  * Relevant images and results are properly labeled and appropriately displayed  * The need to administer antibiotics or fluids for irrigation purposes during the procedure as applicable   * Safety precautions based on patient history or medication use    DURING PROCEDURE: Verification of correct person, site, and procedures any time the responsibility for care of the patient is transferred to another member of the care team.       Prior to injection, verified patient identity using patient's name and date of birth.  Due to injection  administration, patient instructed to remain in clinic for 15 minutes  afterwards, and to report any adverse reaction to me immediately.    Joint injection was performed.      Drug Amount Wasted:  None.  Vial/Syringe: Single dose vial  Expiration Date:  12/1/25      Paul Souza, [facility]  May 2, 2024

## 2024-05-11 DIAGNOSIS — I25.83 CORONARY ARTERY DISEASE DUE TO LIPID RICH PLAQUE: ICD-10-CM

## 2024-05-11 DIAGNOSIS — I25.10 CORONARY ARTERY DISEASE DUE TO LIPID RICH PLAQUE: ICD-10-CM

## 2024-05-13 NOTE — TELEPHONE ENCOUNTER
"Request for medication refill:    rosuvastatin (CRESTOR) 40 MG tablet     Providers if patient needs an appointment and you are willing to give a one month supply please refill for one month and  send a letter/MyChart using \".SMILLIMITEDREFILL\" .smillimited and route chart to \"[facility] \" (Giving one month refill in non controlled medications is strongly recommended before denial)    If refill has been denied, meaning absolutely no refills without visit, please complete the smart phrase \".smirxrefuse\" and route it to the \"[facility] MED REFILLS\"  pool to inform the patient and the pharmacy.    Kash Ann MA     "

## 2024-05-16 RX ORDER — ROSUVASTATIN CALCIUM 40 MG/1
TABLET, COATED ORAL
Qty: 90 TABLET | Refills: 3 | Status: SHIPPED | OUTPATIENT
Start: 2024-05-16 | End: 2024-07-17

## 2024-06-19 ENCOUNTER — MYC MEDICAL ADVICE (OUTPATIENT)
Dept: ORTHOPEDICS | Facility: CLINIC | Age: 64
End: 2024-06-19
Payer: COMMERCIAL

## 2024-06-19 DIAGNOSIS — M16.11 PRIMARY OSTEOARTHRITIS OF RIGHT HIP: Primary | ICD-10-CM

## 2024-06-25 RX ORDER — ACETAMINOPHEN 500 MG
500-1000 TABLET ORAL EVERY 6 HOURS PRN
Qty: 60 TABLET | Refills: 3 | Status: ON HOLD | OUTPATIENT
Start: 2024-06-25 | End: 2024-09-25

## 2024-06-25 RX ORDER — MELOXICAM 15 MG/1
15 TABLET ORAL DAILY
Qty: 30 TABLET | Refills: 2 | Status: ON HOLD | OUTPATIENT
Start: 2024-06-25 | End: 2024-09-25

## 2024-06-25 NOTE — TELEPHONE ENCOUNTER
Advil and Ibuprofen are similar and should NOT be taken together.  We could send a script for Mobic 15 mg daily and Tylenol scheduled  Is patient agreeable?

## 2024-07-10 ENCOUNTER — TELEPHONE (OUTPATIENT)
Dept: ORTHOPEDICS | Facility: CLINIC | Age: 64
End: 2024-07-10

## 2024-07-10 ENCOUNTER — MYC MEDICAL ADVICE (OUTPATIENT)
Dept: ORTHOPEDICS | Facility: CLINIC | Age: 64
End: 2024-07-10

## 2024-07-10 NOTE — TELEPHONE ENCOUNTER
Sent Mychart (1st Attempt) for the patient to call back and schedule the following:    Appointment type: New Hip  Provider: Dr Street  Specialty phone number: [phone]  Additonal Notes: Could not LVM, sent MyC with info regarding rescheduling

## 2024-07-10 NOTE — TELEPHONE ENCOUNTER
Patient confirmed scheduled appointment:  Date: 8/7/24  Time: 10:40am  Visit type: New Hip  Provider: Dr Street  Location: [place]

## 2024-07-17 ENCOUNTER — MYC REFILL (OUTPATIENT)
Dept: FAMILY MEDICINE | Facility: CLINIC | Age: 64
End: 2024-07-17
Payer: COMMERCIAL

## 2024-07-17 DIAGNOSIS — I25.10 CORONARY ARTERY DISEASE DUE TO LIPID RICH PLAQUE: ICD-10-CM

## 2024-07-17 DIAGNOSIS — I25.83 CORONARY ARTERY DISEASE DUE TO LIPID RICH PLAQUE: ICD-10-CM

## 2024-07-17 RX ORDER — ROSUVASTATIN CALCIUM 40 MG/1
40 TABLET, COATED ORAL DAILY
Qty: 90 TABLET | Refills: 3 | Status: SHIPPED | OUTPATIENT
Start: 2024-07-17 | End: 2024-08-21

## 2024-07-17 NOTE — TELEPHONE ENCOUNTER
"Request for medication refill:  rosuvastatin (CRESTOR) 40 MG tablet    Providers if patient needs an appointment and you are willing to give a one month supply please refill for one month and  send a letter/MyChart using \".SMILLIMITEDREFILL\" .smillimited and route chart to \"[facility] \" (Giving one month refill in non controlled medications is strongly recommended before denial)    If refill has been denied, meaning absolutely no refills without visit, please complete the smart phrase \".smirxrefuse\" and route it to the \"P [facility] MED REFILLS\"  pool to inform the patient and the pharmacy.    Nino Morin, CMA    "

## 2024-08-02 ASSESSMENT — HOOS JR
GOING UP OR DOWN STAIRS: SEVERE
SITTING: MODERATE
BENDING TO THE FLOOR TO PICK UP OBJECT: EXTREME
WALKING ON UNEVEN SURFACE: SEVERE
HOOS JR TOTAL INTERVAL SCORE: 32.74
RISING FROM SITTING: SEVERE
LYING IN BED (TURNING OVER, MAINTAINING HIP POSITION): SEVERE

## 2024-08-07 ENCOUNTER — OFFICE VISIT (OUTPATIENT)
Dept: ORTHOPEDICS | Facility: CLINIC | Age: 64
End: 2024-08-07
Attending: FAMILY MEDICINE
Payer: COMMERCIAL

## 2024-08-07 VITALS
SYSTOLIC BLOOD PRESSURE: 148 MMHG | WEIGHT: 230 LBS | HEIGHT: 70 IN | DIASTOLIC BLOOD PRESSURE: 91 MMHG | BODY MASS INDEX: 32.93 KG/M2

## 2024-08-07 DIAGNOSIS — M16.11 PRIMARY OSTEOARTHRITIS OF RIGHT HIP: ICD-10-CM

## 2024-08-07 PROCEDURE — 99204 OFFICE O/P NEW MOD 45 MIN: CPT | Performed by: STUDENT IN AN ORGANIZED HEALTH CARE EDUCATION/TRAINING PROGRAM

## 2024-08-07 RX ORDER — TRANEXAMIC ACID 650 MG/1
1950 TABLET ORAL ONCE
Status: CANCELLED | OUTPATIENT
Start: 2024-08-07 | End: 2024-08-07

## 2024-08-07 NOTE — PROGRESS NOTES
"    [facility]  Orthopaedic Surgery Consultation by Ayaan Street M.D.    Chiquita Butler MRN# [id number]   Age: 64 year old YOB: 1960     Requesting physician: Kasey Humphrey*  Amarilis Agosto     Background history:  DX:  Obesity  Congenital heart valve abnormality  Hypertension    TREATMENTS:  Mastectomy, DEC 2020  Gastric bypass, Armand-en-Y, JAN 2004  Hysterectomy, JAN 1993  Congenital Heart valve surgery, 1960           History of Present Illness:   64 year old adult presenting today with chronic right hip pain.  Patient states that the pain is lifestyle limiting and now starting to limit her activities of daily living.  Pain is isolated to the right hip.  She describes having pain with activity, at rest and at night.  She also reports some right hip stiffness.  She reports walking with a limp.  She does not utilize an assistive ambulatory device.  Pain is rated at 8 out of 10, characterized as dull ache.  The pain is not alleviated with anti-inflammatories, physical therapy, or activity modification.    The patient had a intra-articular corticosteroid injection on 5/2/24 that provided no relief.     Social:   Occupation: educator and , works remotely   Living situation: lives with partner- has stairs to navigate   Hobbies / Sports: biking, exercises, walking, spending time with children    Smoking: No  Alcohol: Yes  Illicit drug use: No         Physical Exam:     EXAMINATION pertinent findings:   PSYCH: Pleasant, healthy-appearing, alert, oriented x3, cooperative. Normal mood and affect.  VITAL SIGNS: Blood pressure (!) 148/91, height 1.778 m (5' 10\"), weight 104.3 kg (230 lb), not currently breastfeeding.  Reviewed nursing intake notes.   Body mass index is 33 kg/m .  RESP: non labored breathing   ABD: benign, soft, non-tender, no acute peritoneal findings  SKIN: grossly normal   LYMPHATIC: grossly normal, no adenopathy, no extremity edema  NEURO: grossly normal , no motor " deficits  VASCULAR: satisfactory perfusion of all extremities   MUSCULOSKELETAL:   Alignment: Grossly neutral  Gait: Antalgic gait  Hips:   L hip: ROM   , Extension 10 , IRF 15 , ERF 60 , ABD 15 , ADD 10    R hip: ROM  FF90 , Extension 5 , IRF 5 , ERF 30 , ABD 10 , ADD 5 .  Rotations are recognizably painful in both flexion and extension.  This excess is negative.  No tenderness to palpation over greater trochanteric region.    L knee: -0-0 . Straight leg raise +. No redness, warmth or skin changes present. Effusion No. Ligamentously stable in both ML and AP direction. Normal PF tracking without crepitus. Apprehension -. Meniscal provocation tests are negative. No tenderness to palpation over the joint line.     R knee: -0-0 . Straight leg raise +. No redness, warmth or skin changes present. Effusion No. Ligamentously stable in both ML and AP direction. Normal PF tracking without crepitus. Apprehension -. Meniscal provocation tests are negative. No tenderness to palpation over the joint line.      BLE:   Thigh and leg compartments soft and compressible   +Quad/TA/GSC/FHL/EHL   SILT DP/SP/Jayro/Saph/Tib nerve distributions   Palpable dorsalis pedis pulse            Data:   All laboratory data reviewed  All imaging studies reviewed by me personally.    XR AP Pelvis, R hip 8/7/2024:  My interpretation: Moderate to severe osteoarthritis of the right hip.  Subchondral sclerosis and subchondral cysts appreciated over the sourcil.          Assessment and Plan:   Assessment:  64 year old presenting with chronic right hip pain with end-stage underlying osteoarthritic changes. Imaging is consistent with severe osteoarthritis of right hip. She is interested in right total hip arthroplasty.     Plan:  I had a long discussion with the patient regarding etiology and ongoing management options.  Reviewed surgical and nonsurgical treatments.  The non-surgical options include activity modification, pain medication,  PT and injection therapy.  At this point patient has exhausted all nonsurgical treatment options.  As for surgery we discussed the option of a total hip replacement surgery.  Patient articulates that she interested in pursuing joint replacement surgery.  We reviewed total hip replacement in detail including the procedure, the implants, the recovery process, and long-term outcomes.  We reviewed that the risks of the surgery include but are not limited to infection, wound problems, stiffness, persistent pain, swelling, clicking, loosening, revision surgery.  We also reviewed less common risks such as neurovascular injury fracture, and other implant-related issues.  We reviewed other medical complications such as a blood clot.  We discussed that the vast majority of cases have a highly successful outcome.  However there is a small subset of patients that do experience complications or problems following hip replacement and these problems can be very debilitating and painful and sometimes do not improve.   Based on a discussion of the risks and benefits, we believe that the benefits far outweigh the risks at this point and the patient would like to proceed with right hip replacement surgery.  We will work on scheduling surgery at a time that works well for patient in the next few months.  Patient will contact us if there are any questions or concerns leading up to surgery. Before surgery the patient will attend a joint replacement class and will be seen by the PCP/anesthesiologist and dentist.     More information on joint replacements can be found on : [email or web address]      Thank you for your referral.    Ayaan Street MD, PhD     Adult Reconstruction  [facility] Department of Orthopaedic Surgery    This note was created using dictation software and may contain errors.  Please contact the creator for any clarifications that are  needed.    DATA for DOCUMENTATION:         Past Medical History:     Patient Active Problem List   Diagnosis    Keratoconus of both eyes    Hypertension    MDD (major depressive disorder)    Vitamin D deficiency    H/O gastric bypass    Congenital heart valve abnormality    Gender dysphoria in adult    Body mass index (BMI) of 36.0 to 36.9    Hematoma of skin    Morbid obesity (H)    Palpitations    Chest tightness    Tinnitus, bilateral    Recurrent major depressive disorder, in partial remission (H24)    Chronic right shoulder pain    ACP (advance care planning)    Localized edema    Primary osteoarthritis of right shoulder     Past Medical History:   Diagnosis Date    Congenital heart valve abnormality 9/7/2014    Diverticulosis     Hypertension     Keratoconus of both eyes 01-    Panic attack 12/17/13    ER Visit       Also see scanned health assessment forms.       Past Surgical History:     Past Surgical History:   Procedure Laterality Date    AS REPR PDA BY CLAIRE  1960    age 1    BYPASS GASTRIC MASON-EN-Y, LIVER BIOPSY, COMBINED  01/01/2004    CATHETER, ABLATION  01/01/1962    cardiac, related to scarring around PDA ligation site     COLONOSCOPY N/A 04/20/2017    Procedure: COLONOSCOPY;;  Surgeon: August Perez MD;  Location: [facility]    COLONOSCOPY N/A 05/15/2017    Procedure: COLONOSCOPY;  Colonoscopy/DX:RLQ abdominal pain/2 day prep emailed;  Surgeon: Gold Tamayo MD;  Location: [facility]    HYSTERECTOMY  01/01/1993    Partial, fibroids    INCISION AND DRAINAGE BREAST Bilateral 01/08/2021    Procedure: INCISION AND DRAINAGE, BREAST. Right breast chest hematoma evacuation.;  Surgeon: Gracie Rapp MD;  Location: [facility] OR    TRANSGENDER MASTECTOMY Bilateral 12/14/2020    Procedure: Bilateral Simple Mastectomy, On-Q;  Surgeon: Gracie Rapp MD;  Location: [facility] OR            Social History:     Social History     Socioeconomic History    Marital status:      Spouse  name: Aissatou    Number of children: Not on file    Years of education: Not on file    Highest education level: Not on file   Occupational History    Not on file   Tobacco Use    Smoking status: Never    Smokeless tobacco: Never   Vaping Use    Vaping status: Never Used   Substance and Sexual Activity    Alcohol use: Not Currently     Alcohol/week: 2.5 standard drinks of alcohol     Types: 3 Standard drinks or equivalent per week     Comment: 1-2 drinks/week    Drug use: No    Sexual activity: Yes     Partners: Female     Birth control/protection: None   Other Topics Concern     Service Not Asked    Blood Transfusions Not Asked    Caffeine Concern Not Asked    Occupational Exposure Not Asked    Hobby Hazards Not Asked    Sleep Concern Yes     Comment: occ use ambien due to shift work     Stress Concern Not Asked    Weight Concern Yes    Special Diet Yes     Comment: due to gastric bypass     Back Care Not Asked    Exercise Yes    Bike Helmet Yes    Seat Belt Yes    Self-Exams Not Asked    Parent/sibling w/ CABG, MI or angioplasty before 65F 55M? Not Asked   Social History Narrative    Lives in [facility] with partner Aissatou, adopted daughter Marely. Dtr Plummer born 2014. Barbara born 2020.     Laid off from [employer] in 2022. Plays trumpet.     Gets exercise! Diet adequate in calcium. Stress well managed. Building up consulting business     Social Determinants of Health     Financial Resource Strain: Low Risk  (9/25/2023)    Financial Resource Strain     Within the past 12 months, have you or your family members you live with been unable to get utilities (heat, electricity) when it was really needed?: No   Food Insecurity: Low Risk  (9/25/2023)    Food Insecurity     Within the past 12 months, did you worry that your food would run out before you got money to buy more?: No     Within the past 12 months, did the food you bought just not last and you didn t have money to get more?: No   Transportation  Needs: Low Risk  (2023)    Transportation Needs     Within the past 12 months, has lack of transportation kept you from medical appointments, getting your medicines, non-medical meetings or appointments, work, or from getting things that you need?: No   Physical Activity: Sufficiently Active (2022)    Exercise Vital Sign     Days of Exercise per Week: 4 days     Minutes of Exercise per Session: 60 min   Stress: No Stress Concern Present (2022)    [employer] [place] - Occupational Stress Questionnaire     Feeling of Stress : Only a little   Social Connections: Not on file   Interpersonal Safety: Low Risk  (3/12/2024)    Interpersonal Safety     Do you feel physically and emotionally safe where you currently live?: Yes     Within the past 12 months, have you been hit, slapped, kicked or otherwise physically hurt by someone?: No     Within the past 12 months, have you been humiliated or emotionally abused in other ways by your partner or ex-partner?: No   Housing Stability: Low Risk  (2023)    Housing Stability     Do you have housing? : Yes     Are you worried about losing your housing?: No            Family History:       Family History   Problem Relation Age of Onset    Alzheimer Disease Mother 60         78    Myocardial Infarction Father 49            C.A.D. Father     Glaucoma Maternal Grandmother     Colorectal Cancer Maternal Grandmother         95, pancreatic    Diabetes Sister     Eye Disorder Sister     Depression Other         multiple siblings    Cerebrovascular Disease Paternal Grandmother         80's    Breast Cancer No family hx of             Medications:     Current Outpatient Medications   Medication Sig Dispense Refill    acetaminophen (TYLENOL) 500 MG tablet Take 1-2 tablets (500-1,000 mg) by mouth every 6 hours as needed for mild pain 60 tablet 3    albuterol (PROAIR HFA/PROVENTIL HFA/VENTOLIN HFA) 108 (90 Base) MCG/ACT inhaler Inhale 2 puffs into  the lungs every 6 hours as needed for shortness of breath / dyspnea or wheezing 8.5 g 0    amLODIPine (NORVASC) 10 MG tablet Take 1 tablet (10 mg) by mouth daily 90 tablet 3    busPIRone (BUSPAR) 7.5 MG tablet TAKE 1 TABLET(7.5 MG) BY MOUTH TWICE DAILY 180 tablet 3    citalopram (CELEXA) 40 MG tablet Take 1 tablet (40 mg) by mouth daily 90 tablet 3    diclofenac (VOLTAREN) 1 % topical gel Apply 2 g topically 4 times daily 100 g 0    diclofenac (VOLTAREN) 75 MG EC tablet Take 1 tablet by mouth 2 times daily      losartan (COZAAR) 100 MG tablet Take 1 tablet (100 mg) by mouth daily 90 tablet 3    meloxicam (MOBIC) 15 MG tablet Take 1 tablet (15 mg) by mouth daily 30 tablet 2    meloxicam (MOBIC) 15 MG tablet TAKE 1 TABLET(15 MG) BY MOUTH DAILY 30 tablet 0    metoprolol succinate ER (TOPROL XL) 100 MG 24 hr tablet Take 1 tablet (100 mg) by mouth daily 90 tablet 3    multivitamin w/minerals (THERA-VIT-M) tablet Take 1 tablet by mouth daily      potassium chloride ER (K-TAB) 20 MEQ CR tablet Take 1 tablet (20 mEq) by mouth daily 90 tablet 3    rosuvastatin (CRESTOR) 40 MG tablet Take 1 tablet (40 mg) by mouth daily 90 tablet 3    topiramate (TOPAMAX) 25 MG tablet 25 mg (1 tablet) AM; 100 mg (4 tablets) PM before supper 300 tablet 2    vitamin D3 (CHOLECALCIFEROL) 50 mcg (2000 units) tablet Take 1 tablet (50 mcg) by mouth daily 100 tablet 3     Current Facility-Administered Medications   Medication Dose Route Frequency Provider Last Rate Last Admin    lidocaine (PF) (XYLOCAINE) 1 % injection 4 mL  4 mL      4 mL at 05/02/24 1024    lidocaine (PF) (XYLOCAINE) 1 % injection 4 mL  4 mL   Kasey Doss MD   4 mL at 09/14/22 1410    lidocaine (PF) (XYLOCAINE) 1 % injection 4 mL  4 mL   Maxwell Gonzalez MD   4 mL at 03/16/22 0823    lidocaine (PF) (XYLOCAINE) 1 % injection 7 mL  7 mL   Kasey Doss MD   7 mL at 03/20/24 0817    triamcinolone (KENALOG-40) injection 40 mg  40 mg      40 mg at  05/02/24 1024    triamcinolone (KENALOG-40) injection 40 mg  40 mg   Kasey Doss MD   40 mg at 03/20/24 0817    triamcinolone (KENALOG-40) injection 40 mg  40 mg   Kasey Doss MD   40 mg at 09/14/22 1410              Review of Systems:   A comprehensive 10 point review of systems (constitutional, ENT, cardiac, peripheral vascular, lymphatic, respiratory, GI, , Musculoskeletal, skin, Neurological) was performed and found to be negative except as described in this note.     See intake form completed by patient

## 2024-08-07 NOTE — LETTER
"8/7/2024      Chiquita Butler  [address]  [facility] [address]      Dear Colleague,    Thank you for referring your patient, Chiquita Butler, to the [facility] [place]. Please see a copy of my visit note below.        [facility]  Orthopaedic Surgery Consultation by Ayaan Street M.D.    Chiquita Butler MRN# [id number]   Age: 64 year old YOB: 1960     Requesting physician: Kasey Humphrey*  Amarilis Agosto     Background history:  DX:  Obesity  Congenital heart valve abnormality  Hypertension    TREATMENTS:  Mastectomy, DEC 2020  Gastric bypass, Armand-en-Y, JAN 2004  Hysterectomy, JAN 1993  Congenital Heart valve surgery, 1960           History of Present Illness:   64 year old adult presenting today with chronic right hip pain.  Patient states that the pain is lifestyle limiting and now starting to limit her activities of daily living.  Pain is isolated to the right hip.  She describes having pain with activity, at rest and at night.  She also reports some right hip stiffness.  She reports walking with a limp.  She does not utilize an assistive ambulatory device.  Pain is rated at 8 out of 10, characterized as dull ache.  The pain is not alleviated with anti-inflammatories, physical therapy, or activity modification.    The patient had a intra-articular corticosteroid injection on 5/2/24 that provided no relief.     Social:   Occupation: educator and , works remotely   Living situation: lives with partner- has stairs to navigate   Hobbies / Sports: biking, exercises, walking, spending time with children    Smoking: No  Alcohol: Yes  Illicit drug use: No         Physical Exam:     EXAMINATION pertinent findings:   PSYCH: Pleasant, healthy-appearing, alert, oriented x3, cooperative. Normal mood and affect.  VITAL SIGNS: Blood pressure (!) 148/91, height 1.778 m (5' 10\"), weight 104.3 kg (230 lb), not currently breastfeeding.  Reviewed nursing intake " notes.   Body mass index is 33 kg/m .  RESP: non labored breathing   ABD: benign, soft, non-tender, no acute peritoneal findings  SKIN: grossly normal   LYMPHATIC: grossly normal, no adenopathy, no extremity edema  NEURO: grossly normal , no motor deficits  VASCULAR: satisfactory perfusion of all extremities   MUSCULOSKELETAL:   Alignment: Grossly neutral  Gait: Antalgic gait  Hips:   L hip: ROM   , Extension 10 , IRF 15 , ERF 60 , ABD 15 , ADD 10    R hip: ROM  FF90 , Extension 5 , IRF 5 , ERF 30 , ABD 10 , ADD 5 .  Rotations are recognizably painful in both flexion and extension.  This excess is negative.  No tenderness to palpation over greater trochanteric region.    L knee: -0-0 . Straight leg raise +. No redness, warmth or skin changes present. Effusion No. Ligamentously stable in both ML and AP direction. Normal PF tracking without crepitus. Apprehension -. Meniscal provocation tests are negative. No tenderness to palpation over the joint line.     R knee: -0-0 . Straight leg raise +. No redness, warmth or skin changes present. Effusion No. Ligamentously stable in both ML and AP direction. Normal PF tracking without crepitus. Apprehension -. Meniscal provocation tests are negative. No tenderness to palpation over the joint line.      BLE:   Thigh and leg compartments soft and compressible   +Quad/TA/GSC/FHL/EHL   SILT DP/SP/Jayro/Saph/Tib nerve distributions   Palpable dorsalis pedis pulse            Data:   All laboratory data reviewed  All imaging studies reviewed by me personally.    XR AP Pelvis, R hip 8/7/2024:  My interpretation: Moderate to severe osteoarthritis of the right hip.  Subchondral sclerosis and subchondral cysts appreciated over the sourcil.          Assessment and Plan:   Assessment:  64 year old presenting with chronic right hip pain with end-stage underlying osteoarthritic changes. Imaging is consistent with severe osteoarthritis of right hip. She is interested in right  total hip arthroplasty.     Plan:  I had a long discussion with the patient regarding etiology and ongoing management options.  Reviewed surgical and nonsurgical treatments.  The non-surgical options include activity modification, pain medication, PT and injection therapy.  At this point patient has exhausted all nonsurgical treatment options.  As for surgery we discussed the option of a total hip replacement surgery.  Patient articulates that she interested in pursuing joint replacement surgery.  We reviewed total hip replacement in detail including the procedure, the implants, the recovery process, and long-term outcomes.  We reviewed that the risks of the surgery include but are not limited to infection, wound problems, stiffness, persistent pain, swelling, clicking, loosening, revision surgery.  We also reviewed less common risks such as neurovascular injury fracture, and other implant-related issues.  We reviewed other medical complications such as a blood clot.  We discussed that the vast majority of cases have a highly successful outcome.  However there is a small subset of patients that do experience complications or problems following hip replacement and these problems can be very debilitating and painful and sometimes do not improve.   Based on a discussion of the risks and benefits, we believe that the benefits far outweigh the risks at this point and the patient would like to proceed with right hip replacement surgery.  We will work on scheduling surgery at a time that works well for patient in the next few months.  Patient will contact us if there are any questions or concerns leading up to surgery. Before surgery the patient will attend a joint replacement class and will be seen by the PCP/anesthesiologist and dentist.     More information on joint replacements can be found on : [email or web address]      Thank you for your referral.    Ayaan Street  MD, PhD     Adult Reconstruction  [facility] Department of Orthopaedic Surgery    This note was created using dictation software and may contain errors.  Please contact the creator for any clarifications that are needed.    DATA for DOCUMENTATION:         Past Medical History:     Patient Active Problem List   Diagnosis     Keratoconus of both eyes     Hypertension     MDD (major depressive disorder)     Vitamin D deficiency     H/O gastric bypass     Congenital heart valve abnormality     Gender dysphoria in adult     Body mass index (BMI) of 36.0 to 36.9     Hematoma of skin     Morbid obesity (H)     Palpitations     Chest tightness     Tinnitus, bilateral     Recurrent major depressive disorder, in partial remission (H24)     Chronic right shoulder pain     ACP (advance care planning)     Localized edema     Primary osteoarthritis of right shoulder     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-     Panic attack 12/17/13    ER Visit       Also see scanned health assessment forms.       Past Surgical History:     Past Surgical History:   Procedure Laterality Date     AS REPR PDA BY CLAIRE  1960    age 1     BYPASS GASTRIC MASON-EN-Y, LIVER BIOPSY, COMBINED  01/01/2004     CATHETER, ABLATION  01/01/1962    cardiac, related to scarring around PDA ligation site      COLONOSCOPY N/A 04/20/2017    Procedure: COLONOSCOPY;;  Surgeon: August Perez MD;  Location: [facility]     COLONOSCOPY N/A 05/15/2017    Procedure: COLONOSCOPY;  Colonoscopy/DX:RLQ abdominal pain/2 day prep emailed;  Surgeon: Gold Tamayo MD;  Location: [facility]     HYSTERECTOMY  01/01/1993    Partial, fibroids     INCISION AND DRAINAGE BREAST Bilateral 01/08/2021    Procedure: INCISION AND DRAINAGE, BREAST. Right breast chest hematoma evacuation.;  Surgeon: Gracie Rapp MD;   Location: [facility] OR     TRANSGENDER MASTECTOMY Bilateral 12/14/2020    Procedure: Bilateral Simple Mastectomy, On-Q;  Surgeon: Gracie Rapp MD;  Location: [facility] OR            Social History:     Social History     Socioeconomic History     Marital status:      Spouse name: Aissatou     Number of children: Not on file     Years of education: Not on file     Highest education level: Not on file   Occupational History     Not on file   Tobacco Use     Smoking status: Never     Smokeless tobacco: Never   Vaping Use     Vaping status: Never Used   Substance and Sexual Activity     Alcohol use: Not Currently     Alcohol/week: 2.5 standard drinks of alcohol     Types: 3 Standard drinks or equivalent per week     Comment: 1-2 drinks/week     Drug use: No     Sexual activity: Yes     Partners: Female     Birth control/protection: None   Other Topics Concern      Service Not Asked     Blood Transfusions Not Asked     Caffeine Concern Not Asked     Occupational Exposure Not Asked     Hobby Hazards Not Asked     Sleep Concern Yes     Comment: 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 Not Asked   Social History Narrative    Lives in [facility] with partner Aissatou, adopted daughter Marely. [place] born 2014. Barbara born 2020.     Laid off from [employer] in 2022. Plays trumpet.     Gets exercise! Diet adequate in calcium. Stress well managed. Building up consulting business     Social Determinants of Health     Financial Resource Strain: Low Risk  (9/25/2023)    Financial Resource Strain      Within the past 12 months, have you or your family members you live with been unable to get utilities (heat, electricity) when it was really needed?: No    Food Insecurity: Low Risk  (2023)    Food Insecurity      Within the past 12 months, did you worry that your food would run out before you got money to buy more?: No      Within the past 12 months, did the food you bought just not last and you didn t have money to get more?: No   Transportation Needs: Low Risk  (2023)    Transportation Needs      Within the past 12 months, has lack of transportation kept you from medical appointments, getting your medicines, non-medical meetings or appointments, work, or from getting things that you need?: No   Physical Activity: Sufficiently Active (2022)    Exercise Vital Sign      Days of Exercise per Week: 4 days      Minutes of Exercise per Session: 60 min   Stress: No Stress Concern Present (2022)    [employer] [place] [employer] - Occupational Stress Questionnaire      Feeling of Stress : Only a little   Social Connections: Not on file   Interpersonal Safety: Low Risk  (3/12/2024)    Interpersonal Safety      Do you feel physically and emotionally safe where you currently live?: Yes      Within the past 12 months, have you been hit, slapped, kicked or otherwise physically hurt by someone?: No      Within the past 12 months, have you been humiliated or emotionally abused in other ways by your partner or ex-partner?: No   Housing Stability: Low Risk  (2023)    Housing Stability      Do you have housing? : Yes      Are you worried about losing your housing?: No            Family History:       Family History   Problem Relation Age of Onset     Alzheimer Disease Mother 60         78     Myocardial Infarction Father 49             C.A.D. Father      Glaucoma Maternal Grandmother      Colorectal Cancer Maternal Grandmother         95, pancreatic     Diabetes Sister      Eye Disorder Sister      Depression Other         multiple siblings     Cerebrovascular Disease Paternal Grandmother         80's     Breast Cancer No family hx of              Medications:     Current Outpatient Medications   Medication Sig Dispense Refill     acetaminophen (TYLENOL) 500 MG tablet Take 1-2 tablets (500-1,000 mg) by mouth every 6 hours as needed for mild pain 60 tablet 3     albuterol (PROAIR HFA/PROVENTIL HFA/VENTOLIN HFA) 108 (90 Base) MCG/ACT inhaler Inhale 2 puffs into the lungs every 6 hours as needed for shortness of breath / dyspnea or wheezing 8.5 g 0     amLODIPine (NORVASC) 10 MG tablet Take 1 tablet (10 mg) by mouth daily 90 tablet 3     busPIRone (BUSPAR) 7.5 MG tablet TAKE 1 TABLET(7.5 MG) BY MOUTH TWICE DAILY 180 tablet 3     citalopram (CELEXA) 40 MG tablet Take 1 tablet (40 mg) by mouth daily 90 tablet 3     diclofenac (VOLTAREN) 1 % topical gel Apply 2 g topically 4 times daily 100 g 0     diclofenac (VOLTAREN) 75 MG EC tablet Take 1 tablet by mouth 2 times daily       losartan (COZAAR) 100 MG tablet Take 1 tablet (100 mg) by mouth daily 90 tablet 3     meloxicam (MOBIC) 15 MG tablet Take 1 tablet (15 mg) by mouth daily 30 tablet 2     meloxicam (MOBIC) 15 MG tablet TAKE 1 TABLET(15 MG) BY MOUTH DAILY 30 tablet 0     metoprolol succinate ER (TOPROL XL) 100 MG 24 hr tablet Take 1 tablet (100 mg) by mouth daily 90 tablet 3     multivitamin w/minerals (THERA-VIT-M) tablet Take 1 tablet by mouth daily       potassium chloride ER (K-TAB) 20 MEQ CR tablet Take 1 tablet (20 mEq) by mouth daily 90 tablet 3     rosuvastatin (CRESTOR) 40 MG tablet Take 1 tablet (40 mg) by mouth daily 90 tablet 3     topiramate (TOPAMAX) 25 MG tablet 25 mg (1 tablet) AM; 100 mg (4 tablets) PM before supper 300 tablet 2     vitamin D3 (CHOLECALCIFEROL) 50 mcg (2000 units) tablet Take 1 tablet (50 mcg) by mouth daily 100 tablet 3     Current Facility-Administered Medications   Medication Dose Route Frequency Provider Last Rate Last Admin     lidocaine (PF) (XYLOCAINE) 1 % injection 4 mL  4 mL      4 mL at 05/02/24 1024     lidocaine (PF) (XYLOCAINE) 1 % injection 4 mL  4 mL    Kasey Doss MD   4 mL at 09/14/22 1410     lidocaine (PF) (XYLOCAINE) 1 % injection 4 mL  4 mL   Maxwell Gonzalez MD   4 mL at 03/16/22 0823     lidocaine (PF) (XYLOCAINE) 1 % injection 7 mL  7 mL   Kasey Doss MD   7 mL at 03/20/24 0817     triamcinolone (KENALOG-40) injection 40 mg  40 mg      40 mg at 05/02/24 1024     triamcinolone (KENALOG-40) injection 40 mg  40 mg   Kasey Doss MD   40 mg at 03/20/24 0817     triamcinolone (KENALOG-40) injection 40 mg  40 mg   Kasey Doss MD   40 mg at 09/14/22 1410              Review of Systems:   A comprehensive 10 point review of systems (constitutional, ENT, cardiac, peripheral vascular, lymphatic, respiratory, GI, , Musculoskeletal, skin, Neurological) was performed and found to be negative except as described in this note.     See intake form completed by patient         Again, thank you for allowing me to participate in the care of your patient.        Sincerely,        Ayaan Street MD

## 2024-08-21 ENCOUNTER — OFFICE VISIT (OUTPATIENT)
Dept: FAMILY MEDICINE | Facility: CLINIC | Age: 64
End: 2024-08-21
Payer: COMMERCIAL

## 2024-08-21 VITALS
HEART RATE: 60 BPM | BODY MASS INDEX: 32.93 KG/M2 | OXYGEN SATURATION: 98 % | WEIGHT: 230 LBS | TEMPERATURE: 98.2 F | DIASTOLIC BLOOD PRESSURE: 80 MMHG | RESPIRATION RATE: 16 BRPM | HEIGHT: 70 IN | SYSTOLIC BLOOD PRESSURE: 129 MMHG

## 2024-08-21 DIAGNOSIS — Q24.8 CONGENITAL HEART VALVE ABNORMALITY: ICD-10-CM

## 2024-08-21 DIAGNOSIS — Z01.818 PREOP GENERAL PHYSICAL EXAM: Primary | ICD-10-CM

## 2024-08-21 DIAGNOSIS — I25.10 CORONARY ARTERY DISEASE DUE TO LIPID RICH PLAQUE: ICD-10-CM

## 2024-08-21 DIAGNOSIS — I25.83 CORONARY ARTERY DISEASE DUE TO LIPID RICH PLAQUE: ICD-10-CM

## 2024-08-21 DIAGNOSIS — Z98.84 H/O GASTRIC BYPASS: ICD-10-CM

## 2024-08-21 DIAGNOSIS — M16.11 PRIMARY OSTEOARTHRITIS OF RIGHT HIP: ICD-10-CM

## 2024-08-21 PROCEDURE — 99214 OFFICE O/P EST MOD 30 MIN: CPT | Mod: GC

## 2024-08-21 PROCEDURE — 93005 ELECTROCARDIOGRAM TRACING: CPT | Mod: GC

## 2024-08-21 RX ORDER — ROSUVASTATIN CALCIUM 40 MG/1
40 TABLET, COATED ORAL DAILY
Qty: 90 TABLET | Refills: 3 | Status: SHIPPED | OUTPATIENT
Start: 2024-08-21

## 2024-08-21 ASSESSMENT — PATIENT HEALTH QUESTIONNAIRE - PHQ9
10. IF YOU CHECKED OFF ANY PROBLEMS, HOW DIFFICULT HAVE THESE PROBLEMS MADE IT FOR YOU TO DO YOUR WORK, TAKE CARE OF THINGS AT HOME, OR GET ALONG WITH OTHER PEOPLE: NOT DIFFICULT AT ALL
SUM OF ALL RESPONSES TO PHQ QUESTIONS 1-9: 1
SUM OF ALL RESPONSES TO PHQ QUESTIONS 1-9: 1

## 2024-08-21 NOTE — PATIENT INSTRUCTIONS

## 2024-08-21 NOTE — Clinical Note
Patient is 3 days early for her preop, but will be going out of town and also needs echo performed. Counseled that sometimes they are allowed to get it a few days early, but sometimes not. Offered reschedule vs take a chance and do it today. She elected to do the preop anyways, and is aware that she may need to return. Could you have your office contact her and discuss if she needs it repeated in 3 days? .

## 2024-08-21 NOTE — PROGRESS NOTES
Preceptor Attestation:   Patient seen, evaluated and discussed with the resident. I have verified the content of the note, which accurately reflects my assessment of the patient and the plan of care.  EKG Interpretation:  By Luigi Yip MD    Indication:Pre op evaluation  Symptoms at time of EKG: None   Interpretation: appears normal, NSR, normal axis, normal intervals, no acute ST/T changes c/w ischemia, no LVH by voltage criteria, unchanged from previous tracings  Comparison with previous EKGs:Previous exam unavailable    Patient informed at visit.    Supervising Physician:  Luigi Yip MD

## 2024-08-21 NOTE — PROGRESS NOTES
Preoperative Evaluation  [address]  [address] [place] [id number]  Phone: [phone]  Fax: [phone]  Primary Provider: Amarilis Agosto MD  Pre-op Performing Provider: Lisa Baker MD  Aug 21, 2024             8/16/2024   Surgical Information   What procedure is being done? hip replacement   Facility or Hospital where procedure/surgery will be performed: [facility]   Who is doing the procedure / surgery? Ayaan Street MD    Date of surgery / procedure: september 24th   Time of surgery / procedure: tbd   Where do you plan to recover after surgery? at home with family        Fax number for surgical facility: Note does not need to be faxed, will be available electronically in Epic.    Assessment & Plan     The proposed surgical procedure is considered INTERMEDIATE risk.      Risks and Recommendations  The patient has the following additional risks and recommendations for perioperative complications:  Cardiovascular:   - Pre-OP echocardiogram and EKG indicated due to history of:  - CAD on CTA- Minimal, non-obstructive coronary artery disease without any evidence of high-grade stenoses  - Hx of History of congenital heart valve disease with open heart in 1960s, ablation in 1996 at [facility].  - 12/2021: Moderate concentric LVH. Dobutamine stress test indeterminate due to ST seg depression.     NO symptoms of ischemia and excellent activity tolerance, so pre-op stress test is not indicated  Addendum September 9, 2024 by Amarilis Agosto MD primary care provider   This preop can be extended until proposed DOS 9/24/24    Preoperative Medication Instructions  Antiplatelet or Anticoagulation Medication Instructions   - Patient is on no antiplatelet or anticoagulation medications.    Additional Medication Instructions  Take all scheduled medications on the day of surgery EXCEPT for modifications listed below:   - ACE/ARB: DO NOT TAKE on day of surgery (minimum 11  hours for general anesthesia).   - Herbal medications and vitamins: DO NOT TAKE 14 days prior to surgery.   - meloxicam (Mobic): DO NOT TAKE 10 days before surgery.     Recommendation  Approval given to proceed with proposed procedure pending review of diagnostic evaluation.    Diagnosis and orders for this visit    Coronary artery disease due to lipid rich plaque  H/O gastric bypass  Minimal, with no high grade stenosis. Refill. Patient is not on ASA due to hxo gastric bipass and minimal disease.   - rosuvastatin (CRESTOR) 40 MG tablet; Take 1 tablet (40 mg) by mouth daily.  - EKG 12-lead complete w/read - Clinics    Preop general physical exam  - Echocardiogram Complete; Future  - EKG 12-lead complete w/read - Clinics    Congenital heart valve abnormality  - EKG 12-lead complete w/read - Clinics      Primary osteoarthritis of right hip      Lorena Porras is a 64 year old, presenting for the following:  Pre-Op Exam (Preop exam 9/24 )          8/21/2024    10:36 AM   Additional Questions   Roomed by Lyrik   Accompanied by self         8/21/2024    Information    services provided? No        HPI related to upcoming procedure: right hip pain        8/16/2024   Pre-Op Questionnaire   Have you ever had a heart attack or stroke? No   Have you ever had surgery on your heart or blood vessels, such as a stent placement, a coronary artery bypass, or surgery on an artery in your head, neck, heart, or legs? (!) YES - Hx of History of congenital heart valve disease with open heart in 1960s, ablation in 1996 at [facility]. 12/2021: Dobutamine stress test indeterminate due to ST seg depression. Moderate concentric LVH.  CTA- Minimal, non-obstructive coronary artery disease without any evidence of high-grade stenoses.   Do you have chest pain with activity? No   Do you have a history of heart failure? No   Do you currently have a cold, bronchitis or symptoms of other infection? No   Do you have a  cough, shortness of breath, or wheezing? No   Do you or anyone in your family have previous history of blood clots? No   Do you or does anyone in your family have a serious bleeding problem such as prolonged bleeding following surgeries or cuts? No   Have you ever had problems with anemia or been told to take iron pills? No   Have you had any abnormal blood loss such as black, tarry or bloody stools, or abnormal vaginal bleeding? No   Have you ever had a blood transfusion? No   Are you willing to have a blood transfusion if it is medically needed before, during, or after your surgery? Yes   Have you or any of your relatives ever had problems with anesthesia? No   Do you have sleep apnea, excessive snoring or daytime drowsiness? No   Do you have any artifical heart valves or other implanted medical devices like a pacemaker, defibrillator, or continuous glucose monitor? No   Do you have artificial joints? No   Are you allergic to latex? No        Health Care Directive  Patient does not have a Health Care Directive or Living Will: Patient states has Advance Directive and will bring in a copy to clinic.    Preoperative Review of    reviewed - no record of controlled substances prescribed.          Patient Active Problem List    Diagnosis Date Noted    Primary osteoarthritis of right shoulder 08/30/2022     Priority: Medium    Recurrent major depressive disorder, in partial remission (H24) 08/26/2022     Priority: Medium    Chronic right shoulder pain 08/26/2022     Priority: Medium    ACP (advance care planning) 08/26/2022     Priority: Medium     Full code. Health care agent wife Aissatou Brown. ACP documents at home - requested copy      Localized edema 08/26/2022     Priority: Medium     At ankles - recommend compression socks      Palpitations 12/05/2021     Priority: Medium    Chest tightness 12/05/2021     Priority: Medium     Hx of History of congenital heart valve disease with open heart in 1960s, ablation  in 1996 at [facility].  12/2021: Dobutamine stress test indeterminate due to ST seg depression. Moderate concentric LVH.  CTA- Minimal, non-obstructive coronary artery disease without any evidence of high-grade stenoses.        Tinnitus, bilateral 12/05/2021     Priority: Medium    Morbid obesity (H) 02/26/2021     Priority: Medium    Hematoma of skin 12/29/2020     Priority: Medium     Added automatically from request for surgery [id number]      Body mass index (BMI) of 36.0 to 36.9 12/08/2020     Priority: Medium    Gender dysphoria in adult 03/01/2019     Priority: Medium    MDD (major depressive disorder) 09/07/2014     Priority: Medium    Vitamin D deficiency 09/07/2014     Priority: Medium     Problem list name updated by automated process. Provider to review      H/O gastric bypass 09/07/2014     Priority: Medium     RNY 2004      Congenital heart valve 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     Priority: Medium    Hypertension      Priority: Medium     Had carotid screening bilaterally, normal in Dec 2018 at a [facility]. Normal EKG at that time as well.       Keratoconus of both eyes 01/02/2012     Priority: Medium     Followed by ophtho q 6mos. Dx in 2002. Wears hyprid soft/hard lenses; anticipates cornea transplants in future.         Past Medical History:   Diagnosis Date    Congenital heart valve abnormality 9/7/2014    Diverticulosis     Hypertension     Keratoconus of both eyes 01-    Panic attack 12/17/13    ER Visit     Past Surgical History:   Procedure Laterality Date    AS REPR PDA BY CLAIRE  1960    age 1    BYPASS GASTRIC MASON-EN-Y, LIVER BIOPSY, COMBINED  01/01/2004    CATHETER, ABLATION  01/01/1962    cardiac, related to scarring around PDA ligation site     COLONOSCOPY N/A 04/20/2017    Procedure: COLONOSCOPY;;  Surgeon: August Perez MD;  Location:  GI    COLONOSCOPY N/A 05/15/2017    Procedure: COLONOSCOPY;  Colonoscopy/DX:RLQ abdominal pain/2 day prep emailed;  Surgeon:  Gold Tamayo MD;  Location: [facility]    HYSTERECTOMY  01/01/1993    Partial, fibroids    INCISION AND DRAINAGE BREAST Bilateral 01/08/2021    Procedure: INCISION AND DRAINAGE, BREAST. Right breast chest hematoma evacuation.;  Surgeon: Gracie Rapp MD;  Location: [facility] OR    TRANSGENDER MASTECTOMY Bilateral 12/14/2020    Procedure: Bilateral Simple Mastectomy, On-Q;  Surgeon: Gracie Rapp MD;  Location: [facility] OR     Current Outpatient Medications   Medication Sig Dispense Refill    acetaminophen (TYLENOL) 500 MG tablet Take 1-2 tablets (500-1,000 mg) by mouth every 6 hours as needed for mild pain 60 tablet 3    albuterol (PROAIR HFA/PROVENTIL HFA/VENTOLIN HFA) 108 (90 Base) MCG/ACT inhaler Inhale 2 puffs into the lungs every 6 hours as needed for shortness of breath / dyspnea or wheezing 8.5 g 0    amLODIPine (NORVASC) 10 MG tablet Take 1 tablet (10 mg) by mouth daily 90 tablet 3    busPIRone (BUSPAR) 7.5 MG tablet TAKE 1 TABLET(7.5 MG) BY MOUTH TWICE DAILY 180 tablet 3    citalopram (CELEXA) 40 MG tablet Take 1 tablet (40 mg) by mouth daily 90 tablet 3    diclofenac (VOLTAREN) 1 % topical gel Apply 2 g topically 4 times daily 100 g 0    diclofenac (VOLTAREN) 75 MG EC tablet Take 1 tablet by mouth 2 times daily      losartan (COZAAR) 100 MG tablet Take 1 tablet (100 mg) by mouth daily 90 tablet 3    meloxicam (MOBIC) 15 MG tablet Take 1 tablet (15 mg) by mouth daily 30 tablet 2    meloxicam (MOBIC) 15 MG tablet TAKE 1 TABLET(15 MG) BY MOUTH DAILY 30 tablet 0    metoprolol succinate ER (TOPROL XL) 100 MG 24 hr tablet Take 1 tablet (100 mg) by mouth daily 90 tablet 3    multivitamin w/minerals (THERA-VIT-M) tablet Take 1 tablet by mouth daily      potassium chloride ER (K-TAB) 20 MEQ CR tablet Take 1 tablet (20 mEq) by mouth daily 90 tablet 3    rosuvastatin (CRESTOR) 40 MG tablet Take 1 tablet (40 mg) by mouth daily 90 tablet 3    topiramate (TOPAMAX) 25 MG tablet 25 mg (1 tablet) AM; 100 mg (4  "tablets) PM before supper 300 tablet 2    vitamin D3 (CHOLECALCIFEROL) 50 mcg (2000 units) tablet Take 1 tablet (50 mcg) by mouth daily 100 tablet 3       No Known Allergies     Social History     Tobacco Use    Smoking status: Never    Smokeless tobacco: Never   Substance Use Topics    Alcohol use: Not Currently     Alcohol/week: 2.5 standard drinks of alcohol     Types: 3 Standard drinks or equivalent per week     Comment: 1-2 drinks/week       History   Drug Use No               Objective    /80   Pulse 60   Temp 98.2  F (36.8  C) (Oral)   Resp 16   Ht 1.778 m (5' 10\")   Wt 104.3 kg (230 lb)   LMP  (LMP Unknown)   SpO2 98%   BMI 33.00 kg/m     Estimated body mass index is 33 kg/m  as calculated from the following:    Height as of this encounter: 1.778 m (5' 10\").    Weight as of this encounter: 104.3 kg (230 lb).  Physical Exam  GENERAL: alert and no distress  NECK: no adenopathy, no asymmetry, masses, or scars  RESP: lungs clear to auscultation - no rales, rhonchi or wheezes  CV: regular rate and rhythm, normal S1 S2, no S3 or S4, no murmur, click or rub, no peripheral edema  ABDOMEN: soft, nontender, no hepatosplenomegaly, no masses and bowel sounds normal  MS: no gross musculoskeletal defects noted, no edema    Creatinine   Date Value Ref Range Status   10/07/2022 0.85 0.51 - 1.17 mg/dL Final     Comment:     Male and Female  0-2 Months    0.31-0.88 mg/dL  2-12 Months   0.16-0.39 mg/dL  1-2 Years     0.18-0.35 mg/dL  3-4 Years     0.26-0.42 mg/dL  5-6 Years     0.29-0.47 mg/dL  7-8 Years     0.34-0.53 mg/dL  9-10 Years    0.33-0.64 mg/dL  11-12 Years   0.44-0.68 mg/dL  13-14 Years   0.46-0.77 mg/dL    Female  15 Years and older  0.51-0.95 mg/dL    Male  15 Years and older  0.67-1.17 mg/dL       02/24/2021 0.8 0.5 - 1.0 mg/dL Final           Diagnostics  No labs were ordered during this visit.   EKG required for known coronary heart disease and history of LVH, Hx of History of congenital heart " valve disease with open heart in 1960s, ablation in 1996 at [facility].  and not completed in the last 90 days.     Revised Cardiac Risk Index (RCRI)  The patient has the following serious cardiovascular risks for perioperative complications:   - Minimal CAD with no high grade stenosis    RCRI-Low risk: Class 2 0.9% complication rate             Total Score: 1    RCRI: CAD               Signed Electronically by: Lisa Baker MD  A copy of this evaluation report is provided to the requesting physician.        Answers submitted by the patient for this visit:  Patient Health Questionnaire (Submitted on 8/21/2024)  If you checked off any problems, how difficult have these problems made it for you to do your work, take care of things at home, or get along with other people?: Not difficult at all  PHQ9 TOTAL SCORE: 1

## 2024-08-27 ENCOUNTER — ANCILLARY PROCEDURE (OUTPATIENT)
Dept: CARDIOLOGY | Facility: CLINIC | Age: 64
End: 2024-08-27
Attending: FAMILY MEDICINE
Payer: COMMERCIAL

## 2024-08-27 DIAGNOSIS — Z01.818 PREOP GENERAL PHYSICAL EXAM: ICD-10-CM

## 2024-08-27 LAB — LVEF ECHO: NORMAL

## 2024-08-27 PROCEDURE — 93306 TTE W/DOPPLER COMPLETE: CPT | Performed by: INTERNAL MEDICINE
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
## 2024-09-03 ENCOUNTER — VIRTUAL VISIT (OUTPATIENT)
Dept: ORTHOPEDICS | Facility: CLINIC | Age: 64
End: 2024-09-03
Payer: COMMERCIAL

## 2024-09-03 DIAGNOSIS — M16.11 OSTEOARTHRITIS OF RIGHT HIP: Primary | ICD-10-CM

## 2024-09-03 DIAGNOSIS — Z96.641 S/P TOTAL RIGHT HIP ARTHROPLASTY: ICD-10-CM

## 2024-09-03 PROCEDURE — 99207 PR NO CHARGE NURSE ONLY: CPT | Mod: 93

## 2024-09-03 NOTE — PROGRESS NOTES
This is a nonbillable nursing telephone visit.    Teaching Flowsheet   Relevant Diagnosis: right hip osteoarthritis  Teaching Topic: right total hip arthroplasty    Patient elects to proceed with fast-track total joint replacement.   is life partner, Aissatou.  Discussed total joint online class.  Patient will call if they require help for signing up for the total joint zoom class. Patient understands they will need a preop exam within 30 days of date of surgery. Patient understands the expected length of stay and the expectation of outpatient physical therapy. Patient understands the need for an at home  after surgery and need for transportation home.  Discussed dental/non-sterile procedure prophylaxis. Discussed the [facility] Companion service.        Person(s) involved in teaching:   Patient     Motivation Level:  Asks Questions: Yes  Eager to Learn: Yes  Cooperative: Yes  Receptive (willing/able to accept information): Yes  Any cultural factors/Faith beliefs that may influence understanding or compliance? No  Comments: none     Patient demonstrates understanding of the following:  Reason for the appointment, diagnosis and treatment plan: Yes  Knowledge of proper use of medications and conditions for which they are ordered (with special attention to potential side effects or drug interactions): Yes  Which situations necessitate calling provider and whom to contact: NA       Teaching Concerns Addressed:   Comments: none     Proper use and care of (medical equip, care aids, etc.): Yes  Nutritional needs and diet plan: Yes  Pain management techniques: Yes  Wound Care: Yes  How and/when to access community resources: Yes     Instructional Materials Used/Given: Preoperative teaching packet, surgical soap x2, dental card, total joint booklet, & welcome letter       Time spent with patient: 30 minutes.

## 2024-09-06 NOTE — PROVIDER NOTIFICATION
09/06/24 1049   Discharge Planning   Patient/Family Anticipates Transition to home with family   Concerns to be Addressed denies needs/concerns at this time   Living Arrangements   People in Home significant other   Type of Residence Private Residence   Is your private residence a single family home or apartment? Single family home   Number of Stairs, Within Home, Primary greater than 10 stairs   Stair Railings, Within Home, Primary railings safe and in good condition   Once home, are you able to live on one level? Yes   Which level? Main Level   Bathroom Shower/Tub Tub/Shower unit   Equipment Currently Used at Home cane, straight   Support System   Support Systems Spouse/Significant Other   Do you have someone available to stay with you one or two nights once you are home? Yes   Medical Clearance   It is recommended that you call and check with any specialty providers before surgery to see if you need surgical clearance.  Do you see any specialty providers outside of your primary care provider? No   Blood   Known Bleeding Disorder or Coagulopathy No   Does the patient have any Mormon/cultural preferences related to blood products? No   Education   Has the patient scheduled or completed pre-op total joint education, either in class or online, in the last 12 months? Yes   What day did the patient complete, or plan to complete, pre-op total joint education? 09/07/24   Patient attended total joint pre-op class/received pre-op teaching  online   Relationship/Living Environment   Name(s) of People in Home Aissatou/partner

## 2024-09-12 NOTE — PROGRESS NOTES
Ortho Navigator Note      Pre-op Date 8/21/24     Medical Clearance  Cleared      Labs WNR      COVID Test Date No longer indicated      Skin  Intact      Activity: Ambulates independently with      Equipment Need Patient will likely need a walker for discharge. Defer to PT/OT for recs.      Meds to Hold Held all supplements 14 days prior to surgery   - ACE/ARB: DO NOT TAKE on day of surgery (minimum 11 hours for general anesthesia).   - Herbal medications and vitamins: DO NOT TAKE 14 days prior to surgery.   - meloxicam (Mobic): DO NOT TAKE 10 days before surgery.   * Medication recommendations are not intended to be exhaustive; they are limited to common medications that are potentially dangerous if incorrectly managed   NPO Instructions  Instructed to stop solids 8 hr prior to arrival and clears 2 hr prior to arrival.       Pre-op Joint Education Complete? Complete    Discharge Plan Patient has declined Fast Track and will plan to discharge home on morning of POD 1. Spouse Aissatou will arrive at hospital at 0800 to participate in therapy and discharge education. They will then transport patient home    /Transportation Aissatou will be  and transportation.  is physically able to care for patient.      Barriers to Discharge No barriers to discharge.      Additional Info/   Special Needs : Patient had no unanswered questions or concerns.           09/06/24 1049   Discharge Planning   Patient/Family Anticipates Transition to home with family   Concerns to be Addressed denies needs/concerns at this time   Living Arrangements   People in Home significant other   Type of Residence Private Residence   Is your private residence a single family home or apartment? Single family home   Number of Stairs, Within Home, Primary greater than 10 stairs   Stair Railings, Within Home, Primary railings safe and in good condition   Once home, are you able to live on one level? Yes   Which level? Main Level   Bathroom  Shower/Tub Tub/Shower unit   Equipment Currently Used at Home cane, straight   Support System   Support Systems Spouse/Significant Other   Do you have someone available to stay with you one or two nights once you are home? Yes   Medical Clearance   It is recommended that you call and check with any specialty providers before surgery to see if you need surgical clearance.  Do you see any specialty providers outside of your primary care provider? No   Blood   Known Bleeding Disorder or Coagulopathy No   Does the patient have any Confucianism/cultural preferences related to blood products? No   Education   Has the patient scheduled or completed pre-op total joint education, either in class or online, in the last 12 months? Yes   What day did the patient complete, or plan to complete, pre-op total joint education? 09/07/24   Patient attended total joint pre-op class/received pre-op teaching  online   Relationship/Living Environment   Name(s) of People in Home Aissatou/partner

## 2024-09-23 ENCOUNTER — TELEPHONE (OUTPATIENT)
Dept: FAMILY MEDICINE | Facility: CLINIC | Age: 64
End: 2024-09-23
Payer: COMMERCIAL

## 2024-09-23 NOTE — TELEPHONE ENCOUNTER
EKG reading does not appear to have loaded into patient chart. Per provider EKG appears normal. Will leave a note on medical records desk to see if they have a copy of the EKG when they arrive in the morning.     Reyna Landa RN      EKG Interpretation:  By Luigi Yip MD     Indication:Pre op evaluation  Symptoms at time of EKG: None   Interpretation: appears normal, NSR, normal axis, normal intervals, no acute ST/T changes c/w ischemia, no LVH by voltage criteria, unchanged from previous tracings  Comparison with previous EKGs:Previous exam unavailable

## 2024-09-23 NOTE — TELEPHONE ENCOUNTER
[facility] phone call message- general phone call:    Reason for call: Tanja is looking for the EKG trace results before patient's surgery tomorrow @ 900am. Fax# [phone].    Return call needed: Yes    OK to leave a message on voice mail? Yes    Primary language: English      needed? No    Call taken on September 23, 2024 at 4:20 PM by Uma Mayen

## 2024-09-23 NOTE — PHARMACY-ADMISSION MEDICATION HISTORY
Admission Medication History    Nurse Tanja Ayoub RN Fri Sep 6, 2024 10:54 AM     Current Facility-Administered Medications for the 9/24/24 encounter (Hospital Encounter)   Medication    lidocaine (PF) (XYLOCAINE) 1 % injection 4 mL    lidocaine (PF) (XYLOCAINE) 1 % injection 4 mL    lidocaine (PF) (XYLOCAINE) 1 % injection 4 mL    lidocaine (PF) (XYLOCAINE) 1 % injection 7 mL    triamcinolone (KENALOG-40) injection 40 mg    triamcinolone (KENALOG-40) injection 40 mg    triamcinolone (KENALOG-40) injection 40 mg     PTA Med List   Medication Sig Note Last Dose    acetaminophen (TYLENOL) 500 MG tablet Take 1-2 tablets (500-1,000 mg) by mouth every 6 hours as needed for mild pain      amLODIPine (NORVASC) 10 MG tablet Take 1 tablet (10 mg) by mouth daily      busPIRone (BUSPAR) 7.5 MG tablet TAKE 1 TABLET(7.5 MG) BY MOUTH TWICE DAILY      citalopram (CELEXA) 40 MG tablet Take 1 tablet (40 mg) by mouth daily      losartan (COZAAR) 100 MG tablet Take 1 tablet (100 mg) by mouth daily 9/12/2024: Hold AM of surgery        meloxicam (MOBIC) 15 MG tablet Take 1 tablet (15 mg) by mouth daily 9/12/2024: Stop 10 days prior to surgery  9/11/2024    metoprolol succinate ER (TOPROL XL) 100 MG 24 hr tablet Take 1 tablet (100 mg) by mouth daily      multivitamin w/minerals (THERA-VIT-M) tablet Take 1 tablet by mouth daily      potassium chloride ER (K-TAB) 20 MEQ CR tablet Take 1 tablet (20 mEq) by mouth daily      rosuvastatin (CRESTOR) 40 MG tablet Take 1 tablet (40 mg) by mouth daily.      vitamin D3 (CHOLECALCIFEROL) 50 mcg (2000 units) tablet Take 1 tablet (50 mcg) by mouth daily

## 2024-09-24 ENCOUNTER — APPOINTMENT (OUTPATIENT)
Dept: GENERAL RADIOLOGY | Facility: CLINIC | Age: 64
End: 2024-09-24
Attending: STUDENT IN AN ORGANIZED HEALTH CARE EDUCATION/TRAINING PROGRAM
Payer: COMMERCIAL

## 2024-09-24 ENCOUNTER — APPOINTMENT (OUTPATIENT)
Dept: PHYSICAL THERAPY | Facility: CLINIC | Age: 64
End: 2024-09-24
Attending: STUDENT IN AN ORGANIZED HEALTH CARE EDUCATION/TRAINING PROGRAM
Payer: COMMERCIAL

## 2024-09-24 ENCOUNTER — HOSPITAL ENCOUNTER (OUTPATIENT)
Facility: CLINIC | Age: 64
Discharge: HOME | End: 2024-09-25
Attending: STUDENT IN AN ORGANIZED HEALTH CARE EDUCATION/TRAINING PROGRAM | Admitting: STUDENT IN AN ORGANIZED HEALTH CARE EDUCATION/TRAINING PROGRAM
Payer: COMMERCIAL

## 2024-09-24 ENCOUNTER — ANESTHESIA (OUTPATIENT)
Dept: SURGERY | Facility: CLINIC | Age: 64
End: 2024-09-24
Payer: COMMERCIAL

## 2024-09-24 ENCOUNTER — ANESTHESIA EVENT (OUTPATIENT)
Dept: SURGERY | Facility: CLINIC | Age: 64
End: 2024-09-24
Payer: COMMERCIAL

## 2024-09-24 DIAGNOSIS — M16.11 PRIMARY OSTEOARTHRITIS OF RIGHT HIP: ICD-10-CM

## 2024-09-24 DIAGNOSIS — M16.11 OSTEOARTHRITIS OF ONE HIP, RIGHT: Primary | ICD-10-CM

## 2024-09-24 PROBLEM — Z96.649 S/P TOTAL HIP ARTHROPLASTY: Status: ACTIVE | Noted: 2024-09-24

## 2024-09-24 PROCEDURE — 27130 TOTAL HIP ARTHROPLASTY: CPT | Mod: RT | Performed by: STUDENT IN AN ORGANIZED HEALTH CARE EDUCATION/TRAINING PROGRAM

## 2024-09-24 PROCEDURE — 250N000011 HC RX IP 250 OP 636: Performed by: STUDENT IN AN ORGANIZED HEALTH CARE EDUCATION/TRAINING PROGRAM

## 2024-09-24 PROCEDURE — 710N000009 HC RECOVERY PHASE 1, LEVEL 1, PER MIN: Performed by: STUDENT IN AN ORGANIZED HEALTH CARE EDUCATION/TRAINING PROGRAM

## 2024-09-24 PROCEDURE — 250N000011 HC RX IP 250 OP 636: Performed by: NURSE ANESTHETIST, CERTIFIED REGISTERED

## 2024-09-24 PROCEDURE — 250N000013 HC RX MED GY IP 250 OP 250 PS 637: Performed by: STUDENT IN AN ORGANIZED HEALTH CARE EDUCATION/TRAINING PROGRAM

## 2024-09-24 PROCEDURE — 250N000009 HC RX 250: Performed by: NURSE ANESTHETIST, CERTIFIED REGISTERED

## 2024-09-24 PROCEDURE — 250N000013 HC RX MED GY IP 250 OP 250 PS 637: Performed by: ANESTHESIOLOGY

## 2024-09-24 PROCEDURE — 272N000001 HC OR GENERAL SUPPLY STERILE: Performed by: STUDENT IN AN ORGANIZED HEALTH CARE EDUCATION/TRAINING PROGRAM

## 2024-09-24 PROCEDURE — 97161 PT EVAL LOW COMPLEX 20 MIN: CPT | Mod: GP

## 2024-09-24 PROCEDURE — 250N000025 HC SEVOFLURANE, PER MIN: Performed by: STUDENT IN AN ORGANIZED HEALTH CARE EDUCATION/TRAINING PROGRAM

## 2024-09-24 PROCEDURE — 258N000003 HC RX IP 258 OP 636: Performed by: NURSE ANESTHETIST, CERTIFIED REGISTERED

## 2024-09-24 PROCEDURE — 360N000077 HC SURGERY LEVEL 4, PER MIN: Performed by: STUDENT IN AN ORGANIZED HEALTH CARE EDUCATION/TRAINING PROGRAM

## 2024-09-24 PROCEDURE — 999N000065 XR PELVIS AND HIP PORTABLE RIGHT 1 VIEW

## 2024-09-24 PROCEDURE — 258N000003 HC RX IP 258 OP 636: Performed by: ANESTHESIOLOGY

## 2024-09-24 PROCEDURE — 97110 THERAPEUTIC EXERCISES: CPT | Mod: GP

## 2024-09-24 PROCEDURE — 250N000009 HC RX 250: Performed by: STUDENT IN AN ORGANIZED HEALTH CARE EDUCATION/TRAINING PROGRAM

## 2024-09-24 PROCEDURE — 97530 THERAPEUTIC ACTIVITIES: CPT | Mod: GP

## 2024-09-24 PROCEDURE — 258N000001 HC RX 258: Performed by: STUDENT IN AN ORGANIZED HEALTH CARE EDUCATION/TRAINING PROGRAM

## 2024-09-24 PROCEDURE — 999N000141 HC STATISTIC PRE-PROCEDURE NURSING ASSESSMENT: Performed by: STUDENT IN AN ORGANIZED HEALTH CARE EDUCATION/TRAINING PROGRAM

## 2024-09-24 PROCEDURE — 370N000017 HC ANESTHESIA TECHNICAL FEE, PER MIN: Performed by: STUDENT IN AN ORGANIZED HEALTH CARE EDUCATION/TRAINING PROGRAM

## 2024-09-24 PROCEDURE — 258N000003 HC RX IP 258 OP 636: Performed by: STUDENT IN AN ORGANIZED HEALTH CARE EDUCATION/TRAINING PROGRAM

## 2024-09-24 PROCEDURE — C1776 JOINT DEVICE (IMPLANTABLE): HCPCS | Performed by: STUDENT IN AN ORGANIZED HEALTH CARE EDUCATION/TRAINING PROGRAM

## 2024-09-24 DEVICE — IMPLANTABLE DEVICE
Type: IMPLANTABLE DEVICE | Site: HIP | Status: FUNCTIONAL
Brand: G7® ACETABULAR SYSTEM

## 2024-09-24 DEVICE — IMPLANTABLE DEVICE
Type: IMPLANTABLE DEVICE | Site: HIP | Status: FUNCTIONAL
Brand: TAPERLOC COMPLETE PRIMARY FEMORAL

## 2024-09-24 DEVICE — IMPLANTABLE DEVICE
Type: IMPLANTABLE DEVICE | Site: HIP | Status: FUNCTIONAL
Brand: BIOLOX® DELTA MODULAR CERAMIC HEAD

## 2024-09-24 DEVICE — IMPLANTABLE DEVICE
Type: IMPLANTABLE DEVICE | Site: HIP | Status: FUNCTIONAL
Brand: G7® LONGEVITY®

## 2024-09-24 RX ORDER — HYDRALAZINE HYDROCHLORIDE 20 MG/ML
10 INJECTION INTRAMUSCULAR; INTRAVENOUS EVERY 10 MIN PRN
Status: DISCONTINUED | OUTPATIENT
Start: 2024-09-24 | End: 2024-09-24 | Stop reason: HOSPADM

## 2024-09-24 RX ORDER — METOPROLOL SUCCINATE 100 MG/1
100 TABLET, EXTENDED RELEASE ORAL DAILY
Status: DISCONTINUED | OUTPATIENT
Start: 2024-09-24 | End: 2024-09-25 | Stop reason: HOSPADM

## 2024-09-24 RX ORDER — BISACODYL 10 MG
10 SUPPOSITORY, RECTAL RECTAL DAILY PRN
Status: DISCONTINUED | OUTPATIENT
Start: 2024-09-24 | End: 2024-09-25 | Stop reason: HOSPADM

## 2024-09-24 RX ORDER — KETOROLAC TROMETHAMINE 15 MG/ML
15 INJECTION, SOLUTION INTRAMUSCULAR; INTRAVENOUS EVERY 6 HOURS
Status: COMPLETED | OUTPATIENT
Start: 2024-09-24 | End: 2024-09-25

## 2024-09-24 RX ORDER — SODIUM CHLORIDE, SODIUM LACTATE, POTASSIUM CHLORIDE, CALCIUM CHLORIDE 600; 310; 30; 20 MG/100ML; MG/100ML; MG/100ML; MG/100ML
INJECTION, SOLUTION INTRAVENOUS CONTINUOUS
Status: DISCONTINUED | OUTPATIENT
Start: 2024-09-24 | End: 2024-09-24 | Stop reason: HOSPADM

## 2024-09-24 RX ORDER — DIPHENHYDRAMINE HCL 12.5MG/5ML
12.5 LIQUID (ML) ORAL EVERY 6 HOURS PRN
Status: DISCONTINUED | OUTPATIENT
Start: 2024-09-24 | End: 2024-09-24 | Stop reason: HOSPADM

## 2024-09-24 RX ORDER — PROCHLORPERAZINE MALEATE 5 MG
5 TABLET ORAL EVERY 6 HOURS PRN
Status: DISCONTINUED | OUTPATIENT
Start: 2024-09-24 | End: 2024-09-25 | Stop reason: HOSPADM

## 2024-09-24 RX ORDER — LABETALOL HYDROCHLORIDE 5 MG/ML
10 INJECTION, SOLUTION INTRAVENOUS
Status: DISCONTINUED | OUTPATIENT
Start: 2024-09-24 | End: 2024-09-24 | Stop reason: HOSPADM

## 2024-09-24 RX ORDER — ONDANSETRON 4 MG/1
4 TABLET, ORALLY DISINTEGRATING ORAL EVERY 30 MIN PRN
Status: DISCONTINUED | OUTPATIENT
Start: 2024-09-24 | End: 2024-09-24 | Stop reason: HOSPADM

## 2024-09-24 RX ORDER — METHADONE HYDROCHLORIDE 10 MG/ML
2 INJECTION, SOLUTION INTRAMUSCULAR; INTRAVENOUS; SUBCUTANEOUS 3 TIMES DAILY PRN
Status: DISCONTINUED | OUTPATIENT
Start: 2024-09-24 | End: 2024-09-24 | Stop reason: HOSPADM

## 2024-09-24 RX ORDER — METHADONE HYDROCHLORIDE 10 MG/ML
INJECTION, SOLUTION INTRAMUSCULAR; INTRAVENOUS; SUBCUTANEOUS PRN
Status: DISCONTINUED | OUTPATIENT
Start: 2024-09-24 | End: 2024-09-24

## 2024-09-24 RX ORDER — IBUPROFEN 600 MG/1
600 TABLET, FILM COATED ORAL EVERY 6 HOURS PRN
Qty: 30 TABLET | Refills: 0 | Status: SHIPPED | OUTPATIENT
Start: 2024-09-24

## 2024-09-24 RX ORDER — ALBUTEROL SULFATE 0.83 MG/ML
2.5 SOLUTION RESPIRATORY (INHALATION) EVERY 4 HOURS PRN
Status: DISCONTINUED | OUTPATIENT
Start: 2024-09-24 | End: 2024-09-24 | Stop reason: HOSPADM

## 2024-09-24 RX ORDER — SODIUM CHLORIDE, SODIUM LACTATE, POTASSIUM CHLORIDE, CALCIUM CHLORIDE 600; 310; 30; 20 MG/100ML; MG/100ML; MG/100ML; MG/100ML
INJECTION, SOLUTION INTRAVENOUS CONTINUOUS
Status: DISCONTINUED | OUTPATIENT
Start: 2024-09-24 | End: 2024-09-25 | Stop reason: HOSPADM

## 2024-09-24 RX ORDER — DEXAMETHASONE SODIUM PHOSPHATE 4 MG/ML
4 INJECTION, SOLUTION INTRA-ARTICULAR; INTRALESIONAL; INTRAMUSCULAR; INTRAVENOUS; SOFT TISSUE
Status: DISCONTINUED | OUTPATIENT
Start: 2024-09-24 | End: 2024-09-24 | Stop reason: HOSPADM

## 2024-09-24 RX ORDER — ACETAMINOPHEN 325 MG/1
975 TABLET ORAL EVERY 8 HOURS
Status: DISCONTINUED | OUTPATIENT
Start: 2024-09-24 | End: 2024-09-25 | Stop reason: HOSPADM

## 2024-09-24 RX ORDER — NALOXONE HYDROCHLORIDE 0.4 MG/ML
0.1 INJECTION, SOLUTION INTRAMUSCULAR; INTRAVENOUS; SUBCUTANEOUS
Status: DISCONTINUED | OUTPATIENT
Start: 2024-09-24 | End: 2024-09-24 | Stop reason: HOSPADM

## 2024-09-24 RX ORDER — ONDANSETRON 2 MG/ML
4 INJECTION INTRAMUSCULAR; INTRAVENOUS EVERY 6 HOURS PRN
Status: DISCONTINUED | OUTPATIENT
Start: 2024-09-24 | End: 2024-09-25 | Stop reason: HOSPADM

## 2024-09-24 RX ORDER — KETOROLAC TROMETHAMINE 30 MG/ML
INJECTION, SOLUTION INTRAMUSCULAR; INTRAVENOUS PRN
Status: DISCONTINUED | OUTPATIENT
Start: 2024-09-24 | End: 2024-09-24

## 2024-09-24 RX ORDER — DIPHENHYDRAMINE HYDROCHLORIDE 50 MG/ML
12.5 INJECTION INTRAMUSCULAR; INTRAVENOUS EVERY 6 HOURS PRN
Status: DISCONTINUED | OUTPATIENT
Start: 2024-09-24 | End: 2024-09-24 | Stop reason: HOSPADM

## 2024-09-24 RX ORDER — NALOXONE HYDROCHLORIDE 0.4 MG/ML
0.2 INJECTION, SOLUTION INTRAMUSCULAR; INTRAVENOUS; SUBCUTANEOUS
Status: DISCONTINUED | OUTPATIENT
Start: 2024-09-24 | End: 2024-09-25 | Stop reason: HOSPADM

## 2024-09-24 RX ORDER — TRANEXAMIC ACID 650 MG/1
1950 TABLET ORAL ONCE
Status: COMPLETED | OUTPATIENT
Start: 2024-09-24 | End: 2024-09-24

## 2024-09-24 RX ORDER — ONDANSETRON 2 MG/ML
4 INJECTION INTRAMUSCULAR; INTRAVENOUS EVERY 30 MIN PRN
Status: DISCONTINUED | OUTPATIENT
Start: 2024-09-24 | End: 2024-09-24 | Stop reason: HOSPADM

## 2024-09-24 RX ORDER — AMOXICILLIN 250 MG
1 CAPSULE ORAL 2 TIMES DAILY
Status: DISCONTINUED | OUTPATIENT
Start: 2024-09-24 | End: 2024-09-25 | Stop reason: HOSPADM

## 2024-09-24 RX ORDER — LOSARTAN POTASSIUM 100 MG/1
100 TABLET ORAL DAILY
Status: DISCONTINUED | OUTPATIENT
Start: 2024-09-24 | End: 2024-09-25 | Stop reason: HOSPADM

## 2024-09-24 RX ORDER — CEFAZOLIN SODIUM 2 G/100ML
2 INJECTION, SOLUTION INTRAVENOUS EVERY 8 HOURS
Status: COMPLETED | OUTPATIENT
Start: 2024-09-24 | End: 2024-09-25

## 2024-09-24 RX ORDER — ACETAMINOPHEN 325 MG/1
650 TABLET ORAL EVERY 4 HOURS PRN
Qty: 100 TABLET | Refills: 0 | Status: SHIPPED | OUTPATIENT
Start: 2024-09-24

## 2024-09-24 RX ORDER — PROPOFOL 10 MG/ML
INJECTION, EMULSION INTRAVENOUS PRN
Status: DISCONTINUED | OUTPATIENT
Start: 2024-09-24 | End: 2024-09-24

## 2024-09-24 RX ORDER — LIDOCAINE 40 MG/G
CREAM TOPICAL
Status: DISCONTINUED | OUTPATIENT
Start: 2024-09-24 | End: 2024-09-24 | Stop reason: HOSPADM

## 2024-09-24 RX ORDER — BUSPIRONE HYDROCHLORIDE 7.5 MG/1
7.5 TABLET ORAL 2 TIMES DAILY
Status: DISCONTINUED | OUTPATIENT
Start: 2024-09-24 | End: 2024-09-25 | Stop reason: HOSPADM

## 2024-09-24 RX ORDER — ACETAMINOPHEN 325 MG/1
650 TABLET ORAL EVERY 4 HOURS PRN
Status: DISCONTINUED | OUTPATIENT
Start: 2024-09-27 | End: 2024-09-25 | Stop reason: HOSPADM

## 2024-09-24 RX ORDER — HYDROMORPHONE HCL IN WATER/PF 6 MG/30 ML
0.4 PATIENT CONTROLLED ANALGESIA SYRINGE INTRAVENOUS
Status: DISCONTINUED | OUTPATIENT
Start: 2024-09-24 | End: 2024-09-25 | Stop reason: HOSPADM

## 2024-09-24 RX ORDER — OXYCODONE HYDROCHLORIDE 5 MG/1
5-10 TABLET ORAL EVERY 4 HOURS PRN
Qty: 26 TABLET | Refills: 0 | Status: SHIPPED | OUTPATIENT
Start: 2024-09-24

## 2024-09-24 RX ORDER — POLYETHYLENE GLYCOL 3350 17 G/17G
17 POWDER, FOR SOLUTION ORAL DAILY
Status: DISCONTINUED | OUTPATIENT
Start: 2024-09-25 | End: 2024-09-25 | Stop reason: HOSPADM

## 2024-09-24 RX ORDER — KETOROLAC TROMETHAMINE 15 MG/ML
15 INJECTION, SOLUTION INTRAMUSCULAR; INTRAVENOUS
Status: DISCONTINUED | OUTPATIENT
Start: 2024-09-24 | End: 2024-09-24 | Stop reason: HOSPADM

## 2024-09-24 RX ORDER — ASPIRIN 81 MG/1
81 TABLET ORAL 2 TIMES DAILY
Qty: 60 TABLET | Refills: 0 | Status: SHIPPED | OUTPATIENT
Start: 2024-09-24

## 2024-09-24 RX ORDER — CEFAZOLIN SODIUM/WATER 2 G/20 ML
2 SYRINGE (ML) INTRAVENOUS SEE ADMIN INSTRUCTIONS
Status: DISCONTINUED | OUTPATIENT
Start: 2024-09-24 | End: 2024-09-24 | Stop reason: HOSPADM

## 2024-09-24 RX ORDER — CITALOPRAM HYDROBROMIDE 20 MG/1
40 TABLET ORAL DAILY
Status: DISCONTINUED | OUTPATIENT
Start: 2024-09-25 | End: 2024-09-25 | Stop reason: HOSPADM

## 2024-09-24 RX ORDER — AMLODIPINE BESYLATE 10 MG/1
10 TABLET ORAL DAILY
Status: DISCONTINUED | OUTPATIENT
Start: 2024-09-24 | End: 2024-09-25 | Stop reason: HOSPADM

## 2024-09-24 RX ORDER — NALOXONE HYDROCHLORIDE 0.4 MG/ML
0.4 INJECTION, SOLUTION INTRAMUSCULAR; INTRAVENOUS; SUBCUTANEOUS
Status: DISCONTINUED | OUTPATIENT
Start: 2024-09-24 | End: 2024-09-25 | Stop reason: HOSPADM

## 2024-09-24 RX ORDER — ASPIRIN 81 MG/1
81 TABLET ORAL 2 TIMES DAILY
Status: DISCONTINUED | OUTPATIENT
Start: 2024-09-24 | End: 2024-09-25 | Stop reason: HOSPADM

## 2024-09-24 RX ORDER — OXYCODONE HYDROCHLORIDE 10 MG/1
10 TABLET ORAL EVERY 4 HOURS PRN
Status: DISCONTINUED | OUTPATIENT
Start: 2024-09-24 | End: 2024-09-25 | Stop reason: HOSPADM

## 2024-09-24 RX ORDER — ACETAMINOPHEN 325 MG/1
975 TABLET ORAL ONCE
Status: COMPLETED | OUTPATIENT
Start: 2024-09-24 | End: 2024-09-24

## 2024-09-24 RX ORDER — HYDROMORPHONE HCL IN WATER/PF 6 MG/30 ML
0.2 PATIENT CONTROLLED ANALGESIA SYRINGE INTRAVENOUS
Status: DISCONTINUED | OUTPATIENT
Start: 2024-09-24 | End: 2024-09-25 | Stop reason: HOSPADM

## 2024-09-24 RX ORDER — ONDANSETRON 4 MG/1
4 TABLET, ORALLY DISINTEGRATING ORAL EVERY 6 HOURS PRN
Status: DISCONTINUED | OUTPATIENT
Start: 2024-09-24 | End: 2024-09-25 | Stop reason: HOSPADM

## 2024-09-24 RX ORDER — DEXAMETHASONE SODIUM PHOSPHATE 4 MG/ML
INJECTION, SOLUTION INTRA-ARTICULAR; INTRALESIONAL; INTRAMUSCULAR; INTRAVENOUS; SOFT TISSUE PRN
Status: DISCONTINUED | OUTPATIENT
Start: 2024-09-24 | End: 2024-09-24

## 2024-09-24 RX ORDER — POLYETHYLENE GLYCOL 3350 17 G/17G
1 POWDER, FOR SOLUTION ORAL DAILY
Qty: 7 PACKET | Refills: 0 | Status: SHIPPED | OUTPATIENT
Start: 2024-09-24

## 2024-09-24 RX ORDER — MAGNESIUM SULFATE HEPTAHYDRATE 40 MG/ML
2 INJECTION, SOLUTION INTRAVENOUS
Status: DISCONTINUED | OUTPATIENT
Start: 2024-09-24 | End: 2024-09-24 | Stop reason: HOSPADM

## 2024-09-24 RX ORDER — AMOXICILLIN 250 MG
1-2 CAPSULE ORAL 2 TIMES DAILY
Qty: 30 TABLET | Refills: 0 | Status: SHIPPED | OUTPATIENT
Start: 2024-09-24

## 2024-09-24 RX ORDER — CEFAZOLIN SODIUM/WATER 2 G/20 ML
2 SYRINGE (ML) INTRAVENOUS
Status: COMPLETED | OUTPATIENT
Start: 2024-09-24 | End: 2024-09-24

## 2024-09-24 RX ORDER — LIDOCAINE HYDROCHLORIDE 20 MG/ML
INJECTION, SOLUTION INFILTRATION; PERINEURAL PRN
Status: DISCONTINUED | OUTPATIENT
Start: 2024-09-24 | End: 2024-09-24

## 2024-09-24 RX ORDER — ROSUVASTATIN CALCIUM 20 MG/1
40 TABLET, COATED ORAL DAILY
Status: DISCONTINUED | OUTPATIENT
Start: 2024-09-25 | End: 2024-09-25 | Stop reason: HOSPADM

## 2024-09-24 RX ORDER — ONDANSETRON 2 MG/ML
INJECTION INTRAMUSCULAR; INTRAVENOUS PRN
Status: DISCONTINUED | OUTPATIENT
Start: 2024-09-24 | End: 2024-09-24

## 2024-09-24 RX ORDER — LIDOCAINE 40 MG/G
CREAM TOPICAL
Status: DISCONTINUED | OUTPATIENT
Start: 2024-09-24 | End: 2024-09-25 | Stop reason: HOSPADM

## 2024-09-24 RX ORDER — OXYCODONE HYDROCHLORIDE 5 MG/1
5 TABLET ORAL EVERY 4 HOURS PRN
Status: DISCONTINUED | OUTPATIENT
Start: 2024-09-24 | End: 2024-09-25 | Stop reason: HOSPADM

## 2024-09-24 RX ADMIN — KETOROLAC TROMETHAMINE 15 MG: 30 INJECTION, SOLUTION INTRAMUSCULAR at 09:16

## 2024-09-24 RX ADMIN — DEXMEDETOMIDINE HYDROCHLORIDE 0.5 MCG/KG/HR: 100 INJECTION, SOLUTION INTRAVENOUS at 09:16

## 2024-09-24 RX ADMIN — DEXAMETHASONE SODIUM PHOSPHATE 8 MG: 4 INJECTION, SOLUTION INTRA-ARTICULAR; INTRALESIONAL; INTRAMUSCULAR; INTRAVENOUS; SOFT TISSUE at 09:16

## 2024-09-24 RX ADMIN — PHENYLEPHRINE HYDROCHLORIDE 50 MCG: 10 INJECTION INTRAVENOUS at 10:57

## 2024-09-24 RX ADMIN — PHENYLEPHRINE HYDROCHLORIDE 50 MCG: 10 INJECTION INTRAVENOUS at 10:53

## 2024-09-24 RX ADMIN — OXYCODONE HYDROCHLORIDE 5 MG: 5 TABLET ORAL at 22:47

## 2024-09-24 RX ADMIN — ROCURONIUM BROMIDE 10 MG: 50 INJECTION, SOLUTION INTRAVENOUS at 10:25

## 2024-09-24 RX ADMIN — PHENYLEPHRINE HYDROCHLORIDE 50 MCG: 10 INJECTION INTRAVENOUS at 10:47

## 2024-09-24 RX ADMIN — PHENYLEPHRINE HYDROCHLORIDE 50 MCG: 10 INJECTION INTRAVENOUS at 11:07

## 2024-09-24 RX ADMIN — ROCURONIUM BROMIDE 15 MG: 50 INJECTION, SOLUTION INTRAVENOUS at 10:18

## 2024-09-24 RX ADMIN — PHENYLEPHRINE HYDROCHLORIDE 50 MCG: 10 INJECTION INTRAVENOUS at 11:02

## 2024-09-24 RX ADMIN — ASPIRIN 81 MG: 81 TABLET, COATED ORAL at 20:56

## 2024-09-24 RX ADMIN — ROCURONIUM BROMIDE 20 MG: 50 INJECTION, SOLUTION INTRAVENOUS at 10:01

## 2024-09-24 RX ADMIN — PHENYLEPHRINE HYDROCHLORIDE 100 MCG: 10 INJECTION INTRAVENOUS at 10:50

## 2024-09-24 RX ADMIN — ACETAMINOPHEN 325MG 975 MG: 325 TABLET ORAL at 08:08

## 2024-09-24 RX ADMIN — CEFAZOLIN SODIUM 2 G: 2 INJECTION, SOLUTION INTRAVENOUS at 18:13

## 2024-09-24 RX ADMIN — MIDAZOLAM 2 MG: 1 INJECTION INTRAMUSCULAR; INTRAVENOUS at 09:13

## 2024-09-24 RX ADMIN — SODIUM CHLORIDE, POTASSIUM CHLORIDE, SODIUM LACTATE AND CALCIUM CHLORIDE: 600; 310; 30; 20 INJECTION, SOLUTION INTRAVENOUS at 08:36

## 2024-09-24 RX ADMIN — METHADONE HYDROCHLORIDE 5 MG: 10 INJECTION, SOLUTION INTRAMUSCULAR; INTRAVENOUS; SUBCUTANEOUS at 10:05

## 2024-09-24 RX ADMIN — ACETAMINOPHEN 325MG 975 MG: 325 TABLET ORAL at 17:12

## 2024-09-24 RX ADMIN — TRANEXAMIC ACID 1950 MG: 650 TABLET ORAL at 08:08

## 2024-09-24 RX ADMIN — SUGAMMADEX 200 MG: 100 INJECTION, SOLUTION INTRAVENOUS at 11:06

## 2024-09-24 RX ADMIN — SODIUM CHLORIDE, POTASSIUM CHLORIDE, SODIUM LACTATE AND CALCIUM CHLORIDE: 600; 310; 30; 20 INJECTION, SOLUTION INTRAVENOUS at 14:06

## 2024-09-24 RX ADMIN — KETOROLAC TROMETHAMINE 15 MG: 15 INJECTION, SOLUTION INTRAMUSCULAR; INTRAVENOUS at 21:00

## 2024-09-24 RX ADMIN — PROPOFOL 50 MG: 10 INJECTION, EMULSION INTRAVENOUS at 09:23

## 2024-09-24 RX ADMIN — METHADONE HYDROCHLORIDE 15 MG: 10 INJECTION, SOLUTION INTRAMUSCULAR; INTRAVENOUS; SUBCUTANEOUS at 09:16

## 2024-09-24 RX ADMIN — PROPOFOL 150 MG: 10 INJECTION, EMULSION INTRAVENOUS at 09:16

## 2024-09-24 RX ADMIN — ROCURONIUM BROMIDE 50 MG: 50 INJECTION, SOLUTION INTRAVENOUS at 09:16

## 2024-09-24 RX ADMIN — Medication 2 G: at 09:09

## 2024-09-24 RX ADMIN — ONDANSETRON 4 MG: 2 INJECTION INTRAMUSCULAR; INTRAVENOUS at 09:16

## 2024-09-24 RX ADMIN — SENNOSIDES AND DOCUSATE SODIUM 1 TABLET: 8.6; 5 TABLET ORAL at 20:56

## 2024-09-24 RX ADMIN — BUSPIRONE HYDROCHLORIDE 7.5 MG: 7.5 TABLET ORAL at 20:56

## 2024-09-24 RX ADMIN — KETOROLAC TROMETHAMINE 15 MG: 15 INJECTION, SOLUTION INTRAMUSCULAR; INTRAVENOUS at 15:49

## 2024-09-24 RX ADMIN — LIDOCAINE HYDROCHLORIDE 50 MG: 20 INJECTION, SOLUTION INFILTRATION; PERINEURAL at 09:16

## 2024-09-24 RX ADMIN — PHENYLEPHRINE HYDROCHLORIDE 50 MCG: 10 INJECTION INTRAVENOUS at 11:05

## 2024-09-24 ASSESSMENT — ACTIVITIES OF DAILY LIVING (ADL)
ADLS_ACUITY_SCORE: 23
ADLS_ACUITY_SCORE: 23
ADLS_ACUITY_SCORE: 27
ADLS_ACUITY_SCORE: 23
ADLS_ACUITY_SCORE: 23
ADLS_ACUITY_SCORE: 27
ADLS_ACUITY_SCORE: 20
ADLS_ACUITY_SCORE: 27
ADLS_ACUITY_SCORE: 27
ADLS_ACUITY_SCORE: 22
ADLS_ACUITY_SCORE: 23
ADLS_ACUITY_SCORE: 27
ADLS_ACUITY_SCORE: 27

## 2024-09-24 NOTE — ANESTHESIA PROCEDURE NOTES
Airway       Patient location during procedure: OR       Procedure Start/Stop Times: 9/24/2024 9:22 AM  Staff -        CRNA: Melvin Jennings APRN CRNA       Performed By: CRNA  Consent for Airway        Urgency: elective  Indications and Patient Condition       Indications for airway management: leander-procedural       Induction type:intravenous       Mask difficulty assessment: 1 - vent by mask    Final Airway Details       Final airway type: endotracheal airway       Successful airway: ETT - single and Oral  Endotracheal Airway Details        ETT size (mm): 7.0       Cuffed: yes       Cuff volume (mL): 6       Successful intubation technique: direct laryngoscopy       DL Blade Type: Luong 2       Grade View of Cords: 2       Adjucts: stylet       Position: Right       Measured from: gums/teeth       Secured at (cm): 22       Bite block used: Soft    Post intubation assessment        Placement verified by: capnometry, equal breath sounds and chest rise        Number of attempts at approach: 1       Number of other approaches attempted: 0       Secured with: tape       Ease of procedure: easy       Dentition: Intact and Unchanged    Medication(s) Administered   Medication Administration Time: 9/24/2024 9:22 AM

## 2024-09-24 NOTE — ANESTHESIA CARE TRANSFER NOTE
Patient: Chiquita Butler    Procedure: Procedure(s):  Right total hip arthroplasty       Diagnosis: Primary osteoarthritis of right hip [M16.11]  Diagnosis Additional Information: No value filed.    Anesthesia Type:   General     Note:    Oropharynx: oropharynx clear of all foreign objects and spontaneously breathing  Level of Consciousness: drowsy  Oxygen Supplementation: face mask  Level of Supplemental Oxygen (L/min / FiO2): 6  Independent Airway: airway patency satisfactory and stable  Dentition: dentition unchanged  Vital Signs Stable: post-procedure vital signs reviewed and stable  Report to RN Given: handoff report given  Patient transferred to: PACU    Handoff Report: Identifed the Patient, Identified the Reponsible Provider, Reviewed the pertinent medical history, Discussed the surgical course, Reviewed Intra-OP anesthesia mangement and issues during anesthesia, Set expectations for post-procedure period and Allowed opportunity for questions and acknowledgement of understanding  Vitals:  Vitals Value Taken Time   /73 09/24/24 1125   Temp     Pulse 72 09/24/24 1128   Resp 21 09/24/24 1128   SpO2 100 % 09/24/24 1128   Vitals shown include unfiled device data.    Electronically Signed By: LEONEL Ahmadi CRNA  September 24, 2024  11:29 AM

## 2024-09-24 NOTE — PROGRESS NOTES
09/24/24 1627   Appointment Info   Signing Clinician's Name / Credentials (PT) Brad Barton DPT   Quick Adds   Quick Adds Certification   Living Environment   People in Home significant other;child(iris), dependent   Current Living Arrangements house   Transportation Anticipated family or friend will provide   Living Environment Comments Pt lives in house, with partner and 2 kids. Pt enters through front door 2+4 steps with B railings. All needs met on main floor.   Self-Care   Usual Activity Tolerance good   Current Activity Tolerance moderate   Equipment Currently Used at Home cane, straight   Fall history within last six months no   Activity/Exercise/Self-Care Comment Pt IND with most functional mob, using SEC for ambulation. Needed help with socks/shoes due to difficulty bending. Pt owns toilet riser.   General Information   Onset of Illness/Injury or Date of Surgery 09/24/24   Referring Physician Ayaan Street MD   Pertinent History of Current Problem (include personal factors and/or comorbidities that impact the POC) Pt is 63 yo, POD #0 R SAMM.   Existing Precautions/Restrictions fall;no hip IR;no hip ADD past midline;90 degree hip flexion   Cognition   Affect/Mental Status (Cognition) WFL   Orientation Status (Cognition) oriented x 4   Follows Commands (Cognition) WFL   Pain Assessment   Patient Currently in Pain   (3/10 in R hip)   Integumentary/Edema   Integumentary/Edema Comments swelling in B lateral ankles, pt reports its been there for 20 years. Similar soft pocket of fluid/adipose tissue on R Lateral thigh.   Posture    Posture Not impaired   Range of Motion (ROM)   Range of Motion ROM deficits secondary to surgical procedure;ROM deficits secondary to pain   Strength (Manual Muscle Testing)   Strength (Manual Muscle Testing) Able to perform L SLR;Deficits observed during functional mobility   Bed Mobility   Comment, (Bed Mobility) supine > sit SBA   Transfers   Comment, (Transfers) STS with  FWW and CGA, pushing up from walker, pain reported R hip.   Gait/Stairs (Locomotion)   Comment, (Gait/Stairs) 10' with FWW and CGA, antalgic-gait  pattern with decreased L step length, slow gait speed, no overt LOB throughout.   Balance   Balance Comments walker needs for standing and ambulation, but no overt LOB. steady with walker.   Sensory Examination   Sensory Perception Comments light touch intact distal B LEs.   Clinical Impression   Criteria for Skilled Therapeutic Intervention Yes, treatment indicated   PT Diagnosis (PT) impaired functional mobility   Influenced by the following impairments pain, decreased ROM and strength, decreased activity tolerance.   Functional limitations due to impairments difficulty with bed mobility, transfers, and ambulation   Clinical Presentation (PT Evaluation Complexity) stable   Clinical Presentation Rationale clinical judgment   Clinical Decision Making (Complexity) low complexity   Planned Therapy Interventions (PT) balance training;bed mobility training;gait training;home exercise program;ROM (range of motion);stair training;strengthening;stretching;transfer training;progressive activity/exercise   Risk & Benefits of therapy have been explained evaluation/treatment results reviewed;care plan/treatment goals reviewed;risks/benefits reviewed;current/potential barriers reviewed;participants voiced agreement with care plan;participants included;patient   PT Total Evaluation Time   PT Eval, Low Complexity Minutes (29084) 10   Therapy Certification   Start of care date 09/24/24   Certification date from 09/24/24   Certification date to 10/01/24   Medical Diagnosis s/p R SAMM   Physical Therapy Goals   PT Frequency Daily   PT Predicted Duration/Target Date for Goal Attainment 10/01/24   PT Goals Bed Mobility;Transfers;Gait;Stairs   PT: Bed Mobility Supervision/stand-by assist;Supine to/from sit;Within precautions   PT: Transfers Modified independent;Sit to/from stand;Bed to/from  chair;Assistive device   PT: Gait Modified independent;Standard walker;100 feet   PT: Stairs Greater than 10 stairs;Supervision/stand-by assist;Rail on right   Interventions   Interventions Quick Adds Gait Training;Therapeutic Activity;Therapeutic Procedure   Therapeutic Procedure/Exercise   Ther. Procedure: strength, endurance, ROM, flexibillity Minutes (04837) 8   Symptoms Noted During/After Treatment increased pain   Treatment Detail/Skilled Intervention Pt performed x10 of the following exercises for circulation, ROM and strength: ankle pumps, quad sets, and heel slides. Provided verbal and tactile cues for form and technique. Pt was provided HEP handout and education given on performance 2x/day x10 reps each, exception being ankle pumps x20 per hour. Pt verbalized understanding. Additonal education on ambulation program, short bouts of gait every 2 hours during day time.   Therapeutic Activity   Therapeutic Activities: dynamic activities to improve functional performance Minutes (64747) 10   Symptoms Noted During/After Treatment Fatigue;Increased pain   Treatment Detail/Skilled Intervention Pt supine in bed upon PT arrival. Time for line mgmt. Pt educated on hip precautions. Pt performed STS x4 during session, FWW and CGA, progressed to SBA, cues on UE placement and R foot anterior placement to help maintain flexion precaution. Pt demonstrated on last STS. Pt used bathroom, on commode over toilet for increased height. IND with  pericares. Pt performed sit >supine Veda. Pt supine, alarm on, all needs within reach.   Gait Training   Gait Training Minutes (74095) 5   Symptoms Noted During/After Treatment (Gait Training) fatigue;increased pain   Treatment Detail/Skilled Intervention Pt ambulated ~100' + 25' with FWW and CGA, progressed to SBA. Cues on step to pattern. Pt demonstrated. Pt progressed to step through with decreased L step length, early heel off on R foot.   PT Discharge Planning   PT Plan review hip  precautions with mob, review HEP and walking program, trial stairs.   PT Discharge Recommendation (DC Rec)   (defer to ortho rec.)   PT Rationale for DC Rec Pt is below baseline, but moving well and able to demonstrate transfers and ambulation with FWW and SBA. Anticipate pt will progress to return home with assistance from family after POD #1 session. Will need FWW for discharge.   PT Brief overview of current status SBA with FWW   Total Session Time   Timed Code Treatment Minutes 23   Total Session Time (sum of timed and untimed services) 33     [facility]  OUTPATIENT PHYSICAL THERAPY EVALUATION  PLAN OF TREATMENT FOR OUTPATIENT REHABILITATION  (COMPLETE FOR INITIAL CLAIMS ONLY)  Patient's Last Name, First Name, M.I.  YOB: 1960  LukeChiquita  L                        Provider's Name  [facility] Medical Record No.  [id number]                             Onset Date:  09/24/24   Start of Care Date:  09/24/24   Type:     _X_PT   ___OT   ___SLP Medical Diagnosis:  s/p R SAMM              PT Diagnosis:  impaired functional mobility Visits from SOC:  1     See note for plan of treatment, functional goals and certification details    I CERTIFY THE NEED FOR THESE SERVICES FURNISHED UNDER        THIS PLAN OF TREATMENT AND WHILE UNDER MY CARE     (Physician co-signature of this document indicates review and certification of the therapy plan).

## 2024-09-24 NOTE — OP NOTE
[facility]     Operative Note     Pre-operative diagnosis:         64 year old presenting with chronic right hip pain with end-stage underlying osteoarthritic changes. Imaging is consistent with severe osteoarthritis of right hip. Not responding to nonoperative options. She is interested in right total hip arthroplasty.   Post-operative diagnosis        Same as pre-operative diagnosis     Procedure:      Right total hip arthroplasty, Right - Hip     Surgeon:         Surgeons and Role:     * Ayaan Street MD - Primary     * Stanley Singh - Resident     * Fran Bull - PAc     Anesthesia:     Choice   Estimated Blood Loss: Less than 100 ml     Drains: None  Specimens:     * No specimens in log *     Complications:            None.     Implants:           Implant Name Type Inv. Item Serial No.  Lot No. LRB No. Used Action   IMP SHELL BIOM G7 ACETAB PPS LALA HOLE 52MM SZ E [id number] - [id number] Total Joint Component/Insert IMP SHELL BIOM G7 ACETAB PPS LALA HOLE 52MM SZ E [id number]   [employer] [id number] Right 1 Implanted   LINER ACTB G7 E 36MM LNGVT HIP STRL LUM LF [id number] - [id number] Total Joint Component/Insert LINER ACTB G7 E 36MM LNGVT HIP STRL LUM LF [id number]   [employer] [id number] Right 1 Implanted   IMP STEM FEM BIOM TAPERLOC STD OFFSET TYPE 1 [facility] -110 - [id number] Total Joint Component/Insert IMP STEM FEM BIOM TAPERLOC STD OFFSET TYPE 1 SZ11 -110   [employer] [id number] Right 1 Implanted   HEAD FEM 0MM OFST 36MM HIP ACTB MDLR TY 1 BLX D G7 - [id number] Total Joint Component/Insert HEAD FEM 0MM OFST 36MM HIP ACTB MDLR TY 1 BLX D G7   [employer] [id number] Right 1 Implanted         The presence of GERBER Sim was essential throughout this case for assistance with patient transfer to and from the operative bed, draping, irrigation, cautery usage, retraction, implant placement, cement removal, arthrotomy closure, incisional closure,  dressing placement.        COMPONENTS USED:  1. Biomet Taperloc Size 11, standard Offset  2. Biomet G7 Acetabular Component Size 52,   3. Biomet ArCOM XL Polyethylene Liner neutral   4. Biolox Delta Ceramic Head Size 36+0         FINDINGS: Cartilage loss, irregularity of the femoral head, diffuse cartilage loss, osteophyte formation of the native acetabulum.  Acetabular component well positioned and secure.  Femoral component well positioned.  Intraoperative stability with full extension and ER, flexion to 90 with IR to 80, and in the sleep position.  Appropriate limb length.       DESCRIPTION OF PROCEDURE: Patient was seen in the preoperative area where we again reviewed the risks, benefits, and alternatives to total hip arthroplasty.  Patient understands and agrees to the treatment plan as set forth. Informed written consent was obtained.  The operative right hip was marked with an indelible marker after patient confirmation of the operative site.  All the patient's questions were answered.  The patient was taken to the operating room and underwent induction of  General  anesthesia.  The patient was placed on the operating table in the lateral decubitus position with the operative site up.   An SCD was placed on the nonoperative leg.   Bony prominences were well padded and was secured to the table with the Wixon positioner. The patient was prepped and draped in the normal sterile fashion.         After the completion of a timeout procedure a posterior approach to the hip joint was performed making a incision over the greater trochanter and taking this down through the subcutaneous tissue. The gluteus prasanth and IT band were identified and then split in the direction of its fibers. A charnley retractor was placed and hemostasis was obtained.  After internal rotation of the femur, the piriformis tendon and external rotators were detached.  A posterior capsulotomy was performed.  The capsule and pirformis was tagged  using a #1 Vicryl suture.  Care was taken to do incised the inferior and superior capsule to relieve tension and the limb was positioned in the sleep position and the hip was dislocated.  The soft tissues under the greater trochanter were debrided, exposing the neck.  The femoral neck osteotomy was made in accordance with the preoperative plan, approximately 10 mm above the level of the lesser trochanter.  The femoral head was removed and noted to have diffuse, extensive cartilage loss.      The acetabulum was exposed with an anterior partida's crook retractor and posteroinferior retractor as well as a self-retaining retractor.  The remaining acetabular labrum was removed.  The patient was noted to have anterior and inferior osteophytes.  The pulvinar was removed and the medial wall was identified.  We began reaming with care to maintain the anterior and posterior walls.  We began with a size 47 reamer and reamed to 51.  At this point the sclerotic bone was removed back to healthy bleeding cancelleous bone.  The 52 G7 cup was placed.  It had excellent initial stability.    Any prominent anterior and inferior osteophytes were debrided and carefully removed.  The acetabular component was irrigated and cleaned.  The definitive neutral liner was placed without complications.     Attention was turned to the femur, which was exposed in the standard fashion with a femoral elevator.  Care was taken to not damage the gluteus medius.  A box osteotome and rongeur was used to remove residual lateral neck.  The canal was identified and reamed with a Charnley Awl.  The femoral broaches was positioned in the appropriate anteversion, care was taken to broach the lateral cancellous bone.  We broached, starting with a size 4, up to size 11.  Care was taken to minimize risk of calcar fracture.  The final trial fit well.  A standard offset neck was placed with a 36+0 head.  The hip was reduced.  The stability was examined and  was  found to be secure and stable in full extension and external rotation of > 45 degrees as well as flexion of 90 degrees combined with internal rotation > 80 degrees.  Leg lengths were judged to be within 5 mm of symmetry.      The hip was dislocated and the trial femoral component was removed.   The femur was exposed and the 11 stem with standard offset was placed and noted to fit well.  The hip was trialed with various head lengths and the 36+0 head had the best fit. .  Again, the  stability was examined and  was found to be secure and stable in full extension and external rotation of > 45 degrees as well as flexion of 90 degrees combined with internal rotation > 80 degrees.  Leg lengths were judged to be within 5 mm of symmetry as evaluated by limb palpation and the markings on the greater trochanter. The hip was dislocated and the trial head was removed.  The trunion was washed and dried and the final 36+0 ceramic head was impacted into place.  The hip was reduced and again stable as listed above.       The hip was lavaged with dilute betaine irrigant followed by sterile saline.  Periarticular injection of ropivicaine, toradol, and epi was injected into the soft tissue.  The capsule was reattached to the greater trochanter using a 2 mm drill and suture passer.  The piriformis and external rotators were repaired to the medius tendon.  The IT and gluteal fascia was closed with #1 Vicryl figure of 8 interrupted sutures.  The deep dermal layer was closed with 0 Vicryl and 2-0 Vicryl. The skin was closed with 3-0 PDS, Steri-Strips and a sterile dressing consisting of alginate and Tegaderm. The patient was positioned supine and awoken from anesthesia.  The patient was transferred to the PACU in stable condition.        POSTOPERATIVE PLAN:  The patient will be admitted to the floor for pain control and monitoring.    Weight bearing: Weightbearing as tolerated.  Anticoagulation: Aspirin 162 mg daily for 4 weeks as DVT  prophylaxis  Precautions: Posterior hip precautions  PT/OT  Antibiotics per protocol.  X-ray in PACU  Patient will discharge on POD 1.  Clinic visit 2 weeks in 6 weeks.     Ayaan Street MD      Adult Reconstruction  [facility] Department of Orthopaedic Surgery  Pager [phone]

## 2024-09-24 NOTE — BRIEF OP NOTE
[facility]    Operative Note    Pre-operative diagnosis: 64 year old presenting with chronic right hip pain with end-stage underlying osteoarthritic changes. Imaging is consistent with severe osteoarthritis of right hip. Not responding to nonoperative options. She is interested in right total hip arthroplasty.   Post-operative diagnosis Same as pre-operative diagnosis    Procedure: Right total hip arthroplasty, Right - Hip    Surgeon: Surgeons and Role:     * Ayaan Street MD - Primary     * Stanley Singh - Resident     * Fran Bull - PAc    Anesthesia: Choice   Estimated Blood Loss: Less than 100 ml    Drains: None  Specimens: * No specimens in log *    Complications: None.    Implants:   Implant Name Type Inv. Item Serial No.  Lot No. LRB No. Used Action   IMP SHELL BIOM G7 ACETAB PPS LALA HOLE 52MM SZ E [id number] - [id number] Total Joint Component/Insert IMP SHELL BIOM G7 ACETAB PPS LALA HOLE 52MM SZ E [id number]  [employer] [id number] Right 1 Implanted   LINER ACTB G7 E 36MM LNGVT HIP STRL LUM LF [id number] - [id number] Total Joint Component/Insert LINER ACTB G7 E 36MM LNGVT HIP STRL LUM LF [id number]  [employer] [id number] Right 1 Implanted   IMP STEM FEM BIOM TAPERLOC STD OFFSET TYPE 1 SZ11 -110 - [id number] Total Joint Component/Insert IMP STEM FEM BIOM TAPERLOC STD OFFSET TYPE 1 SZ11 -110  [employer] [id number] Right 1 Implanted   HEAD FEM 0MM OFST 36MM HIP ACTB MDLR TY 1 BLX D G7 - [id number] Total Joint Component/Insert HEAD FEM 0MM OFST 36MM HIP ACTB MDLR TY 1 BLX D G7  [employer] [id number] Right 1 Implanted       The presence of GERBER Sim was essential throughout this case for assistance with patient transfer to and from the operative bed, draping, irrigation, cautery usage, retraction, implant placement, cement removal, arthrotomy closure, incisional closure, dressing placement.      COMPONENTS USED:  1. Biomet Taperloc Size 11,  standard Offset  2. Biomet G7 Acetabular Component Size 52,   3. Biomet ArCOM XL Polyethylene Liner neutral   4. Biolox Delta Ceramic Head Size 36+0       FINDINGS: Cartilage loss, irregularity of the femoral head, diffuse cartilage loss, osteophyte formation of the native acetabulum.  Acetabular component well positioned and secure.  Femoral component well positioned.  Intraoperative stability with full extension and ER, flexion to 90 with IR to 80, and in the sleep position.  Appropriate limb length.      DESCRIPTION OF PROCEDURE: Patient was seen in the preoperative area where we again reviewed the risks, benefits, and alternatives to total hip arthroplasty.  Patient understands and agrees to the treatment plan as set forth. Informed written consent was obtained.  The operative right hip was marked with an indelible marker after patient confirmation of the operative site.  All the patient's questions were answered.  The patient was taken to the operating room and underwent induction of  General  anesthesia.  The patient was placed on the operating table in the lateral decubitus position with the operative site up.   An SCD was placed on the nonoperative leg.   Bony prominences were well padded and was secured to the table with the Wixon positioner. The patient was prepped and draped in the normal sterile fashion.         After the completion of a timeout procedure a posterior approach to the hip joint was performed making a incision over the greater trochanter and taking this down through the subcutaneous tissue. The gluteus prasanth and IT band were identified and then split in the direction of its fibers. A charnley retractor was placed and hemostasis was obtained.  After internal rotation of the femur, the piriformis tendon and external rotators were detached.  A posterior capsulotomy was performed.  The capsule and pirformis was tagged using a #1 Vicryl suture.  Care was taken to do incised the inferior and  superior capsule to relieve tension and the limb was positioned in the sleep position and the hip was dislocated.  The soft tissues under the greater trochanter were debrided, exposing the neck.  The femoral neck osteotomy was made in accordance with the preoperative plan, approximately 10 mm above the level of the lesser trochanter.  The femoral head was removed and noted to have diffuse, extensive cartilage loss.     The acetabulum was exposed with an anterior partida's crook retractor and posteroinferior retractor as well as a self-retaining retractor.  The remaining acetabular labrum was removed.  The patient was noted to have anterior and inferior osteophytes.  The pulvinar was removed and the medial wall was identified.  We began reaming with care to maintain the anterior and posterior walls.  We began with a size 47 reamer and reamed to 51.  At this point the sclerotic bone was removed back to healthy bleeding cancelleous bone.  The 52 G7 cup was placed.  It had excellent initial stability.    Any prominent anterior and inferior osteophytes were debrided and carefully removed.  The acetabular component was irrigated and cleaned.  The definitive neutral liner was placed without complications.    Attention was turned to the femur, which was exposed in the standard fashion with a femoral elevator.  Care was taken to not damage the gluteus medius.  A box osteotome and rongeur was used to remove residual lateral neck.  The canal was identified and reamed with a Ismaeley Awl.  The femoral broaches was positioned in the appropriate anteversion, care was taken to broach the lateral cancellous bone.  We broached, starting with a size 4, up to size 11.  Care was taken to minimize risk of calcar fracture.  The final trial fit well.  A standard offset neck was placed with a 36+0 head.  The hip was reduced.  The stability was examined and  was found to be secure and stable in full extension and external rotation of > 45  degrees as well as flexion of 90 degrees combined with internal rotation > 80 degrees.  Leg lengths were judged to be within 5 mm of symmetry.     The hip was dislocated and the trial femoral component was removed.   The femur was exposed and the 11 stem with standard offset was placed and noted to fit well.  The hip was trialed with various head lengths and the 36+0 head had the best fit. .  Again, the  stability was examined and  was found to be secure and stable in full extension and external rotation of > 45 degrees as well as flexion of 90 degrees combined with internal rotation > 80 degrees.  Leg lengths were judged to be within 5 mm of symmetry as evaluated by limb palpation and the markings on the greater trochanter. The hip was dislocated and the trial head was removed.  The trunion was washed and dried and the final 36+0 ceramic head was impacted into place.  The hip was reduced and again stable as listed above.      The hip was lavaged with dilute betaine irrigant followed by sterile saline.  Periarticular injection of ropivicaine, toradol, and epi was injected into the soft tissue.  The capsule was reattached to the greater trochanter using a 2 mm drill and suture passer.  The piriformis and external rotators were repaired to the medius tendon.  The IT and gluteal fascia was closed with #1 Vicryl figure of 8 interrupted sutures.  The deep dermal layer was closed with 0 Vicryl and 2-0 Vicryl. The skin was closed with 3-0 PDS, Steri-Strips and a sterile dressing consisting of alginate and Tegaderm. The patient was positioned supine and awoken from anesthesia.  The patient was transferred to the PACU in stable condition.      POSTOPERATIVE PLAN:  The patient will be admitted to the floor for pain control and monitoring.    Weight bearing: Weightbearing as tolerated.  Anticoagulation: Aspirin 162 mg daily for 4 weeks as DVT prophylaxis  Precautions: Posterior hip precautions  PT/OT  Antibiotics per  protocol.  X-ray in PACU  Patient will discharge on POD 1.  Clinic visit 2 weeks in 6 weeks.    Ayaan Street MD     Adult Reconstruction  [facility] Department of Orthopaedic Surgery  Pager [phone]

## 2024-09-24 NOTE — PLAN OF CARE
Goal Outcome Evaluation:       Patient vital signs are at baseline: Yes  Patient able to ambulate as they were prior to admission or with assist devices provided by therapies during their stay:  Yes  Patient MUST void prior to discharge:  Yes  Patient able to tolerate oral intake:  Yes  Pain has adequate pain control using Oral analgesics:  Yes  Does patient have an identified :  Yes  Has goal D/C date and time been discussed with patient:  No,  Reason:  Working with therapy in AM.       Pt A/Ox4. VSS. PIV infusing. Assist x1 with walker and gait belt. Voiding well. CMS intact. Dressing CDI. Tolerating a regular diet well. Plan is to work with therapy tomorrow AM.

## 2024-09-24 NOTE — ANESTHESIA PREPROCEDURE EVALUATION
Anesthesia Pre-Procedure Evaluation    Patient: Chiquita Butler   MRN: [id number] : 1960        Procedure : Procedure(s):  Right total hip arthroplasty          Past Medical History:   Diagnosis Date    Congenital heart valve abnormality 2014    Diverticulosis     Gastroesophageal reflux disease     History of blood transfusion     during heart surgery    Hypertension     Keratoconus of both eyes 2012    Panic attack 2013    ER Visit      Past Surgical History:   Procedure Laterality Date    AS REPR PDA BY LIGATASHLEY      age 1    BYPASS GASTRIC MASON-EN-Y, LIVER BIOPSY, COMBINED  2004    CATHETER, ABLATION  1962    cardiac, related to scarring around PDA ligation site     COLONOSCOPY N/A 2017    Procedure: COLONOSCOPY;;  Surgeon: August Perez MD;  Location: [facility]    COLONOSCOPY N/A 05/15/2017    Procedure: COLONOSCOPY;  Colonoscopy/DX:RLQ abdominal pain/2 day prep emailed;  Surgeon: Gold Tamayo MD;  Location: [facility]    HYSTERECTOMY  1993    Partial, fibroids    INCISION AND DRAINAGE BREAST Bilateral 2021    Procedure: INCISION AND DRAINAGE, BREAST. Right breast chest hematoma evacuation.;  Surgeon: Gracie Rapp MD;  Location: [facility]    TRANSGENDER MASTECTOMY Bilateral 2020    Procedure: Bilateral Simple Mastectomy, On-Q;  Surgeon: Gracie Rapp MD;  Location: [facility]      No Known Allergies   Social History     Tobacco Use    Smoking status: Never     Passive exposure: Never    Smokeless tobacco: Never   Substance Use Topics    Alcohol use: Not Currently     Alcohol/week: 2.5 standard drinks of alcohol     Types: 3 Standard drinks or equivalent per week     Comment: 0-2 drinks/week      Wt Readings from Last 1 Encounters:   24 108 kg (238 lb 1.6 oz)        Anesthesia Evaluation   Pt has had prior anesthetic.     No history of anesthetic complications       ROS/MED HX  ENT/Pulmonary:  - neg pulmonary ROS      Neurologic:  - neg neurologic ROS     Cardiovascular:     (+)  hypertension- -   -  - -                                 Previous cardiac testing   Echo: Date: 2024 Results:  Left Ventricle  Left ventricular size is normal. Global and regional left ventricular function  is normal with an EF of 60-65%. Mild concentric wall thickening consistent  with left ventricular hypertrophy is present. Left ventricular diastolic  function is normal.     Right Ventricle  Right ventricular function, chamber size, wall motion, and thickness are  normal.     Atria  The right atria appears normal. Mild left atrial enlargement is present.     Mitral Valve  The mitral valve is normal. Mild mitral insufficiency is present.     Aortic Valve  There is trivial aortic sclerosis of the left coronary cusp. The aortic valve  is tricuspid.     Tricuspid Valve  The tricuspid valve is normal. Trace tricuspid insufficiency is present. The  right ventricular systolic pressure is approximated at 21.0 mmHg plus the  right atrial pressure.     Pulmonic Valve  The pulmonic valve is normal.     Vessels  The aorta root is normal. The inferior vena cava was normal in size with  preserved respiratory variability. Ascending aorta 3.8 cm.     Pericardium  No pericardial effusion is present.     Miscellaneous  No significant valvular abnormalities present.     Compared to Previous Study  This study was compared with the study from 2021 . No significant changes  noted.    Stress Test:  Date: Results:    ECG Reviewed:  Date: Results:    Cath:  Date: Results:      METS/Exercise Tolerance:     Hematologic:       Musculoskeletal:       GI/Hepatic:     (+) GERD, Asymptomatic on medication,                  Renal/Genitourinary:  - neg Renal ROS     Endo:     (+)               Obesity,       Psychiatric/Substance Use:     (+) psychiatric history anxiety       Infectious Disease:  - neg infectious disease ROS     Malignancy:       Other:            Physical  Exam    Airway        Mallampati: II   TM distance: > 3 FB   Neck ROM: full   Mouth opening: > 3 cm    Respiratory Devices and Support         Dental       (+) Minor Abnormalities - some fillings, tiny chips      Cardiovascular          Rhythm and rate: regular and normal     Pulmonary           breath sounds clear to auscultation           OUTSIDE LABS:  CBC:   Lab Results   Component Value Date    WBC 3.2 (L) 10/07/2022    WBC 5.0 12/05/2021    HGB 13.2 10/07/2022    HGB 12.9 12/05/2021    HCT 42.0 10/07/2022    HCT 40.9 12/05/2021     10/07/2022     12/05/2021     BMP:   Lab Results   Component Value Date     10/07/2022     08/26/2022    POTASSIUM 3.9 10/07/2022    POTASSIUM 4.0 08/26/2022    CHLORIDE 107 10/07/2022    CHLORIDE 106 08/26/2022    CO2 23 10/07/2022    CO2 28 08/26/2022    BUN 18.3 10/07/2022    BUN 18.3 08/26/2022    CR 0.85 10/07/2022    CR 1.20 (H) 08/26/2022     (H) 10/07/2022    GLC 84 08/26/2022     COAGS:   Lab Results   Component Value Date    INR 1.02 12/05/2021     POC:   Lab Results   Component Value Date    BGM 87 01/08/2021     HEPATIC:   Lab Results   Component Value Date    ALBUMIN 3.1 (L) 12/05/2021    PROTTOTAL 7.2 12/05/2021    ALT 39 10/07/2022    AST 34 10/07/2022    ALKPHOS 94 12/05/2021    BILITOTAL 0.3 12/05/2021     OTHER:   Lab Results   Component Value Date    A1C 5.3 08/26/2022    ROJAS 9.2 10/07/2022    MAG 2.6 (H) 12/05/2021    LIPASE 89 11/13/2016    TSH 2.49 12/05/2021    CRP <2.9 12/05/2021       Anesthesia Plan    ASA Status:  2    NPO Status:  NPO Appropriate    Anesthesia Type: General.     - Airway: ETT   Induction: Intravenous.   Maintenance: Balanced.   Techniques and Equipment:       - Drips/Meds: Dexmed. infusion     Consents    Anesthesia Plan(s) and associated risks, benefits, and realistic alternatives discussed. Questions answered and patient/representative(s) expressed understanding.     - Discussed:     - Discussed with:   Patient      - Extended Intubation/Ventilatory Support Discussed: No.      - Patient is DNR/DNI Status: No     Use of blood products discussed: No .     Postoperative Care    Pain management: IV analgesics, Oral pain medications, Multi-modal analgesia.     - Plan for long acting post-op opioid use   PONV prophylaxis: Dexamethasone or Solumedrol, Ondansetron (or other 5HT-3)     Comments:    Other Comments:   Methadone 20 mg  Dexadron 8 mg  Zofran 4 mg  Toradol 15 mg  Precedex  Sugammadex if paralytic to be used            César Jennings MD

## 2024-09-24 NOTE — ANESTHESIA POSTPROCEDURE EVALUATION
Patient: Chiquita Butler    Procedure: Procedure(s):  Right total hip arthroplasty       Anesthesia Type:  General    Note:  Disposition: Admission   Postop Pain Control: Uneventful            Sign Out: Well controlled pain   PONV: No   Neuro/Psych: Uneventful            Sign Out: Acceptable/Baseline neuro status   Airway/Respiratory: Uneventful            Sign Out: Acceptable/Baseline resp. status   CV/Hemodynamics: Uneventful            Sign Out: Acceptable CV status   Other NRE: NONE   DID A NON-ROUTINE EVENT OCCUR? No           Last vitals:  Vitals Value Taken Time   /68 09/24/24 1245   Temp 96.6  F (35.9  C) 09/24/24 1125   Pulse 72 09/24/24 1305   Resp 12 09/24/24 1305   SpO2 100 % 09/24/24 1305   Vitals shown include unfiled device data.    Electronically Signed By: César Jennings MD  September 24, 2024  1:06 PM

## 2024-09-24 NOTE — PROVIDER NOTIFICATION
ROMEO Jennings notified pt last took metoprolol on 9/21. No further orders.    Astrid Seals RN on 9/24/2024 at 8:55 AM

## 2024-09-25 ENCOUNTER — APPOINTMENT (OUTPATIENT)
Dept: OCCUPATIONAL THERAPY | Facility: CLINIC | Age: 64
End: 2024-09-25
Attending: STUDENT IN AN ORGANIZED HEALTH CARE EDUCATION/TRAINING PROGRAM
Payer: COMMERCIAL

## 2024-09-25 ENCOUNTER — APPOINTMENT (OUTPATIENT)
Dept: PHYSICAL THERAPY | Facility: CLINIC | Age: 64
End: 2024-09-25
Attending: STUDENT IN AN ORGANIZED HEALTH CARE EDUCATION/TRAINING PROGRAM
Payer: COMMERCIAL

## 2024-09-25 VITALS
HEIGHT: 70 IN | HEART RATE: 79 BPM | OXYGEN SATURATION: 100 % | DIASTOLIC BLOOD PRESSURE: 82 MMHG | BODY MASS INDEX: 32.98 KG/M2 | TEMPERATURE: 97.6 F | WEIGHT: 230.38 LBS | RESPIRATION RATE: 16 BRPM | SYSTOLIC BLOOD PRESSURE: 135 MMHG

## 2024-09-25 LAB
FASTING STATUS PATIENT QL REPORTED: NO
GLUCOSE SERPL-MCNC: 163 MG/DL (ref 70–99)
HGB BLD-MCNC: 8.9 G/DL (ref 11.7–17.7)

## 2024-09-25 PROCEDURE — 85018 HEMOGLOBIN: CPT | Performed by: STUDENT IN AN ORGANIZED HEALTH CARE EDUCATION/TRAINING PROGRAM

## 2024-09-25 PROCEDURE — 97530 THERAPEUTIC ACTIVITIES: CPT | Mod: GP

## 2024-09-25 PROCEDURE — 250N000013 HC RX MED GY IP 250 OP 250 PS 637: Performed by: STUDENT IN AN ORGANIZED HEALTH CARE EDUCATION/TRAINING PROGRAM

## 2024-09-25 PROCEDURE — 97116 GAIT TRAINING THERAPY: CPT | Mod: GP

## 2024-09-25 PROCEDURE — 82947 ASSAY GLUCOSE BLOOD QUANT: CPT | Performed by: STUDENT IN AN ORGANIZED HEALTH CARE EDUCATION/TRAINING PROGRAM

## 2024-09-25 PROCEDURE — 97165 OT EVAL LOW COMPLEX 30 MIN: CPT | Mod: GO | Performed by: OCCUPATIONAL THERAPIST

## 2024-09-25 PROCEDURE — 97535 SELF CARE MNGMENT TRAINING: CPT | Mod: GO | Performed by: OCCUPATIONAL THERAPIST

## 2024-09-25 PROCEDURE — 36415 COLL VENOUS BLD VENIPUNCTURE: CPT | Performed by: STUDENT IN AN ORGANIZED HEALTH CARE EDUCATION/TRAINING PROGRAM

## 2024-09-25 PROCEDURE — 250N000011 HC RX IP 250 OP 636: Performed by: STUDENT IN AN ORGANIZED HEALTH CARE EDUCATION/TRAINING PROGRAM

## 2024-09-25 RX ORDER — MELOXICAM 15 MG/1
15 TABLET ORAL DAILY
Status: SHIPPED
Start: 2024-09-25

## 2024-09-25 RX ADMIN — METOPROLOL SUCCINATE 100 MG: 100 TABLET, EXTENDED RELEASE ORAL at 07:56

## 2024-09-25 RX ADMIN — ASPIRIN 81 MG: 81 TABLET, COATED ORAL at 07:56

## 2024-09-25 RX ADMIN — AMLODIPINE BESYLATE 10 MG: 10 TABLET ORAL at 07:56

## 2024-09-25 RX ADMIN — CITALOPRAM HYDROBROMIDE 40 MG: 20 TABLET ORAL at 07:56

## 2024-09-25 RX ADMIN — ROSUVASTATIN CALCIUM 40 MG: 20 TABLET, FILM COATED ORAL at 08:00

## 2024-09-25 RX ADMIN — CEFAZOLIN SODIUM 2 G: 2 INJECTION, SOLUTION INTRAVENOUS at 02:04

## 2024-09-25 RX ADMIN — OXYCODONE HYDROCHLORIDE 5 MG: 5 TABLET ORAL at 11:46

## 2024-09-25 RX ADMIN — BUSPIRONE HYDROCHLORIDE 7.5 MG: 7.5 TABLET ORAL at 08:00

## 2024-09-25 RX ADMIN — ACETAMINOPHEN 325MG 975 MG: 325 TABLET ORAL at 07:56

## 2024-09-25 RX ADMIN — KETOROLAC TROMETHAMINE 15 MG: 15 INJECTION, SOLUTION INTRAMUSCULAR; INTRAVENOUS at 10:20

## 2024-09-25 RX ADMIN — KETOROLAC TROMETHAMINE 15 MG: 15 INJECTION, SOLUTION INTRAMUSCULAR; INTRAVENOUS at 03:15

## 2024-09-25 RX ADMIN — LOSARTAN POTASSIUM 100 MG: 100 TABLET, FILM COATED ORAL at 07:56

## 2024-09-25 RX ADMIN — SENNOSIDES AND DOCUSATE SODIUM 1 TABLET: 8.6; 5 TABLET ORAL at 07:56

## 2024-09-25 RX ADMIN — ACETAMINOPHEN 325MG 975 MG: 325 TABLET ORAL at 00:41

## 2024-09-25 RX ADMIN — OXYCODONE HYDROCHLORIDE 5 MG: 5 TABLET ORAL at 03:14

## 2024-09-25 RX ADMIN — OXYCODONE HYDROCHLORIDE 5 MG: 5 TABLET ORAL at 07:56

## 2024-09-25 ASSESSMENT — ACTIVITIES OF DAILY LIVING (ADL)
ADLS_ACUITY_SCORE: 27
ADLS_ACUITY_SCORE: 26
ADLS_ACUITY_SCORE: 27
ADLS_ACUITY_SCORE: 26
PREVIOUS_RESPONSIBILITIES: MEAL PREP;HOUSEKEEPING;LAUNDRY;SHOPPING;YARDWORK;MEDICATION MANAGEMENT;FINANCES;DRIVING;WORK;CHILD CARE
ADLS_ACUITY_SCORE: 27
ADLS_ACUITY_SCORE: 26
ADLS_ACUITY_SCORE: 26
ADLS_ACUITY_SCORE: 27
ADLS_ACUITY_SCORE: 27

## 2024-09-25 NOTE — PLAN OF CARE
"Goal Outcome Evaluation:      Plan of Care Reviewed With: patient    Overall Patient Progress: improvingOverall Patient Progress: improving    Outcome Evaluation: Pt stable, Ax1 W/GB, pain managed, ABX given    A/Ox4, VSS, on RA, PIV SL, ABX given, Ax1 W/GB, pain managed with scheduled toradol, APAP, and PRN oxy, tolerating a regular diet, voiding well, dressing to R hip CDI, plan to discharge to home pending PT    Problem: Adult Inpatient Plan of Care  Goal: Plan of Care Review  Description: The Plan of Care Review/Shift note should be completed every shift.  The Outcome Evaluation is a brief statement about your assessment that the patient is improving, declining, or no change.  This information will be displayed automatically on your shift  note.  Outcome: Progressing  Flowsheets (Taken 9/25/2024 0612)  Outcome Evaluation: Pt stable, Ax1 W/GB, pain managed, ABX given  Plan of Care Reviewed With: patient  Overall Patient Progress: improving  Goal: Patient-Specific Goal (Individualized)  Description: You can add care plan individualizations to a care plan. Examples of Individualization might be:  \"Parent requests to be called daily at 9am for status\", \"I have a hard time hearing out of my right ear\", or \"Do not touch me to wake me up as it startles  me\".  Outcome: Progressing  Goal: Absence of Hospital-Acquired Illness or Injury  Outcome: Progressing  Intervention: Identify and Manage Fall Risk  Recent Flowsheet Documentation  Taken 9/24/2024 2112 by Margaret Do, RN  Safety Promotion/Fall Prevention:   activity supervised   assistive device/personal items within reach   clutter free environment maintained   nonskid shoes/slippers when out of bed   room door open   room near nurse's station   room organization consistent   safety round/check completed   supervised activity  Intervention: Prevent Skin Injury  Recent Flowsheet Documentation  Taken 9/24/2024 2100 by Margaret Do, RN  Body Position: supine, head " elevated  Intervention: Prevent and Manage VTE (Venous Thromboembolism) Risk  Recent Flowsheet Documentation  Taken 9/24/2024 2112 by Margaret Do RN  VTE Prevention/Management: SCDs off (sequential compression devices)  Intervention: Prevent Infection  Recent Flowsheet Documentation  Taken 9/24/2024 2112 by Margaret Do RN  Infection Prevention:   rest/sleep promoted   single patient room provided  Goal: Optimal Comfort and Wellbeing  Outcome: Progressing  Intervention: Monitor Pain and Promote Comfort  Recent Flowsheet Documentation  Taken 9/24/2024 2100 by Margaret Do RN  Pain Management Interventions:   medication (see MAR)   care clustered   rest  Goal: Readiness for Transition of Care  Outcome: Progressing     Problem: Hip Arthroplasty  Goal: Absence of Bleeding  Outcome: Progressing  Goal: Effective Bowel Elimination  Outcome: Progressing  Goal: Optimal Functional Ability  Outcome: Progressing  Intervention: Promote Optimal Functional Status  Recent Flowsheet Documentation  Taken 9/25/2024 0345 by Margaret Do RN  Assistive Device Utilized:   walker   gait belt  Activity Management:   activity adjusted per tolerance   ambulated to bathroom   back to bed  Taken 9/24/2024 2100 by Margaret Do RN  Activity Management: activity adjusted per tolerance  Goal: Absence of Infection Signs and Symptoms  Outcome: Progressing  Goal: Acceptable Pain Control  Outcome: Progressing  Intervention: Prevent or Manage Pain  Recent Flowsheet Documentation  Taken 9/24/2024 2100 by Margaret Do RN  Pain Management Interventions:   medication (see MAR)   care clustered   rest  Goal: Nausea and Vomiting Relief  Outcome: Progressing  Goal: Effective Urinary Elimination  Outcome: Progressing

## 2024-09-25 NOTE — DISCHARGE SUMMARY
[facility]  Discharge Summary            Interval History:     64 year old adult admitted postoperatively for elective Right SAMM  with Dr. Ayaan Street  due to DJD unresponsive to non operative treatment.  There were no notable intraoperative complications.  Patient being discharged post op Day #1.  Chiquita Butler received skilled nursing, PT, OT, SW inquiry.  There was no Hospitalist care during the stay.     Chiquita Butler has progressed satisfactorily with ambulation and pain management and without medical complication.  Patient is confident in their discharge and has  met goals with PT and OT. Pt lives at home with support arranged and will be discharged to home based on home living conditions and level of support.  Chiquita Butler leaves the hospital in stable condition with good chance for recovery.  Today: doing well.  Eating, ambulating, resting.  No concerns.    Pain: tolerable.   Nausea: none.  Light headedness : none  Chest pain: none  Shortness of breath: none              Assessment and Plan:     S/P L SAMM Day #1.  Final Diagnosis: same.  VSS, Hemodynamically asymptomatic, pain management adequate.    PLAN:  DVT prophylaxis with daily aspirin, as patient has no history of VTE.  Pain management with tylenol, NSAID, Oxycodone.  Bowel Regimen.  RICE  Assistive devices as determined by Physical therapy.  OP PT.  Patient instructions attached to discharge.      Discharge to home.  Follow up in clinic as previously scheduled, approx 10-14 days post op.    [facility]  [address] [place]  [address]  [phone]  [phone] fax  [phone] for scheduling                      Physical Exam:   Patient Vitals for the past 12 hrs:   BP Temp Temp src Pulse Resp SpO2   09/25/24 0751 135/82 97.6  F (36.4  C) Temporal 79 16 100 %   09/25/24 0623 137/73 97.5  F (36.4  C) Temporal 83 18 100 %   09/24/24 2111 -- -- -- -- -- 99 %   09/24/24 2026 124/74 97.9  F (36.6  C) Temporal 90  11 98 %     Hemoglobin   Date Value Ref Range Status   09/25/2024 8.9 (L) 11.7 - 17.7 g/dL Final     Comment:     Reference Range:                                                     Female 11.7-15.7 g/dL                                      Male 13.3-17.7 g/dL   10/07/2022 13.2 11.7 - 17.7 g/dL Final     Comment:     Sex Specific Reference Ranges:    Female  11.7-15.7  g/dL  Male    13.3-17.7   g/dL     12/14/2020 12.7 11.7 - 15.7 g/dL Final   11/13/2016 12.0 11.7 - 15.7 g/dL Final       Patient is alert and oriented.  Vitals: stable  Neurovascular status: Grossly intact to RLE>  Dressing: clean and dry  Calves: soft and non tender          Dilip Mckinley PA-C  [facility] - Surgery    9/25/2024

## 2024-09-25 NOTE — PROGRESS NOTES
[facility]  OUTPATIENT OCCUPATIONAL THERAPY  EVALUATION  PLAN OF TREATMENT FOR OUTPATIENT REHABILITATION  (COMPLETE FOR INITIAL CLAIMS ONLY)  Patient's Last Name, First Name, M.I.  YOB: 1960  Chiquita Butler                          Provider's Name  [facility] Medical Record No.  [id number]                             Onset Date:  09/24/24   Start of Care Date:      Type:     ___PT   _X_OT   ___SLP Medical Diagnosis:                       OT Diagnosis:  Decline in ADL independence and safety Visits from SOC:  1     See note for plan of treatment, functional goals and certification details    I CERTIFY THE NEED FOR THESE SERVICES FURNISHED UNDER        THIS PLAN OF TREATMENT AND WHILE UNDER MY CARE     (Physician co-signature of this document indicates review and certification of the therapy plan).                       09/25/24 1100   Appointment Info   Signing Clinician's Name / Credentials (OT) RADHA Sy   Living Environment   People in Home significant other;child(iris), dependent   Current Living Arrangements house   Home Accessibility stairs to enter home   Number of Stairs, Main Entrance 6   Living Environment Comments Pt lives in house, with partner and 2 kids. Reports good support from family and friends at discharge. Pt enters through front door 2+4 steps with B railings. All needs met on main floor. Tub shower and standard height toilets with riser.   Self-Care   Usual Activity Tolerance good   Current Activity Tolerance moderate   Equipment Currently Used at Home cane, straight   Fall history within last six months no   Activity/Exercise/Self-Care Comment Pt reports mod I with cane at baseline, independent with ADLs but endorses increasing R shoulder pain limiting movement   Instrumental Activities of Daily Living (IADL)   Previous Responsibilities meal prep;housekeeping;laundry;shopping;yardwork;medication  management;finances;driving;work;   IADL Comments Pt works as a  and educator, has support with IADLs from family and friends at discharge   General Information   Onset of Illness/Injury or Date of Surgery 09/24/24   Referring Physician Ayaan Street MD   Patient/Family Therapy Goal Statement (OT) Patient would like to return home to prior level of function. 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   Additional Occupational Profile Info/Pertinent History of Current Problem POD 1 RTHA   Existing Precautions/Restrictions fall;no hip IR;no hip ADD past midline;90 degree hip flexion;no pivoting or twisting;weight bearing   Right Lower Extremity (Weight-bearing Status) weight-bearing as tolerated (WBAT)   Cognitive Status Examination   Orientation Status orientation to person, place and time   Affect/Mental Status (Cognitive) WFL   Follows Commands WFL   Cognitive Status Comments Pt appears at her cognitive baseline, good recall of precautions   Visual Perception   Visual Impairment/Limitations WFL   Sensory   Sensory Comments Pt reports intact   Pain Assessment   Patient Currently in Pain Yes, see Vital Sign flowsheet   Posture   Posture not impaired   Range of Motion Comprehensive   Comment, General Range of Motion RLE limited due to pain and precautions   Strength Comprehensive (MMT)   Comment, General Manual Muscle Testing (MMT) Assessment RLE limited due to pain   Bed Mobility   Comment (Bed Mobility) SBA   Transfers   Transfer Comments CGA   Balance   Balance Comments Intact with walker   Activities of Daily Living   BADL Assessment/Intervention bathing;lower body dressing;toileting   Bathing Assessment/Intervention   Comment, (Bathing) Min assist   Lower Body Dressing Assessment/Training   Comment, (Lower Body Dressing) Mod assist   Toileting   Comment, (Toileting) CGA   Clinical Impression   Criteria for Skilled Therapeutic Interventions Met (OT) Yes, treatment indicated   OT Diagnosis Decline  in ADL independence and safety   Influenced by the following impairments RTHA, precautiosn   OT Problem List-Impairments impacting ADL problems related to;pain;post-surgical precautions;range of motion (ROM);strength   Assessment of Occupational Performance 1-3 Performance Deficits   Identified Performance Deficits Impaired dressing bathing and toileting independence   Planned Therapy Interventions (OT) ADL retraining   Clinical Decision Making Complexity (OT) problem focused assessment/low complexity   Risk & Benefits of therapy have been explained care plan/treatment goals reviewed;evaluation/treatment results reviewed;risks/benefits reviewed;current/potential barriers reviewed;participants voiced agreement with care plan;participants included;patient   OT Total Evaluation Time   OT Eval, Low Complexity Minutes (49323) 8   OT Goals   Therapy Frequency (OT) One time eval and treatment   OT Predicted Duration/Target Date for Goal Attainment 09/25/24   OT Goals Lower Body Dressing;Lower Body Bathing;Toilet Transfer/Toileting   OT: Lower Body Dressing Modified independent;using adaptive equipment;within precautions;including set-up/clothing retrieval;Goal Met   OT: Lower Body Bathing Modified independent;using adaptive equipment;with precautions;Goal Met   OT: Toilet Transfer/Toileting Modified independent;toilet transfer;cleaning and garment management;using adaptive equipment;within precautions;Goal Met   Self-Care/Home Management   Self-Care/Home Mgmt/ADL, Compensatory, Meal Prep Minutes (82997) 39   Symptoms Noted During/After Treatment (Meal Preparation/Planning Training) increased pain   Treatment Detail/Skilled Intervention OT: Pt agreeable to therapy. OT reviewed precautions, pt able to recall spontaneously demonstrating good carryover. Supine to sit with SBA. EOB pt edcuated on LE dressing with precautions, after education pt donned socks with sock aid and donned underwear and pants with reacher and cuing. Pt  completed sit to stand with cues for hand placemetn and SBA with walker. Pt ambulated to therapy room. Educated on tub transfer without tub bench and with tub bench, pt compelted tub transfer with bench after education with SBA. Pt educated on where to acquire bench for home use. Pt owns a toilet riser, SBA sit to stand from toilet height with walker. Pt reports no further OT concerns at this time. Pt in chair with call light upon OT departure.   OT Discharge Planning   OT Plan DC   OT Discharge Recommendation (DC Rec) other (see comments)  (defer to ortho MD)   OT Rationale for DC Rec Patient appears safe and able to return home with AE and family/friend support with higher level IADLS. Defer to ortho MD for further therapy recommendations   OT Brief overview of current status SBA tub transfer with tub transfer bench   Total Session Time   Timed Code Treatment Minutes 39   Total Session Time (sum of timed and untimed services) 47

## 2024-09-25 NOTE — PLAN OF CARE
Physical Therapy Discharge Summary    Reason for therapy discharge:    All goals and outcomes met, no further needs identified.    Progress towards therapy goal(s). See goals on Care Plan in [facility] electronic health record for goal details.  Goals met    Therapy recommendation(s):    Pt currently mobilizing SBA with use of FWW. Able to maintain hip precautions throughout mobility and demo safe stair performance today. Appropriate to return home with assist from family and use of FWW for mobility.

## 2024-09-25 NOTE — PLAN OF CARE
Goal Outcome Evaluation:      Plan of Care Reviewed With: patient    Overall Patient Progress: improvingOverall Patient Progress: improving     Patient vital signs are at baseline: Yes  Patient able to ambulate as they were prior to admission or with assist devices provided by therapies during their stay:  Yes  Patient MUST void prior to discharge:  Yes  Patient able to tolerate oral intake:  Yes  Pain has adequate pain control using Oral analgesics:  Yes  Does patient have an identified :  Yes  Has goal D/C date and time been discussed with patient:  Yes    Pt A/Ox4. VSS. PIV removed. Voiding well. CMS intact. Dressing CDI. Pain managed with tylenol, oxycodone. Plan is to discharge home today with family.     Reviewed discharge instructions with patient and friend. Questions answered. Patient discharged to home with friend, meds (miralax, senna, tylenol, ibuprofen, aspirin, tylenol), discharge instructions, and belongings. Pt agree to discharge, education provided.

## 2024-09-25 NOTE — PLAN OF CARE
Occupational Therapy Discharge Summary    Reason for therapy discharge:    All goals and outcomes met, no further needs identified.    Progress towards therapy goal(s). See goals on Care Plan in [facility] electronic health record for goal details.  Goals met    Therapy recommendation(s):    Defer to ortho MD for further therapy recommendations.

## 2024-09-30 ASSESSMENT — ACTIVITIES OF DAILY LIVING (ADL)
PUTTING ON SOCKS AND SHOES: SLIGHT DIFFICULTY
GETTING INTO AND OUT OF AN AVERAGE CAR: SLIGHT DIFFICULTY
SPORTS_HIGHEST_POTENTIAL_SCORE: 36
SITTING FOR 15 MINUTES: NO DIFFICULTY AT ALL
WALKING_UP_STEEP_HILLS: EXTREME DIFFICULTY
SITTING_FOR_15_MINUTES: NO DIFFICULTY AT ALL
STANDING_FOR_15_MINUTES: SLIGHT DIFFICULTY
ADL_SCORE(%): 0
WALKING_15_MINUTES_OR_GREATER: SLIGHT DIFFICULTY
STEPPING_UP_AND_DOWN_CURBS: SLIGHT DIFFICULTY
ABILITY_TO_PERFORM_ACTIVITY_WITH_YOUR_NORMAL_TECHNIQUE: EXTREME DIFFICULTY
GOING_DOWN_1_FLIGHT_OF_STAIRS: SLIGHT DIFFICULTY
ROLLING OVER IN BED: EXTREME DIFFICULTY
HEAVY_WORK: EXTREME DIFFICULTY
ABILITY_TO_PARTICIPATE_IN_YOUR_DESIRED_SPORT_AS_LONG_AS_YOU_WOULD_LIKE: EXTREME DIFFICULTY
RECREATIONAL ACTIVITIES: EXTREME DIFFICULTY
STARTING_AND_STOPPING_QUICKLY: EXTREME DIFFICULTY
DEEP_SQUATTING: EXTREME DIFFICULTY
ROLLING_OVER_IN_BED: EXTREME DIFFICULTY
STEPPING UP AND DOWN CURBS: SLIGHT DIFFICULTY
HOS_ADL_ITEM_SCORE_TOTAL: 34
LIGHT_TO_MODERATE_WORK: SLIGHT DIFFICULTY
HOS_ADL_SCORE(%): 50
RUNNING_ONE_MILE: EXTREME DIFFICULTY
GOING DOWN 1 FLIGHT OF STAIRS: SLIGHT DIFFICULTY
CUTTING/LATERAL_MOVEMENTS: EXTREME DIFFICULTY
GOING_UP_1_FLIGHT_OF_STAIRS: MODERATE DIFFICULTY
HOS_ADL_HIGHEST_POTENTIAL_SCORE: 68
SPORTS_TOTAL_ITEM_SCORE: 0
GETTING_INTO_AND_OUT_OF_A_BATHTUB: EXTREME DIFFICULTY
HEAVY_WORK: EXTREME DIFFICULTY
SWINGING_OBJECTS_LIKE_A_GOLF_CLUB: EXTREME DIFFICULTY
ADL_TOTAL_ITEM_SCORE: 0
ADL_COUNT: 17
GETTING_INTO_AND_OUT_OF_AN_AVERAGE_CAR: SLIGHT DIFFICULTY
LANDING: EXTREME DIFFICULTY
PUTTING_ON_SOCKS_AND_SHOES: SLIGHT DIFFICULTY
STANDING FOR 15 MINUTES: SLIGHT DIFFICULTY
WALKING_15_MINUTES_OR_GREATER: SLIGHT DIFFICULTY
HOW_WOULD_YOU_RATE_YOUR_CURRENT_LEVEL_OF_FUNCTION?: ABNORMAL
WALKING_APPROXIMATELY_10_MINUTES: SLIGHT DIFFICULTY
SPORTS_COUNT: 9
WALKING_FOR_APPROXIMATELY_10_MINUTES: SLIGHT DIFFICULTY
LOW_IMPACT_ACTIVITIES_LIKE_FAST_WALKING: EXTREME DIFFICULTY
WALKING_INITIALLY: SLIGHT DIFFICULTY
JUMPING: EXTREME DIFFICULTY
WALKING_UP_STEEP_HILLS: EXTREME DIFFICULTY
TWISTING/PIVOTING_ON_INVOLVED_LEG: EXTREME DIFFICULTY
LIGHT_TO_MODERATE_WORK: SLIGHT DIFFICULTY
PLEASE_INDICATE_YOR_PRIMARY_REASON_FOR_REFERRAL_TO_THERAPY:: HIP
GOING UP 1 FLIGHT OF STAIRS: MODERATE DIFFICULTY
WALKING_DOWN_STEEP_HILLS: EXTREME DIFFICULTY
DEEP SQUATTING: EXTREME DIFFICULTY
SPORTS_SCORE(%): 0
WALKING_INITIALLY: SLIGHT DIFFICULTY
TWISTING/PIVOTING ON INVOLVED LEG: EXTREME DIFFICULTY
GETTING_INTO_AND_OUT_OF_A_BATHTUB: EXTREME DIFFICULTY
RECREATIONAL_ACTIVITIES: EXTREME DIFFICULTY
ADL_HIGHEST_POTENTIAL_SCORE: 68
WALKING_DOWN_STEEP_HILLS: EXTREME DIFFICULTY

## 2024-10-02 ENCOUNTER — THERAPY VISIT (OUTPATIENT)
Dept: PHYSICAL THERAPY | Facility: CLINIC | Age: 64
End: 2024-10-02
Payer: COMMERCIAL

## 2024-10-02 DIAGNOSIS — M16.11 OSTEOARTHRITIS OF RIGHT HIP: ICD-10-CM

## 2024-10-02 DIAGNOSIS — Z96.641 S/P TOTAL RIGHT HIP ARTHROPLASTY: ICD-10-CM

## 2024-10-02 PROCEDURE — 97110 THERAPEUTIC EXERCISES: CPT | Mod: GP | Performed by: PHYSICAL THERAPIST

## 2024-10-02 PROCEDURE — 97161 PT EVAL LOW COMPLEX 20 MIN: CPT | Mod: GP | Performed by: PHYSICAL THERAPIST

## 2024-10-02 PROCEDURE — 97116 GAIT TRAINING THERAPY: CPT | Mod: GP | Performed by: PHYSICAL THERAPIST

## 2024-10-02 NOTE — PROGRESS NOTES
PHYSICAL THERAPY EVALUATION  Type of Visit: Evaluation    Subjective       Presenting condition or subjective complaint:    Date of onset: 09/24/24 (surgery)    Relevant medical history: Arthritis; High blood pressure   Past Medical History:   Diagnosis Date    Congenital heart valve 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    Diverticulosis     Gastroesophageal reflux disease     History of blood transfusion 1960    during heart surgery    Hypertension     Keratoconus of both eyes 01/02/2012    Panic attack 12/17/2013    ER Visit     Dates & types of surgery: 9 24 2024   Past Surgical History:   Procedure Laterality Date    ARTHROPLASTY HIP Right 9/24/2024    Procedure: Right total hip arthroplasty;  Surgeon: Ayaan Street MD;  Location: [facility] OR    AS REPR PDA BY CLAIRE  1960    age 1    BYPASS GASTRIC MASON-EN-Y, LIVER BIOPSY, COMBINED  01/01/2004    CATHETER, ABLATION  01/01/1962    cardiac, related to scarring around PDA ligation site     COLONOSCOPY N/A 04/20/2017    Procedure: COLONOSCOPY;;  Surgeon: August Perez MD;  Location: [facility]    COLONOSCOPY N/A 05/15/2017    Procedure: COLONOSCOPY;  Colonoscopy/DX:RLQ abdominal pain/2 day prep emailed;  Surgeon: Gold Tamayo MD;  Location: [facility]    HYSTERECTOMY  01/01/1993    Partial, fibroids    INCISION AND DRAINAGE BREAST Bilateral 01/08/2021    Procedure: INCISION AND DRAINAGE, BREAST. Right breast chest hematoma evacuation.;  Surgeon: Gracie Rapp MD;  Location: [facility] OR    TRANSGENDER MASTECTOMY Bilateral 12/14/2020    Procedure: Bilateral Simple Mastectomy, On-Q;  Surgeon: Gracie Rapp MD;  Location: [facility] OR       Prior diagnostic imaging/testing results: MRI; CT scan; X-ray; EMG     Prior therapy history for the same diagnosis, illness or injury: Yes        Living Environment  Social support: With family members   Type of home: House   Stairs to enter the home: Yes 12 Is there a railing: Yes     Ramp: No   Stairs inside the home: Yes        Help at home: Self Cares (home health aide/personal care attendant, family, etc); Home management tasks (cooking, cleaning); Medication and/or finances; Home and Yard maintenance tasks; Assist for driving and community activities; Meals on wheels/meal service; Emergency call system  Equipment owned: Straight Cane; Walker; Walker with wheels; Raised toilet seat; Bath bench; Dressing equipment     Employment: Yes Educator  Hobbies/Interests:      Patient goals for therapy: Ride my.bike    Pain assessment: Pain present  See objective evaluation for additional pain details     Objective     HIP EVALUATION  PAIN: Pain Location: side and posterior of the hip  Pain Quality: noticing some swelling into the hip, steady dull pain, no increased sharp pains   Pain is Worst: sleeping is a little tough as she is a side sleeper  Pain is Exacerbated By: hardest thing is getting up off the side of the bed, due to strength - feeling like she is laying around a lot, trying to get up every hour  Pain is Relieved By: she is going to start icing today  Pain Progression: steady pain since surgery    GAIT:   Assistive Device(s): Walker (front wheeled)    ROM: 10-90 R hip  Strength: NT    LE FLEXIBILITY: decreased of HF, hamstrings, gluteals  PALPATION: NT today  JOINT MOBILITY: limited end range    Assessment & Plan   CLINICAL IMPRESSIONS  Medical Diagnosis: s/p R SAMM    Treatment Diagnosis: s/p R SAMM   Impression/Assessment: Patient is a 64 year old adult with s/p R hip replacement complaints.  The following significant findings have been identified: Pain, Decreased ROM/flexibility, Decreased joint mobility, Decreased strength, Impaired gait, Impaired muscle performance, and Decreased activity tolerance. These impairments interfere with their ability to perform self care tasks, recreational activities, household chores, driving , household mobility, and community mobility as compared to previous level of function.     Clinical Decision  Making (Complexity):  Clinical Presentation: Stable/Uncomplicated  Clinical Presentation Rationale: based on medical and personal factors listed in PT evaluation  Clinical Decision Making (Complexity): Low complexity    PLAN OF CARE  Treatment Interventions:  Modalities: E-stim  Interventions: Gait Training, Manual Therapy, Neuromuscular Re-education, Therapeutic Activity, Therapeutic Exercise, Self-Care/Home Management    Long Term Goals     PT Goal 1  Goal Identifier: ambulation  Goal Description: Patient will be able to ambulate for at least 10 minutes without use of AD, no increase of P symptoms or difficulty in order to improve community mobility  Goal Progress: RW used today  Target Date: 11/16/24  PT Goal 2  Goal Identifier: AROM  Goal Description: Patient will be able to demonstrate full R hip AROM without difficulty or increased P symptoms in order to improve functional mobility at home  Target Date: 12/11/24      Frequency of Treatment: 1x/week  Duration of Treatment: 10 weeks    Risks and benefits of evaluation/treatment have been explained.   Patient/Family/caregiver agrees with Plan of Care.     Evaluation Time:     PT Eval, Low Complexity Minutes (33206): 15   Signing Clinician: ТАТЬЯНА VICTORIA PT

## 2024-10-09 ENCOUNTER — THERAPY VISIT (OUTPATIENT)
Dept: PHYSICAL THERAPY | Facility: CLINIC | Age: 64
End: 2024-10-09
Payer: COMMERCIAL

## 2024-10-09 DIAGNOSIS — Z96.641 S/P TOTAL RIGHT HIP ARTHROPLASTY: ICD-10-CM

## 2024-10-09 DIAGNOSIS — M16.11 OSTEOARTHRITIS OF RIGHT HIP: Primary | ICD-10-CM

## 2024-10-09 PROCEDURE — 97140 MANUAL THERAPY 1/> REGIONS: CPT | Mod: GP | Performed by: PHYSICAL THERAPIST

## 2024-10-09 PROCEDURE — 97110 THERAPEUTIC EXERCISES: CPT | Mod: GP | Performed by: PHYSICAL THERAPIST

## 2024-10-11 ENCOUNTER — HOSPITAL ENCOUNTER (OUTPATIENT)
Dept: ULTRASOUND IMAGING | Facility: CLINIC | Age: 64
Discharge: HOME | End: 2024-10-11
Attending: PHYSICIAN ASSISTANT | Admitting: PHYSICIAN ASSISTANT
Payer: COMMERCIAL

## 2024-10-11 ENCOUNTER — OFFICE VISIT (OUTPATIENT)
Dept: ORTHOPEDICS | Facility: CLINIC | Age: 64
End: 2024-10-11
Payer: COMMERCIAL

## 2024-10-11 DIAGNOSIS — Z96.641 STATUS POST TOTAL HIP REPLACEMENT, RIGHT: Primary | ICD-10-CM

## 2024-10-11 DIAGNOSIS — M16.11 OSTEOARTHRITIS OF ONE HIP, RIGHT: ICD-10-CM

## 2024-10-11 DIAGNOSIS — M79.89 RIGHT LEG SWELLING: ICD-10-CM

## 2024-10-11 DIAGNOSIS — Z96.641 STATUS POST TOTAL HIP REPLACEMENT, RIGHT: ICD-10-CM

## 2024-10-11 PROCEDURE — 99024 POSTOP FOLLOW-UP VISIT: CPT | Performed by: PHYSICIAN ASSISTANT

## 2024-10-11 PROCEDURE — 93971 EXTREMITY STUDY: CPT | Mod: RT

## 2024-10-11 RX ORDER — OXYCODONE HYDROCHLORIDE 5 MG/1
5-10 TABLET ORAL EVERY 4 HOURS PRN
Qty: 26 TABLET | Refills: 0 | Status: SHIPPED | OUTPATIENT
Start: 2024-10-11

## 2024-10-11 ASSESSMENT — HOOS JR
LYING IN BED (TURNING OVER, MAINTAINING HIP POSITION): MODERATE
SITTING: MILD
RISING FROM SITTING: SEVERE
GOING UP OR DOWN STAIRS: MODERATE
HOOS JR TOTAL INTERVAL SCORE: 52.97
BENDING TO THE FLOOR TO PICK UP OBJECT: MODERATE
WALKING ON UNEVEN SURFACE: MODERATE

## 2024-10-11 NOTE — LETTER
10/11/2024      Chiquita Butler  [address]  [facility] [address]      Dear Colleague,    Thank you for referring your patient, Chiquita Butler, to the [facility] [place]. Please see a copy of my visit note below.        [facility]  Orthopaedic Surgery Consultation by Ayaan Street M.D.    Chiquita Butler MRN# [id number]   Age: 64 year old YOB: 1960     Requesting physician: Kasey Humphrey*  Amarilis Agosto     Background history:  DX:  Obesity  Congenital heart valve abnormality  Hypertension    TREATMENTS:  Mastectomy, DEC 2020  Gastric bypass, Armand-en-Y, JAN 2004  Hysterectomy, JAN 1993  Congenital Heart valve surgery, 1960  9/24/2024, right total hip arthroplasty, Cece Hu           History of Present Illness:     64-year-old patient who presents today approximate 2-week status post right total hip arthroplasty.  She is concerned about postoperative swelling in the right lower extremity.  She states she has some calf pain.  She denies any shortness of breath or calf pain.  She is taking aspirin for DVT prophylaxis.  She has been compliant with her posterior hip or cautions.  Made good progress of physical therapy.  Her pain is well-controlled with oxycodone and she is requesting a refill today.    Social:   Occupation: educator and , works remotely   Living situation: lives with partner- has stairs to navigate   Hobbies / Sports: biking, exercises, walking, spending time with children    Smoking: No  Alcohol: Yes  Illicit drug use: No         Physical Exam:     EXAMINATION pertinent findings:   PSYCH: Pleasant, healthy-appearing, alert, oriented x3, cooperative. Normal mood and affect.  VITAL SIGNS: not currently breastfeeding.  Reviewed nursing intake notes.   There is no height or weight on file to calculate BMI.  RESP: non labored breathing   ABD: benign, soft, non-tender, no acute peritoneal findings  SKIN: grossly normal    LYMPHATIC: grossly normal, no adenopathy, no extremity edema  NEURO: grossly normal , no motor deficits  VASCULAR: satisfactory perfusion of all extremities   MUSCULOSKELETAL:   Alignment: Grossly neutral  Gait: Antalgic gait  Hips:      R hip: The incision is clean, dry, and intact with no erythema, dehiscence, or discharge.  Moderate to severe edema noted in right lower extremity.  Calves are soft but tender.  Positive Homans' sign       BLE:   Thigh and leg compartments soft and compressible   +Quad/TA/GSC/FHL/EHL   SILT DP/SP/Jayro/Saph/Tib nerve distributions   Palpable dorsalis pedis pulse            Data:   All laboratory data reviewed  All imaging studies reviewed by me personally.    XR AP pelvis lateral right hip on 9/24/2020:  My interpretation: Status post right total hip arthroplasty.  Adequate sizing, fixation and orientation of components.  No signs of immediate postoperative complications.          Assessment and Plan:   Assessment:  64 year old history of chronic right hip pain with end-stage underlying osteoarthritic changes.  Patient therefore underwent a right total hip arthroplasty on 9/24/2024.  Postoperative right lower extremity edema noted today.     Plan:  I extensively discussed my findings with the patient.  We discussed the intraoperative findings and today's findings.  Patient will continue to work with physical therapy on strengthening and gait training.  Posterior hip precautions were repeated.  Diffuse right lower extremity edema with positive Homans' sign noted today.  I would like to get an ultrasound to rule out DVT.  I will call with positive results with further recommendations.  If negative I encouraged patient to work on compression and elevation.  Patient will continue their DVT prophylaxis until 4 weeks postoperatively.  Suture tails were removed.  All questions were answered.  Patient understands and agrees to the treatment plan as set forth.  We will follow-up with patient  at the 6-week postoperative date with renewed radiographic imaging studies.     Fran Bull PA-C  Physician Assistant   Oncology and Adult Reconstructive Surgery  Dept Orthopaedic Surgery, [facility]    This note was created using dictation software and may contain errors.  Please contact the creator for any clarifications that are needed.          Again, thank you for allowing me to participate in the care of your patient.        Sincerely,        Fran Bull PA-C

## 2024-10-11 NOTE — PROGRESS NOTES
[facility]  Orthopaedic Surgery Consultation by Ayaan Street M.D.    Chiquita Butler MRN# [id number]   Age: 64 year old YOB: 1960     Requesting physician: Kasey Humphrey*  Amarilis Agosto     Background history:  DX:  Obesity  Congenital heart valve abnormality  Hypertension    TREATMENTS:  Mastectomy, DEC 2020  Gastric bypass, Armand-en-Y, JAN 2004  Hysterectomy, JAN 1993  Congenital Heart valve surgery, 1960  9/24/2024, right total hip arthroplasty, Cece Hu           History of Present Illness:     64-year-old patient who presents today approximate 2-week status post right total hip arthroplasty.  She is concerned about postoperative swelling in the right lower extremity.  She states she has some calf pain.  She denies any shortness of breath or calf pain.  She is taking aspirin for DVT prophylaxis.  She has been compliant with her posterior hip or cautions.  Made good progress of physical therapy.  Her pain is well-controlled with oxycodone and she is requesting a refill today.    Social:   Occupation: educator and , works remotely   Living situation: lives with partner- has stairs to navigate   Hobbies / Sports: biking, exercises, walking, spending time with children    Smoking: No  Alcohol: Yes  Illicit drug use: No         Physical Exam:     EXAMINATION pertinent findings:   PSYCH: Pleasant, healthy-appearing, alert, oriented x3, cooperative. Normal mood and affect.  VITAL SIGNS: not currently breastfeeding.  Reviewed nursing intake notes.   There is no height or weight on file to calculate BMI.  RESP: non labored breathing   ABD: benign, soft, non-tender, no acute peritoneal findings  SKIN: grossly normal   LYMPHATIC: grossly normal, no adenopathy, no extremity edema  NEURO: grossly normal , no motor deficits  VASCULAR: satisfactory perfusion of all extremities   MUSCULOSKELETAL:   Alignment: Grossly neutral  Gait: Antalgic gait  Hips:      R hip: The  incision is clean, dry, and intact with no erythema, dehiscence, or discharge.  Moderate to severe edema noted in right lower extremity.  Calves are soft but tender.  Positive Homans' sign       BLE:   Thigh and leg compartments soft and compressible   +Quad/TA/GSC/FHL/EHL   SILT DP/SP/Jayro/Saph/Tib nerve distributions   Palpable dorsalis pedis pulse            Data:   All laboratory data reviewed  All imaging studies reviewed by me personally.    XR AP pelvis lateral right hip on 9/24/2020:  My interpretation: Status post right total hip arthroplasty.  Adequate sizing, fixation and orientation of components.  No signs of immediate postoperative complications.          Assessment and Plan:   Assessment:  64 year old history of chronic right hip pain with end-stage underlying osteoarthritic changes.  Patient therefore underwent a right total hip arthroplasty on 9/24/2024.  Postoperative right lower extremity edema noted today.     Plan:  I extensively discussed my findings with the patient.  We discussed the intraoperative findings and today's findings.  Patient will continue to work with physical therapy on strengthening and gait training.  Posterior hip precautions were repeated.  Diffuse right lower extremity edema with positive Homans' sign noted today.  I would like to get an ultrasound to rule out DVT.  I will call with positive results with further recommendations.  If negative I encouraged patient to work on compression and elevation.  Patient will continue their DVT prophylaxis until 4 weeks postoperatively.  Suture tails were removed.  All questions were answered.  Patient understands and agrees to the treatment plan as set forth.  We will follow-up with patient at the 6-week postoperative date with renewed radiographic imaging studies.     Fran Bull PA-C  Physician Assistant   Oncology and Adult Reconstructive Surgery  Dept Orthopaedic Surgery, [facility]    This note was created using  dictation software and may contain errors.  Please contact the creator for any clarifications that are needed.

## 2024-10-11 NOTE — PATIENT INSTRUCTIONS
1. Status post total hip replacement, right    2. Osteoarthritis of one hip, right    3. Right leg swelling

## 2024-10-25 ENCOUNTER — THERAPY VISIT (OUTPATIENT)
Dept: PHYSICAL THERAPY | Facility: CLINIC | Age: 64
End: 2024-10-25
Payer: COMMERCIAL

## 2024-10-25 DIAGNOSIS — Z96.641 S/P TOTAL RIGHT HIP ARTHROPLASTY: ICD-10-CM

## 2024-10-25 DIAGNOSIS — M16.11 OSTEOARTHRITIS OF RIGHT HIP: Primary | ICD-10-CM

## 2024-10-25 PROCEDURE — 97110 THERAPEUTIC EXERCISES: CPT | Mod: GP | Performed by: PHYSICAL THERAPIST

## 2024-11-01 ENCOUNTER — THERAPY VISIT (OUTPATIENT)
Dept: PHYSICAL THERAPY | Facility: CLINIC | Age: 64
End: 2024-11-01
Payer: COMMERCIAL

## 2024-11-01 DIAGNOSIS — M16.11 OSTEOARTHRITIS OF RIGHT HIP: Primary | ICD-10-CM

## 2024-11-01 DIAGNOSIS — Z96.641 S/P TOTAL RIGHT HIP ARTHROPLASTY: ICD-10-CM

## 2024-11-01 PROCEDURE — 97116 GAIT TRAINING THERAPY: CPT | Mod: GP | Performed by: PHYSICAL THERAPIST

## 2024-11-01 PROCEDURE — 97110 THERAPEUTIC EXERCISES: CPT | Mod: GP | Performed by: PHYSICAL THERAPIST

## 2024-11-01 ASSESSMENT — ACTIVITIES OF DAILY LIVING (ADL)
GETTING INTO AND OUT OF AN AVERAGE CAR: SLIGHT DIFFICULTY
ADL_COUNT: 17
WALKING_FOR_APPROXIMATELY_10_MINUTES: SLIGHT DIFFICULTY
ADL_SCORE: 0
HEAVY_WORK: UNABLE TO DO
DEEP_SQUATTING: UNABLE TO DO
WALKING_DOWN_STEEP_HILLS: EXTREME DIFFICULTY
DEEP SQUATTING: UNABLE TO DO
HOW_WOULD_YOU_RATE_YOUR_CURRENT_LEVEL_OF_FUNCTION_DURING_YOUR_USUAL_ACTIVITIES_OF_DAILY_LIVING_FROM_0_TO_100_WITH_100_BEING_YOUR_LEVEL_OF_FUNCTION_PRIOR_TO_YOUR_HIP_PROBLEM_AND_0_BEING_THE_INABILITY_TO_PERFORM_ANY_OF_YOUR_USUAL_DAILY_ACTIVITIES?: 50
WALKING_DOWN_STEEP_HILLS: EXTREME DIFFICULTY
RECREATIONAL ACTIVITIES: UNABLE TO DO
SITTING_FOR_15_MINUTES: NO DIFFICULTY AT ALL
SITTING FOR 15 MINUTES: NO DIFFICULTY AT ALL
STEPPING UP AND DOWN CURBS: SLIGHT DIFFICULTY
TWISTING/PIVOTING ON INVOLVED LEG: MODERATE DIFFICULTY
LIGHT_TO_MODERATE_WORK: SLIGHT DIFFICULTY
PUTTING ON SOCKS AND SHOES: MODERATE DIFFICULTY
GETTING_INTO_AND_OUT_OF_A_BATHTUB: UNABLE TO DO
WALKING_APPROXIMATELY_10_MINUTES: SLIGHT DIFFICULTY
GETTING_INTO_AND_OUT_OF_A_BATHTUB: UNABLE TO DO
HOS_ADL_SCORE(%): 42.19
GETTING_INTO_AND_OUT_OF_AN_AVERAGE_CAR: SLIGHT DIFFICULTY
HEAVY_WORK: UNABLE TO DO
GOING UP 1 FLIGHT OF STAIRS: MODERATE DIFFICULTY
ADL_HIGHEST_POTENTIAL_SCORE: 68
GOING_DOWN_1_FLIGHT_OF_STAIRS: MODERATE DIFFICULTY
WALKING_INITIALLY: MODERATE DIFFICULTY
ROLLING OVER IN BED: MODERATE DIFFICULTY
ADL_TOTAL_ITEM_SCORE: 0
PUTTING_ON_SOCKS_AND_SHOES: MODERATE DIFFICULTY
STEPPING_UP_AND_DOWN_CURBS: SLIGHT DIFFICULTY
WALKING_INITIALLY: MODERATE DIFFICULTY
HOS_ADL_ITEM_SCORE_TOTAL: 27
LIGHT_TO_MODERATE_WORK: SLIGHT DIFFICULTY
HOW_WOULD_YOU_RATE_YOUR_CURRENT_LEVEL_OF_FUNCTION_DURING_YOUR_USUAL_ACTIVITIES_OF_DAILY_LIVING_FROM_0_TO_100_WITH_100_BEING_YOUR_LEVEL_OF_FUNCTION_PRIOR_TO_YOUR_HIP_PROBLEM_AND_0_BEING_THE_INABILITY_TO_PERFORM_ANY_OF_YOUR_USUAL_DAILY_ACTIVITIES?: 50
TWISTING/PIVOTING_ON_INVOLVED_LEG: MODERATE DIFFICULTY
RECREATIONAL_ACTIVITIES: UNABLE TO DO
ROLLING_OVER_IN_BED: MODERATE DIFFICULTY
WALKING_UP_STEEP_HILLS: EXTREME DIFFICULTY
STANDING_FOR_15_MINUTES: SLIGHT DIFFICULTY
GOING_UP_1_FLIGHT_OF_STAIRS: MODERATE DIFFICULTY
STANDING FOR 15 MINUTES: SLIGHT DIFFICULTY
WALKING_UP_STEEP_HILLS: EXTREME DIFFICULTY
HOS_ADL_HIGHEST_POTENTIAL_SCORE: 64
GOING DOWN 1 FLIGHT OF STAIRS: MODERATE DIFFICULTY

## 2024-11-01 NOTE — PROGRESS NOTES
PLAN  Continue therapy per current plan of care.    Beginning/End Dates of Progress Note Reporting Period:  9/24/24 to 11/01/2024    Referring Provider:  Ayaan Street       11/01/24 0500   Appointment Info   Signing clinician's name / credentials Naila Mera PT   Total/Authorized Visits 10 E&T   Visits Used 4   Medical Diagnosis s/p R SAMM   PT Tx Diagnosis s/p R SAMM   Precautions/Limitations no bending past 90deg, no internal rotation, and no crossing the legs - reiterated today for importance   Progress Note/Certification   Onset of illness/injury or Date of Surgery 09/24/24  (surgery)   Therapy Frequency 1x/week   Predicted Duration 10 weeks   Progress Note Completed Date 11/01/24   GOALS   PT Goals 2   PT Goal 1   Goal Identifier ambulation   Goal Description Patient will be able to ambulate for at least 10 minutes without use of AD, no increase of P symptoms or difficulty in order to improve community mobility   Goal Progress SPC used today   Target Date 11/16/24   PT Goal 2   Goal Identifier AROM   Goal Description Patient will be able to demonstrate full R hip AROM without difficulty or increased P symptoms in order to improve functional mobility at home   Target Date 12/11/24   Subjective Report   Subjective Report Patient without cane today, feeling fatigued in general today. The sit to stand exercises are too low for here   Objective Measures   Objective Measures Objective Measure 1;Objective Measure 2   Objective Measure 1   Objective Measure R hip AROM   Details 5-90 due to precautions - able to decrease extension lag with ex   Objective Measure 2   Objective Measure Swelling went down quite a bit   Treatment Interventions (PT)   Interventions Therapeutic Procedure/Exercise;Neuromuscular Re-education;Manual Therapy;Therapeutic Activity;Gait Training   Therapeutic Procedure/Exercise   Therapeutic Procedures: strength, endurance, ROM, flexibility minutes (77479) 23   Therapeutic Procedures Ther Proc  "2;Ther Proc 3;Ther Proc 4;Ther Proc 5   Ther Proc 1 own HEP   Ther Proc 1 - Details ankle pumps, leg raises   Ther Proc 2 Nustep   Ther Proc 2 - Details 5 min for LE warm up, level 4   Ther Proc 3 STair step ups   Ther Proc 3 - Details HEP   PTRx Ther Proc 1 Nerve Mobility Sciatic Position 5   PTRx Ther Proc 1 - Details HEP   PTRx Ther Proc 2 Isometric Quad   PTRx Ther Proc 2 - Details HEP   PTRx Ther Proc 3 Short Arc Knee Extension (Long Sitting)   PTRx Ther Proc 3 - Details HEP   PTRx Ther Proc 4 Hip Flexion Straight Leg Raise   PTRx Ther Proc 4 - Details HOLD off on right now   PTRx Ther Proc 5 Hamstring Stretch Long Sitting   PTRx Ther Proc 5 - Details HEP   PTRx Ther Proc 6 Standing Hip Abduction   PTRx Ther Proc 6 - Details x 10 reps with added red band today   PTRx Ther Proc 7 Standing Hip Flexion Straight Leg Raise   PTRx Ther Proc 7 - Details x 10 reps with added red band today for increased resistance   PTRx Ther Proc 8 Sit to Stand   PTRx Ther Proc 8 - Details HEP - discussed modifications at home since her chairs seem too low, 23\" to 17\" x 10 reps in clinic today, work on butt taps for control   Skilled Intervention strengthening to improve mobility and decrease P, cues for posture and form   Patient Response/Progress tolerated OK, fatigue today   Ther Proc 4 added in hip extension to HEP today   Ther Proc 4 - Details x 10-30 reps to work on hip mobility with ambulation and gait   Ther Proc 5 wall sit   Ther Proc 5 - Details 30 sec hold x 3 sets , add to HEP as indicated for LE strengthening and endurance   Gait Training   Gait Training Minutes, includes stair climbing (19406) 8   Gait 1 ambulation training for faster walking   Gait 1 - Details x 6 minutes   Skilled Intervention gait training to improve mobility and ambulation - EDU on how her leg length can be coorelated to glute weakness and to not overuse the heel lift, only during her exercise/walking at the gym   Patient Response/Progress tolerated " well and verbalized understanding   Gait 2 Sidestepping and moster walks   Gait 2 - Details x 4 reps of gym   Gait Training Gait 2   Plan   Home program Printed PTRX   Updates to plan of care Report back after MD visit per patient request   Plan for next session gait training progressions, gluteal strengthening to avoid LLD and limping, LE strengthening progressions , hip PROM   Total Session Time   Timed Code Treatment Minutes 31   Total Treatment Time (sum of timed and untimed services) 31

## 2024-11-07 NOTE — PROGRESS NOTES
[facility]  Orthopaedic Surgery Consultation by Ayaan Street M.D.    Chiquita Butler MRN# [id number]   Age: 64 year old YOB: 1960     Requesting physician: Kasey Humphrey*  Amarilis Agosto     Background history:  DX:  Obesity  Congenital heart valve abnormality  Hypertension    TREATMENTS:  Mastectomy, DEC 2020  Gastric bypass, Armand-en-Y, JAN 2004  Hysterectomy, JAN 1993  Congenital Heart valve surgery, 1960  9/24/2024, right total hip arthroplasty, Cece Hu           History of Present Illness:   64-year-old patient who presents today approximately 6-week status post right total hip arthroplasty.  They were previously evaluated on 10/11/24. Patient reports that the right hip pain has decreased. Since this visit they report increased pain in right knee and right shoulder. Patient reports pain with starting to standing. Increased pain with standing.  Pain wakes patient at nighttime. Pain is improved by Advil.  They have tried the following therapies: PT 1x with at-home exercises.  Current pain level: 3/10, Worst pain level (pain is worse at night): 6/10.  Patient states she has had previous injections into the right shoulder.  Patient states she has a leg length discrepancy with the right being longer than the left.  She currently has a shoe lift on the left shoe which is improved the feeling of discrepancy.      Social:   Occupation: educator and , works remotely   Living situation: lives with partner- has stairs to navigate   Hobbies / Sports: biking, exercises, walking, spending time with children    Smoking: No  Alcohol: Yes  Illicit drug use: No         Physical Exam:     EXAMINATION pertinent findings:   PSYCH: Pleasant, healthy-appearing, alert, oriented x3, cooperative. Normal mood and affect.  VITAL SIGNS: not currently breastfeeding.  Reviewed nursing intake notes.   There is no height or weight on file to calculate BMI.  RESP: non labored breathing    ABD: benign, soft, non-tender, no acute peritoneal findings  SKIN: grossly normal   LYMPHATIC: grossly normal, no adenopathy, no extremity edema  NEURO: grossly normal , no motor deficits  VASCULAR: satisfactory perfusion of all extremities   MUSCULOSKELETAL:   Alignment: Grossly neutral  Gait: Antalgic gait  Hips:      R hip: The incision is clean, dry, and intact with no erythema, dehiscence, or discharge.  Moderate to severe edema noted in right lower extremity.  Calves are soft but tender.  Positive Homans' sign       BLE:   Thigh and leg compartments soft and compressible   +Quad/TA/GSC/FHL/EHL   SILT DP/SP/Jayro/Saph/Tib nerve distributions   Palpable dorsalis pedis pulse            Data:   All laboratory data reviewed  All imaging studies reviewed by me personally.    XR AP pelvis lateral right hip on 11/8/2024:  My interpretation: Status post right total hip arthroplasty.  9 mm likely discrepancy noted with right being longer than the left at the femoral acetabular prostatic joint.  Adequate sizing, fixation and orientation of components.  No signs of immediate postoperative complications.          Assessment and Plan:   Assessment:  64 year old history of chronic right hip pain with end-stage underlying osteoarthritic changes.  Patient therefore underwent a right total hip arthroplasty on 9/24/2024.  Leg length discrepancy noted today but overall recovering appropriately     Plan:  I extensively discussed my findings with the patient.  Patient appears to be recovering appropriately.  Patient will continue to work with physical therapy on strengthening and gait training.  Posterior hip precautions were repeated and advised to remain in place until 3 months postoperatively.  Patient has completed the DVT prophylaxis 4 weeks postoperatively.  Antibiotics prior to dental procedures reviewed. All questions were answered.  Patient understands and agrees to the treatment plan as set forth.  We will follow-up with  patient at the 1 year postoperative date with renewed radiographic imaging studies.     Recommend she continue to use over-the-counter shoe lift in the left shoe to compensate for the 9 mm likely discrepancy.  The patient understands and is in agreement.  She will continue to follow-up with her sports medicine provider regards to her right shoulder osteoarthritis.  I instructed her to follow-up with a Dr. Street in regards to her right knee pain in the future if it continues to persist.    Fran Bull PA-C  Physician Assistant   Oncology and Adult Reconstructive Surgery  Dept Orthopaedic Surgery, [facility]    This note was created using dictation software and may contain errors.  Please contact the creator for any clarifications that are needed.

## 2024-11-08 ENCOUNTER — OFFICE VISIT (OUTPATIENT)
Dept: ORTHOPEDICS | Facility: CLINIC | Age: 64
End: 2024-11-08
Payer: COMMERCIAL

## 2024-11-08 ENCOUNTER — ANCILLARY PROCEDURE (OUTPATIENT)
Dept: GENERAL RADIOLOGY | Facility: CLINIC | Age: 64
End: 2024-11-08
Attending: PHYSICIAN ASSISTANT
Payer: COMMERCIAL

## 2024-11-08 VITALS — SYSTOLIC BLOOD PRESSURE: 138 MMHG | DIASTOLIC BLOOD PRESSURE: 88 MMHG

## 2024-11-08 DIAGNOSIS — Z47.89 ORTHOPEDIC AFTERCARE: ICD-10-CM

## 2024-11-08 DIAGNOSIS — Z47.89 ORTHOPEDIC AFTERCARE: Primary | ICD-10-CM

## 2024-11-08 PROCEDURE — 73501 X-RAY EXAM HIP UNI 1 VIEW: CPT | Mod: TC | Performed by: RADIOLOGY

## 2024-11-08 PROCEDURE — 99024 POSTOP FOLLOW-UP VISIT: CPT | Performed by: PHYSICIAN ASSISTANT

## 2024-11-08 ASSESSMENT — HOOS JR
BENDING TO THE FLOOR TO PICK UP OBJECT: MODERATE
SITTING: MILD
HOOS JR TOTAL INTERVAL SCORE: 55.99
LYING IN BED (TURNING OVER, MAINTAINING HIP POSITION): MODERATE
GOING UP OR DOWN STAIRS: MODERATE
RISING FROM SITTING: SEVERE
WALKING ON UNEVEN SURFACE: MILD

## 2024-11-08 NOTE — LETTER
11/8/2024      Chiquita Butler  [address]  [facility] [address]      Dear Colleague,    Thank you for referring your patient, Chiquita Butler, to the [facility] [place]. Please see a copy of my visit note below.        [facility]  Orthopaedic Surgery Consultation by Ayaan Street M.D.    Chiquita Butler MRN# [id number]   Age: 64 year old YOB: 1960     Requesting physician: Kasey Humphrey*  Amarilis Agosto     Background history:  DX:  Obesity  Congenital heart valve abnormality  Hypertension    TREATMENTS:  Mastectomy, DEC 2020  Gastric bypass, Armand-en-Y, JAN 2004  Hysterectomy, JAN 1993  Congenital Heart valve surgery, 1960  9/24/2024, right total hip arthroplasty, Cece Hu           History of Present Illness:   64-year-old patient who presents today approximately 6-week status post right total hip arthroplasty.  They were previously evaluated on 10/11/24. Patient reports that the right hip pain has decreased. Since this visit they report increased pain in right knee and right shoulder. Patient reports pain with starting to standing. Increased pain with standing.  Pain wakes patient at nighttime. Pain is improved by Advil.  They have tried the following therapies: PT 1x with at-home exercises.  Current pain level: 3/10, Worst pain level (pain is worse at night): 6/10.  Patient states she has had previous injections into the right shoulder.  Patient states she has a leg length discrepancy with the right being longer than the left.  She currently has a shoe lift on the left shoe which is improved the feeling of discrepancy.      Social:   Occupation: educator and , works remotely   Living situation: lives with partner- has stairs to navigate   Hobbies / Sports: biking, exercises, walking, spending time with children    Smoking: No  Alcohol: Yes  Illicit drug use: No         Physical Exam:     EXAMINATION pertinent findings:   PSYCH:  Pleasant, healthy-appearing, alert, oriented x3, cooperative. Normal mood and affect.  VITAL SIGNS: not currently breastfeeding.  Reviewed nursing intake notes.   There is no height or weight on file to calculate BMI.  RESP: non labored breathing   ABD: benign, soft, non-tender, no acute peritoneal findings  SKIN: grossly normal   LYMPHATIC: grossly normal, no adenopathy, no extremity edema  NEURO: grossly normal , no motor deficits  VASCULAR: satisfactory perfusion of all extremities   MUSCULOSKELETAL:   Alignment: Grossly neutral  Gait: Antalgic gait  Hips:      R hip: The incision is clean, dry, and intact with no erythema, dehiscence, or discharge.  Moderate to severe edema noted in right lower extremity.  Calves are soft but tender.  Positive Homans' sign       BLE:   Thigh and leg compartments soft and compressible   +Quad/TA/GSC/FHL/EHL   SILT DP/SP/Jayro/Saph/Tib nerve distributions   Palpable dorsalis pedis pulse            Data:   All laboratory data reviewed  All imaging studies reviewed by me personally.    XR AP pelvis lateral right hip on 11/8/2024:  My interpretation: Status post right total hip arthroplasty.  9 mm likely discrepancy noted with right being longer than the left at the femoral acetabular prostatic joint.  Adequate sizing, fixation and orientation of components.  No signs of immediate postoperative complications.          Assessment and Plan:   Assessment:  64 year old history of chronic right hip pain with end-stage underlying osteoarthritic changes.  Patient therefore underwent a right total hip arthroplasty on 9/24/2024.  Leg length discrepancy noted today but overall recovering appropriately     Plan:  I extensively discussed my findings with the patient.  Patient appears to be recovering appropriately.  Patient will continue to work with physical therapy on strengthening and gait training.  Posterior hip precautions were repeated and advised to remain in place until 3 months  postoperatively.  Patient has completed the DVT prophylaxis 4 weeks postoperatively.  Antibiotics prior to dental procedures reviewed. All questions were answered.  Patient understands and agrees to the treatment plan as set forth.  We will follow-up with patient at the 1 year postoperative date with renewed radiographic imaging studies.     Recommend she continue to use over-the-counter shoe lift in the left shoe to compensate for the 9 mm likely discrepancy.  The patient understands and is in agreement.  She will continue to follow-up with her sports medicine provider regards to her right shoulder osteoarthritis.  I instructed her to follow-up with a Dr. Street in regards to her right knee pain in the future if it continues to persist.    Fran Bull PA-C  Physician Assistant   Oncology and Adult Reconstructive Surgery  Dept Orthopaedic Surgery, [facility]    This note was created using dictation software and may contain errors.  Please contact the creator for any clarifications that are needed.          Again, thank you for allowing me to participate in the care of your patient.        Sincerely,        Fran Bull PA-C

## 2024-11-15 ENCOUNTER — OFFICE VISIT (OUTPATIENT)
Dept: ORTHOPEDICS | Facility: CLINIC | Age: 64
End: 2024-11-15
Payer: COMMERCIAL

## 2024-11-15 DIAGNOSIS — M19.011 OSTEOARTHRITIS OF GLENOHUMERAL JOINT, RIGHT: Primary | ICD-10-CM

## 2024-11-15 RX ORDER — TRIAMCINOLONE ACETONIDE 40 MG/ML
40 INJECTION, SUSPENSION INTRA-ARTICULAR; INTRAMUSCULAR
Status: COMPLETED | OUTPATIENT
Start: 2024-11-15 | End: 2024-11-15

## 2024-11-15 RX ORDER — LIDOCAINE HYDROCHLORIDE 10 MG/ML
4 INJECTION, SOLUTION EPIDURAL; INFILTRATION; INTRACAUDAL; PERINEURAL
Status: COMPLETED | OUTPATIENT
Start: 2024-11-15 | End: 2024-11-15

## 2024-11-15 RX ADMIN — LIDOCAINE HYDROCHLORIDE 4 ML: 10 INJECTION, SOLUTION EPIDURAL; INFILTRATION; INTRACAUDAL; PERINEURAL at 13:51

## 2024-11-15 RX ADMIN — TRIAMCINOLONE ACETONIDE 40 MG: 40 INJECTION, SUSPENSION INTRA-ARTICULAR; INTRAMUSCULAR at 13:51

## 2024-11-15 NOTE — LETTER
11/15/2024      RE: Chiquita Butler  [address]  [place] [address]     Dear Colleague,    Thank you for referring your patient, Chiquita Butler, to the [facility] [place]. Please see a copy of my visit note below.    PROCEDURE ENCOUNTER    [facility]  Tony Barnett DO  November 15, 2024     Name: Chiquita Butler  MRN: [id number]  Age: 64 year old  : 1960    Referring provider: ALEISHA Doss  Diagnosis: R Shoulder Oa    Procedure: Glenohumeral Injection - Ultrasound Guided  The patient was informed of the risks and the benefits of the procedure and a written consent was signed.  The patient s right shoulder was prepped with chlorhexidine in sterile fashion.   40 mg of triamcinolone suspension was drawn up into a 5 mL syringe with 4 mL of 1% lidocaine w/o Epi.  Injection was performed using sterile technique.  Under ultrasound guidance a 3.5-inch 22-gauge needle was used to enter the right glenohumeral joint.  Posterior approach was used with the patient in lateral recumbent position, arm in neutral position at the side.  Needle placement was visualized and documented with ultrasound.  Ultrasound visualization was necessary due to the small joint space entered.  Injection performed long axis to the probe.  Injection solution visualized within the joint space.  Images were permanently stored for the patient's record.  There were no complications. The patient tolerated the procedure well. There was negligible bleeding.   Therapy scheduled to follow for mobilization.  The patient was instructed to call or go to the emergency room with any unusual pain, swelling, redness, or if otherwise concerned.    Tony Barnett D.O., [facility]  , Sports Medicine  Department of Family [facility] and [facility]    Large Joint Injection: R glenohumeral joint    Date/Time: 11/15/2024 1:51 PM    Performed by: Tony Barnett DO  Authorized by:  Tony Barnett DO    Indications:  Pain  Guidance: ultrasound    Approach:  Posterior  Location:  Shoulder      Site:  R glenohumeral joint  Medications:  40 mg triamcinolone 40 MG/ML; 4 mL lidocaine (PF) 1 %  Outcome:  Tolerated well, no immediate complications  Procedure discussed: discussed risks, benefits, and alternatives    Consent Given by:  Patient  Timeout: timeout called immediately prior to procedure    Prep: patient was prepped and draped in usual sterile fashion            Again, thank you for allowing me to participate in the care of your patient.      Sincerely,    Tony Barnett DO

## 2024-11-15 NOTE — NURSING NOTE
[address] [address]  Dept: [phone]  ______________________________________________________________________________    Patient: Chiquita Butler   : 1960   MRN: [id number]   November 15, 2024    INVASIVE PROCEDURE SAFETY CHECKLIST    Date: November 15, 2024   Procedure: Right GH USGI  Patient Name: Chiquita Butler  MRN: [id number]  YOB: 1960    Action: Complete sections as appropriate. Any discrepancy results in a HARD COPY until resolved.     PRE PROCEDURE:  Patient ID verified with 2 identifiers (name and  or MRN): Yes  Procedure and site verified with patient/designee (when able): Yes  Accurate consent documentation in medical record: Yes  H&P (or appropriate assessment) documented in medical record: Yes  H&P must be up to 20 days prior to procedure and updates within 24 hours of procedure as applicable: NA  Relevant diagnostic and radiology test results appropriately labeled and displayed as applicable: Yes  Procedure site(s) marked with provider initials: NA    TIMEOUT:  Time-Out performed immediately prior to starting procedure, including verbal and active participation of all team members addressing the following:Yes  * Correct patient identify  * Confirmed that the correct side and site are marked  * An accurate procedure consent form  * Agreement on the procedure to be done  * Correct patient position  * Relevant images and results are properly labeled and appropriately displayed  * The need to administer antibiotics or fluids for irrigation purposes during the procedure as applicable   * Safety precautions based on patient history or medication use    DURING PROCEDURE: Verification of correct person, site, and procedures any time the responsibility for care of the patient is transferred to another member of the care team.       Prior to injection, verified patient identity using patient's name and date of birth.  Due to  injection administration, patient instructed to remain in clinic for 15 minutes  afterwards, and to report any adverse reaction to me immediately.    Joint injection was performed.      Drug Amount Wasted:  Yes: 1 mg/ml   Vial/Syringe: Single dose vial  Expiration Date:  04/2028      Myranda Luong, ATC  November 15, 2024

## 2024-11-15 NOTE — PROGRESS NOTES
PROCEDURE ENCOUNTER    [facility]  Tony Barnett DO  November 15, 2024     Name: Chiquita Butler  MRN: [id number]  Age: 64 year old  : 1960    Referring provider: ALEISHA Doss  Diagnosis: R Shoulder Oa    Procedure: Glenohumeral Injection - Ultrasound Guided  The patient was informed of the risks and the benefits of the procedure and a written consent was signed.  The patient s right shoulder was prepped with chlorhexidine in sterile fashion.   40 mg of triamcinolone suspension was drawn up into a 5 mL syringe with 4 mL of 1% lidocaine w/o Epi.  Injection was performed using sterile technique.  Under ultrasound guidance a 3.5-inch 22-gauge needle was used to enter the right glenohumeral joint.  Posterior approach was used with the patient in lateral recumbent position, arm in neutral position at the side.  Needle placement was visualized and documented with ultrasound.  Ultrasound visualization was necessary due to the small joint space entered.  Injection performed long axis to the probe.  Injection solution visualized within the joint space.  Images were permanently stored for the patient's record.  There were no complications. The patient tolerated the procedure well. There was negligible bleeding.   Therapy scheduled to follow for mobilization.  The patient was instructed to call or go to the emergency room with any unusual pain, swelling, redness, or if otherwise concerned.    Tony Barnett D.O., [facility]  , Sports Medicine  Department of Family [facility] and [facility]    Large Joint Injection: R glenohumeral joint    Date/Time: 11/15/2024 1:51 PM    Performed by: Tony Barnett DO  Authorized by: Tony Barnett DO    Indications:  Pain  Guidance: ultrasound    Approach:  Posterior  Location:  Shoulder      Site:  R glenohumeral joint  Medications:  40 mg triamcinolone 40 MG/ML; 4 mL lidocaine (PF) 1 %  Outcome:  Tolerated well, no immediate  complications  Procedure discussed: discussed risks, benefits, and alternatives    Consent Given by:  Patient  Timeout: timeout called immediately prior to procedure    Prep: patient was prepped and draped in usual sterile fashion

## 2024-11-24 ENCOUNTER — HEALTH MAINTENANCE LETTER (OUTPATIENT)
Age: 64
End: 2024-11-24

## 2024-12-22 NOTE — Clinical Note
Addended encounter that includes my name at the bottom.
Please disregard the first chart and sign this one!
Parent

## 2025-01-14 DIAGNOSIS — M25.561 PAIN IN BOTH KNEES, UNSPECIFIED CHRONICITY: Primary | ICD-10-CM

## 2025-01-14 DIAGNOSIS — M25.562 PAIN IN BOTH KNEES, UNSPECIFIED CHRONICITY: Primary | ICD-10-CM

## 2025-01-14 NOTE — TELEPHONE ENCOUNTER
DIAGNOSIS: (B) knees, wants to discuss inj / [employer] -- via MyChart     APPOINTMENT DATE: 1.17.25   NOTES STATUS DETAILS   OFFICE NOTE from other specialist Internal 3.16.22, 3.2.22  Carlos Evangelista   MEDICATION LIST Internal    MRI/XRAYS (IMAGES & REPORTS) Internal *sched* for 1.17.25  XR Knee Chauncey    12.17.19  XR Knee Left    7.9.16  MR Knee Right

## 2025-01-17 ENCOUNTER — ANCILLARY PROCEDURE (OUTPATIENT)
Dept: GENERAL RADIOLOGY | Facility: CLINIC | Age: 65
End: 2025-01-17
Attending: STUDENT IN AN ORGANIZED HEALTH CARE EDUCATION/TRAINING PROGRAM
Payer: COMMERCIAL

## 2025-01-17 ENCOUNTER — PRE VISIT (OUTPATIENT)
Dept: ORTHOPEDICS | Facility: CLINIC | Age: 65
End: 2025-01-17

## 2025-01-17 DIAGNOSIS — M25.561 PAIN IN BOTH KNEES, UNSPECIFIED CHRONICITY: ICD-10-CM

## 2025-01-17 DIAGNOSIS — M25.562 PAIN IN BOTH KNEES, UNSPECIFIED CHRONICITY: ICD-10-CM

## 2025-01-17 PROCEDURE — 73562 X-RAY EXAM OF KNEE 3: CPT | Mod: GC | Performed by: RADIOLOGY

## 2025-02-10 ENCOUNTER — OFFICE VISIT (OUTPATIENT)
Dept: FAMILY MEDICINE | Facility: CLINIC | Age: 65
End: 2025-02-10
Payer: COMMERCIAL

## 2025-02-10 VITALS
RESPIRATION RATE: 16 BRPM | DIASTOLIC BLOOD PRESSURE: 77 MMHG | WEIGHT: 230 LBS | SYSTOLIC BLOOD PRESSURE: 114 MMHG | BODY MASS INDEX: 34.07 KG/M2 | OXYGEN SATURATION: 98 % | TEMPERATURE: 97.1 F | HEART RATE: 60 BPM | HEIGHT: 69 IN

## 2025-02-10 DIAGNOSIS — M15.0 PRIMARY OSTEOARTHRITIS INVOLVING MULTIPLE JOINTS: ICD-10-CM

## 2025-02-10 DIAGNOSIS — E66.09 CLASS 1 OBESITY DUE TO EXCESS CALORIES WITH SERIOUS COMORBIDITY AND BODY MASS INDEX (BMI) OF 33.0 TO 33.9 IN ADULT: ICD-10-CM

## 2025-02-10 DIAGNOSIS — E66.811 CLASS 1 OBESITY DUE TO EXCESS CALORIES WITH SERIOUS COMORBIDITY AND BODY MASS INDEX (BMI) OF 33.0 TO 33.9 IN ADULT: ICD-10-CM

## 2025-02-10 DIAGNOSIS — I10 PRIMARY HYPERTENSION: ICD-10-CM

## 2025-02-10 DIAGNOSIS — Z00.00 HEALTH CARE MAINTENANCE: ICD-10-CM

## 2025-02-10 DIAGNOSIS — F33.41 RECURRENT MAJOR DEPRESSIVE DISORDER, IN PARTIAL REMISSION: ICD-10-CM

## 2025-02-10 DIAGNOSIS — Z81.8 FAMILY HISTORY OF DEMENTIA: ICD-10-CM

## 2025-02-10 DIAGNOSIS — I25.83 CORONARY ARTERY DISEASE DUE TO LIPID RICH PLAQUE: ICD-10-CM

## 2025-02-10 DIAGNOSIS — Z00.00 ROUTINE GENERAL MEDICAL EXAMINATION AT A HEALTH CARE FACILITY: Primary | ICD-10-CM

## 2025-02-10 DIAGNOSIS — I25.10 CORONARY ARTERY DISEASE DUE TO LIPID RICH PLAQUE: ICD-10-CM

## 2025-02-10 PROBLEM — E66.01 MORBID OBESITY (H): Status: RESOLVED | Noted: 2021-02-26 | Resolved: 2025-02-10

## 2025-02-10 LAB
ANION GAP SERPL CALCULATED.3IONS-SCNC: 9 MMOL/L (ref 7–15)
BUN SERPL-MCNC: 24.6 MG/DL (ref 8–23)
CALCIUM SERPL-MCNC: 9.2 MG/DL (ref 8.8–10.4)
CHLORIDE SERPL-SCNC: 109 MMOL/L (ref 98–107)
CHOLEST SERPL-MCNC: 141 MG/DL
CREAT SERPL-MCNC: 0.73 MG/DL (ref 0.51–1.17)
EGFRCR SERPLBLD CKD-EPI 2021: 90 ML/MIN/1.73M2
EST. AVERAGE GLUCOSE BLD GHB EST-MCNC: 117 MG/DL
FASTING STATUS PATIENT QL REPORTED: NO
FASTING STATUS PATIENT QL REPORTED: NO
GLUCOSE SERPL-MCNC: 92 MG/DL (ref 70–99)
HBA1C MFR BLD: 5.7 % (ref 0–5.6)
HCO3 SERPL-SCNC: 25 MMOL/L (ref 22–29)
HDLC SERPL-MCNC: 83 MG/DL
LDLC SERPL CALC-MCNC: 39 MG/DL
NONHDLC SERPL-MCNC: 58 MG/DL
POTASSIUM SERPL-SCNC: 4.4 MMOL/L (ref 3.4–5.3)
SODIUM SERPL-SCNC: 143 MMOL/L (ref 135–145)
TRIGL SERPL-MCNC: 94 MG/DL

## 2025-02-10 RX ORDER — MELOXICAM 15 MG/1
15 TABLET ORAL DAILY PRN
Qty: 90 TABLET | Refills: 1 | Status: SHIPPED | OUTPATIENT
Start: 2025-02-10

## 2025-02-10 RX ORDER — BUSPIRONE HYDROCHLORIDE 7.5 MG/1
TABLET ORAL
Qty: 180 TABLET | Refills: 3 | Status: SHIPPED | OUTPATIENT
Start: 2025-02-10

## 2025-02-10 RX ORDER — CITALOPRAM HYDROBROMIDE 40 MG/1
40 TABLET ORAL DAILY
Qty: 90 TABLET | Refills: 3 | Status: SHIPPED | OUTPATIENT
Start: 2025-02-10

## 2025-02-10 RX ORDER — CHOLECALCIFEROL (VITAMIN D3) 50 MCG
50 TABLET ORAL DAILY
Qty: 100 TABLET | Refills: 3 | Status: SHIPPED | OUTPATIENT
Start: 2025-02-10

## 2025-02-10 SDOH — HEALTH STABILITY: PHYSICAL HEALTH: ON AVERAGE, HOW MANY DAYS PER WEEK DO YOU ENGAGE IN MODERATE TO STRENUOUS EXERCISE (LIKE A BRISK WALK)?: 3 DAYS

## 2025-02-10 ASSESSMENT — PAIN SCALES - GENERAL: PAINLEVEL_OUTOF10: MILD PAIN (1)

## 2025-02-10 ASSESSMENT — SOCIAL DETERMINANTS OF HEALTH (SDOH): HOW OFTEN DO YOU GET TOGETHER WITH FRIENDS OR RELATIVES?: ONCE A WEEK

## 2025-02-10 ASSESSMENT — PATIENT HEALTH QUESTIONNAIRE - PHQ9: SUM OF ALL RESPONSES TO PHQ QUESTIONS 1-9: 0

## 2025-02-10 NOTE — PROGRESS NOTES
Preventive Care Visit  [facility] CARL Agosto MD, Family Medicine  Feb 10, 2025      Assessment & Plan     Routine general medical examination at a health care facility  Doing very well overall and is progressing exercise after hip replacement. Eating well. Goal to get to 217 lbs and is working on arthritis management. Updated vaccines today. Diet is adequate in calcium.   - INFLUENZA HIGH DOSE, TRIVALENT, PF (FLUZONE)  - COVID-19 12+ (PFIZER)  - RSV VACCINE (ABRYSVO)    Health care maintenance  - vitamin D3 (CHOLECALCIFEROL) 50 mcg (2000 units) tablet  Dispense: 100 tablet; Refill: 3    Hypertension  Well controlled on metoprolol, losartan, and amlodipine. Refills provided.   - BASIC METABOLIC PANEL  - BASIC METABOLIC PANEL    Coronary artery disease due to lipid rich plaque  Had been taking aspirin temporarily but is off of it now. Has not had a heart attack or stroke, so less evidence for primary prevention. Findings to support coronary artery disease were via CTA in 2021 and were considered minimal. Would repeat lipids and continue lipid reduction.    - Lipid panel reflex to direct LDL Non-fasting  - Lipid panel reflex to direct LDL Non-fasting    Family history of dementia  Mom had what is suspected to be Alzheimer's and patient is wondering if she has the gene. Wants to undergo genetic testing. Neurology referral to Dr. Beard at [facility] clinic for this. Continue exercise. No symptoms of dementia.  - Adult Neurology  Referral    Recurrent major depressive disorder, in partial remission  Has been on medications for many years and is stable. Continue 40 of Celexa and 7.5 Buspar BID.   - citalopram (CELEXA) 40 MG tablet  Dispense: 90 tablet; Refill: 3    BMI 33  Patient is working on weight loss goals which are more attainable now that she is status post hip replacement. Able to move easier. Feels better at a slightly lower body weight. Inquires about  Tirzepatide and semaglutide. Did look at formulary but they were not listed. Instructed to call insurance and inquire about coverage. She would be a candidate for mult co-morbidities. Discussed risks and benefits. Continue exercise.   - Hemoglobin A1c    Primary osteoarthritis involving multiple joints  Causing long standing pain. Status post hip replacement and is considering other areas. Working with Dr. Barnett in orthopedics. Getting injections. Discussed medical cannabis and I completed her certification.   - meloxicam (MOBIC) 15 MG tablet  Dispense: 90 tablet; Refill: 1      Patient has been advised of split billing requirements and indicates understanding: Yes    Counseling  Appropriate preventive services were addressed with this patient via screening, questionnaire, or discussion as appropriate for fall prevention, nutrition, physical activity, Tobacco-use cessation, social engagement, weight loss and cognition.  Checklist reviewing preventive services available has been given to the patient.  Reviewed patient's diet, addressing concerns and/or questions.   She is at risk for lack of exercise and has been provided with information to increase physical activity for the benefit of her well-being.   She is at risk for psychosocial distress and has been provided with information to reduce risk.     Return in about 53 weeks (around 2/16/2026) for Annual Wellness Visit.    Lorena Porras is a 65 year old, presenting for the following:  Annual Visit (AWV)        2/10/2025     1:54 PM   Additional Questions   Roomed by Ronny         2/10/2025   Declines Weight   Did patient decline having their weight taken? Yes         2/10/2025    Information    services provided? 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 She feels that she is doing well overall with regard to her mental health. She is collecting social security  which helps with financial strain related to having her job cut.     Pain  She reports that hip surgery went well. She reports arthritis in her knees and shoulder. She has undergone steroid injections in her knees. She notes that shoulder arthritis is very painful and affects range of motion. Meloxicam has provided good relief and she is requesting a refill.     She is doing gel injections in her knees. She doesn't feel that she is at the point of needing a knee replacement at this time. She is engaged in physical therapy for this. She is exercising regularly.     She takes Meloxicam every other day. Denies any side effects including blood in stool.     Exercise and diet  She is exercising regularly and working on eating well. She has lost some weight as she has been able to exercise more following her hip replacement. She would like to continue to lose weight and has a target weight of 217 lbs as this was a weight she was previously. She is in age appropriate classes at the . She reports that she has been hesitant to eat chicken over concerns of bo flu.     She is wanting to start playing [employer] ball. She has been increasing in exercise capacity overall since her hip replacement. She is also working on balance.     Family history   She reports that her mother's alzheimer's started at age 67, her current age. She has not noticed any memory loss.     Sleep  She reports that she sleeps poorly. She takes Nyquil PM to help her sleep. She is inquiring about medical cannabis to help with shoulder pain and sleep. She notes that she is a side sleeper which aggravates her shoulder pain.     Sexual Health  Reports that she has not had time for sex with partner as they are raising children. They have had children who have had to sleep in their bed for a while but this has now stopped. She is wondering if her libido has declined. She feels that starting medical cannabis will help with sleep and help her relax which may  improve libido.       Health Care Directive  Patient does not have a Health Care Directive: Patient states has Advance Directive and will upload a copy to [email or web address].      2/10/2025   General Health   How would you rate your overall physical health? Good   Feel stress (tense, anxious, or unable to sleep) To some extent   (!) STRESS CONCERN      2/10/2025   Nutrition   Diet: Regular (no restrictions)         2/10/2025   Exercise   Days per week of moderate/strenous exercise 3 days         2/10/2025   Social Factors   Frequency of gathering with friends or relatives Once a week   Worry food won't last until get money to buy more No   Food not last or not have enough money for food? No   Do you have housing? (Housing is defined as stable permanent housing and does not include staying ouside in a car, in a tent, in an abandoned building, in an overnight shelter, or couch-surfing.) Yes   Are you worried about losing your housing? No   Lack of transportation? No   Unable to get utilities (heat,electricity)? No         2/10/2025   Fall Risk   Fallen 2 or more times in the past year? No   Trouble with walking or balance? No          2/10/2025   Activities of Daily Living- Home Safety   Needs help with the following daily activites None of the above   Safety concerns in the home None of the above         2/10/2025   Dental   Dentist two times every year? Yes         2/10/2025   Hearing Screening   Hearing concerns? None of the above         2/10/2025   Driving Risk Screening   Patient/family members have concerns about driving No         2/10/2025   General Alertness/Fatigue Screening   Have you been more tired than usual lately? No         2/10/2025   Urinary Incontinence Screening   Bothered by leaking urine in past 6 months No            Today's PHQ-2 Score:       2/10/2025     1:50 PM   PHQ-2 ( 1999 Pfizer)   Q1: Little interest or pleasure in doing things 0   Q2: Feeling down, depressed or hopeless 0   PHQ-2 Score 0    Q1:  Little interest or pleasure in doing things Not at all   Q2: Feeling down, depressed or hopeless Not at all   PHQ-2 Score 0       Patient-reported           2/10/2025   Substance Use   Alcohol more than 3/day or more than 7/wk No   Do you have a current opioid prescription? No   How severe/bad is pain from 1 to 10? 3/10   Do you use any other substances recreationally? (!) CANNABIS PRODUCTS     Social History     Tobacco Use    Smoking status: Never     Passive exposure: Never    Smokeless tobacco: Never   Vaping Use    Vaping status: Never Used   Substance Use Topics    Alcohol use: Not Currently     Alcohol/week: 2.5 standard drinks of alcohol     Types: 3 Standard drinks or equivalent per week     Comment: 0-2 drinks/week    Drug use: No          Status post top surgery. Mammogram not indicated.       History of abnormal Pap smear: No - age 65 or older with adequate negative prior screening test results (3 consecutive negative cytology results, 2 consecutive negative cotesting results, or 2 consecutive negative HrHPV test results within 10 years, with the most recent test occurring within the recommended screening interval for the test used)        Latest Ref Rng & Units 5/2/2017     1:04 PM 5/2/2017     9:15 AM   PAP / HPV   PAP (Historical)   NIL    HPV 16 DNA NEG Negative     HPV 18 DNA NEG Negative     Other HR HPV NEG Negative       ASCVD Risk   The ASCVD Risk score (Nichol DK, et al., 2019) failed to calculate for the following reasons:    The valid total cholesterol range is 130 to 320 mg/dL        Reviewed and updated as needed this visit by Provider   Tobacco  Allergies  Meds  Problems  Med Hx  Surg Hx  Fam Hx            Past Medical History:   Diagnosis Date    Congenital heart valve 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    Diverticulosis     Gastroesophageal reflux disease     History of blood transfusion 1960    during heart surgery    Hypertension     Keratoconus of both eyes 01/02/2012    Panic  attack 2013    ER Visit     Past Surgical History:   Procedure Laterality Date    ARTHROPLASTY HIP Right 2024    Procedure: Right total hip arthroplasty;  Surgeon: Ayaan Street MD;  Location: [facility] OR    AS REPR PDA BY CLAIRE      age 1    BYPASS GASTRIC MASON-EN-Y, LIVER BIOPSY, COMBINED  2004    CATHETER, ABLATION  1962    cardiac, related to scarring around PDA ligation site     COLONOSCOPY N/A 2017    Procedure: COLONOSCOPY;;  Surgeon: August Perez MD;  Location: [facility]    COLONOSCOPY N/A 05/15/2017    Procedure: COLONOSCOPY;  Colonoscopy/DX:RLQ abdominal pain/2 day prep emailed;  Surgeon: Gold Tamayo MD;  Location: [facility]    HYSTERECTOMY  1993    Partial, fibroids    INCISION AND DRAINAGE BREAST Bilateral 2021    Procedure: INCISION AND DRAINAGE, BREAST. Right breast chest hematoma evacuation.;  Surgeon: Gracie Rapp MD;  Location: [facility] OR    TRANSGENDER MASTECTOMY Bilateral 2020    Procedure: Bilateral Simple Mastectomy, On-Q;  Surgeon: Gracie Rapp MD;  Location: [facility] OR     OB History    Para Term  AB Living   1 0 0 0 1 0   SAB IAB Ectopic Multiple Live Births   0 0 0 0 0      # Outcome Date GA Lbr Salas/2nd Weight Sex Type Anes PTL Lv   1 AB              Current providers sharing in care for this patient include:  Patient Care Team:  Amarilis Agosto MD as PCP - General (Family Practice)  Cedric Degroot MD as MD (Family Medicine - Sports Medicine)  Giacomo Troy as Specialty Care Coordinator (Plastic Surgery)  Gracie Rapp MD as MD (Plastic Surgery)  Amarilis Agosto MD as Assigned PCP  Bloch, Lauren Turner, RP as Pharmacist (Pharmacist)  Seema Dow RP as Pharmacist (Pharmacist)  Tony Barnett DO as Assigned Musculoskeletal Provider    The following health maintenance items are reviewed in Epic and correct as of today:  Health Maintenance   Topic Date Due    DEPRESSION ACTION PLAN  Never done  "   BMP  10/07/2023    LIPID  10/07/2023    PHQ-9  08/10/2025    COVID-19 Vaccine (7 - 2024-25 season) 08/10/2025    MEDICARE ANNUAL WELLNESS VISIT  02/10/2026    FALL RISK ASSESSMENT  02/10/2026    COLORECTAL CANCER SCREENING  05/15/2027    DEXA  09/14/2027    GLUCOSE  09/25/2027    ADVANCE CARE PLANNING  08/21/2029    DTAP/TDAP/TD IMMUNIZATION (2 - Td or Tdap) 02/22/2034    HEPATITIS C SCREENING  Completed    INFLUENZA VACCINE  Completed    Pneumococcal Vaccine: 50+ Years  Completed    ZOSTER IMMUNIZATION  Completed    RSV VACCINE  Completed    HPV IMMUNIZATION  Aged Out    MENINGITIS IMMUNIZATION  Aged Out    MAMMO SCREENING  Discontinued    PAP  Discontinued       This document serves as a record of the services and decisions personally performed and made by Amarilis Agosto MD. It was created on his/her behalf by Hal Draper, a trained medical scribe. The creation of this document is based the provider's statements to the medical scribe.  Scribe Hal Draper 2:46 PM, February 10, 2025      Objective    Exam  /77 (BP Location: Left arm, Patient Position: Sitting, Cuff Size: Adult Large)   Pulse 60   Temp 97.1  F (36.2  C) (Temporal)   Resp 16   Ht 1.753 m (5' 9\")   Wt 104.3 kg (230 lb)   LMP  (LMP Unknown)   SpO2 98%   BMI 33.97 kg/m     Estimated body mass index is 33.97 kg/m  as calculated from the following:    Height as of this encounter: 1.753 m (5' 9\").    Weight as of this encounter: 104.3 kg (230 lb).    Physical Exam  GENERAL: alert and no distress  EYES: Eyes grossly normal to inspection, PERRL and conjunctivae and sclerae normal  HENT: ear canals and TM's normal, nose and mouth without ulcers or lesions  NECK: no adenopathy, no asymmetry, masses, or scars  RESP: lungs clear to auscultation - no rales, rhonchi or wheezes  CV: regular rate and rhythm, normal S1 S2, no S3 or S4, no murmur, click or rub, no peripheral edema  ABDOMEN: soft, nontender, no hepatosplenomegaly, no masses and bowel " sounds normal  MS: no gross musculoskeletal defects noted, no edema. Diminished right shoulder elevation and AB duction.   SKIN: no suspicious lesions or rashes  NEURO: Normal tone, mentation intact and speech normal  PSYCH: mentation appears normal, affect normal/bright        2/10/2025   Mini Cog   Clock Draw Score 2 Normal   3 Item Recall 3 objects recalled   Mini Cog Total Score 5         Signed Electronically by: Amarilis Agosto MD  Spent 38 minutes in addition to preventive care addressing acute and chronic problems.

## 2025-02-10 NOTE — PATIENT INSTRUCTIONS
Neurology referral sent for Alzheimer's. Dr. Francisco Javier Beard at [facility].   Upload Advanced Care Planning documents to #waywire.   Meloxicam refill sent.   Semaglutide and Tirzepatide (Wegovy and Zepbound). Call insurance to see what is covered and message me with results.   Medical cannabis certification started.  Vitamin D and Buspar refilled.   Labs today. Results by #waywire.     Patient Education   Preventive Care Advice   This is general advice given by our system to help you stay healthy. However, your care team may have specific advice just for you. Please talk to your care team about your preventive care needs.  Nutrition  Eat 5 or more servings of fruits and vegetables each day.  Try wheat bread, brown rice and whole grain pasta (instead of white bread, rice, and pasta).  Get enough calcium and vitamin D. Check the label on foods and aim for 100% of the RDA (recommended daily allowance).  Lifestyle  Exercise at least 150 minutes each week  (30 minutes a day, 5 days a week).  Do muscle strengthening activities 2 days a week. These help control your weight and prevent disease.  No smoking.  Wear sunscreen to prevent skin cancer.  Have a dental exam and cleaning every 6 months.  Yearly exams  See your health care team every year to talk about:  Any changes in your health.  Any medicines your care team has prescribed.  Preventive care, family planning, and ways to prevent chronic diseases.  Shots (vaccines)   HPV shots (up to age 26), if you've never had them before.  Hepatitis B shots (up to age 59), if you've never had them before.  COVID-19 shot: Get this shot when it's due.  Flu shot: Get a flu shot every year.  Tetanus shot: Get a tetanus shot every 10 years.  Pneumococcal, hepatitis A, and RSV shots: Ask your care team if you need these based on your risk.  Shingles shot (for age 50 and up)  General health tests  Diabetes screening:  Starting at age 35, Get screened for diabetes at least  every 3 years.  If you are younger than age 35, ask your care team if you should be screened for diabetes.  Cholesterol test: At age 39, start having a cholesterol test every 5 years, or more often if advised.  Bone density scan (DEXA): At age 50, ask your care team if you should have this scan for osteoporosis (brittle bones).  Hepatitis C: Get tested at least once in your life.  STIs (sexually transmitted infections)  Before age 24: Ask your care team if you should be screened for STIs.  After age 24: Get screened for STIs if you're at risk. You are at risk for STIs (including HIV) if:  You are sexually active with more than one person.  You don't use condoms every time.  You or a partner was diagnosed with a sexually transmitted infection.  If you are at risk for HIV, ask about PrEP medicine to prevent HIV.  Get tested for HIV at least once in your life, whether you are at risk for HIV or not.  Cancer screening tests  Cervical cancer screening: If you have a cervix, begin getting regular cervical cancer screening tests starting at age 21.  Breast cancer scan (mammogram): If you've ever had breasts, begin having regular mammograms starting at age 40. This is a scan to check for breast cancer.  Colon cancer screening: It is important to start screening for colon cancer at age 45.  Have a colonoscopy test every 10 years (or more often if you're at risk) Or, ask your provider about stool tests like a FIT test every year or Cologuard test every 3 years.  To learn more about your testing options, visit:   .  For help making a decision, visit:   https://bit.ly/uq22585.  Prostate cancer screening test: If you have a prostate, ask your care team if a prostate cancer screening test (PSA) at age 55 is right for you.  Lung cancer screening: If you are a current or former smoker ages 50 to 80, ask your care team if ongoing lung cancer screenings are right for you.  For informational purposes only. Not to replace the advice of  your health care provider. Copyright   2023 [facility]. All rights reserved. Clinically reviewed by the [facility] Transitions Program. [employer] 666831 - REV 01/24.  Learning About Stress  What is stress?     Stress is your body's response to a hard situation. Your body can have a physical, emotional, or mental response. Stress is a fact of life for most people, and it affects everyone differently. What causes stress for you may not be stressful for someone else.  A lot of things can cause stress. You may feel stress when you go on a job interview, take a test, or run a race. This kind of short-term stress is normal and even useful. It can help you if you need to work hard or react quickly. For example, stress can help you finish an important job on time.  Long-term stress is caused by ongoing stressful situations or events. Examples of long-term stress include long-term health problems, ongoing problems at work, or conflicts in your family. Long-term stress can harm your health.  How does stress affect your health?  When you are stressed, your body responds as though you are in danger. It makes hormones that speed up your heart, make you breathe faster, and give you a burst of energy. This is called the fight-or-flight stress response. If the stress is over quickly, your body goes back to normal and no harm is done.  But if stress happens too often or lasts too long, it can have bad effects. Long-term stress can make you more likely to get sick, and it can make symptoms of some diseases worse. If you tense up when you are stressed, you may develop neck, shoulder, or low back pain. Stress is linked to high blood pressure and heart disease.  Stress also harms your emotional health. It can make you ashley, tense, or depressed. Your relationships may suffer, and you may not do well at work or school.  What can you do to manage stress?  You can try these things to help manage stress:   Do something  active. Exercise or activity can help reduce stress. Walking is a great way to get started. Even everyday activities such as housecleaning or yard work can help.  Try yoga or kya chi. These techniques combine exercise and meditation. You may need some training at first to learn them.  Do something you enjoy. For example, listen to music or go to a movie. Practice your hobby or do volunteer work.  Meditate. This can help you relax, because you are not worrying about what happened before or what may happen in the future.  Do guided imagery. Imagine yourself in any setting that helps you feel calm. You can use online videos, books, or a teacher to guide you.  Do breathing exercises. For example:  From a standing position, bend forward from the waist with your knees slightly bent. Let your arms dangle close to the floor.  Breathe in slowly and deeply as you return to a standing position. Roll up slowly and lift your head last.  Hold your breath for just a few seconds in the standing position.  Breathe out slowly and bend forward from the waist.  Let your feelings out. Talk, laugh, cry, and express anger when you need to. Talking with supportive friends or family, a counselor, or a bay leader about your feelings is a healthy way to relieve stress. Avoid discussing your feelings with people who make you feel worse.  Write. It may help to write about things that are bothering you. This helps you find out how much stress you feel and what is causing it. When you know this, you can find better ways to cope.  What can you do to prevent stress?  You might try some of these things to help prevent stress:  Manage your time. This helps you find time to do the things you want and need to do.  Get enough sleep. Your body recovers from the stresses of the day while you are sleeping.  Get support. Your family, friends, and community can make a difference in how you experience stress.  Limit your news feed. Avoid or limit time on  "social media or news that may make you feel stressed.  Do something active. Exercise or activity can help reduce stress. Walking is a great way to get started.  Where can you learn more?  Go to https://www.Buzzoo.net/patiented  Enter N032 in the search box to learn more about \"Learning About Stress.\"  Current as of: October 24, 2023  Content Version: 14.3    2024 [employer].   Care instructions adapted under license by your healthcare professional. If you have questions about a medical condition or this instruction, always ask your healthcare professional. [employer] disclaims any warranty or liability for your use of this information.    Substance Use Disorder: Care Instructions  Overview     You can improve your life and health by stopping your use of alcohol or drugs. When you don't drink or use drugs, you may feel and sleep better. You may get along better with your family, friends, and coworkers. There are medicines and programs that can help with substance use disorder.  How can you care for yourself at home?  Here are some ways to help you stay sober and prevent relapse.  If you have been given medicine to help keep you sober or reduce your cravings, be sure to take it exactly as prescribed.  Talk to your doctor about programs that can help you stop using drugs or drinking alcohol.  Do not keep alcohol or drugs in your home.  Plan ahead. Think about what you'll say if other people ask you to drink or use drugs. Try not to spend time with people who drink or use drugs.  Use the time and money spent on drinking or drugs to do something that's important to you.  Preventing a relapse  Have a plan to deal with relapse. Learn to recognize changes in your thinking that lead you to drink or use drugs. Get help before you start to drink or use drugs again.  Try to stay away from situations, friends, or places that may lead you to drink or use drugs.  If you feel the need to drink alcohol or " use drugs again, seek help right away. Call a trusted friend or family member. Some people get support from organizations such as Narcotics Anonymous or [employer] or from treatment facilities.  If you relapse, get help as soon as you can. Some people make a plan with another person that outlines what they want that person to do for them if they relapse. The plan usually includes how to handle the relapse and who to notify in case of relapse.  Don't give up. Remember that a relapse doesn't mean that you have failed. Use the experience to learn the triggers that lead you to drink or use drugs. Then quit again. Recovery is a lifelong process. Many people have several relapses before they are able to quit for good.  Follow-up care is a key part of your treatment and safety. Be sure to make and go to all appointments, and call your doctor if you are having problems. It's also a good idea to know your test results and keep a list of the medicines you take.  When should you call for help?   Call 911  anytime you think you may need emergency care. For example, call if you or someone else:    Has overdosed or has withdrawal signs. Be sure to tell the emergency workers that you are or someone else is using or trying to quit using drugs. Overdose or withdrawal signs may include:  Losing consciousness.  Seizure.  Seeing or hearing things that aren't there (hallucinations).     Is thinking or talking about suicide or harming others.   Where to get help 24 hours a day, 7 days a week   If you or someone you know talks about suicide, self-harm, a mental health crisis, a substance use crisis, or any other kind of emotional distress, get help right away. You can:    Call the Suicide and Crisis Lifeline at 8.     Call 0-249-655-TALK (1-877.989.7540).     Text HOME to 786396 to access the Crisis Text Line.   Consider saving these numbers in your phone.  Go to [employer] for more information or to chat online.  Call your  "doctor now or seek immediate medical care if:    You are having withdrawal symptoms. These may include nausea or vomiting, sweating, shakiness, and anxiety.   Watch closely for changes in your health, and be sure to contact your doctor if:    You have a relapse.     You need more help or support to stop.   Where can you learn more?  Go to https://www.Zipt[employer]ix.net/patiented  Enter H573 in the search box to learn more about \"Substance Use Disorder: Care Instructions.\"  Current as of: November 15, 2023  Content Version: 14.3    2024 [employer].   Care instructions adapted under license by your healthcare professional. If you have questions about a medical condition or this instruction, always ask your healthcare professional. [employer] disclaims any warranty or liability for your use of this information.       "

## 2025-02-24 ENCOUNTER — MYC REFILL (OUTPATIENT)
Dept: FAMILY MEDICINE | Facility: CLINIC | Age: 65
End: 2025-02-24
Payer: COMMERCIAL

## 2025-02-24 DIAGNOSIS — I25.10 CORONARY ARTERY DISEASE DUE TO LIPID RICH PLAQUE: ICD-10-CM

## 2025-02-24 DIAGNOSIS — F33.41 RECURRENT MAJOR DEPRESSIVE DISORDER, IN PARTIAL REMISSION: ICD-10-CM

## 2025-02-24 DIAGNOSIS — I10 BENIGN ESSENTIAL HYPERTENSION: ICD-10-CM

## 2025-02-24 DIAGNOSIS — I25.83 CORONARY ARTERY DISEASE DUE TO LIPID RICH PLAQUE: ICD-10-CM

## 2025-02-25 RX ORDER — CITALOPRAM HYDROBROMIDE 40 MG/1
40 TABLET ORAL DAILY
Qty: 90 TABLET | Refills: 3 | OUTPATIENT
Start: 2025-02-25

## 2025-02-25 RX ORDER — ROSUVASTATIN CALCIUM 40 MG/1
40 TABLET, COATED ORAL DAILY
Qty: 90 TABLET | Refills: 3 | Status: SHIPPED | OUTPATIENT
Start: 2025-02-25

## 2025-02-25 RX ORDER — AMLODIPINE BESYLATE 10 MG/1
10 TABLET ORAL DAILY
Qty: 90 TABLET | Refills: 3 | Status: SHIPPED | OUTPATIENT
Start: 2025-02-25

## 2025-02-25 NOTE — TELEPHONE ENCOUNTER
"Citalopram was already sent:    citalopram (CELEXA) 40 MG tablet 90 tablet 3 2/10/2025     Request for medication refill:    Medication Name: amLODIPine (NORVASC) 10 MG tablet     Providers if patient needs an appointment and you are willing to give a one month supply please refill for one month and  send a MyChart using \".SMILLIMITEDREFILL\" .Or route chart to \"P [facility] \" . To call patient and inform of limited refill and providers request to make an appointment. (Giving one month refill in non controlled medications is strongly recommended before denial)    If refill has been denied, meaning absolutely no refills without visit, please complete the smart phrase \".SMIRXREFUSE\" and route it to the \"P [facility] MED REFILLS\"  pool to inform the patient and the pharmacy.    Betsy Vallejo RN      "

## 2025-02-25 NOTE — TELEPHONE ENCOUNTER
"Request for medication refill:    Medication Name:   rosuvastatin (CRESTOR) 40 MG tablet          Providers if patient needs an appointment and you are willing to give a one month supply please refill for one month and  send a MyChart using \".SMILLIMITEDREFILL\" .Or route chart to \"P [facility] \" . To call patient and inform of limited refill and providers request to make an appointment. (Giving one month refill in non controlled medications is strongly recommended before denial)    If refill has been denied, meaning absolutely no refills without visit, please complete the smart phrase \".SMIRXREFUSE\" and route it to the \"P [facility] MED REFILLS\"  pool to inform the patient and the pharmacy.    Betsy Vallejo RN      "

## 2025-03-02 DIAGNOSIS — I10 BENIGN ESSENTIAL HYPERTENSION: ICD-10-CM

## 2025-03-03 RX ORDER — LOSARTAN POTASSIUM 100 MG/1
100 TABLET ORAL DAILY
Qty: 90 TABLET | Refills: 3 | Status: SHIPPED | OUTPATIENT
Start: 2025-03-03

## 2025-03-03 RX ORDER — METOPROLOL SUCCINATE 100 MG/1
100 TABLET, EXTENDED RELEASE ORAL DAILY
Qty: 90 TABLET | Refills: 3 | Status: SHIPPED | OUTPATIENT
Start: 2025-03-03

## 2025-03-03 NOTE — TELEPHONE ENCOUNTER
"Request for medication refill:    Medication Name: losartan (COZAAR) 100 MG tablet     metoprolol succinate ER (TOPROL XL) 100 MG 24 hr tablet     Providers if patient needs an appointment and you are willing to give a one month supply please refill for one month and  send a MyChart using \".SMILLIMITEDREFILL\" .Or route chart to \"P [facility] \" . To call patient and inform of limited refill and providers request to make an appointment. (Giving one month refill in non controlled medications is strongly recommended before denial)    If refill has been denied, meaning absolutely no refills without visit, please complete the smart phrase \".SMIRXREFUSE\" and route it to the \"P [facility] MED REFILLS\"  pool to inform the patient and the pharmacy.    Gaby Rodriguez, CMA      "

## 2025-03-11 ENCOUNTER — MYC MEDICAL ADVICE (OUTPATIENT)
Dept: FAMILY MEDICINE | Facility: CLINIC | Age: 65
End: 2025-03-11
Payer: COMMERCIAL

## 2025-03-11 DIAGNOSIS — E66.09 CLASS 1 OBESITY DUE TO EXCESS CALORIES WITH SERIOUS COMORBIDITY AND BODY MASS INDEX (BMI) OF 33.0 TO 33.9 IN ADULT: Primary | ICD-10-CM

## 2025-03-11 DIAGNOSIS — E66.811 CLASS 1 OBESITY DUE TO EXCESS CALORIES WITH SERIOUS COMORBIDITY AND BODY MASS INDEX (BMI) OF 33.0 TO 33.9 IN ADULT: Primary | ICD-10-CM

## 2025-03-12 PROBLEM — R73.03 PREDIABETES: Status: ACTIVE | Noted: 2025-03-12

## 2025-03-25 ENCOUNTER — TELEPHONE (OUTPATIENT)
Dept: FAMILY MEDICINE | Facility: CLINIC | Age: 65
End: 2025-03-25

## 2025-03-25 NOTE — TELEPHONE ENCOUNTER
Prior Authorization Retail Medication Request    Medication/Dose: semaglutide-weight management (WEGOVY) 0.25 MG/0.5ML pen   Diagnosis and ICD code (if different than what is on RX):  N/A  New/renewal/insurance change PA/secondary ins. PA:  Previously Tried and Failed:  N/A  Rationale:  N/A    Insurance   Primary: BCBS/BCBS OUT OF STATE   Insurance ID:  [id number]     Secondary (if applicable):N/A  Insurance ID:  N/A    Pharmacy Information (if different than what is on RX)  Name:    [facility] #[id number] [address] AT [address] [place] [address] & [facility]     Phone:  [phone]   Fax:[phone]     The insurance gave the patient this number for the preauth process; [phone].      Clinic Information  Preferred routing pool for dept communication: N/A

## 2025-03-26 NOTE — TELEPHONE ENCOUNTER
PA Initiation    Medication: WEGOVY 0.25 MG/0.5ML SC SOAJ  Insurance Company: [employer] - Phone [phone] Fax [phone]  Pharmacy Filling the Rx: [id number] [address] AT [address] [place] [address]  Filling Pharmacy Phone: [phone]  Filling Pharmacy Fax:    Start Date: 3/26/2025

## 2025-03-27 NOTE — TELEPHONE ENCOUNTER
PRIOR AUTHORIZATION DENIED    Medication: WEGOVY 0.25 MG/0.5ML SC SOAJ  Insurance Company: [employer] - Phone [phone] Fax [phone]  Denial Date: 3/27/2025  Denial Reason(s): Criteria not met      Appeal Information:   Patient Notified: No

## 2025-04-03 ENCOUNTER — TELEPHONE (OUTPATIENT)
Dept: NEUROLOGY | Facility: CLINIC | Age: 65
End: 2025-04-03

## 2025-04-03 DIAGNOSIS — M19.011 OSTEOARTHRITIS OF GLENOHUMERAL JOINT, RIGHT: Primary | ICD-10-CM

## 2025-04-03 NOTE — TELEPHONE ENCOUNTER
Reached out to pt to reschedule visit with Dr. Beard, however, unable to reach pt and voicemail box not set up yet. Sent [employer]alister message letting pt know that her appt on 9-5 was rescheduled to 9-3.

## 2025-04-04 ENCOUNTER — ANCILLARY PROCEDURE (OUTPATIENT)
Dept: GENERAL RADIOLOGY | Facility: CLINIC | Age: 65
End: 2025-04-04
Attending: STUDENT IN AN ORGANIZED HEALTH CARE EDUCATION/TRAINING PROGRAM
Payer: COMMERCIAL

## 2025-04-04 DIAGNOSIS — M19.011 OSTEOARTHRITIS OF GLENOHUMERAL JOINT, RIGHT: ICD-10-CM

## 2025-04-04 PROCEDURE — 73030 X-RAY EXAM OF SHOULDER: CPT | Mod: RT | Performed by: RADIOLOGY

## 2025-04-05 DIAGNOSIS — E66.09 CLASS 1 OBESITY DUE TO EXCESS CALORIES WITH SERIOUS COMORBIDITY AND BODY MASS INDEX (BMI) OF 33.0 TO 33.9 IN ADULT: ICD-10-CM

## 2025-04-05 DIAGNOSIS — E66.811 CLASS 1 OBESITY DUE TO EXCESS CALORIES WITH SERIOUS COMORBIDITY AND BODY MASS INDEX (BMI) OF 33.0 TO 33.9 IN ADULT: ICD-10-CM

## 2025-04-07 RX ORDER — SEMAGLUTIDE 0.25 MG/.5ML
INJECTION, SOLUTION SUBCUTANEOUS
Qty: 2 ML | Refills: 0 | Status: SHIPPED | OUTPATIENT
Start: 2025-04-07

## 2025-04-29 NOTE — TELEPHONE ENCOUNTER
Medication Appeal Initiation    Medication: WEGOVY 0.25 MG/0.5ML SC SOAJ  Appeal Start Date:  4/29/2025  Insurance Company: [employer]  Insurance Phone: [phone]  Insurance Fax: [phone]  Comments:   faxed

## 2025-04-30 NOTE — TELEPHONE ENCOUNTER
MEDICATION APPEAL APPROVED    Medication: WEGOVY 0.25 MG/0.5ML SC SOAJ  Authorization Effective Date: 2/28/2025  Authorization Expiration Date: 10/28/2025  Approved Dose/Quantity: 2/28  Reference #: [id number]   Appeal Insurance Company: [employer]  Expected CoPay: $       CoPay Card Available:    Financial Assistance Needed:   Filling Pharmacy: [facility] #[id number] [address] AT [address] [place] [address] & [facility]  Patient Notified: Yes  Comments:

## 2025-06-02 ENCOUNTER — OFFICE VISIT (OUTPATIENT)
Dept: FAMILY MEDICINE | Facility: CLINIC | Age: 65
End: 2025-06-02
Payer: COMMERCIAL

## 2025-06-02 ENCOUNTER — RESULTS FOLLOW-UP (OUTPATIENT)
Dept: FAMILY MEDICINE | Facility: CLINIC | Age: 65
End: 2025-06-02

## 2025-06-02 VITALS
SYSTOLIC BLOOD PRESSURE: 146 MMHG | TEMPERATURE: 97.6 F | DIASTOLIC BLOOD PRESSURE: 85 MMHG | WEIGHT: 230 LBS | BODY MASS INDEX: 34.07 KG/M2 | OXYGEN SATURATION: 100 % | HEART RATE: 88 BPM | RESPIRATION RATE: 16 BRPM | HEIGHT: 69 IN

## 2025-06-02 DIAGNOSIS — L81.9 DISCOLORATION OF SKIN OF LOWER LEG: ICD-10-CM

## 2025-06-02 DIAGNOSIS — M15.0 PRIMARY OSTEOARTHRITIS INVOLVING MULTIPLE JOINTS: ICD-10-CM

## 2025-06-02 DIAGNOSIS — I10 BENIGN ESSENTIAL HYPERTENSION: ICD-10-CM

## 2025-06-02 DIAGNOSIS — R22.42 SUBCUTANEOUS MASS OF LEFT LOWER EXTREMITY: Primary | ICD-10-CM

## 2025-06-02 LAB
BASOPHILS # BLD AUTO: 0 10E3/UL (ref 0–0.2)
BASOPHILS NFR BLD AUTO: 1 %
CRP SERPL-MCNC: <3 MG/L
EOSINOPHIL # BLD AUTO: 0.1 10E3/UL (ref 0–0.7)
EOSINOPHIL NFR BLD AUTO: 2 %
ERYTHROCYTE [DISTWIDTH] IN BLOOD BY AUTOMATED COUNT: 15.7 % (ref 10–15)
ERYTHROCYTE [SEDIMENTATION RATE] IN BLOOD BY WESTERGREN METHOD: 31 MM/HR (ref 0–30)
HCT VFR BLD AUTO: 36.8 % (ref 35–53)
HGB BLD-MCNC: 10.8 G/DL (ref 11.7–17.7)
IMM GRANULOCYTES # BLD: 0 10E3/UL
IMM GRANULOCYTES NFR BLD: 0 %
LYMPHOCYTES # BLD AUTO: 1.2 10E3/UL (ref 0.8–5.3)
LYMPHOCYTES NFR BLD AUTO: 34 %
MCH RBC QN AUTO: 25.7 PG (ref 26.5–33)
MCHC RBC AUTO-ENTMCNC: 29.3 G/DL (ref 31.5–36.5)
MCV RBC AUTO: 87 FL (ref 78–100)
MONOCYTES # BLD AUTO: 0.5 10E3/UL (ref 0–1.3)
MONOCYTES NFR BLD AUTO: 12 %
NEUTROPHILS # BLD AUTO: 1.9 10E3/UL (ref 1.6–8.3)
NEUTROPHILS NFR BLD AUTO: 51 %
PLATELET # BLD AUTO: 207 10E3/UL (ref 150–450)
RBC # BLD AUTO: 4.21 10E6/UL (ref 3.8–5.9)
WBC # BLD AUTO: 3.7 10E3/UL (ref 4–11)

## 2025-06-02 PROCEDURE — 85025 COMPLETE CBC W/AUTO DIFF WBC: CPT

## 2025-06-02 PROCEDURE — 36415 COLL VENOUS BLD VENIPUNCTURE: CPT

## 2025-06-02 PROCEDURE — 3077F SYST BP >= 140 MM HG: CPT

## 2025-06-02 PROCEDURE — 3079F DIAST BP 80-89 MM HG: CPT

## 2025-06-02 PROCEDURE — 86140 C-REACTIVE PROTEIN: CPT

## 2025-06-02 PROCEDURE — 99214 OFFICE O/P EST MOD 30 MIN: CPT | Mod: GC

## 2025-06-02 PROCEDURE — 85652 RBC SED RATE AUTOMATED: CPT

## 2025-06-02 RX ORDER — MELOXICAM 15 MG/1
15 TABLET ORAL DAILY PRN
Qty: 90 TABLET | Refills: 1 | Status: SHIPPED | OUTPATIENT
Start: 2025-06-02

## 2025-06-02 RX ORDER — METOPROLOL SUCCINATE 100 MG/1
100 TABLET, EXTENDED RELEASE ORAL DAILY
Qty: 90 TABLET | Refills: 3 | Status: SHIPPED | OUTPATIENT
Start: 2025-06-02

## 2025-06-02 NOTE — PROGRESS NOTES
"  Assessment & Plan     Subcutaneous mass of left lower extremity  Discoloration of skin of lower leg  Presenting with a few months of pain associated with mass of left leg proximal to the knee and new onset skin discoloration for the past 2-3 months. Had previous episode of this about 15 months ago during which US and CT showed \"prominent subcutaneous fat without focal encapsulated fatty mass/lipoma\". Xray at urgent care two days ago showed no bone involvement. No new injuries or trauma to the area. Severely impacts her ability to walk and causes severe pain. No systemic symptoms or pain above/below the mass. On exam, extension of left knee is limited due to pain but otherwise neurologically intact. Mass is warm and soft to palpation and tender to deep palpation. Skin discoloration is smooth without any open wounds. Unclear etiology at this time but differential includes fat necrosis, soft tissue infection, lipoma, and less likely necrotizing fasciitis or septic arthritis. No concerns for necrotizing fasciitis given prolonged duration of symptoms and lack of systemic symptoms. Unlikely to be septic arthritis as pain is not in knee joint and no systemic symptoms. Will order inflammatory markers and STAT MRI to further evaluation.   - CRP inflammation; Future  - Erythrocyte sedimentation rate auto; Future  - CBC with platelets differential; Future  - MR Femur Thigh Left wo & w Contrast; Future    Primary osteoarthritis involving multiple joints  Stable, needs refill.   - meloxicam (MOBIC) 15 MG tablet; Take 1 tablet (15 mg) by mouth daily as needed for pain.    Benign essential hypertension  Elevated today. Will refill and recommend follow up specifically focused on BP.  - metoprolol succinate ER (TOPROL XL) 100 MG 24 hr tablet; Take 1 tablet (100 mg) by mouth daily.      Subjective   Chiquita is a 65 year old, presenting for the following health issues:  Pain (Pain and discoloration in left leg. Pain over a month, pain " "has gotten worst since last appt. Pain scale 9 ) and Medication Request (fiber)        6/2/2025    10:15 AM   Additional Questions   Roomed by Myranda   Accompanied by self         6/2/2025   Declines Weight   Did patient decline having their weight taken? Yes         6/2/2025    Information    services provided? No     HPI      Left knee discoloration  - no new injuries or trauma  - seen at  on Saturday, normal xray   - pain is constant, worse w/ movement, impacting walking, stabbing pain, tender to palpation   - eval about a year ago with CT and US, no masses found, plan was to continue w/ observation   - pain started a couple months ago and now new discoloration started about 2-3 weeks ago that has progressing and moving proximally from knee  - initially discoloration was brown and now there is a light patch on the side  - meloxicam isn't helping with the pain   - no systemic symptoms = fever, chills, chest pain, shortness of breath  - no pain above or below the bruising       - Started wegovy yesterday and wondering about fiber supplement  - Needs refills of meloxicam and metoprolol      Objective    BP (!) 146/85   Pulse 88   Temp 97.6  F (36.4  C) (Temporal)   Resp 16   Ht 1.753 m (5' 9\")   Wt 104.3 kg (230 lb)   LMP  (LMP Unknown)   SpO2 100%   BMI 33.97 kg/m    Body mass index is 33.97 kg/m .  Physical Exam  Constitutional:       Appearance: Normal appearance.   HENT:      Head: Normocephalic.   Pulmonary:      Effort: Pulmonary effort is normal.   Musculoskeletal:      Right upper leg: Normal.      Left upper leg: Swelling, edema and tenderness present.      Comments: Soft, tender mass proximal to left knee.    Skin:     Comments: Area of hyperpigmentation with central hypopigmentation on the lateral aspect of the left distal thigh (see image below).   Neurological:      Mental Status: She is alert.      Gait: Gait abnormal.      Comments: Symmetric patellar reflexes. Decreased " strength with left knee extension compared to right. Symmetric plantar and dorsiflexion bilaterally.    Psychiatric:         Mood and Affect: Mood normal.         Behavior: Behavior normal.            Lashawn Andrew, MS4  [employer] [facility] [employer] [facility] [employer]  June 2, 2025 10:39 AM     I was present with the medical student who participated in the service and in the documentation of this note. I have verified the history and personally performed the physical exam and medical decision making, and have verified the content of the note, which accurately reflects my assessment of the patient and the plan of care.   Dee Dee Akbar DO, MPH

## 2025-06-02 NOTE — PATIENT INSTRUCTIONS
Patient Education   Here is the plan from today's visit    1. Subcutaneous mass of left lower extremity (Primary)  - CRP inflammation; Future  - Erythrocyte sedimentation rate auto; Future  - CBC with platelets differential; Future  - MR Femur Thigh Left wo & w Contrast; Future  - CRP inflammation  - Erythrocyte sedimentation rate auto  - CBC with platelets differential    2. Discoloration of skin of lower leg    3. Primary osteoarthritis involving multiple joints  - meloxicam (MOBIC) 15 MG tablet; Take 1 tablet (15 mg) by mouth daily as needed for pain.  Dispense: 90 tablet; Refill: 1    4. Benign essential hypertension  - metoprolol succinate ER (TOPROL XL) 100 MG 24 hr tablet; Take 1 tablet (100 mg) by mouth daily.  Dispense: 90 tablet; Refill: 3    Please call or return to clinic if your symptoms don't go away.    Follow up plan  Return in about 4 weeks (around 6/30/2025).    Thank you for coming to [facility] today.  Lab Testing:  **If you had lab testing today and your results are reassuring or normal they will be mailed to you or sent through [employer] within 7 days.   **If the lab tests need quick action we will call you with the results.  **If you are having labs done on a different day, please call [phone] to schedule at [facility] or [phone] for other [facility] locations. Labs do not offer walk-in appointments.  The phone number we will call with results is # [phone] (home) . If this is not the best number please call our clinic and change the number.  Medication Refills:  If you need any refills please call your pharmacy and they will contact us.   If you need to  your refill at a new pharmacy, please contact the new pharmacy directly. The new pharmacy will help you get your medications transferred faster.   Scheduling:  If you have any concerns about today's visit or wish to schedule another appointment please call our office during normal business hours  [phone] (8-5:00 M-F). If you can no longer make a scheduled visit, please cancel via [employer] or call us to cancel.   If a referral was made to an [facility] [place] specialty provider and you do not get a call from central scheduling, please refer to directions on your visit summary or call our office during normal business hours for assistance.   If a Mammogram was ordered for you at the [facility] call [phone] to schedule or change your appointment.  If you had an XRay/CT/Ultrasound/MRI ordered the number is [phone] to schedule or change your radiology appointment.   [facility] has limited ultrasound appointments available on Wednesdays, if you would like your ultrasound at [facility], please call [phone] to schedule.   Medical Concerns:  If you have urgent medical concerns please call [phone] at any time of the day.    Dee Dee Akbar, DO

## 2025-06-03 NOTE — PROGRESS NOTES
Preceptor Attestation:   Patient seen, evaluated and discussed with the resident. I have verified the content of the note, which accurately reflects my assessment of the patient and the plan of care.   Supervising Physician:  Scott Graham MD

## 2025-06-10 ENCOUNTER — ANCILLARY PROCEDURE (OUTPATIENT)
Dept: MRI IMAGING | Facility: CLINIC | Age: 65
End: 2025-06-10
Attending: FAMILY MEDICINE
Payer: COMMERCIAL

## 2025-06-10 DIAGNOSIS — R22.42 SUBCUTANEOUS MASS OF LEFT LOWER EXTREMITY: ICD-10-CM

## 2025-06-10 PROCEDURE — 73720 MRI LWR EXTREMITY W/O&W/DYE: CPT | Mod: 26 | Performed by: RADIOLOGY

## 2025-06-10 PROCEDURE — 73720 MRI LWR EXTREMITY W/O&W/DYE: CPT | Mod: LT

## 2025-06-10 PROCEDURE — 255N000002 HC RX 255 OP 636: Performed by: FAMILY MEDICINE

## 2025-06-10 PROCEDURE — A9585 GADOBUTROL INJECTION: HCPCS | Performed by: FAMILY MEDICINE

## 2025-06-10 RX ORDER — GADOBUTROL 604.72 MG/ML
11 INJECTION INTRAVENOUS ONCE
Status: COMPLETED | OUTPATIENT
Start: 2025-06-10 | End: 2025-06-10

## 2025-06-10 RX ADMIN — GADOBUTROL 11 ML: 604.72 INJECTION INTRAVENOUS at 08:51

## 2025-06-17 ENCOUNTER — TELEPHONE (OUTPATIENT)
Dept: SURGERY | Facility: CLINIC | Age: 65
End: 2025-06-17
Payer: COMMERCIAL

## 2025-06-17 NOTE — TELEPHONE ENCOUNTER
[facility] Call Center    Phone Message    May a detailed message be left on voicemail: yes     Reason for Call: Appointment Intake      Referring Provider Name: Dee Dee AkbarDO     Diagnosis and/or Symptoms: Appears to have panniculitis of unknown etiology on her left lateral thigh. Extremely painful. CRP, ESR, CBC negative for infxn. MRI only shows diffuse fatty prominence. Given persistence of pain affecting IDAL, request a biopsy for histopath eval.       Panniculitis is only on guidelines for Plastic Surgery (tummy tuck). PT reported seeking biopsy of thigh? Sending for review - thank you!     Action Taken: Message routed to:  [facility] Gen Surg     Travel Screening: Not Applicable     Date of Service:

## 2025-06-23 ENCOUNTER — MYC MEDICAL ADVICE (OUTPATIENT)
Dept: FAMILY MEDICINE | Facility: CLINIC | Age: 65
End: 2025-06-23
Payer: COMMERCIAL

## 2025-06-23 DIAGNOSIS — E66.09 CLASS 1 OBESITY DUE TO EXCESS CALORIES WITH SERIOUS COMORBIDITY AND BODY MASS INDEX (BMI) OF 33.0 TO 33.9 IN ADULT: Primary | ICD-10-CM

## 2025-06-23 DIAGNOSIS — E66.811 CLASS 1 OBESITY DUE TO EXCESS CALORIES WITH SERIOUS COMORBIDITY AND BODY MASS INDEX (BMI) OF 33.0 TO 33.9 IN ADULT: Primary | ICD-10-CM

## 2025-06-23 DIAGNOSIS — I10 HYPERTENSION, UNSPECIFIED TYPE: ICD-10-CM

## 2025-06-23 DIAGNOSIS — R73.03 PREDIABETES: ICD-10-CM

## 2025-06-25 ENCOUNTER — TELEPHONE (OUTPATIENT)
Dept: DERMATOLOGY | Facility: CLINIC | Age: 65
End: 2025-06-25
Payer: COMMERCIAL

## 2025-06-25 ENCOUNTER — TELEPHONE (OUTPATIENT)
Dept: FAMILY MEDICINE | Facility: CLINIC | Age: 65
End: 2025-06-25

## 2025-06-25 NOTE — TELEPHONE ENCOUNTER
6/26 Patient confirmed scheduled appointment:  Date: 07/10/2026  Time: 2:10 PM  Visit type: New Autoimmune Dermatology  Provider: Elizabeth  Location: [facility]  Testing/imaging: na  Additional notes: Moise Brown         6/25 called for the patient to schedule the following:    Appointment type: New Autoimmune Dermatology  Provider: Elizabeth  Return date: Next Available  Specialty phone number: [phone]  Additional appointment(s) needed: na  Additonal Notes: Moise Brown

## 2025-06-25 NOTE — TELEPHONE ENCOUNTER
Appointment has been taken since original message. Next available option would be 07/10 at 2:10pm    Stephanie JOHNSON CMA

## 2025-07-10 ENCOUNTER — OFFICE VISIT (OUTPATIENT)
Dept: DERMATOLOGY | Facility: CLINIC | Age: 65
End: 2025-07-10
Payer: COMMERCIAL

## 2025-07-10 ENCOUNTER — PRE VISIT (OUTPATIENT)
Dept: DERMATOLOGY | Facility: CLINIC | Age: 65
End: 2025-07-10

## 2025-07-10 ENCOUNTER — LAB (OUTPATIENT)
Dept: LAB | Facility: CLINIC | Age: 65
End: 2025-07-10
Payer: COMMERCIAL

## 2025-07-10 DIAGNOSIS — R21 RASH: ICD-10-CM

## 2025-07-10 DIAGNOSIS — R21 RASH: Primary | ICD-10-CM

## 2025-07-10 DIAGNOSIS — R21 RASH AND NONSPECIFIC SKIN ERUPTION: ICD-10-CM

## 2025-07-10 LAB
ALBUMIN SERPL BCG-MCNC: 3.8 G/DL (ref 3.5–5.2)
ALP SERPL-CCNC: 78 U/L (ref 40–150)
ALT SERPL W P-5'-P-CCNC: 46 U/L (ref 0–70)
ANION GAP SERPL CALCULATED.3IONS-SCNC: 9 MMOL/L (ref 7–15)
AST SERPL W P-5'-P-CCNC: 61 U/L (ref 0–45)
BASOPHILS # BLD AUTO: 0 10E3/UL (ref 0–0.2)
BASOPHILS NFR BLD AUTO: 1 %
BILIRUB SERPL-MCNC: 0.3 MG/DL
BUN SERPL-MCNC: 24.7 MG/DL (ref 8–23)
CALCIUM SERPL-MCNC: 9.5 MG/DL (ref 8.8–10.4)
CHLORIDE SERPL-SCNC: 109 MMOL/L (ref 98–107)
CK SERPL-CCNC: 97 U/L (ref 26–308)
CREAT SERPL-MCNC: 0.86 MG/DL (ref 0.51–1.17)
CRP SERPL-MCNC: <3 MG/L
EGFRCR SERPLBLD CKD-EPI 2021: 74 ML/MIN/1.73M2
EOSINOPHIL # BLD AUTO: 0.1 10E3/UL (ref 0–0.7)
EOSINOPHIL NFR BLD AUTO: 2 %
ERYTHROCYTE [DISTWIDTH] IN BLOOD BY AUTOMATED COUNT: 16.3 % (ref 10–15)
ERYTHROCYTE [SEDIMENTATION RATE] IN BLOOD BY WESTERGREN METHOD: 27 MM/HR (ref 0–30)
GLUCOSE SERPL-MCNC: 115 MG/DL (ref 70–99)
HCO3 SERPL-SCNC: 25 MMOL/L (ref 22–29)
HCT VFR BLD AUTO: 34.5 % (ref 35–53)
HGB BLD-MCNC: 10.4 G/DL (ref 11.7–17.7)
IMM GRANULOCYTES # BLD: 0 10E3/UL
IMM GRANULOCYTES NFR BLD: 0 %
LYMPHOCYTES # BLD AUTO: 1.4 10E3/UL (ref 0.8–5.3)
LYMPHOCYTES NFR BLD AUTO: 39 %
MCH RBC QN AUTO: 26.2 PG (ref 26.5–33)
MCHC RBC AUTO-ENTMCNC: 30.1 G/DL (ref 31.5–36.5)
MCV RBC AUTO: 87 FL (ref 78–100)
MONOCYTES # BLD AUTO: 0.4 10E3/UL (ref 0–1.3)
MONOCYTES NFR BLD AUTO: 11 %
NEUTROPHILS # BLD AUTO: 1.7 10E3/UL (ref 1.6–8.3)
NEUTROPHILS NFR BLD AUTO: 48 %
NRBC # BLD AUTO: 0 10E3/UL
NRBC BLD AUTO-RTO: 0 /100
PLATELET # BLD AUTO: 207 10E3/UL (ref 150–450)
POTASSIUM SERPL-SCNC: 3.4 MMOL/L (ref 3.4–5.3)
PROT SERPL-MCNC: 6.7 G/DL (ref 6.4–8.3)
RBC # BLD AUTO: 3.97 10E6/UL (ref 3.8–5.9)
SODIUM SERPL-SCNC: 143 MMOL/L (ref 135–145)
WBC # BLD AUTO: 3.6 10E3/UL (ref 4–11)

## 2025-07-10 PROCEDURE — 88305 TISSUE EXAM BY PATHOLOGIST: CPT | Mod: TC | Performed by: DERMATOLOGY

## 2025-07-10 PROCEDURE — 88313 SPECIAL STAINS GROUP 2: CPT | Mod: 26 | Performed by: DERMATOLOGY

## 2025-07-10 PROCEDURE — 88305 TISSUE EXAM BY PATHOLOGIST: CPT | Mod: 26 | Performed by: DERMATOLOGY

## 2025-07-10 ASSESSMENT — PAIN SCALES - GENERAL: PAINLEVEL_OUTOF10: MODERATE PAIN (6)

## 2025-07-10 NOTE — PROGRESS NOTES
[facility] Dermatology Note  Encounter Date: Jul 10, 2025    Dermatology Problem List:  # Rash, unspecified   - ddx: lupus panniculitis, myositis, dermatomyositis, lichen sclerosus, morphea, liposarcoma    Impression/Plan:  # Rash of unspecified behavior   The affected area discoloration with no surface change or scale. The area is soft with tenderness to deep palpation. MRI shows diffuse subcutaneous fat prominence especially laterally without  discrete mass, fluid collection or encapsulated fat. Consider lupus panniculitis for the possibility of traumatic fat necrosis in the area of pain.  Differential diagnosis also includes myositis, dermatomyositis, lichen sclerosus, morphea, or liposarcoma though with the latter, we would expect to feel more induration and firmness of the subcutaneous tissue.  Will perform a punch biopsy making sure to obtain deep cutaneous tissue.  Patient was agreeable with this recommendation. Inflammatory lab work up pending  - s/p 4 mm punch biopsy   - Labs pending: CBC with diff, CMP, ck, aldolase, crp, esr   Follow-up 3 months     Staff and Resident Involved:  Sabrina Farrell MD PGY-3  [facility]     Dr. Johnson staffed the patient.    Staff Physician Comments:   I saw and evaluated the patient with the resident and I edited the assessment and plan as documented in the note. I was present for the key portions of the above major procedure and examination.    Nick Johnson MD   of Dermatology  Department of Dermatology  [facility]          CC:   Chief Complaint   Patient presents with    Derm Problem     Has area of concern on left thigh x2 months.      History of Present Illness:  Ms. Chiquita Butler is a very pleasant 65 year old adult who presents to clinic for evaluation of a new painful rash on the left lateral thigh.  Patient states about 3 months ago she developed pain in the lateral left  thigh with a subsequent rash that has progressed from the knee to the upper lateral thigh. MRI showed a diffuse subcutaneous fat prominence with a discrete mass. Patient endorses pain with active and movement of the left leg. MRI results are provided below.     Exam: MR FEMUR THIGH LEFT W/O & W CONTRAST   IMPRESSION:  Images include just proximal to the level of the left knee joint line.  Of note, superficial part of lateral hip/proximal to mid thigh are  collimated off the image.  1. Diffuse subcutaneous fat prominence especially laterally without  discrete mass, fluid collection or encapsulated fat along the lateral  thigh especially proximal to the knee joint line.  2. Degenerative changes of the knee including grade IV chondromalacia patella femoral compartment.     Physical exam:  Vitals: LMP  (LMP Unknown)   SKIN: Focused examination of the bilateral lower legs was performed.  - ill defined dark soft brown patch with areas hypopigmentation and tenderness to deep palpation.     Past Medical History:   Patient Active Problem List   Diagnosis    Keratoconus of both eyes    Hypertension    MDD (major depressive disorder)    Vitamin D deficiency    H/O gastric bypass    Congenital heart valve abnormality    Gender dysphoria in adult    BMI 33 associated with prediabetes, HTN    Hematoma of skin    Palpitations    Chest tightness    Tinnitus, bilateral    Recurrent major depressive disorder, in partial remission    Chronic right shoulder pain    ACP (advance care planning)    Localized edema    Primary osteoarthritis of right shoulder    S/P total hip arthroplasty    Prediabetes     Past Medical History:   Diagnosis Date    Congenital heart valve 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    Diverticulosis     Gastroesophageal reflux disease     History of blood transfusion 1960    during heart surgery    Hypertension     Keratoconus of both eyes 01/02/2012    Panic attack 12/17/2013    ER Visit     Past Surgical History:   Procedure  Laterality Date    ARTHROPLASTY HIP Right 2024    Procedure: Right total hip arthroplasty;  Surgeon: Ayaan Street MD;  Location: [facility] OR    AS REPR PDA BY CLAIRE  1960    age 1    BYPASS GASTRIC MASON-EN-Y, LIVER BIOPSY, COMBINED  2004    CATHETER, ABLATION  1962    cardiac, related to scarring around PDA ligation site     COLONOSCOPY N/A 2017    Procedure: COLONOSCOPY;;  Surgeon: August Perez MD;  Location: [facility]    COLONOSCOPY N/A 05/15/2017    Procedure: COLONOSCOPY;  Colonoscopy/DX:RLQ abdominal pain/2 day prep emailed;  Surgeon: Gold Tamayo MD;  Location: [facility]    HYSTERECTOMY  1993    Partial, fibroids    INCISION AND DRAINAGE BREAST Bilateral 2021    Procedure: INCISION AND DRAINAGE, BREAST. Right breast chest hematoma evacuation.;  Surgeon: Gracie Rapp MD;  Location: [facility]    TRANSGENDER MASTECTOMY Bilateral 2020    Procedure: Bilateral Simple Mastectomy, On-Q;  Surgeon: Gracie Rapp MD;  Location: [facility] OR       Social History:  Patient reports that she has never smoked. She has never been exposed to tobacco smoke. She has never used smokeless tobacco. She reports that she does not currently use alcohol after a past usage of about 2.5 standard drinks of alcohol per week. She reports that she does not use drugs.    Family History:  Family History   Problem Relation Age of Onset    Alzheimer Disease Mother 67         78    Myocardial Infarction Father 49            C.A.D. Father     Diabetes Sister     Eye Disorder Sister     Glaucoma Maternal Grandmother     Colorectal Cancer Maternal Grandmother         95, pancreatic    Cerebrovascular Disease Paternal Grandmother         80's    Depression Other         multiple siblings    Breast Cancer No family hx of        Medications:  Current Outpatient Medications   Medication Sig Dispense Refill    acetaminophen (TYLENOL) 325 MG tablet Take 2 tablets (650 mg) by mouth  every 4 hours as needed for other (mild pain). 100 tablet 0    amLODIPine (NORVASC) 10 MG tablet Take 1 tablet (10 mg) by mouth daily. 90 tablet 3    busPIRone (BUSPAR) 7.5 MG tablet TAKE 1 TABLET(7.5 MG) BY MOUTH TWICE DAILY 180 tablet 3    citalopram (CELEXA) 40 MG tablet Take 1 tablet (40 mg) by mouth daily. 90 tablet 3    ibuprofen (ADVIL/MOTRIN) 600 MG tablet Take 1 tablet (600 mg) by mouth every 6 hours as needed for mild pain. 30 tablet 0    losartan (COZAAR) 100 MG tablet TAKE 1 TABLET(100 MG) BY MOUTH DAILY 90 tablet 3    meloxicam (MOBIC) 15 MG tablet Take 1 tablet (15 mg) by mouth daily as needed for pain. 90 tablet 1    metoprolol succinate ER (TOPROL XL) 100 MG 24 hr tablet Take 1 tablet (100 mg) by mouth daily. 90 tablet 3    multivitamin w/minerals (THERA-VIT-M) tablet Take 1 tablet by mouth daily      polyethylene glycol (MIRALAX) 17 g packet Take 17 g by mouth daily. 7 packet 0    rosuvastatin (CRESTOR) 40 MG tablet Take 1 tablet (40 mg) by mouth daily. 90 tablet 3    Semaglutide-Weight Management (WEGOVY) 0.5 MG/0.5ML pen Inject 0.5 mg subcutaneously once a week. 2 mL 0    senna-docusate (SENOKOT-S/PERICOLACE) 8.6-50 MG tablet Take 1-2 tablets by mouth 2 times daily. Take while on oral narcotics to prevent or treat constipation. 30 tablet 0    vitamin D3 (CHOLECALCIFEROL) 50 mcg (2000 units) tablet Take 1 tablet (50 mcg) by mouth daily. 100 tablet 3    WEGOVY 0.25 MG/0.5ML pen ADMINISTER 0.25 MG UNDER THE SKIN 1 TIME A WEEK 2 mL 0      No Known Allergies    CC No referring provider defined for this encounter. on close of this encounter.

## 2025-07-10 NOTE — NURSING NOTE
Lidocaine-epinephrine 1-1:229920 % injection   1mL once for one use, starting 7/10/2025 ending 7/10/2025,  2mL disp, R-0, injection  Injected by Stephanie JOHNSON CMA

## 2025-07-10 NOTE — PATIENT INSTRUCTIONS
Wound Care After a Biopsy    What is a skin biopsy?  A skin biopsy allows the doctor to examine a very small piece of tissue under the microscope to determine the diagnosis and the best treatment for the skin condition. A local anesthetic (numbing medicine) is injected with a very small needle into the skin area to be tested. A small piece of skin is taken from the area. Sometimes a suture (stitch) is used.     What are the risks of a skin biopsy?  I will experience scar, bleeding, swelling, pain, crusting and redness. I may experience incomplete removal or recurrence. Risks of this procedure are excessive bleeding, bruising, infection, nerve damage, numbness, thick (hypertrophic or keloidal) scar and non-diagnostic biopsy.    How should I care for my wound for the first 24 hours?  Keep the wound dry and covered for 24 hours  If it bleeds, hold direct pressure on the area for 15 minutes. If bleeding does not stop, call us or go to the emergency room  Avoid strenuous exercise the first 1-2 days or as your doctor instructs you    How should I care for the wound after 24 hours?  After 24 hours, remove the bandage  You may bathe or shower as normal  If you had a scalp biopsy, you can shampoo as usual and can use shower water to clean the biopsy site daily  Clean the wound once a day with gentle soap and water  Do not scrub, be gentle  Apply white petroleum/Vaseline after cleaning the wound with a cotton swab or a clean finger, and keep the site covered with a Bandaid /bandage. Bandages are not necessary with a scalp biopsy  If you are unable to cover the site with a Bandaid /bandage, re-apply ointment 2-3 times a day to keep the site moist. Moisture will help with healing  Avoid strenuous activity for first 1-2 days  Avoid lakes, rivers, pools, and oceans until the stitches are removed or the site is healed    How do I clean my wound?  Wash hands thoroughly with soap or use hand  before all wound care  Clean  the wound with gentle soap and water  Apply white petroleum/Vaseline  to wound after it is clean  Replace the Bandaid /bandage to keep the wound covered for the first few days or as instructed by your doctor  If you had a scalp biopsy, warm shower water to the area on a daily basis should suffice    What should I use to clean my wound?   Cotton-tipped applicators (Qtips )  White petroleum jelly (Vaseline ). Use a clean new container and use Q-tips to apply.  Bandaids  as needed  Gentle soap     How should I care for my wound long term?  Do not get your wound dirty  Keep up with wound care for one week or until the area is healed.  If you have stitches, stitches need to be removed in 14 days. You may return to our clinic for this or you may have it done locally at your doctor s office.  A small scab will form and fall off by itself when the area is completely healed. The area will be red and will become pink in color as it heals. Sun protection is very important for how your scar will turn out. Sunscreen with an SPF 30 or greater is recommended once the area is healed.  You should have some soreness but it should be mild and slowly go away over several days. Talk to your doctor about using tylenol for pain,    When should I call my doctor?  If you have increased:   Pain or swelling  Pus or drainage (clear or slightly yellow drainage is ok)  Temperature over 100F  Spreading redness or warmth around wound    When will I hear about my results?  The biopsy results can take 2 weeks to come back.  Your results will automatically release to [employer] before your provider has even reviewed them.  The clinic will call you with the results, send you a [employer] message, or have you schedule a follow-up clinic or phone time to discuss the results.  Contact our clinics if you do not hear from us in 2 weeks.    Who should I call with questions?  [facility]: [phone]  [facility]  [facility]: [phone]  For urgent needs outside of business hours call the [facility] at [phone] and ask for the dermatology resident on call

## 2025-07-10 NOTE — NURSING NOTE
Dermatology Rooming Note    Chiquita Butler's goals for this visit include:   Chief Complaint   Patient presents with    Derm Problem     Has area of concern on left thigh x2 months.      Laurita Dougherty RN

## 2025-07-10 NOTE — LETTER
7/10/2025       RE: Chiquita Butler  [address]  [facility] [address]     Dear Colleague,    Thank you for referring your patient, Chiquita Butler, to the [facility]MAT[facility] [place] at [facility]. Please see a copy of my visit note below.    [facility] Dermatology Note  Encounter Date: Jul 10, 2025    Dermatology Problem List:  # Rash, unspecified   - ddx: lupus panniculitis, myositis, dermatomyositis, lichen sclerosus, morphea, liposarcoma    Impression/Plan:  # Rash of unspecified behavior   The affected area discoloration with no surface change or scale. The area is soft with tenderness to deep palpation. MRI shows diffuse subcutaneous fat prominence especially laterally without  discrete mass, fluid collection or encapsulated fat. Consider lupus panniculitis for the possibility of traumatic fat necrosis in the area of pain.  Differential diagnosis also includes myositis, dermatomyositis, lichen sclerosus, morphea, or liposarcoma though with the latter, we would expect to feel more induration and firmness of the subcutaneous tissue.  Will perform a punch biopsy making sure to obtain deep cutaneous tissue.  Patient was agreeable with this recommendation. Inflammatory lab work up pending  - s/p 4 mm punch biopsy   - Labs pending: CBC with diff, CMP, ck, aldolase, crp, esr   Follow-up 3 months     Staff and Resident Involved:  Sabrina Farrell MD PGY-3  [facility] Dermatology     Dr. Johnson staffed the patient.    Staff Physician Comments:   I saw and evaluated the patient with the resident and I edited the assessment and plan as documented in the note. I was present for the key portions of the above major procedure and examination.    Nick Johnson MD   of Dermatology  Department of Dermatology  [facility]          CC:   Chief Complaint   Patient presents with      Derm Problem     Has area of concern on left thigh x2 months.      History of Present Illness:  Ms. Chiquita Butler is a very pleasant 65 year old adult who presents to clinic for evaluation of a new painful rash on the left lateral thigh.  Patient states about 3 months ago she developed pain in the lateral left thigh with a subsequent rash that has progressed from the knee to the upper lateral thigh. MRI showed a diffuse subcutaneous fat prominence with a discrete mass. Patient endorses pain with active and movement of the left leg. MRI results are provided below.     Exam: MR FEMUR THIGH LEFT W/O & W CONTRAST   IMPRESSION:  Images include just proximal to the level of the left knee joint line.  Of note, superficial part of lateral hip/proximal to mid thigh are  collimated off the image.  1. Diffuse subcutaneous fat prominence especially laterally without  discrete mass, fluid collection or encapsulated fat along the lateral  thigh especially proximal to the knee joint line.  2. Degenerative changes of the knee including grade IV chondromalacia patella femoral compartment.     Physical exam:  Vitals: LMP  (LMP Unknown)   SKIN: Focused examination of the bilateral lower legs was performed.  - ill defined dark soft brown patch with areas hypopigmentation and tenderness to deep palpation.     Past Medical History:   Patient Active Problem List   Diagnosis     Keratoconus of both eyes     Hypertension     MDD (major depressive disorder)     Vitamin D deficiency     H/O gastric bypass     Congenital heart valve abnormality     Gender dysphoria in adult     BMI 33 associated with prediabetes, HTN     Hematoma of skin     Palpitations     Chest tightness     Tinnitus, bilateral     Recurrent major depressive disorder, in partial remission     Chronic right shoulder pain     ACP (advance care planning)     Localized edema     Primary osteoarthritis of right shoulder     S/P total hip arthroplasty     Prediabetes     Past  Medical History:   Diagnosis Date     Congenital heart valve abnormality 2014     Diverticulosis      Gastroesophageal reflux disease      History of blood transfusion     during heart surgery     Hypertension      Keratoconus of both eyes 2012     Panic attack 2013    ER Visit     Past Surgical History:   Procedure Laterality Date     ARTHROPLASTY HIP Right 2024    Procedure: Right total hip arthroplasty;  Surgeon: Ayaan Street MD;  Location: [facility] OR     AS REPR PDA BY CLAIRE  1960    age 1     BYPASS GASTRIC MASON-EN-Y, LIVER BIOPSY, COMBINED  2004     CATHETER, ABLATION  1962    cardiac, related to scarring around PDA ligation site      COLONOSCOPY N/A 2017    Procedure: COLONOSCOPY;;  Surgeon: August Perez MD;  Location: [facility]     COLONOSCOPY N/A 05/15/2017    Procedure: COLONOSCOPY;  Colonoscopy/DX:RLQ abdominal pain/2 day prep emailed;  Surgeon: Gold Tamayo MD;  Location: [facility]     HYSTERECTOMY  1993    Partial, fibroids     INCISION AND DRAINAGE BREAST Bilateral 2021    Procedure: INCISION AND DRAINAGE, BREAST. Right breast chest hematoma evacuation.;  Surgeon: Gracie Rapp MD;  Location: [facility] OR     TRANSGENDER MASTECTOMY Bilateral 2020    Procedure: Bilateral Simple Mastectomy, On-Q;  Surgeon: Gracie Rapp MD;  Location: [facility] OR       Social History:  Patient reports that she has never smoked. She has never been exposed to tobacco smoke. She has never used smokeless tobacco. She reports that she does not currently use alcohol after a past usage of about 2.5 standard drinks of alcohol per week. She reports that she does not use drugs.    Family History:  Family History   Problem Relation Age of Onset     Alzheimer Disease Mother 67         78     Myocardial Infarction Father 49             C.A.D. Father      Diabetes Sister      Eye Disorder Sister      Glaucoma Maternal Grandmother       Colorectal Cancer Maternal Grandmother         95, pancreatic     Cerebrovascular Disease Paternal Grandmother         80's     Depression Other         multiple siblings     Breast Cancer No family hx of        Medications:  Current Outpatient Medications   Medication Sig Dispense Refill     acetaminophen (TYLENOL) 325 MG tablet Take 2 tablets (650 mg) by mouth every 4 hours as needed for other (mild pain). 100 tablet 0     amLODIPine (NORVASC) 10 MG tablet Take 1 tablet (10 mg) by mouth daily. 90 tablet 3     busPIRone (BUSPAR) 7.5 MG tablet TAKE 1 TABLET(7.5 MG) BY MOUTH TWICE DAILY 180 tablet 3     citalopram (CELEXA) 40 MG tablet Take 1 tablet (40 mg) by mouth daily. 90 tablet 3     ibuprofen (ADVIL/MOTRIN) 600 MG tablet Take 1 tablet (600 mg) by mouth every 6 hours as needed for mild pain. 30 tablet 0     losartan (COZAAR) 100 MG tablet TAKE 1 TABLET(100 MG) BY MOUTH DAILY 90 tablet 3     meloxicam (MOBIC) 15 MG tablet Take 1 tablet (15 mg) by mouth daily as needed for pain. 90 tablet 1     metoprolol succinate ER (TOPROL XL) 100 MG 24 hr tablet Take 1 tablet (100 mg) by mouth daily. 90 tablet 3     multivitamin w/minerals (THERA-VIT-M) tablet Take 1 tablet by mouth daily       polyethylene glycol (MIRALAX) 17 g packet Take 17 g by mouth daily. 7 packet 0     rosuvastatin (CRESTOR) 40 MG tablet Take 1 tablet (40 mg) by mouth daily. 90 tablet 3     Semaglutide-Weight Management (WEGOVY) 0.5 MG/0.5ML pen Inject 0.5 mg subcutaneously once a week. 2 mL 0     senna-docusate (SENOKOT-S/PERICOLACE) 8.6-50 MG tablet Take 1-2 tablets by mouth 2 times daily. Take while on oral narcotics to prevent or treat constipation. 30 tablet 0     vitamin D3 (CHOLECALCIFEROL) 50 mcg (2000 units) tablet Take 1 tablet (50 mcg) by mouth daily. 100 tablet 3     WEGOVY 0.25 MG/0.5ML pen ADMINISTER 0.25 MG UNDER THE SKIN 1 TIME A WEEK 2 mL 0      No Known Allergies    CC No referring provider defined for this encounter. on close of this  encounter.     Again, thank you for allowing me to participate in the care of your patient.      Sincerely,    Nick Johnson MD

## 2025-07-12 LAB — ALDOLASE SERPL-CCNC: 3.8 U/L

## 2025-07-14 ENCOUNTER — RESULTS FOLLOW-UP (OUTPATIENT)
Dept: DERMATOLOGY | Facility: CLINIC | Age: 65
End: 2025-07-14
Payer: COMMERCIAL

## 2025-07-14 DIAGNOSIS — R74.01 ELEVATED AST (SGOT): Primary | ICD-10-CM

## 2025-07-18 DIAGNOSIS — I10 HYPERTENSION, UNSPECIFIED TYPE: ICD-10-CM

## 2025-07-18 DIAGNOSIS — E66.09 CLASS 1 OBESITY DUE TO EXCESS CALORIES WITH SERIOUS COMORBIDITY AND BODY MASS INDEX (BMI) OF 33.0 TO 33.9 IN ADULT: ICD-10-CM

## 2025-07-18 DIAGNOSIS — E66.811 CLASS 1 OBESITY DUE TO EXCESS CALORIES WITH SERIOUS COMORBIDITY AND BODY MASS INDEX (BMI) OF 33.0 TO 33.9 IN ADULT: ICD-10-CM

## 2025-07-18 DIAGNOSIS — R73.03 PREDIABETES: ICD-10-CM

## 2025-07-18 NOTE — TELEPHONE ENCOUNTER
Verify that the refill encounter hasn't been started Yes    [facility] phone call message- patient requesting a refill:    Full Medication Name: WEGOVY 0.25 MG/0.5ML pen     Dose:      Pharmacy confirmed as       [id number] - [address] AT [address] [place] [address] & [address] [address]  Phone: [phone] Fax: [phone]          Additional Comments patiens requesting  a refill    OK to leave a message on voice mail? Yes    Advised patient refill may take up to 2 business days? Yes    Primary language: English      needed? No    Call taken on July 18, 2025 at 4:44 PM by BETTINA Torres    Route to P [facility] MED REFILL

## 2025-07-20 DIAGNOSIS — M79.3 SEPTAL PANNICULITIS: Primary | ICD-10-CM

## 2025-07-20 LAB
PATH REPORT.COMMENTS IMP SPEC: NORMAL
PATH REPORT.FINAL DX SPEC: NORMAL
PATH REPORT.GROSS SPEC: NORMAL
PATH REPORT.MICROSCOPIC SPEC OTHER STN: NORMAL
PATH REPORT.RELEVANT HX SPEC: NORMAL

## 2025-07-20 RX ORDER — PREDNISONE 10 MG/1
TABLET ORAL
Qty: 55 TABLET | Refills: 1 | Status: SHIPPED | OUTPATIENT
Start: 2025-07-20

## 2025-07-21 NOTE — TELEPHONE ENCOUNTER
"Request for medication refill:    Medication Name: Semaglutide-Weight Management (WEGOVY) 0.5 MG/0.5ML pen     Providers if patient needs an appointment and you are willing to give a one month supply please refill for one month and  send a MyChart using \".SMILLIMITEDREFILL\" .Or route chart to \"[facility] \" . And use the phrase \" SMIRXFOLLOWUP\"To call patient and inform of limited refill and providers request to make an appointment. (Giving one month refill in non controlled medications is strongly recommended before denial)    If refill has been denied, meaning absolutely no refills without visit, please complete the smart phrase \".SMIRXREFUSE\" and route it to the \"P [facility] MED REFILLS\"  pool to inform the patient and the pharmacy.    Liudmila Issa RN    "

## 2025-07-22 ENCOUNTER — MYC MEDICAL ADVICE (OUTPATIENT)
Dept: FAMILY MEDICINE | Facility: CLINIC | Age: 65
End: 2025-07-22
Payer: COMMERCIAL

## 2025-07-22 DIAGNOSIS — E66.09 CLASS 1 OBESITY DUE TO EXCESS CALORIES WITH SERIOUS COMORBIDITY AND BODY MASS INDEX (BMI) OF 33.0 TO 33.9 IN ADULT: ICD-10-CM

## 2025-07-22 DIAGNOSIS — I10 HYPERTENSION, UNSPECIFIED TYPE: ICD-10-CM

## 2025-07-22 DIAGNOSIS — R73.03 PREDIABETES: ICD-10-CM

## 2025-07-22 DIAGNOSIS — E66.811 CLASS 1 OBESITY DUE TO EXCESS CALORIES WITH SERIOUS COMORBIDITY AND BODY MASS INDEX (BMI) OF 33.0 TO 33.9 IN ADULT: ICD-10-CM

## 2025-07-22 NOTE — TELEPHONE ENCOUNTER
"Request for medication refill:    Medication Name: Wegovy    Providers if patient needs an appointment and you are willing to give a one month supply please refill for one month and  send a MyChart using \".SMILLIMITEDREFILL\" .Or route chart to \"P [facility] \" . And use the phrase \" SMIRXFOLLOWUP\"To call patient and inform of limited refill and providers request to make an appointment. (Giving one month refill in non controlled medications is strongly recommended before denial)    If refill has been denied, meaning absolutely no refills without visit, please complete the smart phrase \".SMIRXREFUSE\" and route it to the \"P [facility] MED REFILLS\"  pool to inform the patient and the pharmacy.    Betsy Vallejo RN      "

## 2025-07-24 ENCOUNTER — OFFICE VISIT (OUTPATIENT)
Dept: ORTHOPEDICS | Facility: CLINIC | Age: 65
End: 2025-07-24
Payer: COMMERCIAL

## 2025-07-24 DIAGNOSIS — M19.011 OSTEOARTHRITIS OF GLENOHUMERAL JOINT, RIGHT: Primary | ICD-10-CM

## 2025-07-24 PROCEDURE — 99213 OFFICE O/P EST LOW 20 MIN: CPT | Mod: 25 | Performed by: STUDENT IN AN ORGANIZED HEALTH CARE EDUCATION/TRAINING PROGRAM

## 2025-07-24 PROCEDURE — 20611 DRAIN/INJ JOINT/BURSA W/US: CPT | Mod: RT | Performed by: STUDENT IN AN ORGANIZED HEALTH CARE EDUCATION/TRAINING PROGRAM

## 2025-07-24 RX ADMIN — TRIAMCINOLONE ACETONIDE 40 MG: 40 INJECTION, SUSPENSION INTRA-ARTICULAR; INTRAMUSCULAR at 11:41

## 2025-07-24 RX ADMIN — LIDOCAINE HYDROCHLORIDE 4 ML: 10 INJECTION, SOLUTION EPIDURAL; INFILTRATION; INTRACAUDAL; PERINEURAL at 11:41

## 2025-07-24 SDOH — HEALTH STABILITY: PHYSICAL HEALTH: ON AVERAGE, HOW MANY DAYS PER WEEK DO YOU ENGAGE IN MODERATE TO STRENUOUS EXERCISE (LIKE A BRISK WALK)?: 2 DAYS

## 2025-07-24 NOTE — PROGRESS NOTES
Large Joint Injection: R glenohumeral joint    Date/Time: 7/24/2025 11:41 AM    Performed by: Tony Barnett DO  Authorized by: Tony Barnett DO    Indications:  Osteoarthritis  Needle Size comment:  23G  Guidance: ultrasound    Approach:  Superior  Location:  Shoulder      Site:  R glenohumeral joint  Medications:  40 mg triamcinolone 40 MG/ML; 4 mL lidocaine (PF) 1 %  Aspirate analysis: sent for lab analysis    Outcome:  Tolerated well, no immediate complications  Procedure discussed: discussed risks, benefits, and alternatives    Consent Given by:  Patient  Timeout: timeout called immediately prior to procedure    Prep: patient was prepped and draped in usual sterile fashion

## 2025-07-24 NOTE — LETTER
2025      RE: Chiquita Butler  [address]  [facility] [address]     Dear Colleague,    Thank you for referring your patient, Chiquita Butler, to the [facility] SP[facility]TS [facility]ICINE [facility]IN[facility] [place]. Please see a copy of my visit note below.    PROCEDURE ENCOUNTER    [facility]  Tony Barnett, DO  2025     Name: Chiquita Butler  MRN: [id number]  Age: 65 year old  : 1960    Referring provider:   Diagnosis: R GH OA    History: Chiquita returns to clinic after her last injection into the shoulder joint, in April.  She did not receive 3 months worth of pain relief.  She also has not done physical therapy for the shoulder in a while.  Overall the shoulder seems to still be bothering her within the glenohumeral joint.  We did discuss today restarting physical therapy which she is interested.  I have also discussed with her referral to orthopedic surgery, given the diminishing returns on the injections.  Could also consider nerve blocks as well, but given the joint involvement, a discussion with orthopedic surgery to determine what his shoulder replacement would look like would be requested.  She can return to our clinic in 3 months or more if this is successful.  All questions answered peer return precautions advised.    Procedure: Glenohumeral Injection - Ultrasound Guided  The patient was informed of the risks and the benefits of the procedure and a written consent was signed.  The patient s right shoulder was prepped with chlorhexidine in sterile fashion.   40 mg of triamcinolone suspension was drawn up into a 5 mL syringe with 4 mL of 1% lidocaine w/o Epi.  Injection was performed using sterile technique.  Under ultrasound guidance a 3.5-inch 22-gauge needle was used to enter the right glenohumeral joint.  Posterior approach was used with the patient in lateral recumbent position, arm in neutral position at the side.  Needle placement was visualized and documented with ultrasound.   Ultrasound visualization was necessary due to the small joint space entered.  Injection performed long axis to the probe.  Injection solution visualized within the joint space.  Images were permanently stored for the patient's record.  There were no complications. The patient tolerated the procedure well. There was negligible bleeding.   Therapy scheduled to follow for mobilization.  The patient was instructed to call or go to the emergency room with any unusual pain, swelling, redness, or if otherwise concerned.  Tony Barnett D.O., CAQSM  , Sports Medicine  Department of [facility]          Large Joint Injection: R glenohumeral joint    Date/Time: 7/24/2025 11:41 AM    Performed by: Tony Barnett DO  Authorized by: Tony Barnett DO    Indications:  Osteoarthritis  Needle Size comment:  23G  Guidance: ultrasound    Approach:  Superior  Location:  Shoulder      Site:  R glenohumeral joint  Medications:  40 mg triamcinolone 40 MG/ML; 4 mL lidocaine (PF) 1 %  Aspirate analysis: sent for lab analysis    Outcome:  Tolerated well, no immediate complications  Procedure discussed: discussed risks, benefits, and alternatives    Consent Given by:  Patient  Timeout: timeout called immediately prior to procedure    Prep: patient was prepped and draped in usual sterile fashion          Again, thank you for allowing me to participate in the care of your patient.      Sincerely,    Tony Barnett DO

## 2025-07-24 NOTE — TELEPHONE ENCOUNTER
DIAGNOSIS:   M19.011 (ICD-10-CM) - Osteoarthritis of glenohumeral joint, right      APPOINTMENT DATE: 7/30/2025   NOTES STATUS DETAILS   OFFICE NOTE from referring provider Internal Tony Barnett DO (SPTS)  7/24/2025   OFFICE NOTE from other specialist Internal Kasey Doss MD (SPTS)  3/20/2024 (R Shoulder)   MEDICATION LIST Internal    LABS     XRAYS (IMAGES & REPORTS) PACS R Shoulder: 4/4/2025, 8/26/2022  Chest: 12/5/2021, 7/27/2021, 1/9/2020

## 2025-07-24 NOTE — NURSING NOTE
[address] [address]  Dept: [phone]  ______________________________________________________________________________    Patient: Chiquita Butler   : 1960   MRN: [id number]   2025    INVASIVE PROCEDURE SAFETY CHECKLIST    Date: 2025   Procedure: USG Right Glenohumeral CSI  Patient Name: Chiquita Butler  MRN: [id number]  YOB: 1960    Action: Complete sections as appropriate. Any discrepancy results in a HARD COPY until resolved.     PRE PROCEDURE:  Patient ID verified with 2 identifiers (name and  or MRN): Yes  Procedure and site verified with patient/designee (when able): Yes  Accurate consent documentation in medical record: Yes  H&P (or appropriate assessment) documented in medical record: Yes  H&P must be up to 20 days prior to procedure and updates within 24 hours of procedure as applicable: NA  Relevant diagnostic and radiology test results appropriately labeled and displayed as applicable: NA  Procedure site(s) marked with provider initials: Yes    TIMEOUT:  Time-Out performed immediately prior to starting procedure, including verbal and active participation of all team members addressing the following:Yes  * Correct patient identify  * Confirmed that the correct side and site are marked  * An accurate procedure consent form  * Agreement on the procedure to be done  * Correct patient position  * Relevant images and results are properly labeled and appropriately displayed  * The need to administer antibiotics or fluids for irrigation purposes during the procedure as applicable   * Safety precautions based on patient history or medication use    DURING PROCEDURE: Verification of correct person, site, and procedures any time the responsibility for care of the patient is transferred to another member of the care team.       Prior to injection, verified patient identity using patient's name and date of birth.  Due  to injection administration, patient instructed to remain in clinic for 15 minutes  afterwards, and to report any adverse reaction to me immediately.    Joint injection was performed.      Drug Amount Wasted:  Yes: 1 lidocaine mg/ml   Vial/Syringe: Single dose vial  Expiration Date:  01/2029      Tanesha Isaacs, AMBER  July 24, 2025

## 2025-07-27 RX ORDER — TRIAMCINOLONE ACETONIDE 40 MG/ML
40 INJECTION, SUSPENSION INTRA-ARTICULAR; INTRAMUSCULAR
Status: COMPLETED | OUTPATIENT
Start: 2025-07-24 | End: 2025-07-24

## 2025-07-27 RX ORDER — LIDOCAINE HYDROCHLORIDE 10 MG/ML
4 INJECTION, SOLUTION EPIDURAL; INFILTRATION; INTRACAUDAL; PERINEURAL
Status: COMPLETED | OUTPATIENT
Start: 2025-07-24 | End: 2025-07-24

## 2025-07-28 NOTE — PROGRESS NOTES
CHIEF COMPLAINT: Right shoulder pain    DIAGNOSIS: Right shoulder glenohumeral arthritis    OCCUPATION/SPORT: Semi- Retired, teaches online    HPI:   Chiquita Butler is a very pleasant 65 year old, right-hand dominant adult who presents for evaluation of right shoulder pain. Symptoms started 1 year ago. There was not a precipitating event. The pain is located to the posterior shoulder. Worst pain is rated a 8 of 10, and current pain is rated at 5 of 10. Symptoms are worsened by usage and movement. Symptoms are improved with rest. Patient has tried right glenohumeral joint injections, her most recent being 7/23/2025 with Dr. RENAY Barnett and states she still has pain but an increase in a ROM and physical therapy in the past with minimal relief. She reports that her injections prior to the most recent one lasted about 4 months. Associated symptoms include pain. Patient has no pain radiating down the arm, without numbness. Notably, the patient has had no other shoulder history. No other concerns or complaints at this time.  SANE score R - 40-70 L - 100    Wegovy shot and has lost 25 lbs so far.    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    Diverticulosis     Gastroesophageal reflux disease     History of blood transfusion 1960    during heart surgery    Hypertension     Keratoconus of both eyes 01/02/2012    Panic attack 12/17/2013    ER Visit       PAST SURGICAL HISTORY:  Past Surgical History:   Procedure Laterality Date    ARTHROPLASTY HIP Right 9/24/2024    Procedure: Right total hip arthroplasty;  Surgeon: Ayaan Street MD;  Location: [facility]    AS REPR PDA BY CLAIRE  1960    age 1    BYPASS GASTRIC MASON-EN-Y, LIVER BIOPSY, COMBINED  01/01/2004    CATHETER, ABLATION  01/01/1962    cardiac, related to scarring around PDA ligation site     COLONOSCOPY N/A 04/20/2017    Procedure: COLONOSCOPY;;  Surgeon: August Perez MD;  Location:   GI    COLONOSCOPY N/A 05/15/2017    Procedure: COLONOSCOPY;  Colonoscopy/DX:RLQ abdominal pain/2 day prep emailed;  Surgeon: Gold Tamayo MD;  Location: [facility]    HYSTERECTOMY  01/01/1993    Partial, fibroids    INCISION AND DRAINAGE BREAST Bilateral 01/08/2021    Procedure: INCISION AND DRAINAGE, BREAST. Right breast chest hematoma evacuation.;  Surgeon: Gracie Rapp MD;  [facility]: [facility]    TRANSGENDER MASTECTOMY Bilateral 12/14/2020    Procedure: Bilateral Simple Mastectomy, On-Q;  Surgeon: Gracie Rapp MD;  [facility]       CURRENT MEDICATIONS:  Current Outpatient Medications   Medication Sig Dispense Refill    acetaminophen (TYLENOL) 325 MG tablet Take 2 tablets (650 mg) by mouth every 4 hours as needed for other (mild pain). 100 tablet 0    amLODIPine (NORVASC) 10 MG tablet Take 1 tablet (10 mg) by mouth daily. 90 tablet 3    busPIRone (BUSPAR) 7.5 MG tablet TAKE 1 TABLET(7.5 MG) BY MOUTH TWICE DAILY 180 tablet 3    citalopram (CELEXA) 40 MG tablet Take 1 tablet (40 mg) by mouth daily. 90 tablet 3    ibuprofen (ADVIL/MOTRIN) 600 MG tablet Take 1 tablet (600 mg) by mouth every 6 hours as needed for mild pain. 30 tablet 0    losartan (COZAAR) 100 MG tablet TAKE 1 TABLET(100 MG) BY MOUTH DAILY 90 tablet 3    meloxicam (MOBIC) 15 MG tablet Take 1 tablet (15 mg) by mouth daily as needed for pain. 90 tablet 1    metoprolol succinate ER (TOPROL XL) 100 MG 24 hr tablet Take 1 tablet (100 mg) by mouth daily. 90 tablet 3    multivitamin w/minerals (THERA-VIT-M) tablet Take 1 tablet by mouth daily      polyethylene glycol (MIRALAX) 17 g packet Take 17 g by mouth daily. 7 packet 0    predniSONE (DELTASONE) 10 MG tablet 40mg x5 days, 30mg x5 days, 20mg x5 days, 10mg x5 days, 5mg x5 days 55 tablet 1    rosuvastatin (CRESTOR) 40 MG tablet Take 1 tablet (40 mg) by mouth daily. 90 tablet 3    Semaglutide-Weight Management (WEGOVY) 0.5 MG/0.5ML pen Inject 0.5 mg subcutaneously once a week. 2 mL 0     senna-docusate (SENOKOT-S/PERICOLACE) 8.6-50 MG tablet Take 1-2 tablets by mouth 2 times daily. Take while on oral narcotics to prevent or treat constipation. 30 tablet 0    vitamin D3 (CHOLECALCIFEROL) 50 mcg (2000 units) tablet Take 1 tablet (50 mcg) by mouth daily. 100 tablet 3    WEGOVY 0.25 MG/0.5ML pen ADMINISTER 0.25 MG UNDER THE SKIN 1 TIME A WEEK 2 mL 0       ALLERGIES:    No Known Allergies      FAMILY HISTORY: No pertinent family history, reviewed in EMR.    SOCIAL HISTORY:   Social History     Socioeconomic History    Marital status:      Spouse name: Aissatou    Number of children: Not on file    Years of education: Not on file    Highest education level: Not on file   Occupational History    Not on file   Tobacco Use    Smoking status: Never     Passive exposure: Never    Smokeless tobacco: Never   Vaping Use    Vaping status: Never Used   Substance and Sexual Activity    Alcohol use: Not Currently     Alcohol/week: 2.5 standard drinks of alcohol     Types: 3 Standard drinks or equivalent per week     Comment: 0-2 drinks/week    Drug use: No    Sexual activity: Yes     Partners: Female     Birth control/protection: None   Other Topics Concern     Service Not Asked    Blood Transfusions Not Asked    Caffeine Concern Not Asked    Occupational Exposure Not Asked    Hobby Hazards Not Asked    Sleep Concern Yes     Comment: occ use ambien due to shift work     Stress Concern Not Asked    Weight Concern Yes    Special Diet Yes     Comment: due to gastric bypass     Back Care Not Asked    Exercise Yes    Bike Helmet Yes    Seat Belt Yes    Self-Exams Not Asked    Parent/sibling w/ CABG, MI or angioplasty before 65F 55M? Not Asked   Social History Narrative    Lives in [facility] with partner Aissatou, adopted daughter Marely. Dtr [place] born 2014. Barbara born 2020.     Laid off from [email or web address] in 2022. Plays trumpet.     Gets exercise! Diet adequate in calcium. Stress well managed.     Goals  for 2025: exercise, eat well goal 217#, working on arthritis mgmt (Seeing Dr. Barnett of ortho for severe OA), assess risk for alzheimer's gene - to see Will Kisha    Updated February 10, 2025 Amarilis Agosto MD          Social Drivers of Health     Financial Resource Strain: Low Risk  (2/10/2025)    Financial Resource Strain     Within the past 12 months, have you or your family members you live with been unable to get utilities (heat, electricity) when it was really needed?: No   Food Insecurity: Low Risk  (2/10/2025)    Food Insecurity     Within the past 12 months, did you worry that your food would run out before you got money to buy more?: No     Within the past 12 months, did the food you bought just not last and you didn t have money to get more?: No   Transportation Needs: Low Risk  (2/10/2025)    Transportation Needs     Within the past 12 months, has lack of transportation kept you from medical appointments, getting your medicines, non-medical meetings or appointments, work, or from getting things that you need?: No   Physical Activity: Unknown (7/24/2025)    Exercise Vital Sign     Days of Exercise per Week: 2 days     Minutes of Exercise per Session: Not on file   Stress: Stress Concern Present (2/10/2025)    [employer] [place] [employer] - Occupational Stress Questionnaire     Feeling of Stress : To some extent   Social Connections: Unknown (2/10/2025)    Social Connection and Isolation Panel [NHANES]     Frequency of Communication with Friends and Family: Not on file     Frequency of Social Gatherings with Friends and Family: Once a week     Attends Baptism Services: Not on file     Active Member of Clubs or Organizations: Not on file     Attends Club or Organization Meetings: Not on file     Marital Status: Not on file   Interpersonal Safety: Low Risk  (2/10/2025)    Interpersonal Safety     Do you feel physically and emotionally safe where you currently live?: Yes     Within the past 12  months, have you been hit, slapped, kicked or otherwise physically hurt by someone?: No     Within the past 12 months, have you been humiliated or emotionally abused in other ways by your partner or ex-partner?: No   Housing Stability: Low Risk  (2/10/2025)    Housing Stability     Do you have housing? : Yes     Are you worried about losing your housing?: No       REVIEW OF SYSTEMS: Positive for that noted in past medical history and history of present illness and otherwise reviewed in EMR    PHYSICAL EXAM:  Patient is Data Unavailable and weighs 0 lbs 0 oz LMP  (LMP Unknown)   There is no height or weight on file to calculate BMI.   Constitutional: Well-developed, well-nourished, healthy appearing adult.  Skin: Warm, dry   HEENT: Normal  Cardiac: Well perfused extremities, strong 2+ peripheral pulses. No edema.   Pulmonary: Breathing room air    Musculoskeletal:   Right Shoulder:  AROM right shoulder: 60/60/20/L5   AROM left shoulder: 160/160/60/T12   PROM right shoulder: 90/90/30/L5   5/5 supraspinatus, 5/5 infraspinatus, 5/5 subscapularis  no AC joint pain, negative cross body adduction  negative Hornblower's  negative ER lag  Neurovascular exam and cervical spine exam are normal.    X-RAYS:   I personally reviewed the prior right shoulder x-rays which show severe glenohumeral joint space narrowing    ADVANCED IMAGING:     IMPRESSION: 65 year old-year-old right hand dominant adult, with right shoulder glenohumeral arthritis.     PLAN:     I discussed with the patient the etiology of their condition. We discussed at length the options as noted above.  I reviewed the x-rays with the patient today and reviewed the etiology and natural history of glenohumeral arthritis.  We discussed options include conservative versus surgical intervention.  Conservatively we discussed activity modification, over-the-counter anti-inflammatories, glenohumeral or suprascapular injection.  I recommended against physical therapy given  the severity of the disease given that this would likely exacerbate pain.  Alternatively discussed surgical option with a total or reverse shoulder arthroplasty.  We discussed hard to assess the rotator cuff on exam given the limitations in motion and strength and if the patient wishes to proceed for surgery would need a preoperative MRI to assess the status of the rotator cuff.  Patient was given a packet today regarding a total shoulder arthroplasty, will contact us should wish to proceed with surgery.  I otherwise recommended yearly screening x-rays    At the conclusion of the office visit, Chiquita verbally acknowledged that I answered all of her questions satisfactorily.    Hayley Sultana MD  Orthopedic Surgery Sports Medicine and Shoulder Surgery

## 2025-07-30 ENCOUNTER — OFFICE VISIT (OUTPATIENT)
Dept: ORTHOPEDICS | Facility: CLINIC | Age: 65
End: 2025-07-30
Attending: STUDENT IN AN ORGANIZED HEALTH CARE EDUCATION/TRAINING PROGRAM
Payer: COMMERCIAL

## 2025-07-30 ENCOUNTER — PRE VISIT (OUTPATIENT)
Dept: ORTHOPEDICS | Facility: CLINIC | Age: 65
End: 2025-07-30

## 2025-07-30 VITALS — BODY MASS INDEX: 32.07 KG/M2 | WEIGHT: 224 LBS | HEIGHT: 70 IN

## 2025-07-30 DIAGNOSIS — M19.011 OSTEOARTHRITIS OF GLENOHUMERAL JOINT, RIGHT: ICD-10-CM

## 2025-07-30 PROCEDURE — 99214 OFFICE O/P EST MOD 30 MIN: CPT | Performed by: ORTHOPAEDIC SURGERY

## 2025-07-30 NOTE — LETTER
7/30/2025      Chiquita Butler  [address]  [place] [address]      Dear Colleague,    Thank you for referring your patient, Chiquita Butler, to the [facility] [place]. Please see a copy of my visit note below.    CHIEF COMPLAINT: Right shoulder pain    DIAGNOSIS: Right shoulder glenohumeral arthritis    OCCUPATION/SPORT: Semi- Retired, teaches online    HPI:   Chiquita Butler is a very pleasant 65 year old, right-hand dominant adult who presents for evaluation of right shoulder pain. Symptoms started 1 year ago. There was not a precipitating event. The pain is located to the posterior shoulder. Worst pain is rated a 8 of 10, and current pain is rated at 5 of 10. Symptoms are worsened by usage and movement. Symptoms are improved with rest. Patient has tried right glenohumeral joint injections, her most recent being 7/23/2025 with Dr. RENAY Barnett and states she still has pain but an increase in a ROM and physical therapy in the past with minimal relief. She reports that her injections prior to the most recent one lasted about 4 months. Associated symptoms include pain. Patient has no pain radiating down the arm, without numbness. Notably, the patient has had no other shoulder history. No other concerns or complaints at this time.  SANE score R - 40-70 L - 100    Wegovy shot and has lost 25 lbs so far.    PAST MEDICAL HISTORY:  Past Medical History:   Diagnosis Date     Congenital heart valve 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     Diverticulosis      Gastroesophageal reflux disease      History of blood transfusion 1960    during heart surgery     Hypertension      Keratoconus of both eyes 01/02/2012     Panic attack 12/17/2013    ER Visit       PAST SURGICAL HISTORY:  Past Surgical History:   Procedure Laterality Date     ARTHROPLASTY HIP Right 9/24/2024    Procedure: Right total hip arthroplasty;  Surgeon: Ayaan Street MD;  Location: [facility]     AS REPR PDA BY CLAIRE  1960    age 1     BYPASS  GASTRIC MASON-EN-Y, LIVER BIOPSY, COMBINED  01/01/2004     CATHETER, ABLATION  01/01/1962    cardiac, related to scarring around PDA ligation site      COLONOSCOPY N/A 04/20/2017    Procedure: COLONOSCOPY;;  Surgeon: August Perez MD;  [facility]     COLONOSCOPY N/A 05/15/2017    Procedure: COLONOSCOPY;  Colonoscopy/DX:RLQ abdominal pain/2 day prep emailed;  Surgeon: Gold Tamayo MD;  [facility]     HYSTERECTOMY  01/01/1993    Partial, fibroids     INCISION AND DRAINAGE BREAST Bilateral 01/08/2021    Procedure: INCISION AND DRAINAGE, BREAST. Right breast chest hematoma evacuation.;  Surgeon: Gracie Rapp MD;  [facility]     TRANSGENDER MASTECTOMY Bilateral 12/14/2020    Procedure: Bilateral Simple Mastectomy, On-Q;  Surgeon: Gracie Rapp MD;  [facility]       CURRENT MEDICATIONS:  Current Outpatient Medications   Medication Sig Dispense Refill     acetaminophen (TYLENOL) 325 MG tablet Take 2 tablets (650 mg) by mouth every 4 hours as needed for other (mild pain). 100 tablet 0     amLODIPine (NORVASC) 10 MG tablet Take 1 tablet (10 mg) by mouth daily. 90 tablet 3     busPIRone (BUSPAR) 7.5 MG tablet TAKE 1 TABLET(7.5 MG) BY MOUTH TWICE DAILY 180 tablet 3     citalopram (CELEXA) 40 MG tablet Take 1 tablet (40 mg) by mouth daily. 90 tablet 3     ibuprofen (ADVIL/MOTRIN) 600 MG tablet Take 1 tablet (600 mg) by mouth every 6 hours as needed for mild pain. 30 tablet 0     losartan (COZAAR) 100 MG tablet TAKE 1 TABLET(100 MG) BY MOUTH DAILY 90 tablet 3     meloxicam (MOBIC) 15 MG tablet Take 1 tablet (15 mg) by mouth daily as needed for pain. 90 tablet 1     metoprolol succinate ER (TOPROL XL) 100 MG 24 hr tablet Take 1 tablet (100 mg) by mouth daily. 90 tablet 3     multivitamin w/minerals (THERA-VIT-M) tablet Take 1 tablet by mouth daily       polyethylene glycol (MIRALAX) 17 g packet Take 17 g by mouth daily. 7 packet 0     predniSONE (DELTASONE) 10 MG tablet  40mg x5 days, 30mg x5 days, 20mg x5 days, 10mg x5 days, 5mg x5 days 55 tablet 1     rosuvastatin (CRESTOR) 40 MG tablet Take 1 tablet (40 mg) by mouth daily. 90 tablet 3     Semaglutide-Weight Management (WEGOVY) 0.5 MG/0.5ML pen Inject 0.5 mg subcutaneously once a week. 2 mL 0     senna-docusate (SENOKOT-S/PERICOLACE) 8.6-50 MG tablet Take 1-2 tablets by mouth 2 times daily. Take while on oral narcotics to prevent or treat constipation. 30 tablet 0     vitamin D3 (CHOLECALCIFEROL) 50 mcg (2000 units) tablet Take 1 tablet (50 mcg) by mouth daily. 100 tablet 3     WEGOVY 0.25 MG/0.5ML pen ADMINISTER 0.25 MG UNDER THE SKIN 1 TIME A WEEK 2 mL 0       ALLERGIES:    No Known Allergies      FAMILY HISTORY: No pertinent family history, reviewed in EMR.    SOCIAL HISTORY:   Social History     Socioeconomic History     Marital status:      Spouse name: Aissatou     Number of children: Not on file     Years of education: Not on file     Highest education level: Not on file   Occupational History     Not on file   Tobacco Use     Smoking status: Never     Passive exposure: Never     Smokeless tobacco: Never   Vaping Use     Vaping status: Never Used   Substance and Sexual Activity     Alcohol use: Not Currently     Alcohol/week: 2.5 standard drinks of alcohol     Types: 3 Standard drinks or equivalent per week     Comment: 0-2 drinks/week     Drug use: No     Sexual activity: Yes     Partners: Female     Birth control/protection: None   Other Topics Concern      Service Not Asked     Blood Transfusions Not Asked     Caffeine Concern Not Asked     Occupational Exposure Not Asked     Hobby Hazards Not Asked     Sleep Concern Yes     Comment: occ use ambien due to shift work      Stress Concern Not Asked     Weight Concern Yes     Special Diet Yes     Comment: due to gastric bypass      Back Care Not Asked     Exercise Yes     Bike Helmet Yes     Seat Belt Yes     Self-Exams Not Asked     Parent/sibling w/ CABG, MI  or angioplasty before 65F 55M? Not Asked   Social History Narrative    Lives in [facility] with partner Aissatou, adopted daughter Marely. Dtr [place] born 2014. Barbara born 2020.     Laid off from [employer] in 2022. Plays trumpet.     Gets exercise! Diet adequate in calcium. Stress well managed.     Goals for 2025: exercise, eat well goal 217#, working on arthritis mgmt (Seeing Dr. Barnett of ortho for severe OA), assess risk for alzheimer's gene - to see [facility]    Updated February 10, 2025 Amarilis Agosto MD          Social Drivers of Health     Financial Resource Strain: Low Risk  (2/10/2025)    Financial Resource Strain      Within the past 12 months, have you or your family members you live with been unable to get utilities (heat, electricity) when it was really needed?: No   Food Insecurity: Low Risk  (2/10/2025)    Food Insecurity      Within the past 12 months, did you worry that your food would run out before you got money to buy more?: No      Within the past 12 months, did the food you bought just not last and you didn t have money to get more?: No   Transportation Needs: Low Risk  (2/10/2025)    Transportation Needs      Within the past 12 months, has lack of transportation kept you from medical appointments, getting your medicines, non-medical meetings or appointments, work, or from getting things that you need?: No   Physical Activity: Unknown (7/24/2025)    Exercise Vital Sign      Days of Exercise per Week: 2 days      Minutes of Exercise per Session: Not on file   Stress: Stress Concern Present (2/10/2025)    [employer] [place] [employer] - Occupational Stress Questionnaire      Feeling of Stress : To some extent   Social Connections: Unknown (2/10/2025)    Social Connection and Isolation Panel [NHANES]      Frequency of Communication with Friends and Family: Not on file      Frequency of Social Gatherings with Friends and Family: Once a week      Attends Congregational Services: Not on  file      Active Member of Clubs or Organizations: Not on file      Attends Club or Organization Meetings: Not on file      Marital Status: Not on file   Interpersonal Safety: Low Risk  (2/10/2025)    Interpersonal Safety      Do you feel physically and emotionally safe where you currently live?: Yes      Within the past 12 months, have you been hit, slapped, kicked or otherwise physically hurt by someone?: No      Within the past 12 months, have you been humiliated or emotionally abused in other ways by your partner or ex-partner?: No   Housing Stability: Low Risk  (2/10/2025)    Housing Stability      Do you have housing? : Yes      Are you worried about losing your housing?: No       REVIEW OF SYSTEMS: Positive for that noted in past medical history and history of present illness and otherwise reviewed in EMR    PHYSICAL EXAM:  Patient is Data Unavailable and weighs 0 lbs 0 oz LMP  (LMP Unknown)   There is no height or weight on file to calculate BMI.   Constitutional: Well-developed, well-nourished, healthy appearing adult.  Skin: Warm, dry   HEENT: Normal  Cardiac: Well perfused extremities, strong 2+ peripheral pulses. No edema.   Pulmonary: Breathing room air    Musculoskeletal:   Right Shoulder:  AROM right shoulder: 60/60/20/L5   AROM left shoulder: 160/160/60/T12   PROM right shoulder: 90/90/30/L5   5/5 supraspinatus, 5/5 infraspinatus, 5/5 subscapularis  no AC joint pain, negative cross body adduction  negative Hornblower's  negative ER lag  Neurovascular exam and cervical spine exam are normal.    X-RAYS:   I personally reviewed the prior right shoulder x-rays which show severe glenohumeral joint space narrowing    ADVANCED IMAGING:     IMPRESSION: 65 year old-year-old right hand dominant adult, with right shoulder glenohumeral arthritis.     PLAN:     I discussed with the patient the etiology of their condition. We discussed at length the options as noted above.  I reviewed the x-rays with the patient  today and reviewed the etiology and natural history of glenohumeral arthritis.  We discussed options include conservative versus surgical intervention.  Conservatively we discussed activity modification, over-the-counter anti-inflammatories, glenohumeral or suprascapular injection.  I recommended against physical therapy given the severity of the disease given that this would likely exacerbate pain.  Alternatively discussed surgical option with a total or reverse shoulder arthroplasty.  We discussed hard to assess the rotator cuff on exam given the limitations in motion and strength and if the patient wishes to proceed for surgery would need a preoperative MRI to assess the status of the rotator cuff.  Patient was given a packet today regarding a total shoulder arthroplasty, will contact us should wish to proceed with surgery.  I otherwise recommended yearly screening x-rays    At the conclusion of the office visit, Chiquita verbally acknowledged that I answered all of her questions satisfactorily.    Hayley Alberts MD  Orthopedic Surgery Sports Medicine and Shoulder Surgery    Again, thank you for allowing me to participate in the care of your patient.        Sincerely,        HAYLEY ALBERTS MD    Electronically signed

## 2025-08-03 ENCOUNTER — HEALTH MAINTENANCE LETTER (OUTPATIENT)
Age: 65
End: 2025-08-03

## 2025-08-22 ENCOUNTER — OFFICE VISIT (OUTPATIENT)
Dept: FAMILY MEDICINE | Facility: CLINIC | Age: 65
End: 2025-08-22
Payer: COMMERCIAL

## 2025-08-22 VITALS
BODY MASS INDEX: 32.08 KG/M2 | HEART RATE: 69 BPM | WEIGHT: 224.1 LBS | OXYGEN SATURATION: 98 % | HEIGHT: 70 IN | DIASTOLIC BLOOD PRESSURE: 83 MMHG | RESPIRATION RATE: 18 BRPM | SYSTOLIC BLOOD PRESSURE: 124 MMHG

## 2025-08-22 DIAGNOSIS — E66.811 CLASS 1 OBESITY DUE TO EXCESS CALORIES WITH SERIOUS COMORBIDITY AND BODY MASS INDEX (BMI) OF 33.0 TO 33.9 IN ADULT: Primary | ICD-10-CM

## 2025-08-22 DIAGNOSIS — M79.3 PANNICULITIS: ICD-10-CM

## 2025-08-22 DIAGNOSIS — I10 PRIMARY HYPERTENSION: ICD-10-CM

## 2025-08-22 DIAGNOSIS — E66.09 CLASS 1 OBESITY DUE TO EXCESS CALORIES WITH SERIOUS COMORBIDITY AND BODY MASS INDEX (BMI) OF 33.0 TO 33.9 IN ADULT: Primary | ICD-10-CM

## 2025-08-22 DIAGNOSIS — Z48.02 VISIT FOR SUTURE REMOVAL: ICD-10-CM

## 2025-08-22 PROBLEM — R07.89 CHEST TIGHTNESS: Status: RESOLVED | Noted: 2021-12-05 | Resolved: 2025-08-22

## 2025-08-22 PROBLEM — R00.2 PALPITATIONS: Status: RESOLVED | Noted: 2021-12-05 | Resolved: 2025-08-22

## 2025-08-22 ASSESSMENT — PATIENT HEALTH QUESTIONNAIRE - PHQ9
SUM OF ALL RESPONSES TO PHQ QUESTIONS 1-9: 2
SUM OF ALL RESPONSES TO PHQ QUESTIONS 1-9: 2

## 2025-09-03 ENCOUNTER — OFFICE VISIT (OUTPATIENT)
Dept: NEUROLOGY | Facility: CLINIC | Age: 65
End: 2025-09-03
Attending: FAMILY MEDICINE
Payer: COMMERCIAL

## 2025-09-03 VITALS
HEIGHT: 70 IN | TEMPERATURE: 97.3 F | WEIGHT: 222 LBS | HEART RATE: 74 BPM | DIASTOLIC BLOOD PRESSURE: 90 MMHG | BODY MASS INDEX: 31.78 KG/M2 | SYSTOLIC BLOOD PRESSURE: 136 MMHG

## 2025-09-03 DIAGNOSIS — Z81.8 FAMILY HISTORY OF DEMENTIA: ICD-10-CM

## (undated) DEVICE — BAG CLEAR TRASH 1.3M 39X33" P4040C

## (undated) DEVICE — SU NDL MAYO 1824-4

## (undated) DEVICE — LINEN FULL SHEET 5511

## (undated) DEVICE — DRSG KERLIX 4 1/2"X4YDS ROLL 6730

## (undated) DEVICE — PACK TOTAL HIP RIDGES LATEX PO15HIFSG

## (undated) DEVICE — ESU BLADE PEAK PLASMA 3.0S PS210-030S

## (undated) DEVICE — LINEN GOWN X4 5410

## (undated) DEVICE — DECANTER TRANSFER DEVICE 2008S

## (undated) DEVICE — SUCTION MANIFOLD NEPTUNE 2 SYS 4 PORT 0702-020-000

## (undated) DEVICE — SU VICRYL 3-0 FS-1 27" J442H

## (undated) DEVICE — SOL NACL 0.9% IRRIG 1000ML BOTTLE 2F7124

## (undated) DEVICE — BLADE KNIFE SURG 10 371110

## (undated) DEVICE — LINEN HALF SHEET 5512

## (undated) DEVICE — GLOVE BIOGEL PI SZ 8.5 40885

## (undated) DEVICE — SYR BULB IRRIG 50ML LATEX FREE 0035280

## (undated) DEVICE — PREP CHLORAPREP 26ML TINTED HI-LITE ORANGE 930815

## (undated) DEVICE — BLADE KNIFE SURG 15 371115

## (undated) DEVICE — LINEN ORTHO PACK 5446

## (undated) DEVICE — SU ETHILON 3-0 FS-1 18" 663G

## (undated) DEVICE — LINEN TOWEL PACK X5 5464

## (undated) DEVICE — DRSG AQUACEL AG HYDROFIBER  3.5X10" 422605

## (undated) DEVICE — DRAPE STERI U 1015

## (undated) DEVICE — Device

## (undated) DEVICE — SU PLAIN FAST ABSORB 5-0 PC-1 18" 1915G

## (undated) DEVICE — CLOSURE SYS SKIN PREMIERPRO EXOFINFUSION 4X60CM 3473

## (undated) DEVICE — DRSG ABDOMINAL 07 1/2X8" 7197D

## (undated) DEVICE — PEN MARKING SKIN W/LABELS 31145918

## (undated) DEVICE — STRAP KNEE/BODY 31143004

## (undated) DEVICE — BNDG ELASTIC 6" DBL LENGTH UNSTERILE 6611-16

## (undated) DEVICE — DRSG KERLIX FLUFFS X5

## (undated) DEVICE — DRAIN JACKSON PRATT RESERVOIR 100ML SU130-1305

## (undated) DEVICE — GLOVE BIOGEL PI MICRO INDICATOR UNDERGLOVE SZ 8.0 48980

## (undated) DEVICE — STPL SKIN PROXIMATE 35 WIDE PMW35

## (undated) DEVICE — DRAIN JACKSON PRATT 15FR ROUND SU130-1323

## (undated) DEVICE — GOWN XLG DISP 9545

## (undated) DEVICE — IMM PILLOW ABDUCT HIP MED M60-025-M

## (undated) DEVICE — DRSG STERI STRIP 1/2X4" R1547

## (undated) DEVICE — LINEN ORTHO ACL PACK 5447

## (undated) DEVICE — WIPES FOLEY CARE SURESTEP PROVON DFC100

## (undated) DEVICE — DEVICE RETRIEVER HEWSON 71111579

## (undated) DEVICE — SUCTION MANIFOLD DORNOCH ULTRA CART UL-CL500

## (undated) DEVICE — GLOVE BIOGEL PI SZ 7.5 40875

## (undated) DEVICE — SUTURE VICRYL+ 0 CT-1 18" DYED VIO VCP740D

## (undated) DEVICE — SOL NACL 0.9% IRRIG 3000ML BAG 2B7477

## (undated) DEVICE — BNDG ELASTIC 6"X5YDS UNSTERILE 6611-60

## (undated) DEVICE — BLADE SAW SAGITTAL STRK 18X90X1.27MM HD SYS 6 6118-127-090

## (undated) DEVICE — SU VICRYL 2-0 CT-1 27" UND J259H

## (undated) DEVICE — GLOVE PROTEXIS MICRO 6.5  2D73PM65

## (undated) DEVICE — DRSG GAUZE 4X8" NON21842

## (undated) DEVICE — SU MONOCRYL 3-0 PS-1 27" Y936H

## (undated) DEVICE — SPONGE LAP 18X18" X8435

## (undated) DEVICE — HOOD FLYTE W/PEELAWAY 408-800-100

## (undated) DEVICE — DRAPE STERI TOWEL LG 1010

## (undated) DEVICE — ESU PENCIL W/SMOKE EVAC NEPTUNE STRYKER 0703-046-000

## (undated) DEVICE — SU VICRYL 4-0 PS-2 18" UND J496H

## (undated) DEVICE — ESU GROUND PAD UNIVERSAL W/O CORD

## (undated) DEVICE — POSITIONER HEAD DONUT FOAM 9" LF FP-HEAD9

## (undated) DEVICE — LIGHT HANDLE X2

## (undated) DEVICE — SET HANDPIECE INTERPULSE W/COAXIAL FAN SPRAY TIP 0210118000

## (undated) DEVICE — GLOVE BIOGEL PI MICRO INDICATOR UNDERGLOVE SZ 8.5 48985

## (undated) DEVICE — SOL ADH LIQUID BENZOIN SWAB 0.6ML C1544

## (undated) DEVICE — PAD CHUX UNDERPAD 30X36" P3036C

## (undated) DEVICE — DRAPE CONVERTORS U-DRAPE 60X72" 8476

## (undated) DEVICE — POSITIONER ARMBOARD FOAM 1PAIR LF FP-ARMB1

## (undated) DEVICE — SOL WATER IRRIG 1000ML BOTTLE 2F7114

## (undated) DEVICE — TUBING SUCTION MEDI-VAC 1/4"X20' N620A

## (undated) DEVICE — DRAPE LAP TRANSVERSE 29421

## (undated) DEVICE — EYE MARKING PAD 581057

## (undated) DEVICE — ESU PENCIL SMOKE EVAC W/ROCKER SWITCH 0703-047-000

## (undated) DEVICE — CATH TRAY FOLEY SURESTEP 16FR WDRAIN BAG STLK LATEX A300316A

## (undated) DEVICE — LINEN DRAPE 54X72" 5467

## (undated) DEVICE — SU VICRYL 0 CTX 36" UND J978H

## (undated) DEVICE — PREP TECHNI-CARE CHLOROXYLENOL 3% 4OZ BOTTLE C222-4ZWO

## (undated) DEVICE — SU VICRYL 4-0 P-3 18" UND  J494H

## (undated) DEVICE — ADH SKIN CLOSURE PREMIERPRO EXOFIN 1.0ML 3470

## (undated) DEVICE — SU VICRYL+ 1 MO-4 18" DYED VCP702D

## (undated) DEVICE — SU VICRYL 4-0 PS-1 18" UND J682G

## (undated) DEVICE — STPL SKIN 35W ROTATING HEAD PRW35

## (undated) RX ORDER — FENTANYL CITRATE-0.9 % NACL/PF 10 MCG/ML
PLASTIC BAG, INJECTION (ML) INTRAVENOUS
Status: DISPENSED
Start: 2021-01-08

## (undated) RX ORDER — LIDOCAINE HYDROCHLORIDE 20 MG/ML
INJECTION, SOLUTION EPIDURAL; INFILTRATION; INTRACAUDAL; PERINEURAL
Status: DISPENSED
Start: 2021-01-08

## (undated) RX ORDER — OXYCODONE HYDROCHLORIDE 5 MG/1
TABLET ORAL
Status: DISPENSED
Start: 2020-12-14

## (undated) RX ORDER — LIDOCAINE HYDROCHLORIDE 10 MG/ML
INJECTION, SOLUTION EPIDURAL; INFILTRATION; INTRACAUDAL; PERINEURAL
Status: DISPENSED
Start: 2022-09-14

## (undated) RX ORDER — TRANEXAMIC ACID 650 MG/1
TABLET ORAL
Status: DISPENSED
Start: 2024-09-24

## (undated) RX ORDER — OXYCODONE HYDROCHLORIDE 5 MG/1
TABLET ORAL
Status: DISPENSED
Start: 2021-01-08

## (undated) RX ORDER — SIMETHICONE 40MG/0.6ML
SUSPENSION, DROPS(FINAL DOSAGE FORM)(ML) ORAL
Status: DISPENSED
Start: 2017-05-15

## (undated) RX ORDER — PROPOFOL 10 MG/ML
INJECTION, EMULSION INTRAVENOUS
Status: DISPENSED
Start: 2021-01-08

## (undated) RX ORDER — TRIAMCINOLONE ACETONIDE 40 MG/ML
INJECTION, SUSPENSION INTRA-ARTICULAR; INTRAMUSCULAR
Status: DISPENSED
Start: 2024-11-15

## (undated) RX ORDER — CEFAZOLIN SODIUM 1 G/3ML
INJECTION, POWDER, FOR SOLUTION INTRAMUSCULAR; INTRAVENOUS
Status: DISPENSED
Start: 2020-12-14

## (undated) RX ORDER — FENTANYL CITRATE 50 UG/ML
INJECTION, SOLUTION INTRAMUSCULAR; INTRAVENOUS
Status: DISPENSED
Start: 2021-01-08

## (undated) RX ORDER — LIDOCAINE HYDROCHLORIDE 10 MG/ML
INJECTION, SOLUTION INFILTRATION; PERINEURAL
Status: DISPENSED
Start: 2022-03-16

## (undated) RX ORDER — CEFAZOLIN SODIUM 1 G/3ML
INJECTION, POWDER, FOR SOLUTION INTRAMUSCULAR; INTRAVENOUS
Status: DISPENSED
Start: 2021-01-08

## (undated) RX ORDER — TRIAMCINOLONE ACETONIDE 40 MG/ML
INJECTION, SUSPENSION INTRA-ARTICULAR; INTRAMUSCULAR
Status: DISPENSED
Start: 2022-09-14

## (undated) RX ORDER — DOBUTAMINE HYDROCHLORIDE 200 MG/100ML
INJECTION INTRAVENOUS
Status: DISPENSED
Start: 2021-12-06

## (undated) RX ORDER — EPHEDRINE SULFATE 50 MG/ML
INJECTION, SOLUTION INTRAMUSCULAR; INTRAVENOUS; SUBCUTANEOUS
Status: DISPENSED
Start: 2021-01-08

## (undated) RX ORDER — FENTANYL CITRATE 50 UG/ML
INJECTION, SOLUTION INTRAMUSCULAR; INTRAVENOUS
Status: DISPENSED
Start: 2017-05-15

## (undated) RX ORDER — KETOROLAC TROMETHAMINE 30 MG/ML
INJECTION, SOLUTION INTRAMUSCULAR; INTRAVENOUS
Status: DISPENSED
Start: 2024-09-24

## (undated) RX ORDER — CEFAZOLIN SODIUM 2 G/100ML
INJECTION, SOLUTION INTRAVENOUS
Status: DISPENSED
Start: 2021-01-08

## (undated) RX ORDER — DEXMEDETOMIDINE HYDROCHLORIDE 4 UG/ML
INJECTION, SOLUTION INTRAVENOUS
Status: DISPENSED
Start: 2024-09-24

## (undated) RX ORDER — FENTANYL CITRATE 50 UG/ML
INJECTION, SOLUTION INTRAMUSCULAR; INTRAVENOUS
Status: DISPENSED
Start: 2020-12-14

## (undated) RX ORDER — LIDOCAINE HYDROCHLORIDE 10 MG/ML
INJECTION, SOLUTION EPIDURAL; INFILTRATION; INTRACAUDAL; PERINEURAL
Status: DISPENSED
Start: 2024-09-24

## (undated) RX ORDER — BUPIVACAINE HYDROCHLORIDE 2.5 MG/ML
INJECTION, SOLUTION EPIDURAL; INFILTRATION; INTRACAUDAL
Status: DISPENSED
Start: 2020-12-14

## (undated) RX ORDER — LIDOCAINE HYDROCHLORIDE 10 MG/ML
INJECTION, SOLUTION EPIDURAL; INFILTRATION; INTRACAUDAL; PERINEURAL
Status: DISPENSED
Start: 2024-05-02

## (undated) RX ORDER — LIDOCAINE HYDROCHLORIDE 10 MG/ML
INJECTION, SOLUTION EPIDURAL; INFILTRATION; INTRACAUDAL; PERINEURAL
Status: DISPENSED
Start: 2025-07-24

## (undated) RX ORDER — PROPOFOL 10 MG/ML
INJECTION, EMULSION INTRAVENOUS
Status: DISPENSED
Start: 2024-09-24

## (undated) RX ORDER — LIDOCAINE HYDROCHLORIDE 10 MG/ML
INJECTION, SOLUTION EPIDURAL; INFILTRATION; INTRACAUDAL; PERINEURAL
Status: DISPENSED
Start: 2024-11-15

## (undated) RX ORDER — CEFAZOLIN SODIUM 2 G/100ML
INJECTION, SOLUTION INTRAVENOUS
Status: DISPENSED
Start: 2020-12-14

## (undated) RX ORDER — HYDROMORPHONE HYDROCHLORIDE 1 MG/ML
INJECTION, SOLUTION INTRAMUSCULAR; INTRAVENOUS; SUBCUTANEOUS
Status: DISPENSED
Start: 2020-12-14

## (undated) RX ORDER — LIDOCAINE HYDROCHLORIDE 10 MG/ML
INJECTION, SOLUTION EPIDURAL; INFILTRATION; INTRACAUDAL; PERINEURAL
Status: DISPENSED
Start: 2024-03-20

## (undated) RX ORDER — FENTANYL CITRATE-0.9 % NACL/PF 10 MCG/ML
PLASTIC BAG, INJECTION (ML) INTRAVENOUS
Status: DISPENSED
Start: 2024-09-24

## (undated) RX ORDER — CEFAZOLIN SODIUM/WATER 2 G/20 ML
SYRINGE (ML) INTRAVENOUS
Status: DISPENSED
Start: 2024-09-24

## (undated) RX ORDER — NITROGLYCERIN 0.4 MG/1
TABLET SUBLINGUAL
Status: DISPENSED
Start: 2021-12-06

## (undated) RX ORDER — TRIAMCINOLONE ACETONIDE 40 MG/ML
INJECTION, SUSPENSION INTRA-ARTICULAR; INTRAMUSCULAR
Status: DISPENSED
Start: 2024-03-20

## (undated) RX ORDER — ONDANSETRON 2 MG/ML
INJECTION INTRAMUSCULAR; INTRAVENOUS
Status: DISPENSED
Start: 2021-01-08

## (undated) RX ORDER — METHADONE HYDROCHLORIDE 10 MG/ML
INJECTION, SOLUTION INTRAMUSCULAR; INTRAVENOUS; SUBCUTANEOUS
Status: DISPENSED
Start: 2024-09-24

## (undated) RX ORDER — ACETAMINOPHEN 325 MG/1
TABLET ORAL
Status: DISPENSED
Start: 2024-09-24

## (undated) RX ORDER — TRIAMCINOLONE ACETONIDE 40 MG/ML
INJECTION, SUSPENSION INTRA-ARTICULAR; INTRAMUSCULAR
Status: DISPENSED
Start: 2025-07-24

## (undated) RX ORDER — ONDANSETRON 2 MG/ML
INJECTION INTRAMUSCULAR; INTRAVENOUS
Status: DISPENSED
Start: 2024-09-24

## (undated) RX ORDER — FENTANYL CITRATE 50 UG/ML
INJECTION, SOLUTION INTRAMUSCULAR; INTRAVENOUS
Status: DISPENSED
Start: 2017-04-20

## (undated) RX ORDER — METOPROLOL TARTRATE 1 MG/ML
INJECTION, SOLUTION INTRAVENOUS
Status: DISPENSED
Start: 2021-12-06

## (undated) RX ORDER — ATROPINE SULFATE 0.4 MG/ML
AMPUL (ML) INJECTION
Status: DISPENSED
Start: 2021-12-06

## (undated) RX ORDER — TRIAMCINOLONE ACETONIDE 40 MG/ML
INJECTION, SUSPENSION INTRA-ARTICULAR; INTRAMUSCULAR
Status: DISPENSED
Start: 2024-05-02

## (undated) RX ORDER — DEXAMETHASONE SODIUM PHOSPHATE 4 MG/ML
INJECTION, SOLUTION INTRA-ARTICULAR; INTRALESIONAL; INTRAMUSCULAR; INTRAVENOUS; SOFT TISSUE
Status: DISPENSED
Start: 2024-09-24

## (undated) RX ORDER — TRIAMCINOLONE ACETONIDE 40 MG/ML
INJECTION, SUSPENSION INTRA-ARTICULAR; INTRAMUSCULAR
Status: DISPENSED
Start: 2022-03-16

## (undated) RX ORDER — ACETAMINOPHEN 325 MG/1
TABLET ORAL
Status: DISPENSED
Start: 2021-01-08